# Patient Record
Sex: FEMALE | Race: WHITE | NOT HISPANIC OR LATINO | Employment: OTHER | URBAN - METROPOLITAN AREA
[De-identification: names, ages, dates, MRNs, and addresses within clinical notes are randomized per-mention and may not be internally consistent; named-entity substitution may affect disease eponyms.]

---

## 2017-01-09 DIAGNOSIS — R60.1 GENERALIZED EDEMA: ICD-10-CM

## 2017-01-09 RX ORDER — OMEGA-3-ACID ETHYL ESTERS 1 G/1
4 CAPSULE, LIQUID FILLED ORAL DAILY
Qty: 120 CAPSULE | Refills: 0 | Status: SHIPPED | OUTPATIENT
Start: 2017-01-09 | End: 2017-02-11 | Stop reason: SDUPTHER

## 2017-01-09 RX ORDER — FUROSEMIDE 20 MG/1
20 TABLET ORAL DAILY
Qty: 30 TABLET | Refills: 0 | Status: SHIPPED | OUTPATIENT
Start: 2017-01-09 | End: 2017-04-22 | Stop reason: SDUPTHER

## 2017-01-23 ENCOUNTER — OFFICE VISIT (OUTPATIENT)
Dept: INTERNAL MEDICINE | Facility: CLINIC | Age: 57
End: 2017-01-23

## 2017-01-23 VITALS
BODY MASS INDEX: 42.43 KG/M2 | SYSTOLIC BLOOD PRESSURE: 132 MMHG | HEIGHT: 66 IN | WEIGHT: 264 LBS | OXYGEN SATURATION: 94 % | DIASTOLIC BLOOD PRESSURE: 84 MMHG | HEART RATE: 89 BPM

## 2017-01-23 DIAGNOSIS — I10 ESSENTIAL HYPERTENSION: ICD-10-CM

## 2017-01-23 DIAGNOSIS — H72.91 RIGHT OTITIS MEDIA WITH SPONTANEOUS RUPTURE OF EARDRUM: ICD-10-CM

## 2017-01-23 DIAGNOSIS — E78.49 OTHER HYPERLIPIDEMIA: ICD-10-CM

## 2017-01-23 DIAGNOSIS — R05.9 COUGH: ICD-10-CM

## 2017-01-23 DIAGNOSIS — H66.91 RIGHT OTITIS MEDIA WITH SPONTANEOUS RUPTURE OF EARDRUM: ICD-10-CM

## 2017-01-23 DIAGNOSIS — H66.41 RECURRENT SUPPURATIVE OTITIS MEDIA, RIGHT: ICD-10-CM

## 2017-01-23 DIAGNOSIS — H72.91 PERFORATED RIGHT TYMPANIC MEMBRANE ON EXAMINATION: ICD-10-CM

## 2017-01-23 DIAGNOSIS — E11.8 TYPE 2 DIABETES MELLITUS WITH COMPLICATION, WITHOUT LONG-TERM CURRENT USE OF INSULIN (HCC): Primary | ICD-10-CM

## 2017-01-23 DIAGNOSIS — B37.31 VAGINAL YEAST INFECTION: ICD-10-CM

## 2017-01-23 DIAGNOSIS — G43.019 INTRACTABLE MIGRAINE WITHOUT AURA AND WITHOUT STATUS MIGRAINOSUS: ICD-10-CM

## 2017-01-23 DIAGNOSIS — K59.1 FUNCTIONAL DIARRHEA: ICD-10-CM

## 2017-01-23 DIAGNOSIS — R10.2 PELVIC PAIN: ICD-10-CM

## 2017-01-23 DIAGNOSIS — R35.0 URINARY FREQUENCY: ICD-10-CM

## 2017-01-23 DIAGNOSIS — M17.11 PRIMARY OSTEOARTHRITIS OF RIGHT KNEE: ICD-10-CM

## 2017-01-23 LAB
BILIRUB BLD-MCNC: NEGATIVE MG/DL
CLARITY, POC: CLEAR
COLOR UR: YELLOW
GLUCOSE BLDC GLUCOMTR-MCNC: 140 MG/DL (ref 70–130)
GLUCOSE UR STRIP-MCNC: NEGATIVE MG/DL
HBA1C MFR BLD: 6.6 %
KETONES UR QL: NEGATIVE
LEUKOCYTE EST, POC: NEGATIVE
NITRITE UR-MCNC: NEGATIVE MG/ML
PH UR: 7 [PH] (ref 5–8)
PROT UR STRIP-MCNC: NEGATIVE MG/DL
RBC # UR STRIP: NEGATIVE /UL
SP GR UR: 1.01 (ref 1–1.03)
UROBILINOGEN UR QL: NORMAL

## 2017-01-23 PROCEDURE — 81003 URINALYSIS AUTO W/O SCOPE: CPT | Performed by: HOSPITALIST

## 2017-01-23 PROCEDURE — 83036 HEMOGLOBIN GLYCOSYLATED A1C: CPT | Performed by: HOSPITALIST

## 2017-01-23 PROCEDURE — 82947 ASSAY GLUCOSE BLOOD QUANT: CPT | Performed by: HOSPITALIST

## 2017-01-23 PROCEDURE — 96372 THER/PROPH/DIAG INJ SC/IM: CPT | Performed by: HOSPITALIST

## 2017-01-23 PROCEDURE — 99214 OFFICE O/P EST MOD 30 MIN: CPT | Performed by: HOSPITALIST

## 2017-01-23 RX ORDER — KETOROLAC TROMETHAMINE 30 MG/ML
60 INJECTION, SOLUTION INTRAMUSCULAR; INTRAVENOUS ONCE
Status: COMPLETED | OUTPATIENT
Start: 2017-01-23 | End: 2017-01-23

## 2017-01-23 RX ORDER — FLUCONAZOLE 150 MG/1
150 TABLET ORAL ONCE
Qty: 2 TABLET | Refills: 0 | Status: SHIPPED | OUTPATIENT
Start: 2017-01-23 | End: 2017-01-23

## 2017-01-23 RX ORDER — CEFDINIR 300 MG/1
300 CAPSULE ORAL 2 TIMES DAILY
Qty: 20 CAPSULE | Refills: 0 | Status: SHIPPED | OUTPATIENT
Start: 2017-01-23 | End: 2017-06-12

## 2017-01-23 RX ADMIN — KETOROLAC TROMETHAMINE 60 MG: 30 INJECTION, SOLUTION INTRAMUSCULAR; INTRAVENOUS at 14:02
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
## 2017-01-23 NOTE — MR AVS SNAPSHOT
Saadia Caceres   1/23/2017 12:30 PM   Office Visit    Dept Phone:  [phone]   Encounter #:  [id number]    Provider:  Carmen Schmitz MD   Department:  [facility] INTERNAL MEDICINE AND ENDOCRINOLOGY [place]                Your Full Care Plan              Today's Medication Changes          These changes are accurate as of: 1/23/17  1:57 PM.  If you have any questions, ask your nurse or doctor.               New Medication(s)Ordered:     cefdinir 300 MG capsule   Commonly known as:  OMNICEF   Take 1 capsule by mouth 2 (Two) Times a Day.   Started by:  Carmen Schmitz MD         Medication(s)that have changed:     fluconazole 150 MG tablet   Commonly known as:  DIFLUCAN   Take 1 tablet by mouth 1 (One) Time for 1 dose. Repeat in 1 week if needed   What changed:  See the new instructions.   Changed by:  Carmen Schmitz MD         Stop taking medication(s)listed here:     amoxicillin 875 MG tablet   Commonly known as:  AMOXIL   Stopped by:  Carmen Schmitz MD           azithromycin 250 MG tablet   Commonly known as:  ZITHROMAX Z-MAGALY   Stopped by:  Carmen Schmitz MD                Where to Get Your Medications      These medications were sent to [facility] [address] [place] [address] [address] AT [facility] [address] - [phone] [facility] [phone]   [address] [facility] [facility] [address]     Phone:  [phone]     cefdinir 300 MG capsule    fluconazole 150 MG tablet                  Your Updated Medication List          This list is accurate as of: 1/23/17  1:57 PM.  Always use your most recent med list.                * albuterol (2.5 MG/3ML) 0.083% nebulizer solution   Commonly known as:  PROVENTIL       * albuterol 108 (90 BASE) MCG/ACT inhaler   Commonly known as:  PROAIR HFA   Inhale 2 puffs every 6 (six) hours as needed for wheezing.       ALPRAZolam 2 MG tablet   Commonly known as:  XANAX       B-D ULTRAFINE III SHORT PEN  31G X 8 MM misc   Generic drug:  Insulin Pen Needle   USE AS DIRECTED TWICE DAILY       * CLEM CONTOUR NEXT TEST test strip   Generic drug:  glucose blood   TEST FOUR TIMES DAILY AS DIRECTED       * CLEM CONTOUR NEXT TEST test strip   Generic drug:  glucose blood   TEST FOUR TIMES DAILY AS DIRECTED       bethanechol 25 MG tablet   Commonly known as:  URECHOLINE       budesonide-formoterol 160-4.5 MCG/ACT inhaler   Commonly known as:  SYMBICORT   Inhale 2 puffs 2 (two) times a day.       butalbital-acetaminophen-caffeine -40 MG per tablet   Commonly known as:  FIORICET, ESGIC   Take 1 tablet by mouth every 6 (six) hours as needed for headaches.       cefdinir 300 MG capsule   Commonly known as:  OMNICEF   Take 1 capsule by mouth 2 (Two) Times a Day.       cyclobenzaprine 10 MG tablet   Commonly known as:  FLEXERIL       dicyclomine 10 MG capsule   Commonly known as:  BENTYL   Take 1 capsule by mouth 4 (Four) Times a Day Before Meals & at Bedtime.       divalproex 500 MG 24 hr tablet   Commonly known as:  DEPAKOTE       estazolam 2 MG tablet   Commonly known as:  PROSOM       estradiol 2 MG tablet   Commonly known as:  ESTRACE   Take 1 tablet by mouth Daily.       fluconazole 150 MG tablet   Commonly known as:  DIFLUCAN   Take 1 tablet by mouth 1 (One) Time for 1 dose. Repeat in 1 week if needed       furosemide 20 MG tablet   Commonly known as:  LASIX   Take 1 tablet by mouth Daily.       levothyroxine 88 MCG tablet   Commonly known as:  SYNTHROID, LEVOTHROID       metFORMIN  MG 24 hr tablet   Commonly known as:  GLUCOPHAGE-XR       * MICROLET LANCETS misc   TEST FOUR TIMES DAILY AS DIRECTED       * MICROLET LANCETS misc   TEST FOUR TIMES DAILY AS DIRECTED       NOVOLOG MIX 70/30 FLEXPEN (70-30) 100 UNIT/ML suspension pen-injector injection   Generic drug:  insulin aspart prot & aspart   INJECT 21 UNITS SUBCUTANOULY EVERY MORNING AND 7 UNITS EVERY EVENING AS DIRECTED       nystatin 002510 UNIT/GM cream    Commonly known as:  MYCOSTATIN   APPLY THREE TIMES DAILY UNDER AFFECTED SKIN FOLDS       omega-3 acid ethyl esters 1 G capsule   Commonly known as:  LOVAZA   Take 4 capsules by mouth Daily.       omeprazole 20 MG capsule   Commonly known as:  priLOSEC   TAKE 1 CAPSULE BY MOUTH EVERY DAY 30 MINUTES BEFORE A MEAL ON AN EMPTY STOMACH       oxyCODONE-acetaminophen  MG per tablet   Commonly known as:  PERCOCET       promethazine 25 MG tablet   Commonly known as:  PHENERGAN       saccharomyces boulardii 250 MG capsule   Commonly known as:  FLORASTOR   Take 1 capsule by mouth 2 (two) times a day.       SUMAtriptan 50 MG tablet   Commonly known as:  IMITREX   Take one tablet at onset of headache. May repeat dose one time in 2 hours if headache not relieved.       traZODone 100 MG tablet   Commonly known as:  DESYREL       * Notice:  This list has 6 medication(s) that are the same as other medications prescribed for you. Read the directions carefully, and ask your doctor or other care provider to review them with you.            We Performed the Following     POC Glucose Fingerstick     POC Glycosylated Hemoglobin (Hb A1C)     POC Urinalysis Dipstick, Automated       You Were Diagnosed With        Codes Comments    Type 2 diabetes mellitus with complication, without long-term current use of insulin    -  Primary ICD-10-CM: E11.8  ICD-9-CM: 250.90     Urinary frequency     ICD-10-CM: R35.0  ICD-9-CM: 788.41     Intractable migraine without aura and without status migrainosus     ICD-10-CM: G43.019  ICD-9-CM: 346.11     Essential hypertension     ICD-10-CM: I10  ICD-9-CM: 401.9     Other hyperlipidemia     ICD-10-CM: E78.4  ICD-9-CM: 272.4     Cough     ICD-10-CM: R05  ICD-9-CM: 786.2     Functional diarrhea     ICD-10-CM: K59.1  ICD-9-CM: 564.5     Primary osteoarthritis of right knee     ICD-10-CM: M17.11  ICD-9-CM: 715.16     Pelvic pain     ICD-10-CM: R10.2  ICD-9-CM: GIZ7660     Right otitis media with spontaneous  "rupture of eardrum     ICD-10-CM: H66.91, H72.91  ICD-9-CM: 382.9     Recurrent suppurative otitis media, right     ICD-10-CM: H66.41  ICD-9-CM: 382.4     Perforated right tympanic membrane on examination     ICD-10-CM: H72.91  ICD-9-CM: 384.20     Vaginal yeast infection     ICD-10-CM: B37.3  ICD-9-CM: 112.1       Instructions     None    Patient Instructions History      Upcoming Appointments     Visit Type Date Time Department    FOLLOW UP 1/23/2017 12:30 PM [facility]      [facility] Signup     Our records indicate that you have declined [facility] signup. If you would like to sign up for [employer], please email [email or web address] or call [phone] to obtain an activation code.             Other Info from Your Visit           Allergies     Hydrocodone  Nausea Only    Ibuprofen  Nausea Only    Lortab [Hydrocodone-acetaminophen]      Ondansetron  Itching    Rosuvastatin        Reason for Visit     Type 2 diabetes mellitus with complication, without long-ter     Chronic obstructive pulmonary disease with acute exacerbatio     requesting a handicap parking permit     Urinary Incontinence     Neck Pain           Vital Signs     Blood Pressure Pulse Height Weight Oxygen Saturation Body Mass Index    132/84 89 66\" (167.6 cm) 264 lb (120 kg) 94% 42.61 kg/m2    Smoking Status                   Current Every Day Smoker           Problems and Diagnoses Noted     Pelvic pain    Cough    Diabetes    Diarrhea    High cholesterol or triglycerides    High blood pressure    Migraines    Degenerative arthritis of knee    Perforated right tympanic membrane on examination    Middle ear infection    Urinary frequency    Right otitis media with spontaneous rupture of eardrum        Vaginal yeast infection          No Longer an Issue     Migraines      Results     POC Glycosylated Hemoglobin (Hb A1C)      Component Value Standard Range & Units    Hemoglobin A1C 6.6 %                POC Glucose Fingerstick      " Component Value Standard Range & Units    Glucose 140 70 - 130 mg/dL                POC Urinalysis Dipstick, Automated      Component Value Standard Range & Units    Color Yellow Yellow, Straw, Dark Yellow, Emerald    Clarity, UA Clear Clear    Glucose, UA Negative Negative, 1000 mg/dL (3+) mg/dL    Bilirubin Negative Negative    Ketones, UA Negative Negative    Specific Gravity  1.015 1.005 - 1.030    Blood, UA Negative Negative    pH, Urine 7.0 5.0 - 8.0    Protein, POC Negative Negative mg/dL    Urobilinogen, UA Normal Normal    Leukocytes Negative Negative    Nitrite, UA Negative Negative

## 2017-02-03 ENCOUNTER — TELEPHONE (OUTPATIENT)
Dept: INTERNAL MEDICINE | Facility: CLINIC | Age: 57
End: 2017-02-03

## 2017-02-03 NOTE — TELEPHONE ENCOUNTER
Please Advise     ----- Message from Gabi Marinelli sent at 2/1/2017  9:49 AM EST -----  Contact: PATIENT   RE: RECENT FALL    PATIENT STATES SHE FELL RECENTLY AND WOULD LIKE TO KNOW IF SHE NEEDS TO BE SEEN BY DR CONTRERAS OR IF SHE NEEDS TO GO SEE HER PAIN MANAGEMENT DOCTOR. PLEASE ADVISE PATIENT.    CALL BACK #[phone]

## 2017-02-06 VITALS
HEART RATE: 82 BPM | TEMPERATURE: 97.7 F | BODY MASS INDEX: 41.78 KG/M2 | WEIGHT: 260 LBS | HEIGHT: 66 IN | DIASTOLIC BLOOD PRESSURE: 67 MMHG | RESPIRATION RATE: 22 BRPM | OXYGEN SATURATION: 93 % | SYSTOLIC BLOOD PRESSURE: 142 MMHG

## 2017-02-06 PROCEDURE — 99282 EMERGENCY DEPT VISIT SF MDM: CPT

## 2017-02-07 ENCOUNTER — APPOINTMENT (OUTPATIENT)
Dept: GENERAL RADIOLOGY | Facility: HOSPITAL | Age: 57
End: 2017-02-07

## 2017-02-07 ENCOUNTER — TELEPHONE (OUTPATIENT)
Dept: INTERNAL MEDICINE | Facility: CLINIC | Age: 57
End: 2017-02-07

## 2017-02-07 ENCOUNTER — HOSPITAL ENCOUNTER (EMERGENCY)
Facility: HOSPITAL | Age: 57
Discharge: HOME | End: 2017-02-07
Attending: EMERGENCY MEDICINE | Admitting: EMERGENCY MEDICINE

## 2017-02-07 DIAGNOSIS — S70.02XA CONTUSION OF LEFT HIP, INITIAL ENCOUNTER: ICD-10-CM

## 2017-02-07 DIAGNOSIS — S40.011A CONTUSION OF RIGHT SHOULDER, INITIAL ENCOUNTER: ICD-10-CM

## 2017-02-07 DIAGNOSIS — S40.012A CONTUSION OF LEFT SHOULDER, INITIAL ENCOUNTER: ICD-10-CM

## 2017-02-07 DIAGNOSIS — S80.01XA CONTUSION OF RIGHT KNEE, INITIAL ENCOUNTER: ICD-10-CM
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
## 2017-02-07 PROCEDURE — 72170 X-RAY EXAM OF PELVIS: CPT

## 2017-02-07 PROCEDURE — 73030 X-RAY EXAM OF SHOULDER: CPT

## 2017-02-07 PROCEDURE — 73560 X-RAY EXAM OF KNEE 1 OR 2: CPT

## 2017-02-07 NOTE — TELEPHONE ENCOUNTER
----- Message from Jena Ortiz sent at 2/6/2017  9:23 AM EST -----  THE PATIENT IS WANTED TO KNOW WHEN HER NEXT APPOINTMENT IS WITH HER COLONOSCOPY . THE PATIENT'S CALL BACK NUMBER [phone]
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
## 2017-02-07 NOTE — TELEPHONE ENCOUNTER
----- Message from Gabi Marinelli sent at 2/7/2017  9:43 AM EST -----  Contact: PATIENT   RE: RECENT FALL    PATIENT STATES SHE FELL SEVERAL DAYS AGO, SHE WENT TO ER TODAY. SHE WAS TOLD THAT SHE WAS BADLY BRUISED AND WOULD LIKE TO KNOW WHAT DR CONTRERAS WOULD LIKE FOR HER TO DO.      CALL BACK #[phone]

## 2017-02-07 NOTE — DISCHARGE INSTRUCTIONS
Please review the medications you are supposed to be taking according to prior physician recommendations. I have not changed your home medications during this visit. If your discharge instructions indicate that I have changed your home medications, this is not the case, and you should disregard. If you have any questions about the medication you should be taking at home, please call your physician.

## 2017-02-13 RX ORDER — OMEGA-3-ACID ETHYL ESTERS 1 G/1
CAPSULE, LIQUID FILLED ORAL
Qty: 120 CAPSULE | Refills: 0 | Status: SHIPPED | OUTPATIENT
Start: 2017-02-13 | End: 2017-04-11 | Stop reason: SDUPTHER

## 2017-02-13 RX ORDER — ESTRADIOL 2 MG/1
TABLET ORAL
Qty: 30 TABLET | Refills: 0 | Status: SHIPPED | OUTPATIENT
Start: 2017-02-13 | End: 2017-03-24 | Stop reason: SDUPTHER

## 2017-02-14 ENCOUNTER — TELEPHONE (OUTPATIENT)
Dept: INTERNAL MEDICINE | Facility: CLINIC | Age: 57
End: 2017-02-14

## 2017-02-14 NOTE — TELEPHONE ENCOUNTER
----- Message from Charlotte Barraza sent at 2/13/2017 11:00 AM EST -----  Contact: [phone]  The patient would like you to call her back at [phone]. She is having a lot of problems after her fall, she can't get around very well or clean her house or do anything. She was wanting to see if she could get a home health nurse out to her house to help her.

## 2017-02-15 DIAGNOSIS — Z91.81 HISTORY OF RECENT FALL: Primary | ICD-10-CM

## 2017-02-22 ENCOUNTER — TELEPHONE (OUTPATIENT)
Dept: ENDOCRINOLOGY | Facility: CLINIC | Age: 57
End: 2017-02-22

## 2017-02-22 ENCOUNTER — TELEPHONE (OUTPATIENT)
Dept: INTERNAL MEDICINE | Facility: CLINIC | Age: 57
End: 2017-02-22

## 2017-02-22 NOTE — TELEPHONE ENCOUNTER
----- Message from Carmen Schmitz MD sent at 2/21/2017  8:38 PM EST -----  Can give verbal order for PT/OT  ----- Message -----     From: Estefany DELGADO MA     Sent: 2/21/2017   4:24 PM       To: MD Dr. Ainsley Mendez pls advise on verbal orders for PT and OT.     ----- Message -----     From: Jena Ortiz     Sent: 2/21/2017  10:52 AM       To: SHANON Jenkins FROM [facility] WOULD LIKE TO GET A  ORDER FOR THE PATIENT TO RECEIVE PT AND OT FROM THEM. SHE STATES THAT THE PATIENT HAD AN ORDER BEFORE BUT IT HAD RAN OUT. DMITRI'S CALL BACK NUMBER [phone]

## 2017-02-22 NOTE — TELEPHONE ENCOUNTER
----- Message from Carmen Schmitz MD sent at 2/21/2017  8:38 PM EST -----  Can give verbal order for PT/OT  ----- Message -----     From: Estefany DELGADO MA     Sent: 2/21/2017   4:24 PM       To: MD Dr. Ainsley Mendez pls advise on verbal orders for PT and OT.     ----- Message -----     From: Jena Ortiz     Sent: 2/21/2017  10:52 AM       To: SHANON Jenkins FROM [facility] WOULD LIKE TO GET A  ORDER FOR THE PATIENT TO RECEIVE PT AND OT FROM THEM. SHE STATES THAT THE PATIENT HAD AN ORDER BEFORE BUT IT HAD RAN OUT. DMITRI'S CALL BACK NUMBER [phone]

## 2017-02-24 ENCOUNTER — TELEPHONE (OUTPATIENT)
Dept: INTERNAL MEDICINE | Facility: CLINIC | Age: 57
End: 2017-02-24

## 2017-02-24 DIAGNOSIS — Z79.899 MEDICATION MANAGEMENT: Primary | ICD-10-CM

## 2017-02-24 NOTE — TELEPHONE ENCOUNTER
2/24/17    Called Tarsha back and tried to give a verbal order but she said that she would not be able to take it since she is a PT.    Dr. Schmitz, can you put an order in and fax it over for a nurse to come out to the pt home please?

## 2017-02-24 NOTE — TELEPHONE ENCOUNTER
----- Message from Charlotte Barraza sent at 2/24/2017  3:32 PM EST -----  Tarsha from [facility] is calling to let dr alcocer know that she set her up for home health for the patient and she is going to see her for 6 visits to address her balance issues. She wanted to see if Dr. Alcocer could order nursing for the patient because she has her medications in disarray. Tarsha can be reached at [phone].

## 2017-03-15 ENCOUNTER — TELEPHONE (OUTPATIENT)
Dept: INTERNAL MEDICINE | Facility: CLINIC | Age: 57
End: 2017-03-15

## 2017-03-15 NOTE — TELEPHONE ENCOUNTER
3/15/17    Called and left a v/m stating the pt's provider has been out of the office and when she returns, we will get this paperwork signed and faxed back over.

## 2017-03-15 NOTE — TELEPHONE ENCOUNTER
----- Message from Daniela oFng sent at 3/13/2017 12:51 PM EDT -----  Contact: ERINN WITH [facility]  SHE IS CALLING STATING THEY FAXED US A MAP 10 FORM ON 03/07/2017 FOR DR. CONTRERAS TO SIGN AND TO HAVE DIAGNOSIS WITH THE ICD 10 CODES ON IT. SHE IS CALLING TO CHECK THE STATUS OF THIS FORM. YOU CAN REACH [phone]

## 2017-03-17 RX ORDER — DICYCLOMINE HYDROCHLORIDE 10 MG/1
CAPSULE ORAL
Qty: 120 CAPSULE | Refills: 0 | Status: SHIPPED | OUTPATIENT
Start: 2017-03-17 | End: 2017-06-28 | Stop reason: SDUPTHER

## 2017-03-21 ENCOUNTER — TELEPHONE (OUTPATIENT)
Dept: INTERNAL MEDICINE | Facility: CLINIC | Age: 57
End: 2017-03-21

## 2017-03-21 NOTE — TELEPHONE ENCOUNTER
3/21/17    Is it okay to do a verbal order for home health to go back out to the pt's home and help her with getting her meds back in order? If so, then for how long?    Please advise.

## 2017-03-24 RX ORDER — ESTRADIOL 2 MG/1
2 TABLET ORAL DAILY
Qty: 30 TABLET | Refills: 5 | Status: SHIPPED | OUTPATIENT
Start: 2017-03-24 | End: 2017-08-31 | Stop reason: SDUPTHER

## 2017-03-24 NOTE — TELEPHONE ENCOUNTER
Spoke with Nazia the Home Health Nurse and she states that she will send a paper over for Dr alcocer to sign but they will be unable to do indefinitely because of Patients medicare.

## 2017-03-28 ENCOUNTER — TELEPHONE (OUTPATIENT)
Dept: INTERNAL MEDICINE | Facility: CLINIC | Age: 57
End: 2017-03-28

## 2017-03-28 NOTE — TELEPHONE ENCOUNTER
----- Message from Jena Ortiz sent at 3/27/2017  9:39 AM EDT -----  HOSEA THE PATIENT'S NURSE CALLED IN REGARDS BECAUSE THE PATIENT HAS STOPPED TAKING HER BETHANECHOL MEDICATION . AND ALSO HOSEA HAS SOME QUESTIONS ABOUT THE PATIENT'S NOVOLG MEDICATION. HOSEA'S  CALL BACK NUMBER [phone]

## 2017-04-05 ENCOUNTER — TELEPHONE (OUTPATIENT)
Dept: INTERNAL MEDICINE | Facility: CLINIC | Age: 57
End: 2017-04-05

## 2017-04-05 NOTE — TELEPHONE ENCOUNTER
Tried to call patient back with no answer     ----- Message from Gabi Marinelli sent at 4/4/2017 11:42 AM EDT -----  Contact: PATIENT   MS GOMEZ CALLED TODAY, NOT SURE WHAT SHE WANTED, I DID MAKE HER AN APPT TO SEE DR CONTRERAS ON FRI 4/7. SHE STATES SHE IS FEELING DIZZY. HER SPEECH WAS SLURRED DURING OUR CONVERSATION. SHE STATES SHE WOULD LIKE A RETURN CALL.      CALL BACK #[phone]

## 2017-04-11 RX ORDER — OMEGA-3-ACID ETHYL ESTERS 1 G/1
CAPSULE, LIQUID FILLED ORAL
Qty: 120 CAPSULE | Refills: 0 | Status: SHIPPED | OUTPATIENT
Start: 2017-04-11 | End: 2017-05-30 | Stop reason: SDUPTHER

## 2017-04-22 DIAGNOSIS — R60.1 GENERALIZED EDEMA: ICD-10-CM

## 2017-04-24 ENCOUNTER — OFFICE VISIT (OUTPATIENT)
Dept: INTERNAL MEDICINE | Facility: CLINIC | Age: 57
End: 2017-04-24

## 2017-04-24 VITALS
OXYGEN SATURATION: 96 % | HEART RATE: 98 BPM | BODY MASS INDEX: 41.45 KG/M2 | WEIGHT: 256.8 LBS | SYSTOLIC BLOOD PRESSURE: 148 MMHG | DIASTOLIC BLOOD PRESSURE: 68 MMHG

## 2017-04-24 DIAGNOSIS — F32.9 REACTIVE DEPRESSION: ICD-10-CM

## 2017-04-24 DIAGNOSIS — R35.0 FREQUENCY OF URINATION: ICD-10-CM

## 2017-04-24 DIAGNOSIS — R53.83 OTHER FATIGUE: ICD-10-CM

## 2017-04-24 DIAGNOSIS — Z51.81 MEDICATION MONITORING ENCOUNTER: ICD-10-CM

## 2017-04-24 DIAGNOSIS — E03.8 OTHER SPECIFIED HYPOTHYROIDISM: ICD-10-CM

## 2017-04-24 DIAGNOSIS — E11.8 TYPE 2 DIABETES MELLITUS WITH COMPLICATION, WITHOUT LONG-TERM CURRENT USE OF INSULIN (HCC): ICD-10-CM

## 2017-04-24 DIAGNOSIS — E78.49 OTHER HYPERLIPIDEMIA: Primary | ICD-10-CM

## 2017-04-24 LAB
BASOPHILS # BLD AUTO: 0.03 10*3/MM3 (ref 0–0.2)
BASOPHILS NFR BLD AUTO: 0.3 % (ref 0–1)
DEPRECATED RDW RBC AUTO: 53.8 FL (ref 37–54)
EOSINOPHIL # BLD AUTO: 0.16 10*3/MM3 (ref 0.1–0.3)
EOSINOPHIL NFR BLD AUTO: 1.7 % (ref 0–3)
ERYTHROCYTE [DISTWIDTH] IN BLOOD BY AUTOMATED COUNT: 15.1 % (ref 11.3–14.5)
GLUCOSE BLDC GLUCOMTR-MCNC: 121 MG/DL (ref 70–130)
HBA1C MFR BLD: 6.6 %
HCT VFR BLD AUTO: 42.7 % (ref 34.5–44)
HGB BLD-MCNC: 13.7 G/DL (ref 11.5–15.5)
IMM GRANULOCYTES # BLD: 0.04 10*3/MM3 (ref 0–0.03)
IMM GRANULOCYTES NFR BLD: 0.4 % (ref 0–0.6)
LYMPHOCYTES # BLD AUTO: 3.94 10*3/MM3 (ref 0.6–4.8)
LYMPHOCYTES NFR BLD AUTO: 42.8 % (ref 24–44)
MCH RBC QN AUTO: 31.5 PG (ref 27–31)
MCHC RBC AUTO-ENTMCNC: 32.1 G/DL (ref 32–36)
MCV RBC AUTO: 98.2 FL (ref 80–99)
MONOCYTES # BLD AUTO: 0.59 10*3/MM3 (ref 0–1)
MONOCYTES NFR BLD AUTO: 6.4 % (ref 0–12)
NEUTROPHILS # BLD AUTO: 4.45 10*3/MM3 (ref 1.5–8.3)
NEUTROPHILS NFR BLD AUTO: 48.4 % (ref 41–71)
PLATELET # BLD AUTO: 230 10*3/MM3 (ref 150–450)
PMV BLD AUTO: 9.1 FL (ref 6–12)
RBC # BLD AUTO: 4.35 10*6/MM3 (ref 3.89–5.14)
TSH SERPL DL<=0.05 MIU/L-ACNC: 1.8 MIU/ML (ref 0.35–5.35)
VALPROATE SERPL-MCNC: 58 MCG/ML (ref 50–150)
VIT B12 BLD-MCNC: 488 PG/ML (ref 211–911)
WBC NRBC COR # BLD: 9.21 10*3/MM3 (ref 3.5–10.8)

## 2017-04-24 PROCEDURE — 99214 OFFICE O/P EST MOD 30 MIN: CPT | Performed by: HOSPITALIST

## 2017-04-24 PROCEDURE — 83036 HEMOGLOBIN GLYCOSYLATED A1C: CPT | Performed by: HOSPITALIST

## 2017-04-24 PROCEDURE — 82607 VITAMIN B-12: CPT | Performed by: HOSPITALIST

## 2017-04-24 PROCEDURE — 84443 ASSAY THYROID STIM HORMONE: CPT | Performed by: HOSPITALIST

## 2017-04-24 PROCEDURE — 82947 ASSAY GLUCOSE BLOOD QUANT: CPT | Performed by: HOSPITALIST

## 2017-04-24 PROCEDURE — 80164 ASSAY DIPROPYLACETIC ACD TOT: CPT | Performed by: HOSPITALIST

## 2017-04-24 PROCEDURE — 85025 COMPLETE CBC W/AUTO DIFF WBC: CPT | Performed by: HOSPITALIST

## 2017-04-24 RX ORDER — BETHANECHOL CHLORIDE 25 MG/1
25 TABLET ORAL 3 TIMES DAILY
Qty: 90 TABLET | Refills: 1 | Status: SHIPPED | OUTPATIENT
Start: 2017-04-24 | End: 2017-08-01 | Stop reason: SINTOL

## 2017-04-24 RX ORDER — FUROSEMIDE 20 MG/1
TABLET ORAL
Qty: 90 TABLET | Refills: 0 | Status: SHIPPED | OUTPATIENT
Start: 2017-04-24 | End: 2017-10-06 | Stop reason: SDUPTHER

## 2017-04-24 RX ORDER — BUPROPION HYDROCHLORIDE 150 MG/1
150 TABLET ORAL DAILY
Qty: 30 TABLET | Refills: 3 | Status: SHIPPED | OUTPATIENT
Start: 2017-04-24 | End: 2017-07-06 | Stop reason: ALTCHOICE

## 2017-04-24 NOTE — PROGRESS NOTES
Subjective   Saadia Caceres is a 56 y.o. female. Hyperlipidemia; Hypertension; Diabetes; Hypothyroidism; and Cough         HPI Comments: She is tired and not sleeping well. She has recently lost her brother 10 days ago. She is still crying and grieving . She is Dr. Schaffer's patient. She has been in contact with him. She is also worried about her granddaughter who is very ill. The patient is having frequency and dysuria. She is also fatigued and feels like sleeping all the time.    Hyperlipidemia   This is a chronic problem. The current episode started more than 1 year ago. Exacerbating diseases include hypothyroidism. Pertinent negatives include no chest pain.   Hypertension   Pertinent negatives include no chest pain.   Diabetes   Pertinent negatives for hypoglycemia include no confusion or seizures. Pertinent negatives for diabetes include no chest pain.   Hypothyroidism   Associated symptoms include coughing. Pertinent negatives include no abdominal pain, chest pain, chills, diaphoresis or numbness.   Cough   Pertinent negatives include no chest pain, chills or eye redness.       The following portions of the patient's history were reviewed and updated as appropriate: allergies, current medications, past family history, past medical history, past social history, past surgical history and problem list.    Review of Systems   Constitutional: Negative for chills and diaphoresis.   Eyes: Negative for pain and redness.   Respiratory: Positive for cough.    Cardiovascular: Negative for chest pain and leg swelling.   Gastrointestinal: Positive for diarrhea. Negative for abdominal distention, abdominal pain, anal bleeding and blood in stool.   Neurological: Negative for seizures and numbness.   Psychiatric/Behavioral: Negative for behavioral problems and confusion.       Objective   Vitals:    04/24/17 1411   BP: 148/68   Pulse: 98   SpO2: 96%       Physical Exam   Constitutional: She is oriented to person, place,  and time. She appears well-developed and well-nourished.   HENT:   Head: Normocephalic and atraumatic.   Eyes: Conjunctivae and EOM are normal. Pupils are equal, round, and reactive to light.   Neck: Normal range of motion. Neck supple.   Cardiovascular: Normal rate, regular rhythm, normal heart sounds and intact distal pulses.    Pulmonary/Chest: Effort normal and breath sounds normal.   Abdominal: Soft. Bowel sounds are normal.   Neurological: She is alert and oriented to person, place, and time.   Skin: Skin is warm and dry.   Psychiatric: Her behavior is normal. Judgment and thought content normal.       Assessment/Plan   [place] was seen today for hyperlipidemia, hypertension, diabetes, hypothyroidism and cough.    Diagnoses and all orders for this visit:    Other hyperlipidemia  -     Lipid Panel; Future    Frequency of urination  -     POCT urinalysis dipstick, automated  -     bethanechol (URECHOLINE) 25 MG tablet; Take 1 tablet by mouth 3 (Three) Times a Day.    Type 2 diabetes mellitus with complication, without long-term current use of insulin    Other specified hypothyroidism  -     TSH    Other fatigue  -     CBC & Differential  -     Vitamin B12    Reactive depression  -     buPROPion XL (WELLBUTRIN XL) 150 MG 24 hr tablet; Take 1 tablet by mouth Daily.    Medication monitoring encounter  -     Valproic Acid Level, Total

## 2017-04-28 ENCOUNTER — TELEPHONE (OUTPATIENT)
Dept: INTERNAL MEDICINE | Facility: CLINIC | Age: 57
End: 2017-04-28

## 2017-04-28 DIAGNOSIS — R21 RASH: Primary | ICD-10-CM

## 2017-04-28 NOTE — TELEPHONE ENCOUNTER
----- Message from Daniela Fong sent at 4/28/2017 12:06 PM EDT -----  Contact: PATIENT  PATIENT IS CALLING STATING SHE HAS A REAL BAD RASH IN THE GROWN AREA AND AROUND BELLY. SHE SAYS ITS VERY PAINFUL. SHE IS WANTING TO SEE IF WE CAN CALL SOMETHING IN TO HELP WITH THIS RASH AND PAIN. PATIENT CAN BE REACHED [phone].

## 2017-04-28 NOTE — TELEPHONE ENCOUNTER
4/28/17    Can you send in a rx for this pt or do you want her to go to [facility]?

## 2017-05-01 RX ORDER — NYSTATIN 100000 U/G
CREAM TOPICAL
Qty: 60 G | Refills: 0 | Status: SHIPPED | OUTPATIENT
Start: 2017-05-01 | End: 2017-08-31

## 2017-05-01 RX ORDER — IODOQUINOL, HYDROCORTISONE ACETATE AND ALOE VERA LEAF 10; 20; 10 MG/G; MG/G; MG/G
0.5 GEL TOPICAL 3 TIMES DAILY
Qty: 30 G | Refills: 1 | Status: SHIPPED | OUTPATIENT
Start: 2017-05-01 | End: 2017-05-05

## 2017-05-05 ENCOUNTER — TELEPHONE (OUTPATIENT)
Dept: INTERNAL MEDICINE | Facility: CLINIC | Age: 57
End: 2017-05-05

## 2017-05-08 RX ORDER — OMEPRAZOLE 20 MG/1
CAPSULE, DELAYED RELEASE ORAL
Qty: 30 CAPSULE | Refills: 0 | Status: SHIPPED | OUTPATIENT
Start: 2017-05-08 | End: 2017-06-05 | Stop reason: SDUPTHER

## 2017-05-30 RX ORDER — OMEGA-3-ACID ETHYL ESTERS 1 G/1
CAPSULE, LIQUID FILLED ORAL
Qty: 120 CAPSULE | Refills: 0 | Status: SHIPPED | OUTPATIENT
Start: 2017-05-30 | End: 2017-07-02 | Stop reason: SDUPTHER

## 2017-06-05 RX ORDER — OMEPRAZOLE 20 MG/1
CAPSULE, DELAYED RELEASE ORAL
Qty: 30 CAPSULE | Refills: 0 | Status: SHIPPED | OUTPATIENT
Start: 2017-06-05 | End: 2017-06-28 | Stop reason: SDUPTHER

## 2017-06-12 ENCOUNTER — OFFICE VISIT (OUTPATIENT)
Dept: INTERNAL MEDICINE | Facility: CLINIC | Age: 57
End: 2017-06-12

## 2017-06-12 VITALS
HEART RATE: 76 BPM | SYSTOLIC BLOOD PRESSURE: 122 MMHG | DIASTOLIC BLOOD PRESSURE: 68 MMHG | BODY MASS INDEX: 40.87 KG/M2 | WEIGHT: 253.2 LBS

## 2017-06-12 DIAGNOSIS — G43.011 INTRACTABLE MIGRAINE WITHOUT AURA AND WITH STATUS MIGRAINOSUS: ICD-10-CM

## 2017-06-12 DIAGNOSIS — R30.9 PAINFUL URINATION: Primary | ICD-10-CM

## 2017-06-12 DIAGNOSIS — B37.2 CUTANEOUS CANDIDIASIS: ICD-10-CM

## 2017-06-12 LAB
BILIRUB BLD-MCNC: NEGATIVE MG/DL
CLARITY, POC: ABNORMAL
COLOR UR: YELLOW
GLUCOSE UR STRIP-MCNC: NEGATIVE MG/DL
KETONES UR QL: NEGATIVE
LEUKOCYTE EST, POC: ABNORMAL
NITRITE UR-MCNC: NEGATIVE MG/ML
PH UR: 6 [PH] (ref 5–8)
PROT UR STRIP-MCNC: NEGATIVE MG/DL
RBC # UR STRIP: NEGATIVE /UL
SP GR UR: 1.02 (ref 1–1.03)
UROBILINOGEN UR QL: ABNORMAL

## 2017-06-12 PROCEDURE — 87186 SC STD MICRODIL/AGAR DIL: CPT | Performed by: PHYSICIAN ASSISTANT

## 2017-06-12 PROCEDURE — 99213 OFFICE O/P EST LOW 20 MIN: CPT | Performed by: PHYSICIAN ASSISTANT

## 2017-06-12 PROCEDURE — 87077 CULTURE AEROBIC IDENTIFY: CPT | Performed by: PHYSICIAN ASSISTANT

## 2017-06-12 PROCEDURE — 96372 THER/PROPH/DIAG INJ SC/IM: CPT | Performed by: PHYSICIAN ASSISTANT

## 2017-06-12 PROCEDURE — 87086 URINE CULTURE/COLONY COUNT: CPT | Performed by: PHYSICIAN ASSISTANT

## 2017-06-12 PROCEDURE — 81003 URINALYSIS AUTO W/O SCOPE: CPT | Performed by: PHYSICIAN ASSISTANT

## 2017-06-12 RX ORDER — PROMETHAZINE HYDROCHLORIDE 25 MG/ML
12.5 INJECTION, SOLUTION INTRAMUSCULAR; INTRAVENOUS ONCE
Status: DISCONTINUED | OUTPATIENT
Start: 2017-06-12 | End: 2017-06-12

## 2017-06-12 RX ORDER — PROMETHAZINE HYDROCHLORIDE 25 MG/ML
25 INJECTION, SOLUTION INTRAMUSCULAR; INTRAVENOUS ONCE
Status: COMPLETED | OUTPATIENT
Start: 2017-06-12 | End: 2017-06-12

## 2017-06-12 RX ORDER — KETOROLAC TROMETHAMINE 30 MG/ML
60 INJECTION, SOLUTION INTRAMUSCULAR; INTRAVENOUS ONCE
Status: COMPLETED | OUTPATIENT
Start: 2017-06-12 | End: 2017-06-12

## 2017-06-12 RX ADMIN — KETOROLAC TROMETHAMINE 60 MG: 30 INJECTION, SOLUTION INTRAMUSCULAR; INTRAVENOUS at 13:07

## 2017-06-12 RX ADMIN — PROMETHAZINE HYDROCHLORIDE 25 MG: 25 INJECTION, SOLUTION INTRAMUSCULAR; INTRAVENOUS at 13:08

## 2017-06-12 NOTE — PROGRESS NOTES
Chief Complaint   Patient presents with   • Rash     breast and groin   • Migraine   • Difficulty Urinating       Subjective   Saadia Caceres is a 56 y.o. female.       History of Present Illness     Pt has had migraine for several days. Has long history of migraines that come and go. Has taken imitrex in the past but was told that she needed a refill. Took a xanax but has not taken anything else for her migraine.    She is having some cutaneous flare. She was not able to get the dermazone cream covered. Has used a medicated cream that is orange and starts with an S, not sure of the name.    Has history of recurrent UTI, having some dysuria.    Current Outpatient Prescriptions:   •  albuterol (PROAIR HFA) 108 (90 BASE) MCG/ACT inhaler, Inhale 2 puffs every 6 (six) hours as needed for wheezing., Disp: 1 inhaler, Rfl: 5  •  albuterol (PROVENTIL) (2.5 MG/3ML) 0.083% nebulizer solution, Albuterol Sulfate (2.5 MG/3ML) 0.083% Inhalation Nebulization Solution; Patient Sig: Albuterol Sulfate (2.5 MG/3ML) 0.083% Inhalation Nebulization Solution USE 1 VIAL PER NEBULIZER EVERY 4 TO 6 HOURS AS NEEDED; 540; 1; 24-Jan-2013; Active, Disp: , Rfl:   •  ALPRAZolam (XANAX) 2 MG tablet, TK 1 T PO QID AND 1 PRN MAX OF 5 PER DAY, Disp: , Rfl: 5  •  B-D ULTRAFINE III SHORT PEN 31G X 8 MM misc, USE AS DIRECTED TWICE DAILY, Disp: 100 each, Rfl: 6  •  CLEM CONTOUR NEXT TEST test strip, TEST FOUR TIMES DAILY AS DIRECTED, Disp: 100 each, Rfl: 1  •  bethanechol (URECHOLINE) 25 MG tablet, Take 1 tablet by mouth 3 (Three) Times a Day., Disp: 90 tablet, Rfl: 1  •  budesonide-formoterol (SYMBICORT) 160-4.5 MCG/ACT inhaler, Inhale 2 puffs 2 (two) times a day., Disp: 10.2 g, Rfl: 5  •  buPROPion XL (WELLBUTRIN XL) 150 MG 24 hr tablet, Take 1 tablet by mouth Daily., Disp: 30 tablet, Rfl: 3  •  butalbital-acetaminophen-caffeine (FIORICET, ESGIC) -40 MG per tablet, Take 1 tablet by mouth every 6 (six) hours as needed for headaches., Disp: 20  tablet, Rfl: 0  •  cyclobenzaprine (FLEXERIL) 10 MG tablet, TK 1 T PO  BID PRF MUSCLE SPASM, Disp: , Rfl: 5  •  dicyclomine (BENTYL) 10 MG capsule, TAKE 1 CAPSULE BY MOUTH FOUR TIMES DAILY BEFORE MEALS AND AT BEDTIME, Disp: 120 capsule, Rfl: 0  •  divalproex (DEPAKOTE) 500 MG 24 hr tablet, TK 4 TS PO QHS, Disp: , Rfl: 5  •  estazolam (PROSOM) 2 MG tablet, , Disp: , Rfl:   •  estradiol (ESTRACE) 2 MG tablet, Take 1 tablet by mouth Daily., Disp: 30 tablet, Rfl: 5  •  furosemide (LASIX) 20 MG tablet, TAKE 1 TABLET BY MOUTH DAILY, Disp: 90 tablet, Rfl: 0  •  Iodoquinol-HC (DERMAZENE) 1 % cream, Apply sparingly to affected areas three times a day., Disp: 28.4 g, Rfl: 1  •  levothyroxine (SYNTHROID, LEVOTHROID) 88 MCG tablet, TK 1 T PO QD, Disp: , Rfl: 3  •  metFORMIN XR (GLUCOPHAGE-XR) 500 MG 24 hr tablet, TK 2 TS PO QD, Disp: , Rfl: 3  •  MICROLET LANCETS misc, TEST FOUR TIMES DAILY AS DIRECTED, Disp: 100 each, Rfl: 0  •  NOVOLOG MIX 70/30 FLEXPEN (70-30) 100 UNIT/ML suspension pen-injector injection, INJECT 21 UNITS SUBCUTANOULY EVERY MORNING AND 7 UNITS EVERY EVENING AS DIRECTED, Disp: 15 mL, Rfl: 2  •  nystatin (MYCOSTATIN) 590836 UNIT/GM cream, APPLY THREE TIMES DAILY UNDER AFFECTED SKIN FOLDS, Disp: 60 g, Rfl: 0  •  omega-3 acid ethyl esters (LOVAZA) 1 G capsule, TAKE 4 CAPSULES BY MOUTH DAILY, Disp: 120 capsule, Rfl: 0  •  omeprazole (priLOSEC) 20 MG capsule, TAKE 1 CAPSULE BY MOUTH EVERY DAY 30 MINUTES BEFORE A MEAL ON AN EMPTY STOMACH, Disp: 30 capsule, Rfl: 0  •  oxyCODONE-acetaminophen (PERCOCET)  MG per tablet, TK 1 T PO TID PRN, Disp: , Rfl: 0  •  promethazine (PHENERGAN) 25 MG tablet, TK 1 T PO  TID PRN, Disp: , Rfl: 3  •  saccharomyces boulardii (FLORASTOR) 250 MG capsule, Take 1 capsule by mouth 2 (two) times a day., Disp: 30 capsule, Rfl: 5  •  SUMAtriptan (IMITREX) 50 MG tablet, Take one tablet at onset of headache. May repeat dose one time in 2 hours if headache not relieved., Disp: 9 tablet,  Rfl: 11  •  traZODone (DESYREL) 100 MG tablet, TK 4 TS PO QHS, Disp: , Rfl: 2  No current facility-administered medications for this visit.      PMFSH  The following portions of the patient's history were reviewed and updated as appropriate: allergies, current medications, past family history, past medical history, past social history, past surgical history and problem list.    Review of Systems   Constitutional: Negative for activity change, fatigue and unexpected weight change.   HENT: Negative for dental problem, ear pain, nosebleeds and sore throat.    Eyes: Negative for pain and discharge.   Respiratory: Negative for chest tightness, shortness of breath and wheezing.    Gastrointestinal: Negative for abdominal pain and blood in stool.   Endocrine: Negative.    Genitourinary: Positive for dysuria. Negative for difficulty urinating and hematuria.   Musculoskeletal: Negative for joint swelling.   Skin: Positive for rash. Negative for color change, pallor and wound.   Allergic/Immunologic: Negative.    Neurological: Positive for headaches. Negative for tremors, seizures, syncope, facial asymmetry, speech difficulty and numbness.   Hematological: Negative for adenopathy.   Psychiatric/Behavioral: Negative for agitation, confusion, sleep disturbance and suicidal ideas.       Objective   /68  Pulse 76  Wt 253 lb 3.2 oz (115 kg)  BMI 40.87 kg/m2    Physical Exam   Constitutional: She is oriented to person, place, and time. She appears well-developed and well-nourished.  Non-toxic appearance. No distress.   HENT:   Head: Normocephalic and atraumatic. Head is without right periorbital erythema and without left periorbital erythema.   Nose: Nose normal.   Mouth/Throat: Oropharynx is clear and moist.   Eyes: Conjunctivae and EOM are normal. Pupils are equal, round, and reactive to light. Right eye exhibits no discharge. Left eye exhibits no discharge. No scleral icterus.   Neck: Normal range of motion. Neck  supple.   Cardiovascular: Normal rate, regular rhythm and normal heart sounds.    No murmur heard.  Pulmonary/Chest: Effort normal.   Abdominal: Soft. There is no tenderness.   Musculoskeletal: Normal range of motion. She exhibits no tenderness or deformity.   Neurological: She is alert and oriented to person, place, and time. She has normal reflexes. She displays no atrophy, no tremor and normal reflexes. No cranial nerve deficit. She exhibits normal muscle tone. Coordination normal.   Reflex Scores:       Tricep reflexes are 2+ on the right side and 2+ on the left side.       Bicep reflexes are 2+ on the right side and 2+ on the left side.       Brachioradialis reflexes are 2+ on the right side and 2+ on the left side.       Patellar reflexes are 2+ on the right side and 2+ on the left side.       Achilles reflexes are 2+ on the right side and 2+ on the left side.  Skin: Skin is warm and dry. No rash noted. She is not diaphoretic. No erythema.   Slight erythema under bilateral breasts and bilateral groin- no visible rash   Psychiatric: She has a normal mood and affect. Her behavior is normal. Judgment and thought content normal.   Nursing note and vitals reviewed.      Results for orders placed or performed in visit on 06/12/17   POCT urinalysis dipstick, automated   Result Value Ref Range    Color Yellow Yellow, Straw, Dark Yellow, Emerald    Clarity, UA Cloudy (A) Clear    Glucose, UA Negative Negative, 1000 mg/dL (3+) mg/dL    Bilirubin Negative Negative    Ketones, UA Negative Negative    Specific Gravity  1.025 1.005 - 1.030    Blood, UA Negative Negative    pH, Urine 6.0 5.0 - 8.0    Protein, POC Negative Negative mg/dL    Urobilinogen, UA 1 E.U./dL  (A) Normal    Leukocytes 25 Drea/ul (A) Negative    Nitrite, UA Negative Negative        ASSESSMENT/PLAN    Problem List Items Addressed This Visit        Cardiovascular and Mediastinum    Intractable migraine without aura and with status migrainosus     Toradol 60  mg IM and phenergan 25 mg IM in office.            Relevant Medications    ketorolac (TORADOL) injection 60 mg (Completed)    promethazine (PHENERGAN) injection 25 mg (Completed)       Musculoskeletal and Integument    Cutaneous candidiasis     Will contact pharmacy to determine what cream pt has used in the past.           Other Visit Diagnoses     Painful urination    -  Primary    Relevant Orders    POCT urinalysis dipstick, automated (Completed)    Urine Culture               Return if symptoms worsen or fail to improve.

## 2017-06-14 LAB — BACTERIA SPEC AEROBE CULT: ABNORMAL

## 2017-06-14 RX ORDER — NITROFURANTOIN 25; 75 MG/1; MG/1
100 CAPSULE ORAL 2 TIMES DAILY
Qty: 14 CAPSULE | Refills: 0 | Status: SHIPPED | OUTPATIENT
Start: 2017-06-14 | End: 2017-07-06

## 2017-06-15 ENCOUNTER — TELEPHONE (OUTPATIENT)
Dept: INTERNAL MEDICINE | Facility: CLINIC | Age: 57
End: 2017-06-15

## 2017-06-15 NOTE — TELEPHONE ENCOUNTER
THE PATIENT STATES THAT SHE DIDN'T GET TO MENTION THAT SHE IS HAVING REALLY BAD HEADACHES AGAIN. SHE STATES THAT SHE IS GOING NEED SOMETHING TO TAKE FOR THEM SHE STATES THAT THERE AWFUL .THE PATIENT WOULD LIKE TO GET A CALL BACK -[phone]

## 2017-06-16 DIAGNOSIS — G43.019 INTRACTABLE MIGRAINE WITHOUT AURA AND WITHOUT STATUS MIGRAINOSUS: ICD-10-CM

## 2017-06-16 RX ORDER — SUMATRIPTAN 50 MG/1
50 TABLET, FILM COATED ORAL
Qty: 9 TABLET | Refills: 5 | Status: SHIPPED | OUTPATIENT
Start: 2017-06-16 | End: 2017-10-31 | Stop reason: SDUPTHER

## 2017-06-16 RX ORDER — METFORMIN HYDROCHLORIDE 500 MG/1
TABLET, EXTENDED RELEASE ORAL
Qty: 180 TABLET | Refills: 0 | Status: SHIPPED | OUTPATIENT
Start: 2017-06-16 | End: 2017-07-06 | Stop reason: SINTOL

## 2017-06-16 NOTE — TELEPHONE ENCOUNTER
6/16/17    Sent rx over to pharmacy since they claimed they did not have refills remaining on this.

## 2017-06-28 RX ORDER — DICYCLOMINE HYDROCHLORIDE 10 MG/1
CAPSULE ORAL
Qty: 120 CAPSULE | Refills: 0 | Status: SHIPPED | OUTPATIENT
Start: 2017-06-28 | End: 2017-07-07 | Stop reason: SDUPTHER

## 2017-06-28 RX ORDER — OMEPRAZOLE 20 MG/1
CAPSULE, DELAYED RELEASE ORAL
Qty: 30 CAPSULE | Refills: 0 | Status: SHIPPED | OUTPATIENT
Start: 2017-06-28 | End: 2017-07-29 | Stop reason: SDUPTHER

## 2017-07-03 RX ORDER — OMEGA-3-ACID ETHYL ESTERS 1 G/1
CAPSULE, LIQUID FILLED ORAL
Qty: 120 CAPSULE | Refills: 0 | Status: SHIPPED | OUTPATIENT
Start: 2017-07-03 | End: 2017-09-15 | Stop reason: SDUPTHER

## 2017-07-06 ENCOUNTER — OFFICE VISIT (OUTPATIENT)
Dept: INTERNAL MEDICINE | Facility: CLINIC | Age: 57
End: 2017-07-06

## 2017-07-06 VITALS
SYSTOLIC BLOOD PRESSURE: 128 MMHG | BODY MASS INDEX: 40.82 KG/M2 | WEIGHT: 254 LBS | DIASTOLIC BLOOD PRESSURE: 70 MMHG | HEART RATE: 70 BPM | OXYGEN SATURATION: 96 % | HEIGHT: 66 IN

## 2017-07-06 DIAGNOSIS — R41.3 MEMORY CHANGE: ICD-10-CM

## 2017-07-06 DIAGNOSIS — K59.1 FUNCTIONAL DIARRHEA: Primary | ICD-10-CM

## 2017-07-06 DIAGNOSIS — Z91.89 AT RISK FOR POLYPHARMACY: ICD-10-CM

## 2017-07-06 DIAGNOSIS — F41.9 ANXIETY: ICD-10-CM

## 2017-07-06 DIAGNOSIS — G43.011 INTRACTABLE MIGRAINE WITHOUT AURA AND WITH STATUS MIGRAINOSUS: ICD-10-CM

## 2017-07-06 DIAGNOSIS — E11.8 TYPE 2 DIABETES MELLITUS WITH COMPLICATION, WITHOUT LONG-TERM CURRENT USE OF INSULIN (HCC): ICD-10-CM

## 2017-07-06 PROCEDURE — 99214 OFFICE O/P EST MOD 30 MIN: CPT | Performed by: NURSE PRACTITIONER

## 2017-07-06 RX ORDER — LANCETS
1 EACH MISCELLANEOUS DAILY
Qty: 100 EACH | Refills: 0 | Status: SHIPPED | OUTPATIENT
Start: 2017-07-06 | End: 2017-08-01 | Stop reason: SDUPTHER
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
## 2017-07-07 RX ORDER — DICYCLOMINE HYDROCHLORIDE 10 MG/1
10 CAPSULE ORAL
Qty: 120 CAPSULE | Refills: 0 | Status: SHIPPED | OUTPATIENT
Start: 2017-07-07 | End: 2017-09-26 | Stop reason: SDUPTHER

## 2017-07-20 DIAGNOSIS — R60.1 GENERALIZED EDEMA: ICD-10-CM

## 2017-07-24 RX ORDER — FUROSEMIDE 20 MG/1
TABLET ORAL
Qty: 90 TABLET | Refills: 0 | OUTPATIENT
Start: 2017-07-24

## 2017-07-24 RX ORDER — PEN NEEDLE, DIABETIC 31 GX5/16"
NEEDLE, DISPOSABLE MISCELLANEOUS
Refills: 0 | OUTPATIENT
Start: 2017-07-24

## 2017-07-28 RX ORDER — OMEPRAZOLE 20 MG/1
CAPSULE, DELAYED RELEASE ORAL
Qty: 30 CAPSULE | Refills: 0 | OUTPATIENT
Start: 2017-07-28

## 2017-07-29 RX ORDER — OMEPRAZOLE 20 MG/1
20 CAPSULE, DELAYED RELEASE ORAL DAILY
Qty: 30 CAPSULE | Refills: 5 | Status: SHIPPED | OUTPATIENT
Start: 2017-07-29 | End: 2018-02-05 | Stop reason: SDUPTHER

## 2017-07-30 RX ORDER — OXYCODONE AND ACETAMINOPHEN 7.5; 325 MG/1; MG/1
TABLET ORAL
Refills: 0 | COMMUNITY
Start: 2017-07-02 | End: 2018-06-25

## 2017-07-30 RX ORDER — BUPROPION HYDROCHLORIDE 300 MG/1
TABLET ORAL
Refills: 3 | COMMUNITY
Start: 2017-07-12 | End: 2018-06-25

## 2017-07-30 RX ORDER — BUPROPION HYDROCHLORIDE 150 MG/1
TABLET ORAL
Refills: 3 | COMMUNITY
Start: 2017-07-12 | End: 2018-06-25

## 2017-08-01 ENCOUNTER — OFFICE VISIT (OUTPATIENT)
Dept: INTERNAL MEDICINE | Facility: CLINIC | Age: 57
End: 2017-08-01

## 2017-08-01 VITALS
HEIGHT: 66 IN | HEART RATE: 84 BPM | DIASTOLIC BLOOD PRESSURE: 72 MMHG | SYSTOLIC BLOOD PRESSURE: 130 MMHG | OXYGEN SATURATION: 95 %

## 2017-08-01 DIAGNOSIS — H72.91 PERFORATED RIGHT TYMPANIC MEMBRANE ON EXAMINATION: ICD-10-CM

## 2017-08-01 DIAGNOSIS — E11.8 TYPE 2 DIABETES MELLITUS WITH COMPLICATION, WITHOUT LONG-TERM CURRENT USE OF INSULIN (HCC): Primary | ICD-10-CM

## 2017-08-01 LAB
GLUCOSE BLDC GLUCOMTR-MCNC: 161 MG/DL (ref 70–130)
HBA1C MFR BLD: 6.6 %

## 2017-08-01 PROCEDURE — 99214 OFFICE O/P EST MOD 30 MIN: CPT | Performed by: NURSE PRACTITIONER

## 2017-08-01 PROCEDURE — 82947 ASSAY GLUCOSE BLOOD QUANT: CPT | Performed by: NURSE PRACTITIONER

## 2017-08-01 PROCEDURE — 83036 HEMOGLOBIN GLYCOSYLATED A1C: CPT | Performed by: NURSE PRACTITIONER

## 2017-08-01 RX ORDER — LANCETS
EACH MISCELLANEOUS
Qty: 100 EACH | Refills: 0 | Status: SHIPPED | OUTPATIENT
Start: 2017-08-01 | End: 2017-09-22 | Stop reason: SDUPTHER

## 2017-08-01 RX ORDER — ECHINACEA PURPUREA EXTRACT 125 MG
2 TABLET ORAL AS NEEDED
Qty: 44 ML | Refills: 12 | Status: SHIPPED | OUTPATIENT
Start: 2017-08-01 | End: 2019-07-25
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
## 2017-08-10 RX ORDER — OMEGA-3-ACID ETHYL ESTERS 1 G/1
CAPSULE, LIQUID FILLED ORAL
Qty: 120 CAPSULE | Refills: 0 | OUTPATIENT
Start: 2017-08-10

## 2017-08-14 RX ORDER — ESTRADIOL 2 MG/1
TABLET ORAL
Qty: 30 TABLET | Refills: 0 | Status: SHIPPED | OUTPATIENT
Start: 2017-08-14 | End: 2017-08-31

## 2017-08-16 ENCOUNTER — APPOINTMENT (OUTPATIENT)
Dept: GENERAL RADIOLOGY | Facility: HOSPITAL | Age: 57
End: 2017-08-16

## 2017-08-16 ENCOUNTER — TELEPHONE (OUTPATIENT)
Dept: INTERNAL MEDICINE | Facility: CLINIC | Age: 57
End: 2017-08-16

## 2017-08-16 ENCOUNTER — HOSPITAL ENCOUNTER (EMERGENCY)
Facility: HOSPITAL | Age: 57
Discharge: HOME | End: 2017-08-17
Attending: EMERGENCY MEDICINE | Admitting: EMERGENCY MEDICINE

## 2017-08-16 DIAGNOSIS — G43.809 OTHER MIGRAINE WITHOUT STATUS MIGRAINOSUS, NOT INTRACTABLE: Primary | ICD-10-CM

## 2017-08-16 PROCEDURE — 96372 THER/PROPH/DIAG INJ SC/IM: CPT

## 2017-08-16 PROCEDURE — 99284 EMERGENCY DEPT VISIT MOD MDM: CPT

## 2017-08-16 PROCEDURE — 25010000002 KETOROLAC TROMETHAMINE PER 15 MG: Performed by: EMERGENCY MEDICINE

## 2017-08-16 PROCEDURE — 72170 X-RAY EXAM OF PELVIS: CPT

## 2017-08-16 PROCEDURE — 73552 X-RAY EXAM OF FEMUR 2/>: CPT

## 2017-08-16 PROCEDURE — 72100 X-RAY EXAM L-S SPINE 2/3 VWS: CPT

## 2017-08-16 RX ORDER — KETOROLAC TROMETHAMINE 30 MG/ML
30 INJECTION, SOLUTION INTRAMUSCULAR; INTRAVENOUS ONCE
Status: COMPLETED | OUTPATIENT
Start: 2017-08-16 | End: 2017-08-16

## 2017-08-16 RX ORDER — OXYCODONE HYDROCHLORIDE AND ACETAMINOPHEN 5; 325 MG/1; MG/1
1 TABLET ORAL ONCE
Status: COMPLETED | OUTPATIENT
Start: 2017-08-16 | End: 2017-08-16

## 2017-08-16 RX ADMIN — OXYCODONE AND ACETAMINOPHEN 1 TABLET: 5; 325 TABLET ORAL at 23:02

## 2017-08-16 RX ADMIN — KETOROLAC TROMETHAMINE 30 MG: 30 INJECTION, SOLUTION INTRAMUSCULAR at 23:01

## 2017-08-16 NOTE — TELEPHONE ENCOUNTER
PT'S GLUCOSE METER, CLEM CONTOUR NEXT, HAS  AND [facility] NEEDS A NEW [facility]

## 2017-08-17 VITALS
TEMPERATURE: 97.3 F | OXYGEN SATURATION: 94 % | HEIGHT: 65 IN | SYSTOLIC BLOOD PRESSURE: 126 MMHG | RESPIRATION RATE: 18 BRPM | DIASTOLIC BLOOD PRESSURE: 59 MMHG | HEART RATE: 64 BPM | BODY MASS INDEX: 40.98 KG/M2 | WEIGHT: 246 LBS
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
## 2017-08-21 ENCOUNTER — APPOINTMENT (OUTPATIENT)
Dept: GENERAL RADIOLOGY | Facility: HOSPITAL | Age: 57
End: 2017-08-21

## 2017-08-21 ENCOUNTER — APPOINTMENT (OUTPATIENT)
Dept: CT IMAGING | Facility: HOSPITAL | Age: 57
End: 2017-08-21

## 2017-08-21 ENCOUNTER — HOSPITAL ENCOUNTER (EMERGENCY)
Facility: HOSPITAL | Age: 57
Discharge: HOME | End: 2017-08-21
Attending: EMERGENCY MEDICINE | Admitting: EMERGENCY MEDICINE

## 2017-08-21 VITALS
RESPIRATION RATE: 16 BRPM | HEART RATE: 85 BPM | BODY MASS INDEX: 41.78 KG/M2 | DIASTOLIC BLOOD PRESSURE: 70 MMHG | OXYGEN SATURATION: 90 % | SYSTOLIC BLOOD PRESSURE: 135 MMHG | TEMPERATURE: 97.8 F | WEIGHT: 260 LBS | HEIGHT: 66 IN

## 2017-08-21 DIAGNOSIS — G89.4 PAIN SYNDROME, CHRONIC: ICD-10-CM

## 2017-08-21 DIAGNOSIS — S50.02XA LEFT ELBOW CONTUSION: Primary | ICD-10-CM

## 2017-08-21 DIAGNOSIS — R29.6 MULTIPLE FALLS: ICD-10-CM

## 2017-08-21 LAB
ALBUMIN SERPL-MCNC: 3.9 G/DL (ref 3.2–4.8)
ALBUMIN/GLOB SERPL: 1.3 G/DL (ref 1.5–2.5)
ALP SERPL-CCNC: 69 U/L (ref 25–100)
ALT SERPL W P-5'-P-CCNC: 10 U/L (ref 7–40)
ANION GAP SERPL CALCULATED.3IONS-SCNC: 6 MMOL/L (ref 3–11)
AST SERPL-CCNC: 10 U/L (ref 0–33)
BASOPHILS # BLD AUTO: 0.04 10*3/MM3 (ref 0–0.2)
BASOPHILS NFR BLD AUTO: 0.4 % (ref 0–1)
BILIRUB SERPL-MCNC: 0.2 MG/DL (ref 0.3–1.2)
BILIRUB UR QL STRIP: NEGATIVE
BUN BLD-MCNC: 11 MG/DL (ref 9–23)
BUN/CREAT SERPL: 15.7 (ref 7–25)
CALCIUM SPEC-SCNC: 9.1 MG/DL (ref 8.7–10.4)
CHLORIDE SERPL-SCNC: 101 MMOL/L (ref 99–109)
CK SERPL-CCNC: 29 U/L (ref 26–174)
CLARITY UR: CLEAR
CO2 SERPL-SCNC: 31 MMOL/L (ref 20–31)
COLOR UR: YELLOW
CREAT BLD-MCNC: 0.7 MG/DL (ref 0.6–1.3)
D-LACTATE SERPL-SCNC: 1.5 MMOL/L (ref 0.5–2)
DEPRECATED RDW RBC AUTO: 55.7 FL (ref 37–54)
EOSINOPHIL # BLD AUTO: 0.13 10*3/MM3 (ref 0–0.3)
EOSINOPHIL NFR BLD AUTO: 1.3 % (ref 0–3)
ERYTHROCYTE [DISTWIDTH] IN BLOOD BY AUTOMATED COUNT: 15.4 % (ref 11.3–14.5)
GFR SERPL CREATININE-BSD FRML MDRD: 87 ML/MIN/1.73
GLOBULIN UR ELPH-MCNC: 3 GM/DL
GLUCOSE BLD-MCNC: 107 MG/DL (ref 70–100)
GLUCOSE UR STRIP-MCNC: NEGATIVE MG/DL
HCT VFR BLD AUTO: 42.2 % (ref 34.5–44)
HGB BLD-MCNC: 13.8 G/DL (ref 11.5–15.5)
HGB UR QL STRIP.AUTO: NEGATIVE
IMM GRANULOCYTES # BLD: 0.08 10*3/MM3 (ref 0–0.03)
IMM GRANULOCYTES NFR BLD: 0.8 % (ref 0–0.6)
KETONES UR QL STRIP: NEGATIVE
LEUKOCYTE ESTERASE UR QL STRIP.AUTO: NEGATIVE
LYMPHOCYTES # BLD AUTO: 4.37 10*3/MM3 (ref 0.6–4.8)
LYMPHOCYTES NFR BLD AUTO: 44.1 % (ref 24–44)
MCH RBC QN AUTO: 31.8 PG (ref 27–31)
MCHC RBC AUTO-ENTMCNC: 32.7 G/DL (ref 32–36)
MCV RBC AUTO: 97.2 FL (ref 80–99)
MONOCYTES # BLD AUTO: 0.59 10*3/MM3 (ref 0–1)
MONOCYTES NFR BLD AUTO: 6 % (ref 0–12)
NEUTROPHILS # BLD AUTO: 4.7 10*3/MM3 (ref 1.5–8.3)
NEUTROPHILS NFR BLD AUTO: 47.4 % (ref 41–71)
NITRITE UR QL STRIP: NEGATIVE
PH UR STRIP.AUTO: 6.5 [PH] (ref 5–8)
PLATELET # BLD AUTO: 218 10*3/MM3 (ref 150–450)
PMV BLD AUTO: 8.7 FL (ref 6–12)
POTASSIUM BLD-SCNC: 4 MMOL/L (ref 3.5–5.5)
PROT SERPL-MCNC: 6.9 G/DL (ref 5.7–8.2)
PROT UR QL STRIP: NEGATIVE
RBC # BLD AUTO: 4.34 10*6/MM3 (ref 3.89–5.14)
SODIUM BLD-SCNC: 138 MMOL/L (ref 132–146)
SP GR UR STRIP: 1.02 (ref 1–1.03)
TROPONIN I SERPL-MCNC: <0.006 NG/ML
UROBILINOGEN UR QL STRIP: NORMAL
WBC NRBC COR # BLD: 9.91 10*3/MM3 (ref 3.5–10.8)

## 2017-08-21 PROCEDURE — 72125 CT NECK SPINE W/O DYE: CPT

## 2017-08-21 PROCEDURE — 80053 COMPREHEN METABOLIC PANEL: CPT | Performed by: NURSE PRACTITIONER

## 2017-08-21 PROCEDURE — 73090 X-RAY EXAM OF FOREARM: CPT

## 2017-08-21 PROCEDURE — 99283 EMERGENCY DEPT VISIT LOW MDM: CPT

## 2017-08-21 PROCEDURE — 84484 ASSAY OF TROPONIN QUANT: CPT | Performed by: NURSE PRACTITIONER

## 2017-08-21 PROCEDURE — 82550 ASSAY OF CK (CPK): CPT | Performed by: NURSE PRACTITIONER

## 2017-08-21 PROCEDURE — 93005 ELECTROCARDIOGRAM TRACING: CPT | Performed by: EMERGENCY MEDICINE

## 2017-08-21 PROCEDURE — 83605 ASSAY OF LACTIC ACID: CPT | Performed by: NURSE PRACTITIONER

## 2017-08-21 PROCEDURE — 70450 CT HEAD/BRAIN W/O DYE: CPT

## 2017-08-21 PROCEDURE — 85025 COMPLETE CBC W/AUTO DIFF WBC: CPT | Performed by: NURSE PRACTITIONER

## 2017-08-21 PROCEDURE — 87040 BLOOD CULTURE FOR BACTERIA: CPT | Performed by: NURSE PRACTITIONER

## 2017-08-21 PROCEDURE — 81003 URINALYSIS AUTO W/O SCOPE: CPT | Performed by: NURSE PRACTITIONER

## 2017-08-21 RX ORDER — OXYCODONE AND ACETAMINOPHEN 7.5; 325 MG/1; MG/1
1 TABLET ORAL ONCE
Status: COMPLETED | OUTPATIENT
Start: 2017-08-21 | End: 2017-08-21

## 2017-08-21 RX ADMIN — OXYCODONE HYDROCHLORIDE AND ACETAMINOPHEN 1 TABLET: 7.5; 325 TABLET ORAL at 22:55
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
## 2017-08-22 NOTE — DISCHARGE INSTRUCTIONS
Call your pain management clinic tomorrow for follow-up appointment.      Take necessary precautions to avoid multiple falls in your home.    .

## 2017-08-25 DIAGNOSIS — Z71.89 ENCOUNTER FOR HOME SAFETY REVIEW FOR INJURY PREVENTION: Primary | ICD-10-CM

## 2017-08-26 LAB
BACTERIA SPEC AEROBE CULT: NORMAL
BACTERIA SPEC AEROBE CULT: NORMAL

## 2017-08-28 ENCOUNTER — TELEPHONE (OUTPATIENT)
Dept: INTERNAL MEDICINE | Facility: CLINIC | Age: 57
End: 2017-08-28

## 2017-08-28 DIAGNOSIS — Z91.81 AT HIGH RISK FOR FALLS: Primary | ICD-10-CM

## 2017-08-31 ENCOUNTER — OFFICE VISIT (OUTPATIENT)
Dept: INTERNAL MEDICINE | Facility: CLINIC | Age: 57
End: 2017-08-31

## 2017-08-31 ENCOUNTER — TELEPHONE (OUTPATIENT)
Dept: INTERNAL MEDICINE | Facility: CLINIC | Age: 57
End: 2017-08-31

## 2017-08-31 VITALS — HEART RATE: 66 BPM | OXYGEN SATURATION: 94 % | DIASTOLIC BLOOD PRESSURE: 76 MMHG | SYSTOLIC BLOOD PRESSURE: 128 MMHG

## 2017-08-31 DIAGNOSIS — G47.30 SLEEP APNEA, UNSPECIFIED TYPE: ICD-10-CM

## 2017-08-31 DIAGNOSIS — N95.1 MENOPAUSAL HOT FLUSHES: ICD-10-CM

## 2017-08-31 DIAGNOSIS — Z51.81 MEDICATION MONITORING ENCOUNTER: Primary | ICD-10-CM

## 2017-08-31 DIAGNOSIS — J41.0 SIMPLE CHRONIC BRONCHITIS (HCC): ICD-10-CM

## 2017-08-31 DIAGNOSIS — E03.9 HYPOTHYROIDISM, UNSPECIFIED TYPE: ICD-10-CM

## 2017-08-31 DIAGNOSIS — E11.8 TYPE 2 DIABETES MELLITUS WITH COMPLICATION, WITHOUT LONG-TERM CURRENT USE OF INSULIN (HCC): ICD-10-CM

## 2017-08-31 DIAGNOSIS — E78.49 OTHER HYPERLIPIDEMIA: ICD-10-CM

## 2017-08-31 DIAGNOSIS — B37.2 CUTANEOUS CANDIDIASIS: ICD-10-CM

## 2017-08-31 LAB
ARTICHOKE IGE QN: 195 MG/DL (ref 0–130)
CHOLEST SERPL-MCNC: 267 MG/DL (ref 0–200)
HDLC SERPL-MCNC: 43 MG/DL (ref 40–60)
TRIGL SERPL-MCNC: 299 MG/DL (ref 0–150)

## 2017-08-31 PROCEDURE — 80061 LIPID PANEL: CPT | Performed by: HOSPITALIST

## 2017-08-31 PROCEDURE — 99215 OFFICE O/P EST HI 40 MIN: CPT | Performed by: NURSE PRACTITIONER

## 2017-08-31 RX ORDER — BETHANECHOL CHLORIDE 25 MG/1
25 TABLET ORAL 3 TIMES DAILY
COMMUNITY
End: 2020-06-24 | Stop reason: SDUPTHER

## 2017-08-31 RX ORDER — LEVOTHYROXINE SODIUM 88 UG/1
88 TABLET ORAL DAILY
Qty: 90 TABLET | Refills: 3 | Status: SHIPPED | OUTPATIENT
Start: 2017-08-31 | End: 2019-01-17 | Stop reason: SDUPTHER

## 2017-08-31 RX ORDER — ESTRADIOL 2 MG/1
1 TABLET ORAL DAILY
Qty: 30 TABLET | Refills: 1 | Status: SHIPPED | OUTPATIENT
Start: 2017-08-31 | End: 2017-10-06 | Stop reason: SDUPTHER

## 2017-08-31 RX ORDER — UNDERPADS 23" X 36"
1 EACH MISCELLANEOUS 2 TIMES DAILY
Qty: 60 EACH | Refills: 11 | Status: SHIPPED | OUTPATIENT
Start: 2017-08-31

## 2017-09-01 ENCOUNTER — TELEPHONE (OUTPATIENT)
Dept: INTERNAL MEDICINE | Facility: CLINIC | Age: 57
End: 2017-09-01

## 2017-09-01 NOTE — TELEPHONE ENCOUNTER
We received a PA for Hydrocortisone cream on the 28th for her. Do you want to change it or do you want me to do the PA?

## 2017-09-11 ENCOUNTER — TELEPHONE (OUTPATIENT)
Dept: INTERNAL MEDICINE | Facility: CLINIC | Age: 57
End: 2017-09-11

## 2017-09-12 ENCOUNTER — TELEPHONE (OUTPATIENT)
Dept: INTERNAL MEDICINE | Facility: CLINIC | Age: 57
End: 2017-09-12

## 2017-09-15 RX ORDER — OMEGA-3-ACID ETHYL ESTERS 1 G/1
CAPSULE, LIQUID FILLED ORAL
Qty: 120 CAPSULE | Refills: 0 | Status: SHIPPED | OUTPATIENT
Start: 2017-09-15 | End: 2017-10-21 | Stop reason: SDUPTHER

## 2017-09-18 ENCOUNTER — OUTSIDE FACILITY SERVICE (OUTPATIENT)
Dept: INTERNAL MEDICINE | Facility: CLINIC | Age: 57
End: 2017-09-18

## 2017-09-18 PROCEDURE — G0179 MD RECERTIFICATION HHA PT: HCPCS | Performed by: INTERNAL MEDICINE

## 2017-09-22 DIAGNOSIS — E11.8 TYPE 2 DIABETES MELLITUS WITH COMPLICATION, WITHOUT LONG-TERM CURRENT USE OF INSULIN (HCC): ICD-10-CM

## 2017-09-23 RX ORDER — LANCETS
EACH MISCELLANEOUS
Qty: 100 EACH | Refills: 0 | Status: SHIPPED | OUTPATIENT
Start: 2017-09-23 | End: 2018-06-25 | Stop reason: SDUPTHER

## 2017-09-23 NOTE — TELEPHONE ENCOUNTER
Called pharmacy and requested PA request be faxed over to our office for Tradjenta in order to start PA.

## 2017-09-26 RX ORDER — DICYCLOMINE HYDROCHLORIDE 10 MG/1
10 CAPSULE ORAL
Qty: 120 CAPSULE | Refills: 2 | Status: SHIPPED | OUTPATIENT
Start: 2017-09-26 | End: 2018-01-12 | Stop reason: SDUPTHER

## 2017-09-26 NOTE — TELEPHONE ENCOUNTER
PATIENT CALLED FOR REFILL, ALSO NEEDS TRADJENTA SAMPLES. PLEASE RETURN CALL IF SAMPLES ARE AVAILABLE. SHE ALSO NEEDS ANOTHER MEDICATION BUT DOES NOTE KNOW THE NAME OF IT.

## 2017-09-27 ENCOUNTER — TELEPHONE (OUTPATIENT)
Dept: INTERNAL MEDICINE | Facility: CLINIC | Age: 57
End: 2017-09-27

## 2017-10-02 ENCOUNTER — OUTSIDE FACILITY SERVICE (OUTPATIENT)
Dept: INTERNAL MEDICINE | Facility: CLINIC | Age: 57
End: 2017-10-02

## 2017-10-02 PROCEDURE — G0179 MD RECERTIFICATION HHA PT: HCPCS | Performed by: INTERNAL MEDICINE

## 2017-10-06 ENCOUNTER — OFFICE VISIT (OUTPATIENT)
Dept: INTERNAL MEDICINE | Facility: CLINIC | Age: 57
End: 2017-10-06

## 2017-10-06 VITALS
BODY MASS INDEX: 42.45 KG/M2 | DIASTOLIC BLOOD PRESSURE: 82 MMHG | OXYGEN SATURATION: 92 % | HEART RATE: 68 BPM | WEIGHT: 263 LBS | SYSTOLIC BLOOD PRESSURE: 128 MMHG

## 2017-10-06 DIAGNOSIS — M17.11 PRIMARY OSTEOARTHRITIS OF RIGHT KNEE: Primary | ICD-10-CM

## 2017-10-06 DIAGNOSIS — Z51.81 MEDICATION MONITORING ENCOUNTER: ICD-10-CM

## 2017-10-06 DIAGNOSIS — E11.8 TYPE 2 DIABETES MELLITUS WITH COMPLICATION, WITHOUT LONG-TERM CURRENT USE OF INSULIN (HCC): ICD-10-CM

## 2017-10-06 DIAGNOSIS — N95.1 MENOPAUSAL HOT FLUSHES: ICD-10-CM

## 2017-10-06 DIAGNOSIS — G89.29 CHRONIC BACK PAIN, UNSPECIFIED BACK LOCATION, UNSPECIFIED BACK PAIN LATERALITY: ICD-10-CM

## 2017-10-06 DIAGNOSIS — E78.5 HYPERLIPIDEMIA, UNSPECIFIED HYPERLIPIDEMIA TYPE: ICD-10-CM

## 2017-10-06 DIAGNOSIS — R60.1 GENERALIZED EDEMA: ICD-10-CM

## 2017-10-06 DIAGNOSIS — G47.30 SLEEP APNEA, UNSPECIFIED TYPE: ICD-10-CM

## 2017-10-06 DIAGNOSIS — I10 ESSENTIAL HYPERTENSION: ICD-10-CM

## 2017-10-06 DIAGNOSIS — M54.9 CHRONIC BACK PAIN, UNSPECIFIED BACK LOCATION, UNSPECIFIED BACK PAIN LATERALITY: ICD-10-CM

## 2017-10-06 DIAGNOSIS — R30.0 DYSURIA: ICD-10-CM

## 2017-10-06 DIAGNOSIS — J43.9 PULMONARY EMPHYSEMA, UNSPECIFIED EMPHYSEMA TYPE (HCC): ICD-10-CM

## 2017-10-06 DIAGNOSIS — E03.9 HYPOTHYROIDISM, UNSPECIFIED TYPE: ICD-10-CM

## 2017-10-06 LAB
ALBUMIN SERPL-MCNC: 4 G/DL (ref 3.2–4.8)
ALBUMIN/GLOB SERPL: 1.4 G/DL (ref 1.5–2.5)
ALP SERPL-CCNC: 68 U/L (ref 25–100)
ALT SERPL W P-5'-P-CCNC: 10 U/L (ref 7–40)
ANION GAP SERPL CALCULATED.3IONS-SCNC: 6 MMOL/L (ref 3–11)
ARTICHOKE IGE QN: 193 MG/DL (ref 0–130)
AST SERPL-CCNC: 12 U/L (ref 0–33)
BACTERIA UR QL AUTO: ABNORMAL /HPF
BASOPHILS # BLD AUTO: 0.02 10*3/MM3 (ref 0–0.2)
BASOPHILS NFR BLD AUTO: 0.3 % (ref 0–1)
BILIRUB BLD-MCNC: NEGATIVE MG/DL
BILIRUB SERPL-MCNC: 0.2 MG/DL (ref 0.3–1.2)
BILIRUB UR QL STRIP: NEGATIVE
BUN BLD-MCNC: 13 MG/DL (ref 9–23)
BUN/CREAT SERPL: 18.6 (ref 7–25)
CALCIUM SPEC-SCNC: 8.9 MG/DL (ref 8.7–10.4)
CHLORIDE SERPL-SCNC: 100 MMOL/L (ref 99–109)
CHOLEST SERPL-MCNC: 264 MG/DL (ref 0–200)
CLARITY UR: ABNORMAL
CLARITY, POC: CLEAR
CO2 SERPL-SCNC: 31 MMOL/L (ref 20–31)
COLOR UR: ABNORMAL
COLOR UR: ABNORMAL
CREAT BLD-MCNC: 0.7 MG/DL (ref 0.6–1.3)
DEPRECATED RDW RBC AUTO: 52.1 FL (ref 37–54)
EOSINOPHIL # BLD AUTO: 0.06 10*3/MM3 (ref 0–0.3)
EOSINOPHIL NFR BLD AUTO: 0.8 % (ref 0–3)
ERYTHROCYTE [DISTWIDTH] IN BLOOD BY AUTOMATED COUNT: 14.6 % (ref 11.3–14.5)
GFR SERPL CREATININE-BSD FRML MDRD: 87 ML/MIN/1.73
GLOBULIN UR ELPH-MCNC: 2.9 GM/DL
GLUCOSE BLD-MCNC: 121 MG/DL (ref 70–100)
GLUCOSE UR STRIP-MCNC: NEGATIVE MG/DL
GLUCOSE UR STRIP-MCNC: NEGATIVE MG/DL
HBA1C MFR BLD: 6.5 % (ref 4.8–5.6)
HCT VFR BLD AUTO: 41.3 % (ref 34.5–44)
HDLC SERPL-MCNC: 49 MG/DL (ref 40–60)
HGB BLD-MCNC: 13.4 G/DL (ref 11.5–15.5)
HGB UR QL STRIP.AUTO: NEGATIVE
HYALINE CASTS UR QL AUTO: ABNORMAL /LPF
IMM GRANULOCYTES # BLD: 0.07 10*3/MM3 (ref 0–0.03)
IMM GRANULOCYTES NFR BLD: 0.9 % (ref 0–0.6)
KETONES UR QL STRIP: ABNORMAL
KETONES UR QL: ABNORMAL
LEUKOCYTE EST, POC: ABNORMAL
LEUKOCYTE ESTERASE UR QL STRIP.AUTO: NEGATIVE
LYMPHOCYTES # BLD AUTO: 2.79 10*3/MM3 (ref 0.6–4.8)
LYMPHOCYTES NFR BLD AUTO: 36.2 % (ref 24–44)
MCH RBC QN AUTO: 31.6 PG (ref 27–31)
MCHC RBC AUTO-ENTMCNC: 32.4 G/DL (ref 32–36)
MCV RBC AUTO: 97.4 FL (ref 80–99)
MONOCYTES # BLD AUTO: 0.5 10*3/MM3 (ref 0–1)
MONOCYTES NFR BLD AUTO: 6.5 % (ref 0–12)
NEUTROPHILS # BLD AUTO: 4.26 10*3/MM3 (ref 1.5–8.3)
NEUTROPHILS NFR BLD AUTO: 55.3 % (ref 41–71)
NITRITE UR QL STRIP: NEGATIVE
NITRITE UR-MCNC: NEGATIVE MG/ML
PH UR STRIP.AUTO: 7.5 [PH] (ref 5–8)
PH UR: 7 [PH] (ref 5–8)
PLATELET # BLD AUTO: 224 10*3/MM3 (ref 150–450)
PMV BLD AUTO: 9.5 FL (ref 6–12)
POTASSIUM BLD-SCNC: 4.8 MMOL/L (ref 3.5–5.5)
PROT SERPL-MCNC: 6.9 G/DL (ref 5.7–8.2)
PROT UR QL STRIP: ABNORMAL
PROT UR STRIP-MCNC: ABNORMAL MG/DL
RBC # BLD AUTO: 4.24 10*6/MM3 (ref 3.89–5.14)
RBC # UR STRIP: NEGATIVE /UL
RBC # UR: ABNORMAL /HPF
REF LAB TEST METHOD: ABNORMAL
SODIUM BLD-SCNC: 137 MMOL/L (ref 132–146)
SP GR UR STRIP: 1.03 (ref 1–1.03)
SP GR UR: 1 (ref 1–1.03)
SQUAMOUS #/AREA URNS HPF: ABNORMAL /HPF
TRIGL SERPL-MCNC: 207 MG/DL (ref 0–150)
UROBILINOGEN UR QL STRIP: ABNORMAL
UROBILINOGEN UR QL: ABNORMAL
VALPROATE SERPL-MCNC: 88 MCG/ML (ref 50–150)
WBC NRBC COR # BLD: 7.7 10*3/MM3 (ref 3.5–10.8)
WBC UR QL AUTO: ABNORMAL /HPF

## 2017-10-06 PROCEDURE — 81003 URINALYSIS AUTO W/O SCOPE: CPT | Performed by: NURSE PRACTITIONER

## 2017-10-06 PROCEDURE — 81001 URINALYSIS AUTO W/SCOPE: CPT | Performed by: NURSE PRACTITIONER

## 2017-10-06 PROCEDURE — 99214 OFFICE O/P EST MOD 30 MIN: CPT | Performed by: NURSE PRACTITIONER

## 2017-10-06 PROCEDURE — 80061 LIPID PANEL: CPT | Performed by: NURSE PRACTITIONER

## 2017-10-06 PROCEDURE — 85025 COMPLETE CBC W/AUTO DIFF WBC: CPT | Performed by: NURSE PRACTITIONER

## 2017-10-06 PROCEDURE — 80053 COMPREHEN METABOLIC PANEL: CPT | Performed by: NURSE PRACTITIONER

## 2017-10-06 PROCEDURE — 83036 HEMOGLOBIN GLYCOSYLATED A1C: CPT | Performed by: NURSE PRACTITIONER

## 2017-10-06 PROCEDURE — 87086 URINE CULTURE/COLONY COUNT: CPT | Performed by: NURSE PRACTITIONER

## 2017-10-06 PROCEDURE — 80164 ASSAY DIPROPYLACETIC ACD TOT: CPT | Performed by: NURSE PRACTITIONER

## 2017-10-06 RX ORDER — FUROSEMIDE 20 MG/1
20 TABLET ORAL DAILY
Qty: 90 TABLET | Refills: 0 | Status: SHIPPED | OUTPATIENT
Start: 2017-10-06 | End: 2018-01-01 | Stop reason: SDUPTHER

## 2017-10-06 RX ORDER — ESTRADIOL 2 MG/1
2 TABLET ORAL DAILY
Qty: 30 TABLET | Refills: 2 | Status: SHIPPED | OUTPATIENT
Start: 2017-10-06 | End: 2018-01-03 | Stop reason: SDUPTHER

## 2017-10-06 RX ORDER — ESTRADIOL 1 MG/1
TABLET ORAL
Refills: 1 | COMMUNITY
Start: 2017-09-23 | End: 2017-10-06
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
## 2017-10-08 DIAGNOSIS — E78.5 HYPERLIPIDEMIA, UNSPECIFIED HYPERLIPIDEMIA TYPE: Primary | ICD-10-CM

## 2017-10-08 LAB
BACTERIA SPEC AEROBE CULT: NORMAL
BACTERIA SPEC AEROBE CULT: NORMAL

## 2017-10-08 RX ORDER — SIMVASTATIN 10 MG
10 TABLET ORAL NIGHTLY
Qty: 30 TABLET | Refills: 2 | Status: SHIPPED | OUTPATIENT
Start: 2017-10-08 | End: 2018-01-03 | Stop reason: SDUPTHER

## 2017-10-08 NOTE — PROGRESS NOTES
Spoke with daughter, not really sure that her leg cramps were a result of statin or because she was 75 pounds heavier at the time. So will try simvastatin and Co Q 10

## 2017-10-11 DIAGNOSIS — E11.8 TYPE 2 DIABETES MELLITUS WITH COMPLICATION, WITHOUT LONG-TERM CURRENT USE OF INSULIN (HCC): ICD-10-CM

## 2017-10-21 RX ORDER — OMEGA-3-ACID ETHYL ESTERS 1 G/1
CAPSULE, LIQUID FILLED ORAL
Qty: 120 CAPSULE | Refills: 0 | Status: SHIPPED | OUTPATIENT
Start: 2017-10-21 | End: 2017-11-22 | Stop reason: SDUPTHER

## 2017-10-31 DIAGNOSIS — G43.019 INTRACTABLE MIGRAINE WITHOUT AURA AND WITHOUT STATUS MIGRAINOSUS: ICD-10-CM

## 2017-10-31 RX ORDER — SUMATRIPTAN 50 MG/1
50 TABLET, FILM COATED ORAL
Qty: 9 TABLET | Refills: 5 | Status: SHIPPED | OUTPATIENT
Start: 2017-10-31 | End: 2018-03-30 | Stop reason: SDUPTHER

## 2017-11-22 RX ORDER — OMEGA-3-ACID ETHYL ESTERS 1 G/1
CAPSULE, LIQUID FILLED ORAL
Qty: 120 CAPSULE | Refills: 0 | Status: SHIPPED | OUTPATIENT
Start: 2017-11-22 | End: 2017-12-26 | Stop reason: SDUPTHER

## 2017-12-22 DIAGNOSIS — E11.8 TYPE 2 DIABETES MELLITUS WITH COMPLICATION, WITHOUT LONG-TERM CURRENT USE OF INSULIN (HCC): ICD-10-CM

## 2017-12-22 RX ORDER — LINAGLIPTIN 5 MG/1
TABLET, FILM COATED ORAL
Qty: 30 TABLET | Refills: 0 | Status: SHIPPED | OUTPATIENT
Start: 2017-12-22 | End: 2018-03-29 | Stop reason: SDUPTHER

## 2017-12-26 RX ORDER — OMEGA-3-ACID ETHYL ESTERS 1 G/1
CAPSULE, LIQUID FILLED ORAL
Qty: 120 CAPSULE | Refills: 0 | Status: SHIPPED | OUTPATIENT
Start: 2017-12-26 | End: 2018-02-05 | Stop reason: SDUPTHER

## 2017-12-27 ENCOUNTER — TELEPHONE (OUTPATIENT)
Dept: INTERNAL MEDICINE | Facility: CLINIC | Age: 57
End: 2017-12-27

## 2017-12-27 NOTE — TELEPHONE ENCOUNTER
ROSSANA, MS GOMEZ STOPPED BY THE OFFICE AND ASKED TO HAVE YOU WRITE A LETTER REGARDING ALL HER PHYSICAL AND MENTAL PROBLEMS TO GET HELP THROUGH A COMPANY CALL[facility].    CALL BACK [phone]

## 2017-12-29 PROBLEM — Z74.09 DECREASED MOBILITY AND ENDURANCE: Status: ACTIVE | Noted: 2017-12-29

## 2017-12-29 PROBLEM — F43.10 PTSD (POST-TRAUMATIC STRESS DISORDER): Status: ACTIVE | Noted: 2017-12-29

## 2017-12-29 PROBLEM — Z91.89 AT RISK FOR POLYPHARMACY: Status: ACTIVE | Noted: 2017-12-29

## 2017-12-29 NOTE — TELEPHONE ENCOUNTER
"Spoke with pt and explained the papers have been faxed to [facility].   Pt verbalized understanding.   Pt stated that she wanted to reschedule her appt from 1/3/17 to after the holidays due to her having to drive in \"holiday traffic\" pt also stated she wants to see Amrita GUILLAUME instead.   Rescheduled pt for 1/12/17 at 2:15pm with Amrita Gr.   "

## 2018-01-01 DIAGNOSIS — R60.1 GENERALIZED EDEMA: ICD-10-CM

## 2018-01-01 RX ORDER — FUROSEMIDE 20 MG/1
TABLET ORAL
Qty: 90 TABLET | Refills: 0 | Status: SHIPPED | OUTPATIENT
Start: 2018-01-01 | End: 2018-04-12 | Stop reason: SDUPTHER

## 2018-01-03 DIAGNOSIS — N95.1 MENOPAUSAL HOT FLUSHES: ICD-10-CM

## 2018-01-03 RX ORDER — ESTRADIOL 2 MG/1
TABLET ORAL
Qty: 30 TABLET | Refills: 0 | Status: SHIPPED | OUTPATIENT
Start: 2018-01-03 | End: 2018-06-25 | Stop reason: SDUPTHER

## 2018-01-03 RX ORDER — SIMVASTATIN 10 MG
TABLET ORAL
Qty: 30 TABLET | Refills: 0 | Status: SHIPPED | OUTPATIENT
Start: 2018-01-03 | End: 2018-02-05 | Stop reason: SDUPTHER

## 2018-01-12 RX ORDER — DICYCLOMINE HYDROCHLORIDE 10 MG/1
10 CAPSULE ORAL
Qty: 120 CAPSULE | Refills: 2 | Status: SHIPPED | OUTPATIENT
Start: 2018-01-12 | End: 2018-06-25

## 2018-01-12 NOTE — TELEPHONE ENCOUNTER
PT IS OUT OF OTHER MEDICATIONS BUT DOESN'T KNOW WHAT THEY ARE. PT SAYS PHARM WAS SOPPOSED TO CALL YESTERDAY ABOUT THIS.

## 2018-02-05 RX ORDER — OMEPRAZOLE 20 MG/1
20 CAPSULE, DELAYED RELEASE ORAL DAILY
Qty: 30 CAPSULE | Refills: 0 | Status: SHIPPED | OUTPATIENT
Start: 2018-02-05 | End: 2018-03-29 | Stop reason: SDUPTHER

## 2018-02-05 RX ORDER — OMEGA-3-ACID ETHYL ESTERS 1 G/1
1 CAPSULE, LIQUID FILLED ORAL DAILY
Qty: 30 CAPSULE | Refills: 0 | Status: SHIPPED | OUTPATIENT
Start: 2018-02-05 | End: 2018-04-11 | Stop reason: SDUPTHER

## 2018-02-05 RX ORDER — SIMVASTATIN 10 MG
10 TABLET ORAL NIGHTLY
Qty: 30 TABLET | Refills: 0 | Status: SHIPPED | OUTPATIENT
Start: 2018-02-05 | End: 2018-06-25 | Stop reason: SDUPTHER

## 2018-02-05 NOTE — TELEPHONE ENCOUNTER
Communication rcvd that ok to provide pt with 30 day supply of omeprazole, simvastatin, and omega-3. Pt will need to schedule with MD in office in order to obtain further refills as provider no longer with this office. Pt has upcoming appt with Amrita Gr on 02/09/18. Per Annabella scott to refill for 30 day supply under her name.

## 2018-03-08 RX ORDER — SIMVASTATIN 10 MG
TABLET ORAL
Qty: 30 TABLET | Refills: 0 | OUTPATIENT
Start: 2018-03-08

## 2018-03-08 RX ORDER — OMEGA-3-ACID ETHYL ESTERS 1 G/1
CAPSULE, LIQUID FILLED ORAL
Qty: 30 CAPSULE | Refills: 0 | OUTPATIENT
Start: 2018-03-08

## 2018-03-08 RX ORDER — OMEPRAZOLE 20 MG/1
CAPSULE, DELAYED RELEASE ORAL
Qty: 30 CAPSULE | Refills: 0 | OUTPATIENT
Start: 2018-03-08

## 2018-03-08 NOTE — TELEPHONE ENCOUNTER
Patient needs to be seen by new provider for refills. Last refill pt was informed that she would need to schedule.

## 2018-03-29 ENCOUNTER — OFFICE VISIT (OUTPATIENT)
Dept: INTERNAL MEDICINE | Facility: CLINIC | Age: 58
End: 2018-03-29

## 2018-03-29 VITALS
OXYGEN SATURATION: 95 % | BODY MASS INDEX: 42.29 KG/M2 | DIASTOLIC BLOOD PRESSURE: 64 MMHG | WEIGHT: 262 LBS | SYSTOLIC BLOOD PRESSURE: 122 MMHG | HEART RATE: 105 BPM

## 2018-03-29 DIAGNOSIS — K21.9 GASTROESOPHAGEAL REFLUX DISEASE, ESOPHAGITIS PRESENCE NOT SPECIFIED: ICD-10-CM

## 2018-03-29 DIAGNOSIS — I10 ESSENTIAL HYPERTENSION: ICD-10-CM

## 2018-03-29 DIAGNOSIS — Z91.89 AT RISK FOR POLYPHARMACY: ICD-10-CM

## 2018-03-29 DIAGNOSIS — E11.8 TYPE 2 DIABETES MELLITUS WITH COMPLICATION, WITHOUT LONG-TERM CURRENT USE OF INSULIN (HCC): Primary | ICD-10-CM

## 2018-03-29 DIAGNOSIS — Z74.09 DECREASED MOBILITY AND ENDURANCE: ICD-10-CM

## 2018-03-29 LAB
GLUCOSE BLDC GLUCOMTR-MCNC: 225 MG/DL (ref 70–130)
HBA1C MFR BLD: 9.1 %

## 2018-03-29 PROCEDURE — 99214 OFFICE O/P EST MOD 30 MIN: CPT | Performed by: PHYSICIAN ASSISTANT

## 2018-03-29 PROCEDURE — 83036 HEMOGLOBIN GLYCOSYLATED A1C: CPT | Performed by: PHYSICIAN ASSISTANT

## 2018-03-29 PROCEDURE — 82962 GLUCOSE BLOOD TEST: CPT | Performed by: PHYSICIAN ASSISTANT

## 2018-03-29 RX ORDER — OMEPRAZOLE 20 MG/1
20 CAPSULE, DELAYED RELEASE ORAL DAILY
Qty: 30 CAPSULE | Refills: 5 | Status: SHIPPED | OUTPATIENT
Start: 2018-03-29 | End: 2019-08-20

## 2018-03-29 NOTE — PROGRESS NOTES
Chief Complaint   Patient presents with   • Follow-up     type 2 diabetes, hyperlipidemia, hypertension       Subjective   Saadia Caceres is a 57 y.o. female.       History of Present Illness     Pt has not been able to get into her appointments for a variety of reasons, struggles with transportation and has familial stressors and issues that interfere with getting to appointments.    She is out of her stomach medications- thinks it is omeprazole. May have increased diarrhea when she does not take it.    She has taken herself off of her wellbutrin for an unknown reason. She sees Dr Schaffer for her psychiatric care. He has been notified that she stopped it.    Was not able to afford the [facility] services that HEIDY Martinez ordered. [facility] [employer] [facility] has come to help her organize her medications in the past. Needs help with this again. Uses pill organizer but has bags of pill bottles.    She is not longer going to pain management because her son was taking her percocets, was seeing Dr Breonna Iyer. Pt says she was told that she could go back to the clinic as needed but she does not want to.    Taking tradjenta, no other diabetes meds. Stopped metformin in the past d/t diarrhea, could not administer victoza. Was previously on novolin 70/30 and it worked well to control her blood sugar.        Current Outpatient Prescriptions:   •  albuterol (PROAIR HFA) 108 (90 BASE) MCG/ACT inhaler, Inhale 2 puffs every 6 (six) hours as needed for wheezing., Disp: 1 inhaler, Rfl: 5  •  albuterol (PROVENTIL) (2.5 MG/3ML) 0.083% nebulizer solution, Albuterol Sulfate (2.5 MG/3ML) 0.083% Inhalation Nebulization Solution; Patient Sig: Albuterol Sulfate (2.5 MG/3ML) 0.083% Inhalation Nebulization Solution USE 1 VIAL PER NEBULIZER EVERY 4 TO 6 HOURS AS NEEDED; 540; 1; 24-Jan-2013; Active, Disp: , Rfl:   •  ALPRAZolam (XANAX) 2 MG tablet, TK 1 T PO QID AND 1 PRN MAX OF 5 PER DAY, Disp: , Rfl: 5  •  bethanechol (URECHOLINE) 25 MG  "tablet, Take 25 mg by mouth 3 (Three) Times a Day., Disp: , Rfl:   •  Blood Glucose Monitoring Suppl ([employer] CONTOUR NEXT MONITOR) w/Device kit, 1 each Daily., Disp: 1 kit, Rfl: 0  •  budesonide-formoterol (SYMBICORT) 160-4.5 MCG/ACT inhaler, Inhale 2 puffs 2 (two) times a day., Disp: 10.2 g, Rfl: 5  •  buPROPion XL (WELLBUTRIN XL) 150 MG 24 hr tablet, TK 1 T PO QAM, Disp: , Rfl: 3  •  buPROPion XL (WELLBUTRIN XL) 300 MG 24 hr tablet, TK 1 T PO QAM, Disp: , Rfl: 3  •  cyclobenzaprine (FLEXERIL) 10 MG tablet, TK 1 T PO  BID PRF MUSCLE SPASM, Disp: , Rfl: 5  •  dicyclomine (BENTYL) 10 MG capsule, Take 1 capsule by mouth 4 (Four) Times a Day Before Meals & at Bedtime., Disp: 120 capsule, Rfl: 2  •  Dimethicone 1.2 % gel, Apply 1 application topically 2 (Two) Times a Day As Needed (skin irritation)., Disp: 42 g, Rfl: 2  •  divalproex (DEPAKOTE) 500 MG 24 hr tablet, TK 4 TS PO QHS, Disp: , Rfl: 5  •  estazolam (PROSOM) 2 MG tablet, , Disp: , Rfl:   •  estradiol (ESTRACE) 2 MG tablet, TAKE 1 TABLET BY MOUTH DAILY, Disp: 30 tablet, Rfl: 0  •  furosemide (LASIX) 20 MG tablet, TAKE 1 TABLET BY MOUTH EVERY DAY, Disp: 90 tablet, Rfl: 0  •  glucose blood (CLEM CONTOUR NEXT TEST) test strip, 1 each by Other route Daily. Use as instructed, Disp: 100 each, Rfl: 1  •  Incontinence Supply Disposable (DISPOSABLE UNDERPADS 30\"X36\") misc, 1 each Every Night., Disp: 30 each, Rfl: 11  •  Incontinence Supply Disposable (INCONTINENCE BRIEF LARGE) misc, 1 each 2 (Two) Times a Day., Disp: 60 each, Rfl: 11  •  levothyroxine (SYNTHROID, LEVOTHROID) 88 MCG tablet, Take 1 tablet by mouth Daily., Disp: 90 tablet, Rfl: 3  •  linagliptin (TRADJENTA) 5 MG tablet tablet, Take 1 tablet by mouth Daily., Disp: 30 tablet, Rfl: 5  •  MICROLET LANCETS misc, Use once daily., Disp: 100 each, Rfl: 0  •  omega-3 acid ethyl esters (LOVAZA) 1 g capsule, Take 1 capsule by mouth Daily., Disp: 30 capsule, Rfl: 0  •  omeprazole (priLOSEC) 20 MG capsule, Take 1 " capsule by mouth Daily., Disp: 30 capsule, Rfl: 5  •  oxyCODONE-acetaminophen (PERCOCET) 7.5-325 MG per tablet, TK 1 T PO TID PRN, Disp: , Rfl: 0  •  promethazine (PHENERGAN) 25 MG tablet, TK 1 T PO  TID PRN, Disp: , Rfl: 3  •  simvastatin (ZOCOR) 10 MG tablet, Take 1 tablet by mouth Every Night., Disp: 30 tablet, Rfl: 0  •  sodium chloride (OCEAN) 0.65 % nasal spray, 2 sprays into each nostril As Needed for Congestion., Disp: 44 mL, Rfl: 12  •  traZODone (DESYREL) 100 MG tablet, TK 4 TS PO QHS, Disp: , Rfl: 2  •  insulin aspart protamine & aspart (NOVOLOG) 70/30 100 unit/mL, Inject 0.1 mL under the skin 2 (Two) Times a Day Before Meals., Disp: 2 pen, Rfl: 5  •  SUMAtriptan (IMITREX) 50 MG tablet, Take 1 tablet by mouth Every 2 (Two) Hours As Needed for Migraine., Disp: 9 tablet, Rfl: 5     PMFSH  The following portions of the patient's history were reviewed and updated as appropriate: allergies, current medications, past family history, past medical history, past social history, past surgical history and problem list.    Review of Systems   Constitutional: Negative for chills, fever and unexpected weight change.   HENT: Negative.    Eyes: Negative for pain and visual disturbance.   Respiratory: Negative for chest tightness and shortness of breath.    Cardiovascular: Negative for chest pain.   Gastrointestinal: Negative for abdominal pain and blood in stool.   Endocrine: Negative.    Genitourinary: Negative.    Musculoskeletal: Positive for back pain. Negative for joint swelling.   Skin: Negative for color change, rash and wound.   Allergic/Immunologic: Negative.    Neurological: Negative for dizziness, syncope and speech difficulty.   Hematological: Negative for adenopathy.   Psychiatric/Behavioral: Positive for decreased concentration. Negative for confusion, hallucinations and suicidal ideas. The patient is nervous/anxious.        Objective   /64   Pulse 105   Wt 119 kg (262 lb)   SpO2 95%   BMI 42.29  kg/m²     Physical Exam   Constitutional: She appears well-developed and well-nourished.   HENT:   Head: Normocephalic.   Right Ear: Hearing, tympanic membrane, external ear and ear canal normal.   Left Ear: Hearing, tympanic membrane, external ear and ear canal normal.   Nose: Nose normal.   Mouth/Throat: Oropharynx is clear and moist.   Eyes: Conjunctivae are normal. Pupils are equal, round, and reactive to light.   Neck: Normal range of motion.   Cardiovascular: Normal rate, regular rhythm and normal heart sounds.    Pulmonary/Chest: Effort normal and breath sounds normal. She has no decreased breath sounds. She has no wheezes. She has no rhonchi. She has no rales.   Musculoskeletal: Normal range of motion.   Neurological: She is alert.   Skin: Skin is warm and dry.   Psychiatric: She has a normal mood and affect. Her behavior is normal.   Nursing note and vitals reviewed.      Results for orders placed or performed in visit on 03/29/18   POCT Glucose   Result Value Ref Range    Glucose 225 (A) 70 - 130 mg/dL   POC Glycosylated Hemoglobin (Hb A1C)   Result Value Ref Range    Hemoglobin A1C 9.1 %        ASSESSMENT/PLAN    Problem List Items Addressed This Visit        Cardiovascular and Mediastinum    Hypertension     Hypertension is unchanged.  Continue current treatment regimen.  Blood pressure will be reassessed at the next regular appointment.            Digestive    Esophageal reflux     Restart omeprazole, sent in refill.         Relevant Medications    omeprazole (priLOSEC) 20 MG capsule       Endocrine    Diabetes mellitus - Primary     Diabetes is worsening.   Reminded to bring in blood sugar diary at next visit.  Dietary recommendations for ADA diet.  Medication changes per orders. Start novolog 70/30 10 units BID.  Diabetes will be reassessed in 3 months.         Relevant Medications    insulin aspart protamine & aspart (NOVOLOG) 70/30 100 unit/mL    linagliptin (TRADJENTA) 5 MG tablet tablet    Other  Relevant Orders    POCT Glucose (Completed)    POC Glycosylated Hemoglobin (Hb A1C) (Completed)      Other Visit Diagnoses    None.              Return in about 3 months (around 6/29/2018) for Recheck.

## 2018-03-30 DIAGNOSIS — G43.019 INTRACTABLE MIGRAINE WITHOUT AURA AND WITHOUT STATUS MIGRAINOSUS: ICD-10-CM

## 2018-03-30 RX ORDER — SUMATRIPTAN 50 MG/1
50 TABLET, FILM COATED ORAL
Qty: 9 TABLET | Refills: 5 | Status: SHIPPED | OUTPATIENT
Start: 2018-03-30 | End: 2019-04-25 | Stop reason: SDUPTHER

## 2018-04-02 ENCOUNTER — TELEPHONE (OUTPATIENT)
Dept: INTERNAL MEDICINE | Facility: CLINIC | Age: 58
End: 2018-04-02

## 2018-04-02 DIAGNOSIS — E11.8 TYPE 2 DIABETES MELLITUS WITH COMPLICATION, WITHOUT LONG-TERM CURRENT USE OF INSULIN (HCC): Primary | ICD-10-CM

## 2018-04-02 NOTE — TELEPHONE ENCOUNTER
Received refill request via fax from [facility] pharmacy stating that plan does not cover Novolog mix 70/30 the preferred alternatives include: Humulin or Humalog, pls advise.

## 2018-04-03 ENCOUNTER — TELEPHONE (OUTPATIENT)
Dept: INTERNAL MEDICINE | Facility: CLINIC | Age: 58
End: 2018-04-03

## 2018-04-03 NOTE — TELEPHONE ENCOUNTER
JULIO C FROM [facility] CALLED ON BEHALF OF PT STATING THE PT WAS PREVIOUSLY TAKING NOVALOG MIX 70/30 FLEX PEN. DUE TO INSURANCE, THE MEDICATION HAS NOW CHANGED TO HUMALOG MIX 70/30 FLEX PEN. PHARMACY NEEDING VERIFICATION IF IT IS OK TO SWITCH. BEST CALL BACK # [phone]

## 2018-04-05 ENCOUNTER — TELEPHONE (OUTPATIENT)
Dept: INTERNAL MEDICINE | Facility: CLINIC | Age: 58
End: 2018-04-05

## 2018-04-05 NOTE — TELEPHONE ENCOUNTER
DARLING WITH [facility] CALLED TO REPORT THAT SHE IS NOT ABLE TO GET IN CONTACT WITH THIS PATIENT FOR HOME HEALTH SERVICES.    DARLING [phone]

## 2018-04-12 DIAGNOSIS — R60.1 GENERALIZED EDEMA: ICD-10-CM

## 2018-04-12 RX ORDER — FUROSEMIDE 20 MG/1
20 TABLET ORAL DAILY
Qty: 30 TABLET | Refills: 0 | Status: SHIPPED | OUTPATIENT
Start: 2018-04-12 | End: 2018-05-15 | Stop reason: SDUPTHER

## 2018-04-12 RX ORDER — OMEGA-3-ACID ETHYL ESTERS 1 G/1
CAPSULE, LIQUID FILLED ORAL
Qty: 30 CAPSULE | Refills: 0 | Status: SHIPPED | OUTPATIENT
Start: 2018-04-12 | End: 2018-05-15 | Stop reason: SDUPTHER

## 2018-04-26 ENCOUNTER — TELEPHONE (OUTPATIENT)
Dept: INTERNAL MEDICINE | Facility: CLINIC | Age: 58
End: 2018-04-26

## 2018-04-26 NOTE — TELEPHONE ENCOUNTER
PATIENT IS NEEDING SOME ASSISTANCE WITH WITH MEDICATION MANAGEMENT AND ON GOING WEAKNESS. SHE HAS HAD A FEW FALLS RECENTLY. THEY NEED ORDERS FOR PHYSICAL THERAPY AND SKILLED NURSING. YOU CAN REACH RUBIN UGALDE BACK [phone]

## 2018-04-27 NOTE — TELEPHONE ENCOUNTER
Called Briana with HH and gave verbal orders per Amrita, faxed over pt's demographic and LOV note per Briana's request at [phone].

## 2018-05-09 ENCOUNTER — TELEPHONE (OUTPATIENT)
Dept: INTERNAL MEDICINE | Facility: CLINIC | Age: 58
End: 2018-05-09

## 2018-05-09 NOTE — TELEPHONE ENCOUNTER
JOSE ENRIQUE WITH [employer] HAS MADE MULTIPLE ATTEMPTS TO CONTACT THIS PATIENT REGARDING HOME HEALTH VISITS OVER ONE WEEK'S TIME. SHE HAS LEFT A FINAL VOICEMAIL TO LET THE PATIENT KNOW THAT [employer] WILL NOT MAKE ANY MORE ATTEMPTS AND IF THE PATIENT HAS ANY CONCERNS ABOUT THIS DECISION TO CONTACT THIS OFFICE.

## 2018-05-15 DIAGNOSIS — R60.1 GENERALIZED EDEMA: ICD-10-CM

## 2018-05-15 RX ORDER — FUROSEMIDE 20 MG/1
20 TABLET ORAL DAILY
Qty: 30 TABLET | Refills: 0 | OUTPATIENT
Start: 2018-05-15

## 2018-05-15 RX ORDER — FUROSEMIDE 20 MG/1
20 TABLET ORAL DAILY
Qty: 30 TABLET | Refills: 0 | Status: SHIPPED | OUTPATIENT
Start: 2018-05-15 | End: 2018-06-25 | Stop reason: SDUPTHER

## 2018-05-15 RX ORDER — OMEGA-3-ACID ETHYL ESTERS 1 G/1
CAPSULE, LIQUID FILLED ORAL
Qty: 30 CAPSULE | Refills: 0 | Status: SHIPPED | OUTPATIENT
Start: 2018-05-15 | End: 2018-06-25 | Stop reason: SDUPTHER

## 2018-05-15 RX ORDER — OMEGA-3-ACID ETHYL ESTERS 1 G/1
CAPSULE, LIQUID FILLED ORAL
Qty: 30 CAPSULE | Refills: 0 | OUTPATIENT
Start: 2018-05-15

## 2018-06-25 ENCOUNTER — OFFICE VISIT (OUTPATIENT)
Dept: FAMILY MEDICINE CLINIC | Facility: CLINIC | Age: 58
End: 2018-06-25

## 2018-06-25 ENCOUNTER — TELEPHONE (OUTPATIENT)
Dept: FAMILY MEDICINE CLINIC | Facility: CLINIC | Age: 58
End: 2018-06-25

## 2018-06-25 VITALS
TEMPERATURE: 97.6 F | DIASTOLIC BLOOD PRESSURE: 80 MMHG | WEIGHT: 250 LBS | OXYGEN SATURATION: 93 % | RESPIRATION RATE: 16 BRPM | SYSTOLIC BLOOD PRESSURE: 110 MMHG | BODY MASS INDEX: 40.35 KG/M2 | HEART RATE: 96 BPM

## 2018-06-25 DIAGNOSIS — B37.2 SKIN YEAST INFECTION: ICD-10-CM

## 2018-06-25 DIAGNOSIS — R60.1 GENERALIZED EDEMA: ICD-10-CM

## 2018-06-25 DIAGNOSIS — G43.011 INTRACTABLE MIGRAINE WITHOUT AURA AND WITH STATUS MIGRAINOSUS: ICD-10-CM

## 2018-06-25 DIAGNOSIS — M54.5 CHRONIC BILATERAL LOW BACK PAIN, WITH SCIATICA PRESENCE UNSPECIFIED: ICD-10-CM

## 2018-06-25 DIAGNOSIS — IMO0002 UNCONTROLLED TYPE 2 DIABETES MELLITUS WITH COMPLICATION, WITH LONG-TERM CURRENT USE OF INSULIN: ICD-10-CM

## 2018-06-25 DIAGNOSIS — J44.1 CHRONIC OBSTRUCTIVE PULMONARY DISEASE WITH ACUTE EXACERBATION (HCC): ICD-10-CM

## 2018-06-25 DIAGNOSIS — J42 CHRONIC BRONCHITIS, UNSPECIFIED CHRONIC BRONCHITIS TYPE (HCC): ICD-10-CM

## 2018-06-25 DIAGNOSIS — E78.5 HYPERLIPIDEMIA, UNSPECIFIED HYPERLIPIDEMIA TYPE: ICD-10-CM

## 2018-06-25 DIAGNOSIS — I10 ESSENTIAL HYPERTENSION: Primary | ICD-10-CM

## 2018-06-25 DIAGNOSIS — Z00.00 HEALTHCARE MAINTENANCE: ICD-10-CM

## 2018-06-25 DIAGNOSIS — E03.9 ACQUIRED HYPOTHYROIDISM: ICD-10-CM

## 2018-06-25 DIAGNOSIS — N95.1 HOT FLASHES DUE TO MENOPAUSE: ICD-10-CM

## 2018-06-25 DIAGNOSIS — R60.9 EDEMA, UNSPECIFIED TYPE: ICD-10-CM

## 2018-06-25 DIAGNOSIS — N95.1 MENOPAUSAL HOT FLUSHES: ICD-10-CM

## 2018-06-25 DIAGNOSIS — G89.29 CHRONIC BILATERAL LOW BACK PAIN, WITH SCIATICA PRESENCE UNSPECIFIED: ICD-10-CM

## 2018-06-25 PROCEDURE — 99214 OFFICE O/P EST MOD 30 MIN: CPT | Performed by: NURSE PRACTITIONER

## 2018-06-25 RX ORDER — LANCETS
EACH MISCELLANEOUS
Qty: 100 EACH | Refills: 0 | Status: SHIPPED | OUTPATIENT
Start: 2018-06-25 | End: 2018-06-25 | Stop reason: SDUPTHER

## 2018-06-25 RX ORDER — OMEGA-3-ACID ETHYL ESTERS 1 G/1
1 CAPSULE, LIQUID FILLED ORAL DAILY
Qty: 30 CAPSULE | Refills: 5 | Status: SHIPPED | OUTPATIENT
Start: 2018-06-25 | End: 2018-11-02 | Stop reason: SDUPTHER

## 2018-06-25 RX ORDER — BUDESONIDE AND FORMOTEROL FUMARATE DIHYDRATE 160; 4.5 UG/1; UG/1
2 AEROSOL RESPIRATORY (INHALATION)
Qty: 10.2 G | Refills: 5 | Status: SHIPPED | OUTPATIENT
Start: 2018-06-25 | End: 2019-07-25 | Stop reason: ALTCHOICE

## 2018-06-25 RX ORDER — CYCLOBENZAPRINE HCL 10 MG
10 TABLET ORAL 3 TIMES DAILY PRN
Qty: 60 TABLET | Refills: 5 | Status: SHIPPED | OUTPATIENT
Start: 2018-06-25 | End: 2018-11-02 | Stop reason: SDUPTHER

## 2018-06-25 RX ORDER — BLOOD-GLUCOSE METER
1 KIT MISCELLANEOUS 2 TIMES DAILY
Qty: 1 EACH | Refills: 0 | Status: SHIPPED | OUTPATIENT
Start: 2018-06-25 | End: 2018-07-17 | Stop reason: SDUPTHER

## 2018-06-25 RX ORDER — ALBUTEROL SULFATE 90 UG/1
2 AEROSOL, METERED RESPIRATORY (INHALATION) EVERY 6 HOURS PRN
Qty: 1 INHALER | Refills: 5 | Status: SHIPPED | OUTPATIENT
Start: 2018-06-25 | End: 2018-10-23 | Stop reason: SDUPTHER

## 2018-06-25 RX ORDER — SIMVASTATIN 10 MG
10 TABLET ORAL NIGHTLY
Qty: 30 TABLET | Refills: 5 | Status: SHIPPED | OUTPATIENT
Start: 2018-06-25 | End: 2019-03-19 | Stop reason: SDUPTHER

## 2018-06-25 RX ORDER — LANCETS
EACH MISCELLANEOUS
Qty: 100 EACH | Refills: 0 | Status: SHIPPED | OUTPATIENT
Start: 2018-06-25 | End: 2018-08-16 | Stop reason: SDUPTHER

## 2018-06-25 RX ORDER — ESTRADIOL 2 MG/1
2 TABLET ORAL DAILY
Qty: 30 TABLET | Refills: 5 | Status: SHIPPED | OUTPATIENT
Start: 2018-06-25 | End: 2019-04-10 | Stop reason: SDUPTHER

## 2018-06-25 RX ORDER — NYSTATIN 100000 U/G
OINTMENT TOPICAL 2 TIMES DAILY
Qty: 30 G | Refills: 1 | Status: SHIPPED | OUTPATIENT
Start: 2018-06-25 | End: 2018-10-16 | Stop reason: SDUPTHER

## 2018-06-25 RX ORDER — FUROSEMIDE 20 MG/1
20 TABLET ORAL DAILY
Qty: 30 TABLET | Refills: 5 | Status: SHIPPED | OUTPATIENT
Start: 2018-06-25 | End: 2018-12-17 | Stop reason: SDUPTHER

## 2018-06-25 NOTE — TELEPHONE ENCOUNTER
Pharmacy called and said that they were missing prescriptions for patient's One Touch Ultra Lancets & Humulin 70/30 quick pens. Please send to [facility]ier Muniz.
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
## 2018-06-26 ENCOUNTER — TELEPHONE (OUTPATIENT)
Dept: FAMILY MEDICINE CLINIC | Facility: CLINIC | Age: 58
End: 2018-06-26

## 2018-06-26 NOTE — TELEPHONE ENCOUNTER
Called pharmacy, pt will start at 15 units bid, but will increase dose by 2 units every 4 days, max of 40 units per day for 30 days at this time.

## 2018-06-26 NOTE — TELEPHONE ENCOUNTER
Pharmacy called back and they are needing a daily max dose on patient's insulin. Essentially wanting to know if it is okay to give patient a 30 day supply.

## 2018-06-26 NOTE — TELEPHONE ENCOUNTER
Patient's daughter called, she is working toward getting patient back on meds and insulin, glucose 300's to 500's, she gave 10 units NPH bid yesterday, fasting this morning was 300; , I advised to increase to 15 units bid and increase by 2 units ea dose every 4 days until fasting 150. Give 5 extra units if glucose over 400. If patient has nausea, vomiting, or diarrhea, go to ER to be evaluated  Daughter verbalized understanding

## 2018-06-27 ENCOUNTER — PRIOR AUTHORIZATION (OUTPATIENT)
Dept: FAMILY MEDICINE CLINIC | Facility: CLINIC | Age: 58
End: 2018-06-27

## 2018-06-27 DIAGNOSIS — IMO0002 UNCONTROLLED TYPE 2 DIABETES MELLITUS WITH COMPLICATION, WITH LONG-TERM CURRENT USE OF INSULIN: Primary | ICD-10-CM

## 2018-06-28 ENCOUNTER — TELEPHONE (OUTPATIENT)
Dept: FAMILY MEDICINE CLINIC | Facility: CLINIC | Age: 58
End: 2018-06-28

## 2018-06-28 NOTE — TELEPHONE ENCOUNTER
Bella, this is the patient that insurance would not cover Humulin 70/30 so I dc'd that and ordered novolog 70/30.  Please call pharmacy  manoj

## 2018-06-28 NOTE — TELEPHONE ENCOUNTER
Spoke to Yanet at [facility].     Explained that we received a coverage denial from [facility] requesting a PA or change of Humulin. Novolog is preferred.     She ran a successful claim for Novolog Flexpens. Humulin Kwikpens cancelled to (hopefully) avoid further confusion.

## 2018-06-28 NOTE — TELEPHONE ENCOUNTER
HUMULIN 70/30 WAS CALLED IN AS WELL AS THE NOVOLOG 70/30. PHARM SAID THEY ARE BASICALLY THE SAME MED. NEEDS CONFIRMATION OF THE MED WE WANT FOR THE PATIENT.

## 2018-07-09 ENCOUNTER — LAB (OUTPATIENT)
Dept: LAB | Facility: HOSPITAL | Age: 58
End: 2018-07-09

## 2018-07-09 ENCOUNTER — TRANSCRIBE ORDERS (OUTPATIENT)
Dept: LAB | Facility: HOSPITAL | Age: 58
End: 2018-07-09

## 2018-07-09 DIAGNOSIS — F31.9 BIPOLAR 1 DISORDER (HCC): Primary | ICD-10-CM

## 2018-07-09 DIAGNOSIS — I10 ESSENTIAL HYPERTENSION: ICD-10-CM

## 2018-07-09 DIAGNOSIS — F32.A DEPRESSION, UNSPECIFIED DEPRESSION TYPE: ICD-10-CM

## 2018-07-09 DIAGNOSIS — E03.9 ACQUIRED HYPOTHYROIDISM: ICD-10-CM

## 2018-07-09 DIAGNOSIS — IMO0002 UNCONTROLLED TYPE 2 DIABETES MELLITUS WITH COMPLICATION, WITH LONG-TERM CURRENT USE OF INSULIN: ICD-10-CM

## 2018-07-09 DIAGNOSIS — F31.9 BIPOLAR 1 DISORDER (HCC): ICD-10-CM

## 2018-07-09 DIAGNOSIS — Z00.00 HEALTHCARE MAINTENANCE: ICD-10-CM

## 2018-07-09 DIAGNOSIS — E78.5 HYPERLIPIDEMIA, UNSPECIFIED HYPERLIPIDEMIA TYPE: ICD-10-CM

## 2018-07-09 LAB
ALBUMIN SERPL-MCNC: 4.16 G/DL (ref 3.2–4.8)
ALBUMIN/GLOB SERPL: 1.6 G/DL (ref 1.5–2.5)
ALP SERPL-CCNC: 77 U/L (ref 25–100)
ALT SERPL W P-5'-P-CCNC: 20 U/L (ref 7–40)
ANION GAP SERPL CALCULATED.3IONS-SCNC: 9 MMOL/L (ref 3–11)
ARTICHOKE IGE QN: 169 MG/DL (ref 0–130)
AST SERPL-CCNC: 19 U/L (ref 0–33)
BILIRUB SERPL-MCNC: 0.3 MG/DL (ref 0.3–1.2)
BUN BLD-MCNC: 6 MG/DL (ref 9–23)
BUN/CREAT SERPL: 7.7 (ref 7–25)
CALCIUM SPEC-SCNC: 9.2 MG/DL (ref 8.7–10.4)
CHLORIDE SERPL-SCNC: 98 MMOL/L (ref 99–109)
CHOLEST SERPL-MCNC: 238 MG/DL (ref 0–200)
CO2 SERPL-SCNC: 31 MMOL/L (ref 20–31)
CREAT BLD-MCNC: 0.78 MG/DL (ref 0.6–1.3)
DEPRECATED RDW RBC AUTO: 51 FL (ref 37–54)
ERYTHROCYTE [DISTWIDTH] IN BLOOD BY AUTOMATED COUNT: 14.3 % (ref 11.3–14.5)
GFR SERPL CREATININE-BSD FRML MDRD: 77 ML/MIN/1.73
GLOBULIN UR ELPH-MCNC: 2.6 GM/DL
GLUCOSE BLD-MCNC: 280 MG/DL (ref 70–100)
HBA1C MFR BLD: 11.7 % (ref 4.8–5.6)
HCT VFR BLD AUTO: 45.8 % (ref 34.5–44)
HCV AB SER DONR QL: NORMAL
HDLC SERPL-MCNC: 37 MG/DL (ref 40–60)
HGB BLD-MCNC: 15.4 G/DL (ref 11.5–15.5)
MCH RBC QN AUTO: 32.8 PG (ref 27–31)
MCHC RBC AUTO-ENTMCNC: 33.6 G/DL (ref 32–36)
MCV RBC AUTO: 97.4 FL (ref 80–99)
PLATELET # BLD AUTO: 219 10*3/MM3 (ref 150–450)
PMV BLD AUTO: 9.9 FL (ref 6–12)
POTASSIUM BLD-SCNC: 4.3 MMOL/L (ref 3.5–5.5)
PROT SERPL-MCNC: 6.8 G/DL (ref 5.7–8.2)
RBC # BLD AUTO: 4.7 10*6/MM3 (ref 3.89–5.14)
SODIUM BLD-SCNC: 138 MMOL/L (ref 132–146)
TRIGL SERPL-MCNC: 394 MG/DL (ref 0–150)
TSH SERPL DL<=0.05 MIU/L-ACNC: 1.58 MIU/ML (ref 0.35–5.35)
VALPROATE SERPL-MCNC: 73 MCG/ML (ref 50–150)
WBC NRBC COR # BLD: 8.84 10*3/MM3 (ref 3.5–10.8)

## 2018-07-09 PROCEDURE — 36415 COLL VENOUS BLD VENIPUNCTURE: CPT

## 2018-07-09 PROCEDURE — 86803 HEPATITIS C AB TEST: CPT

## 2018-07-09 PROCEDURE — 84443 ASSAY THYROID STIM HORMONE: CPT

## 2018-07-09 PROCEDURE — 80061 LIPID PANEL: CPT

## 2018-07-09 PROCEDURE — 85027 COMPLETE CBC AUTOMATED: CPT

## 2018-07-09 PROCEDURE — 80164 ASSAY DIPROPYLACETIC ACD TOT: CPT

## 2018-07-09 PROCEDURE — 82570 ASSAY OF URINE CREATININE: CPT

## 2018-07-09 PROCEDURE — 82043 UR ALBUMIN QUANTITATIVE: CPT

## 2018-07-09 PROCEDURE — 80053 COMPREHEN METABOLIC PANEL: CPT

## 2018-07-09 PROCEDURE — 83036 HEMOGLOBIN GLYCOSYLATED A1C: CPT

## 2018-07-10 LAB
CREAT 24H UR-MCNC: 58.5 MG/DL
MICROALBUMIN UR-MCNC: 39.2 UG/ML
MICROALBUMIN/CREAT UR: 67 MG/G CREAT (ref 0–30)

## 2018-07-12 ENCOUNTER — OFFICE VISIT (OUTPATIENT)
Dept: FAMILY MEDICINE CLINIC | Facility: CLINIC | Age: 58
End: 2018-07-12

## 2018-07-12 VITALS
RESPIRATION RATE: 16 BRPM | BODY MASS INDEX: 40.67 KG/M2 | OXYGEN SATURATION: 95 % | SYSTOLIC BLOOD PRESSURE: 106 MMHG | DIASTOLIC BLOOD PRESSURE: 60 MMHG | TEMPERATURE: 97.2 F | HEART RATE: 76 BPM | WEIGHT: 252 LBS

## 2018-07-12 DIAGNOSIS — K59.00 CONSTIPATION, UNSPECIFIED CONSTIPATION TYPE: ICD-10-CM

## 2018-07-12 DIAGNOSIS — I10 ESSENTIAL HYPERTENSION: Primary | ICD-10-CM

## 2018-07-12 DIAGNOSIS — R41.3 MEMORY CHANGES: ICD-10-CM

## 2018-07-12 DIAGNOSIS — IMO0002 UNCONTROLLED TYPE 2 DIABETES MELLITUS WITH COMPLICATION, WITH LONG-TERM CURRENT USE OF INSULIN: ICD-10-CM

## 2018-07-12 DIAGNOSIS — F43.10 PTSD (POST-TRAUMATIC STRESS DISORDER): ICD-10-CM

## 2018-07-12 DIAGNOSIS — F30.9 BIPOLAR I DISORDER, SINGLE MANIC EPISODE (HCC): ICD-10-CM

## 2018-07-12 PROCEDURE — 99214 OFFICE O/P EST MOD 30 MIN: CPT | Performed by: NURSE PRACTITIONER

## 2018-07-12 RX ORDER — PEN NEEDLE, DIABETIC 31 GX5/16"
1 NEEDLE, DISPOSABLE MISCELLANEOUS
COMMUNITY
Start: 2018-06-25 | End: 2020-06-24 | Stop reason: SDUPTHER

## 2018-07-12 RX ORDER — DICYCLOMINE HYDROCHLORIDE 10 MG/1
10 CAPSULE ORAL
COMMUNITY
End: 2018-12-17 | Stop reason: SDUPTHER

## 2018-07-12 NOTE — PROGRESS NOTES
Subjective   Saadia Caceres is a 57 y.o. female.     History of Present Illness   Patient is here for follow up, has been noncompliant with medications, her daughter has taken over med management. Labs were drawn at initial visit, wiill discuss results today. Patient has been taking medications as prescribed, denies side effects: diet includes ramen noodles, and processed foods, cooks frozen dinners, exercise includes walking, has been drinking a lot of water.   She did have an episode of constipation which caused hemorrhoid flare up  Her daughter is here with her, has been checking glucose levels and preparing medications, glucose was in 400's at first, but after getting patient back on medications,highest has been in 200's, lowest 106.  Daughter has noticed a decline in memory and wants patient referred to neurology.  The following portions of the patient's history were reviewed and updated as appropriate: allergies, current medications, past family history, past medical history, past social history, past surgical history and problem list.    Review of Systems   Constitutional: Negative for appetite change, chills, diaphoresis, fatigue, fever and unexpected weight change.   Eyes: Negative for visual disturbance.   Respiratory: Positive for shortness of breath. Negative for cough and chest tightness.    Cardiovascular: Positive for leg swelling. Negative for chest pain and palpitations.   Gastrointestinal: Positive for blood in stool (hemorrhoids) and constipation. Negative for diarrhea, nausea and vomiting.   Endocrine: Positive for polydipsia and polyuria. Negative for polyphagia.   Skin: Negative for color change and rash.   Neurological: Positive for dizziness, light-headedness and headaches. Negative for syncope, weakness and numbness.   Psychiatric/Behavioral: Positive for decreased concentration. The patient is nervous/anxious.        Objective   Physical Exam   Constitutional: She is oriented to  person, place, and time. She appears well-developed and well-nourished. No distress.   HENT:   Head: Normocephalic and atraumatic.   Right Ear: Tympanic membrane is perforated (chronic). Tympanic membrane is not erythematous and not bulging.   Left Ear: Tympanic membrane is not perforated, not erythematous and not bulging.   Eyes: Conjunctivae and EOM are normal. Pupils are equal, round, and reactive to light. No scleral icterus.   Neck: Normal range of motion. Neck supple. No JVD present. No thyromegaly present.   Cardiovascular: Normal rate, regular rhythm, normal heart sounds and intact distal pulses.    No murmur heard.  Pulmonary/Chest: Effort normal and breath sounds normal. No respiratory distress. She exhibits no tenderness.   Abdominal: Soft. She exhibits no distension. There is no tenderness.   Musculoskeletal: She exhibits no edema.   Neurological: She is alert and oriented to person, place, and time.   Skin: Skin is warm and dry. She is not diaphoretic. No erythema. No pallor.   Psychiatric: She has a normal mood and affect.   Difficulty staying focused   Nursing note and vitals reviewed.      Assessment/Plan   [place] was seen today for diabetes.    Diagnoses and all orders for this visit:    Essential hypertension    Uncontrolled type 2 diabetes mellitus with complication, with long-term current use of insulin (CMS/[facility])  -     insulin aspart protamine & aspart (NOVOLOG) 70/30 100 unit/mL; Inject 0.22 mL under the skin 2 (Two) Times a Day Before Meals. Increase by 2 units every 4 days until fasting at 150 mg/dl    Bipolar I disorder, single manic episode (CMS/[facility])    PTSD (post-traumatic stress disorder)    Memory changes  -     Ambulatory Referral to Neurology    Constipation, unspecified constipation type      Hypertension is controlled, continue current medications.  Diabetes is not controlled, but siince patient was just started back on meds, will continue meds as prescribed, repeat A1c in 3  months. Continue to monitor glucose at home.  Patient sees a psychiatrist for Bipolar and PTSD which accounts for her noncompliance and difficulty concentrating.  Referred to neurology per daughter's request for memory evaluation.  Advised daily fiber supplement to manage constipation.  Patient was encouraged to keep me informed of any acute changes, lack of improvement, or any new concerning symptoms.Patient voiced understanding of all instructions and denied further questions.

## 2018-07-17 DIAGNOSIS — IMO0002 UNCONTROLLED TYPE 2 DIABETES MELLITUS WITH COMPLICATION, WITH LONG-TERM CURRENT USE OF INSULIN: ICD-10-CM

## 2018-07-17 RX ORDER — BLOOD-GLUCOSE METER
EACH MISCELLANEOUS
Qty: 1 EACH | Refills: 0 | Status: SHIPPED | OUTPATIENT
Start: 2018-07-17 | End: 2020-04-07 | Stop reason: SDUPTHER

## 2018-07-25 ENCOUNTER — TELEPHONE (OUTPATIENT)
Dept: FAMILY MEDICINE CLINIC | Facility: CLINIC | Age: 58
End: 2018-07-25

## 2018-07-25 NOTE — TELEPHONE ENCOUNTER
ESTAVOLAM 2 MG, TAKES 1 AT BEDTIME    PATIENT HAD DR VILLA CALL THIS IN FOR HER BUT SHE NO LONGER SEES HIM. WOULD LIKE US TO SEND IN FOR HER.    NEEDS PRIOR AUTH AS WELL.

## 2018-07-25 NOTE — TELEPHONE ENCOUNTER
I think this is supposed to be Alprazolam 2mg. I called both pharmacies listed in pt chart to verify last fill date and the provider who filled it. Does pt need appt to discuss this?     Last fill: 6/30/18 #150 max of 5 tablets daily by Dr. Schaffer  Last office visit: 7/12/18  Next office visit: 10/15/18  Last Jcarlos: ran today   Controlled substance contract on file? No.   Last UDS: Needs updated.

## 2018-07-26 ENCOUNTER — TELEPHONE (OUTPATIENT)
Dept: FAMILY MEDICINE CLINIC | Facility: CLINIC | Age: 58
End: 2018-07-26

## 2018-07-26 NOTE — TELEPHONE ENCOUNTER
Called and spoke with pt daughter. I informed her of Roberta's recommendations and the daughter stated the pt only receives that medication from Dr. Schaffer and if the pt calls requesting a medication or a change it is to be ignored. She stated she is the one responsible for her mothers medications and she will be the one to contact us if changes need to be made. She requested we make note of this in the pt chart.

## 2018-07-26 NOTE — TELEPHONE ENCOUNTER
Patient is supposed to still be seeing Dr. Schaffer, her psychiatrist, he will need to prescribe these. Call patient's daughter if necessary to clarify this, our office can not take over her psychiatric medications.

## 2018-07-26 NOTE — TELEPHONE ENCOUNTER
NAVI FROM [facility] CALLED AND WANTS CHART NOTES FOR PT. SHE SAID ANY ORDERS THAT HAVE BEEN SIGNED, AND ANYTHING THAT SHOWS PT HAS COPD AND IS ON A VENTILATOR.     PLEASE FAX [phone]    PHONE NUMBER [phone]

## 2018-07-27 NOTE — TELEPHONE ENCOUNTER
I faxed the note from 6/25/18, patient was new to this office , the note does state she has COPD, if they need other information, may have to get from previous PCP

## 2018-07-31 ENCOUNTER — OFFICE VISIT (OUTPATIENT)
Dept: FAMILY MEDICINE CLINIC | Facility: CLINIC | Age: 58
End: 2018-07-31

## 2018-07-31 ENCOUNTER — HOSPITAL ENCOUNTER (OUTPATIENT)
Dept: CT IMAGING | Facility: HOSPITAL | Age: 58
Discharge: HOME | End: 2018-07-31
Admitting: NURSE PRACTITIONER

## 2018-07-31 VITALS
OXYGEN SATURATION: 97 % | DIASTOLIC BLOOD PRESSURE: 64 MMHG | WEIGHT: 254.2 LBS | BODY MASS INDEX: 41.03 KG/M2 | TEMPERATURE: 97.9 F | SYSTOLIC BLOOD PRESSURE: 112 MMHG | HEART RATE: 86 BPM

## 2018-07-31 DIAGNOSIS — R10.84 GENERALIZED ABDOMINAL PAIN: ICD-10-CM

## 2018-07-31 DIAGNOSIS — R10.11 ABDOMINAL PAIN, RUQ: ICD-10-CM

## 2018-07-31 PROCEDURE — 25010000002 IOPAMIDOL 61 % SOLUTION: Performed by: NURSE PRACTITIONER

## 2018-07-31 PROCEDURE — A9270 NON-COVERED ITEM OR SERVICE: HCPCS | Performed by: NURSE PRACTITIONER

## 2018-07-31 PROCEDURE — 99214 OFFICE O/P EST MOD 30 MIN: CPT | Performed by: NURSE PRACTITIONER

## 2018-07-31 PROCEDURE — 63710000001 BARIUM 2 % SUSPENSION: Performed by: NURSE PRACTITIONER

## 2018-07-31 PROCEDURE — 74177 CT ABD & PELVIS W/CONTRAST: CPT

## 2018-07-31 RX ADMIN — BARIUM SULFATE 450 ML: 21 SUSPENSION ORAL at 14:25

## 2018-07-31 RX ADMIN — IOPAMIDOL 80 ML: 612 INJECTION, SOLUTION INTRAVENOUS at 17:28

## 2018-07-31 NOTE — PROGRESS NOTES
Subjective   Saadia Caceres is a 57 y.o. female.   Chief Complaint   Patient presents with   • Abdominal Pain     abdomen has been sore to touch for the past 5 days worried about gallbladder       History of Present Illness   Patient is here with complaint of right upper and midgastric abdominal pain x 5 days, states she had 3 good sized bowel movements yesterday, but thought she needed to give herself 2 enemas of water today. Rates pain as a 10, she does have mental health issues and is difficult to tell if  Information she provides is accurate.  The following portions of the patient's history were reviewed and updated as appropriate: allergies, current medications, past family history, past medical history, past social history, past surgical history and problem list.    Review of Systems   Constitutional: Positive for appetite change. Negative for chills, fever and unexpected weight change.   Respiratory: Negative for cough, chest tightness and shortness of breath.    Cardiovascular: Negative for chest pain.   Gastrointestinal: Positive for abdominal distention, abdominal pain (x 5 days), nausea and vomiting. Negative for blood in stool, constipation and diarrhea.   Genitourinary: Negative for difficulty urinating and dysuria.   Musculoskeletal: Negative for back pain.   Skin: Negative for color change and rash.   Allergic/Immunologic: Negative for food allergies.       Objective   Physical Exam   Constitutional: She is oriented to person, place, and time. She appears well-developed and well-nourished. No distress.   HENT:   Head: Normocephalic and atraumatic.   Right Ear: External ear normal.   Left Ear: External ear normal.   Eyes: Conjunctivae are normal. No scleral icterus.   Cardiovascular: Normal rate, regular rhythm and normal heart sounds.    No murmur heard.  Pulmonary/Chest: Effort normal and breath sounds normal. No respiratory distress. She has no wheezes.   Abdominal: Soft. Bowel sounds are  normal. She exhibits no distension and no mass. There is tenderness. There is guarding. There is no rebound. No hernia.   Neurological: She is alert and oriented to person, place, and time.   Skin: Skin is warm and dry. She is not diaphoretic.   Psychiatric: She has a normal mood and affect. Her behavior is normal. Judgment and thought content normal.   Vitals reviewed.      Assessment/Plan   [place] was seen today for abdominal pain.    Diagnoses and all orders for this visit:    Generalized abdominal pain  -     Cancel: CT Abdomen Pelvis Without Contrast; Future  -     CT Abdomen Pelvis With Contrast; Future    Abdominal pain, RUQ  -     Cancel: CT Abdomen Pelvis Without Contrast; Future  -     CT Abdomen Pelvis With Contrast; Future      Stat CT ordered to evaluate abdominal pain, patient is afebrile. Advised her to stop giving herself enemas, take the daily fiber supplement and notify daughter if she has constipation in the future. I asked the daughter to remove the enema bottles from the home.   Patient was encouraged to keep me informed of any acute changes, lack of improvement, or any new concerning symptoms.Patient voiced understanding of all instructions and denied further questions.  I will contact patient regarding test results and provide instructions regarding any necessary changes in plan of care.Patient was encouraged to keep me informed of any acute changes, lack of improvement, or any new concerning symptoms.Patient voiced understanding of all instructions and denied further questions.

## 2018-08-01 ENCOUNTER — TELEPHONE (OUTPATIENT)
Dept: FAMILY MEDICINE CLINIC | Facility: CLINIC | Age: 58
End: 2018-08-01

## 2018-08-01 ENCOUNTER — APPOINTMENT (OUTPATIENT)
Dept: ULTRASOUND IMAGING | Facility: HOSPITAL | Age: 58
End: 2018-08-01

## 2018-08-01 ENCOUNTER — HOSPITAL ENCOUNTER (OUTPATIENT)
Facility: HOSPITAL | Age: 58
Setting detail: OBSERVATION
Discharge: HOME HEALTH | End: 2018-08-05
Attending: FAMILY MEDICINE | Admitting: HOSPITALIST

## 2018-08-01 ENCOUNTER — APPOINTMENT (OUTPATIENT)
Dept: GENERAL RADIOLOGY | Facility: HOSPITAL | Age: 58
End: 2018-08-01

## 2018-08-01 ENCOUNTER — LAB (OUTPATIENT)
Dept: LAB | Facility: HOSPITAL | Age: 58
End: 2018-08-01

## 2018-08-01 DIAGNOSIS — R93.5 ABNORMAL CT OF THE ABDOMEN: ICD-10-CM

## 2018-08-01 DIAGNOSIS — Z78.9 IMPAIRED MOBILITY AND ADLS: Primary | ICD-10-CM

## 2018-08-01 DIAGNOSIS — K85.90 ACUTE PANCREATITIS WITHOUT INFECTION OR NECROSIS, UNSPECIFIED PANCREATITIS TYPE: ICD-10-CM

## 2018-08-01 DIAGNOSIS — Z74.09 IMPAIRED MOBILITY AND ADLS: Primary | ICD-10-CM

## 2018-08-01 DIAGNOSIS — R10.84 GENERALIZED ABDOMINAL PAIN: Primary | ICD-10-CM

## 2018-08-01 DIAGNOSIS — R10.84 GENERALIZED ABDOMINAL PAIN: ICD-10-CM

## 2018-08-01 PROBLEM — E66.01 MORBIDLY OBESE (HCC): Status: ACTIVE | Noted: 2018-08-01

## 2018-08-01 PROBLEM — F31.9 BIPOLAR DISORDER: Chronic | Status: ACTIVE | Noted: 2018-08-01

## 2018-08-01 PROBLEM — G43.909 MIGRAINES: Chronic | Status: ACTIVE | Noted: 2018-08-01

## 2018-08-01 PROBLEM — G31.84 MILD COGNITIVE IMPAIRMENT: Chronic | Status: ACTIVE | Noted: 2018-08-01

## 2018-08-01 PROBLEM — Z72.0 TOBACCO ABUSE: Chronic | Status: ACTIVE | Noted: 2018-08-01

## 2018-08-01 LAB
ALBUMIN SERPL-MCNC: 4.03 G/DL (ref 3.2–4.8)
ALBUMIN SERPL-MCNC: 4.24 G/DL (ref 3.2–4.8)
ALBUMIN/GLOB SERPL: 1.4 G/DL (ref 1.5–2.5)
ALBUMIN/GLOB SERPL: 1.5 G/DL (ref 1.5–2.5)
ALP SERPL-CCNC: 64 U/L (ref 25–100)
ALP SERPL-CCNC: 81 U/L (ref 25–100)
ALT SERPL W P-5'-P-CCNC: 11 U/L (ref 7–40)
ALT SERPL W P-5'-P-CCNC: 12 U/L (ref 7–40)
AMYLASE SERPL-CCNC: 253 U/L (ref 30–118)
AMYLASE SERPL-CCNC: 373 U/L (ref 30–118)
ANION GAP SERPL CALCULATED.3IONS-SCNC: 2 MMOL/L (ref 3–11)
ANION GAP SERPL CALCULATED.3IONS-SCNC: 8 MMOL/L (ref 3–11)
APTT PPP: 34.3 SECONDS (ref 24–31)
AST SERPL-CCNC: 10 U/L (ref 0–33)
AST SERPL-CCNC: 13 U/L (ref 0–33)
BASOPHILS # BLD AUTO: 0.02 10*3/MM3 (ref 0–0.2)
BASOPHILS # BLD AUTO: 0.03 10*3/MM3 (ref 0–0.2)
BASOPHILS NFR BLD AUTO: 0.2 % (ref 0–1)
BASOPHILS NFR BLD AUTO: 0.3 % (ref 0–1)
BILIRUB SERPL-MCNC: 0.2 MG/DL (ref 0.3–1.2)
BILIRUB SERPL-MCNC: 0.2 MG/DL (ref 0.3–1.2)
BNP SERPL-MCNC: 11 PG/ML (ref 0–100)
BUN BLD-MCNC: 10 MG/DL (ref 9–23)
BUN BLD-MCNC: 8 MG/DL (ref 9–23)
BUN/CREAT SERPL: 12.1 (ref 7–25)
BUN/CREAT SERPL: 13.7 (ref 7–25)
CALCIUM SPEC-SCNC: 9.4 MG/DL (ref 8.7–10.4)
CALCIUM SPEC-SCNC: 9.6 MG/DL (ref 8.7–10.4)
CHLORIDE SERPL-SCNC: 100 MMOL/L (ref 99–109)
CHLORIDE SERPL-SCNC: 104 MMOL/L (ref 99–109)
CO2 SERPL-SCNC: 30 MMOL/L (ref 20–31)
CO2 SERPL-SCNC: 33 MMOL/L (ref 20–31)
CREAT BLD-MCNC: 0.66 MG/DL (ref 0.6–1.3)
CREAT BLD-MCNC: 0.73 MG/DL (ref 0.6–1.3)
D-LACTATE SERPL-SCNC: 0.9 MMOL/L (ref 0.5–2)
DEPRECATED RDW RBC AUTO: 50.9 FL (ref 37–54)
DEPRECATED RDW RBC AUTO: 52.4 FL (ref 37–54)
EOSINOPHIL # BLD AUTO: 0.13 10*3/MM3 (ref 0–0.3)
EOSINOPHIL # BLD AUTO: 0.14 10*3/MM3 (ref 0–0.3)
EOSINOPHIL NFR BLD AUTO: 1.2 % (ref 0–3)
EOSINOPHIL NFR BLD AUTO: 1.4 % (ref 0–3)
ERYTHROCYTE [DISTWIDTH] IN BLOOD BY AUTOMATED COUNT: 14.3 % (ref 11.3–14.5)
ERYTHROCYTE [DISTWIDTH] IN BLOOD BY AUTOMATED COUNT: 14.5 % (ref 11.3–14.5)
GFR SERPL CREATININE-BSD FRML MDRD: 83 ML/MIN/1.73
GFR SERPL CREATININE-BSD FRML MDRD: 93 ML/MIN/1.73
GLOBULIN UR ELPH-MCNC: 2.8 GM/DL
GLOBULIN UR ELPH-MCNC: 2.9 GM/DL
GLUCOSE BLD-MCNC: 100 MG/DL (ref 70–100)
GLUCOSE BLD-MCNC: 167 MG/DL (ref 70–100)
GLUCOSE BLDC GLUCOMTR-MCNC: 114 MG/DL (ref 70–130)
HAV IGM SERPL QL IA: NORMAL
HBV CORE IGM SERPL QL IA: NORMAL
HBV SURFACE AG SERPL QL IA: NORMAL
HCT VFR BLD AUTO: 41.2 % (ref 34.5–44)
HCT VFR BLD AUTO: 43.9 % (ref 34.5–44)
HCV AB SER DONR QL: NORMAL
HGB BLD-MCNC: 13.3 G/DL (ref 11.5–15.5)
HGB BLD-MCNC: 14.1 G/DL (ref 11.5–15.5)
IMM GRANULOCYTES # BLD: 0.07 10*3/MM3 (ref 0–0.03)
IMM GRANULOCYTES # BLD: 0.12 10*3/MM3 (ref 0–0.03)
IMM GRANULOCYTES NFR BLD: 0.7 % (ref 0–0.6)
IMM GRANULOCYTES NFR BLD: 1 % (ref 0–0.6)
INR PPP: 0.99 (ref 0.91–1.09)
LIPASE SERPL-CCNC: 319 U/L (ref 6–51)
LIPASE SERPL-CCNC: 942 U/L (ref 6–51)
LYMPHOCYTES # BLD AUTO: 3.88 10*3/MM3 (ref 0.6–4.8)
LYMPHOCYTES # BLD AUTO: 4.72 10*3/MM3 (ref 0.6–4.8)
LYMPHOCYTES NFR BLD AUTO: 32.2 % (ref 24–44)
LYMPHOCYTES NFR BLD AUTO: 49.4 % (ref 24–44)
MAGNESIUM SERPL-MCNC: 2 MG/DL (ref 1.3–2.7)
MCH RBC QN AUTO: 31.6 PG (ref 27–31)
MCH RBC QN AUTO: 31.8 PG (ref 27–31)
MCHC RBC AUTO-ENTMCNC: 32.1 G/DL (ref 32–36)
MCHC RBC AUTO-ENTMCNC: 32.3 G/DL (ref 32–36)
MCV RBC AUTO: 97.9 FL (ref 80–99)
MCV RBC AUTO: 98.9 FL (ref 80–99)
MONOCYTES # BLD AUTO: 0.6 10*3/MM3 (ref 0–1)
MONOCYTES # BLD AUTO: 0.96 10*3/MM3 (ref 0–1)
MONOCYTES NFR BLD AUTO: 6.3 % (ref 0–12)
MONOCYTES NFR BLD AUTO: 8 % (ref 0–12)
NEUTROPHILS # BLD AUTO: 4.07 10*3/MM3 (ref 1.5–8.3)
NEUTROPHILS # BLD AUTO: 7.04 10*3/MM3 (ref 1.5–8.3)
NEUTROPHILS NFR BLD AUTO: 42.6 % (ref 41–71)
NEUTROPHILS NFR BLD AUTO: 58.4 % (ref 41–71)
PLATELET # BLD AUTO: 265 10*3/MM3 (ref 150–450)
PLATELET # BLD AUTO: 306 10*3/MM3 (ref 150–450)
PMV BLD AUTO: 10.2 FL (ref 6–12)
PMV BLD AUTO: 9.4 FL (ref 6–12)
POTASSIUM BLD-SCNC: 4 MMOL/L (ref 3.5–5.5)
POTASSIUM BLD-SCNC: 4.7 MMOL/L (ref 3.5–5.5)
PROT SERPL-MCNC: 6.9 G/DL (ref 5.7–8.2)
PROT SERPL-MCNC: 7 G/DL (ref 5.7–8.2)
PROTHROMBIN TIME: 10.4 SECONDS (ref 9.6–11.5)
RBC # BLD AUTO: 4.21 10*6/MM3 (ref 3.89–5.14)
RBC # BLD AUTO: 4.44 10*6/MM3 (ref 3.89–5.14)
SODIUM BLD-SCNC: 138 MMOL/L (ref 132–146)
SODIUM BLD-SCNC: 139 MMOL/L (ref 132–146)
TROPONIN I SERPL-MCNC: <0.006 NG/ML
VALPROATE SERPL-MCNC: 54 MCG/ML (ref 50–150)
WBC NRBC COR # BLD: 12.04 10*3/MM3 (ref 3.5–10.8)
WBC NRBC COR # BLD: 9.55 10*3/MM3 (ref 3.5–10.8)

## 2018-08-01 PROCEDURE — 63710000001 INSULIN LISPRO (HUMAN) PER 5 UNITS: Performed by: NURSE PRACTITIONER

## 2018-08-01 PROCEDURE — G0378 HOSPITAL OBSERVATION PER HR: HCPCS

## 2018-08-01 PROCEDURE — 83690 ASSAY OF LIPASE: CPT | Performed by: NURSE PRACTITIONER

## 2018-08-01 PROCEDURE — 96374 THER/PROPH/DIAG INJ IV PUSH: CPT

## 2018-08-01 PROCEDURE — 83690 ASSAY OF LIPASE: CPT

## 2018-08-01 PROCEDURE — 85025 COMPLETE CBC W/AUTO DIFF WBC: CPT | Performed by: NURSE PRACTITIONER

## 2018-08-01 PROCEDURE — 85730 THROMBOPLASTIN TIME PARTIAL: CPT | Performed by: NURSE PRACTITIONER

## 2018-08-01 PROCEDURE — 36415 COLL VENOUS BLD VENIPUNCTURE: CPT

## 2018-08-01 PROCEDURE — 80074 ACUTE HEPATITIS PANEL: CPT | Performed by: NURSE PRACTITIONER

## 2018-08-01 PROCEDURE — 96375 TX/PRO/DX INJ NEW DRUG ADDON: CPT

## 2018-08-01 PROCEDURE — 84484 ASSAY OF TROPONIN QUANT: CPT | Performed by: NURSE PRACTITIONER

## 2018-08-01 PROCEDURE — 71045 X-RAY EXAM CHEST 1 VIEW: CPT

## 2018-08-01 PROCEDURE — 85610 PROTHROMBIN TIME: CPT | Performed by: NURSE PRACTITIONER

## 2018-08-01 PROCEDURE — 93010 ELECTROCARDIOGRAM REPORT: CPT | Performed by: INTERNAL MEDICINE

## 2018-08-01 PROCEDURE — 94799 UNLISTED PULMONARY SVC/PX: CPT

## 2018-08-01 PROCEDURE — 83880 ASSAY OF NATRIURETIC PEPTIDE: CPT | Performed by: NURSE PRACTITIONER

## 2018-08-01 PROCEDURE — 82962 GLUCOSE BLOOD TEST: CPT

## 2018-08-01 PROCEDURE — 99223 1ST HOSP IP/OBS HIGH 75: CPT | Performed by: FAMILY MEDICINE

## 2018-08-01 PROCEDURE — 80164 ASSAY DIPROPYLACETIC ACD TOT: CPT | Performed by: NURSE PRACTITIONER

## 2018-08-01 PROCEDURE — 76705 ECHO EXAM OF ABDOMEN: CPT

## 2018-08-01 PROCEDURE — 83735 ASSAY OF MAGNESIUM: CPT | Performed by: NURSE PRACTITIONER

## 2018-08-01 PROCEDURE — 25010000002 HYDROMORPHONE PER 4 MG: Performed by: NURSE PRACTITIONER

## 2018-08-01 PROCEDURE — 85025 COMPLETE CBC W/AUTO DIFF WBC: CPT

## 2018-08-01 PROCEDURE — 94660 CPAP INITIATION&MGMT: CPT

## 2018-08-01 PROCEDURE — 80053 COMPREHEN METABOLIC PANEL: CPT | Performed by: NURSE PRACTITIONER

## 2018-08-01 PROCEDURE — 25010000002 PROMETHAZINE PER 50 MG: Performed by: NURSE PRACTITIONER

## 2018-08-01 PROCEDURE — 83605 ASSAY OF LACTIC ACID: CPT | Performed by: NURSE PRACTITIONER

## 2018-08-01 PROCEDURE — 82150 ASSAY OF AMYLASE: CPT | Performed by: NURSE PRACTITIONER

## 2018-08-01 PROCEDURE — 25010000002 KETOROLAC TROMETHAMINE PER 15 MG: Performed by: NURSE PRACTITIONER

## 2018-08-01 PROCEDURE — 93005 ELECTROCARDIOGRAM TRACING: CPT | Performed by: NURSE PRACTITIONER

## 2018-08-01 PROCEDURE — 80053 COMPREHEN METABOLIC PANEL: CPT

## 2018-08-01 PROCEDURE — 74018 RADEX ABDOMEN 1 VIEW: CPT

## 2018-08-01 PROCEDURE — 82150 ASSAY OF AMYLASE: CPT

## 2018-08-01 RX ORDER — HYDROMORPHONE HYDROCHLORIDE 1 MG/ML
0.5 INJECTION, SOLUTION INTRAMUSCULAR; INTRAVENOUS; SUBCUTANEOUS ONCE
Status: COMPLETED | OUTPATIENT
Start: 2018-08-01 | End: 2018-08-01

## 2018-08-01 RX ORDER — OXYCODONE AND ACETAMINOPHEN 10; 325 MG/1; MG/1
1 TABLET ORAL 4 TIMES DAILY PRN
Status: DISCONTINUED | OUTPATIENT
Start: 2018-08-01 | End: 2018-08-05

## 2018-08-01 RX ORDER — ALPRAZOLAM 1 MG/1
2 TABLET ORAL ONCE AS NEEDED
Status: COMPLETED | OUTPATIENT
Start: 2018-08-01 | End: 2018-08-01

## 2018-08-01 RX ORDER — DIVALPROEX SODIUM 500 MG/1
2000 TABLET, EXTENDED RELEASE ORAL NIGHTLY
Status: DISCONTINUED | OUTPATIENT
Start: 2018-08-01 | End: 2018-08-05 | Stop reason: HOSPADM

## 2018-08-01 RX ORDER — HYDROMORPHONE HYDROCHLORIDE 1 MG/ML
0.5 INJECTION, SOLUTION INTRAMUSCULAR; INTRAVENOUS; SUBCUTANEOUS 4 TIMES DAILY PRN
Status: DISCONTINUED | OUTPATIENT
Start: 2018-08-01 | End: 2018-08-04

## 2018-08-01 RX ORDER — PROMETHAZINE HYDROCHLORIDE 25 MG/ML
12.5 INJECTION, SOLUTION INTRAMUSCULAR; INTRAVENOUS ONCE
Status: COMPLETED | OUTPATIENT
Start: 2018-08-01 | End: 2018-08-01

## 2018-08-01 RX ORDER — IPRATROPIUM BROMIDE AND ALBUTEROL SULFATE 2.5; .5 MG/3ML; MG/3ML
3 SOLUTION RESPIRATORY (INHALATION) EVERY 4 HOURS PRN
Status: DISCONTINUED | OUTPATIENT
Start: 2018-08-01 | End: 2018-08-05 | Stop reason: HOSPADM

## 2018-08-01 RX ORDER — ALPRAZOLAM 1 MG/1
2 TABLET ORAL DAILY PRN
Status: DISCONTINUED | OUTPATIENT
Start: 2018-08-01 | End: 2018-08-02

## 2018-08-01 RX ORDER — SODIUM CHLORIDE 0.9 % (FLUSH) 0.9 %
1-10 SYRINGE (ML) INJECTION AS NEEDED
Status: DISCONTINUED | OUTPATIENT
Start: 2018-08-01 | End: 2018-08-05 | Stop reason: HOSPADM

## 2018-08-01 RX ORDER — ALPRAZOLAM 1 MG/1
2 TABLET ORAL 4 TIMES DAILY
Status: DISCONTINUED | OUTPATIENT
Start: 2018-08-02 | End: 2018-08-05 | Stop reason: HOSPADM

## 2018-08-01 RX ORDER — PANTOPRAZOLE SODIUM 40 MG/1
40 TABLET, DELAYED RELEASE ORAL
Status: DISCONTINUED | OUTPATIENT
Start: 2018-08-02 | End: 2018-08-05 | Stop reason: HOSPADM

## 2018-08-01 RX ORDER — DEXTROSE MONOHYDRATE 25 G/50ML
25 INJECTION, SOLUTION INTRAVENOUS
Status: DISCONTINUED | OUTPATIENT
Start: 2018-08-01 | End: 2018-08-05 | Stop reason: HOSPADM

## 2018-08-01 RX ORDER — LEVOTHYROXINE SODIUM 88 UG/1
88 TABLET ORAL
Status: DISCONTINUED | OUTPATIENT
Start: 2018-08-02 | End: 2018-08-05 | Stop reason: HOSPADM

## 2018-08-01 RX ORDER — CYCLOBENZAPRINE HCL 10 MG
10 TABLET ORAL 3 TIMES DAILY PRN
Status: DISCONTINUED | OUTPATIENT
Start: 2018-08-01 | End: 2018-08-05

## 2018-08-01 RX ORDER — KETOROLAC TROMETHAMINE 30 MG/ML
15 INJECTION, SOLUTION INTRAMUSCULAR; INTRAVENOUS EVERY 6 HOURS PRN
Status: COMPLETED | OUTPATIENT
Start: 2018-08-01 | End: 2018-08-03

## 2018-08-01 RX ORDER — BETHANECHOL CHLORIDE 25 MG/1
25 TABLET ORAL 3 TIMES DAILY
Status: DISCONTINUED | OUTPATIENT
Start: 2018-08-01 | End: 2018-08-05 | Stop reason: HOSPADM

## 2018-08-01 RX ORDER — BUDESONIDE AND FORMOTEROL FUMARATE DIHYDRATE 160; 4.5 UG/1; UG/1
2 AEROSOL RESPIRATORY (INHALATION)
Status: DISCONTINUED | OUTPATIENT
Start: 2018-08-01 | End: 2018-08-05 | Stop reason: HOSPADM

## 2018-08-01 RX ORDER — ATORVASTATIN CALCIUM 10 MG/1
10 TABLET, FILM COATED ORAL NIGHTLY
Status: DISCONTINUED | OUTPATIENT
Start: 2018-08-01 | End: 2018-08-01

## 2018-08-01 RX ORDER — NYSTATIN 100000 U/G
OINTMENT TOPICAL 2 TIMES DAILY
Status: DISCONTINUED | OUTPATIENT
Start: 2018-08-01 | End: 2018-08-05 | Stop reason: HOSPADM

## 2018-08-01 RX ORDER — ACETAMINOPHEN 325 MG/1
650 TABLET ORAL EVERY 4 HOURS PRN
Status: DISCONTINUED | OUTPATIENT
Start: 2018-08-01 | End: 2018-08-05 | Stop reason: HOSPADM

## 2018-08-01 RX ORDER — ECHINACEA PURPUREA EXTRACT 125 MG
2 TABLET ORAL AS NEEDED
Status: DISCONTINUED | OUTPATIENT
Start: 2018-08-01 | End: 2018-08-05 | Stop reason: HOSPADM

## 2018-08-01 RX ORDER — NICOTINE POLACRILEX 4 MG
15 LOZENGE BUCCAL
Status: DISCONTINUED | OUTPATIENT
Start: 2018-08-01 | End: 2018-08-05 | Stop reason: HOSPADM

## 2018-08-01 RX ORDER — TRAZODONE HYDROCHLORIDE 100 MG/1
300 TABLET ORAL NIGHTLY
Status: DISCONTINUED | OUTPATIENT
Start: 2018-08-01 | End: 2018-08-05 | Stop reason: HOSPADM

## 2018-08-01 RX ADMIN — HYDROMORPHONE HYDROCHLORIDE 0.5 MG: 1 INJECTION, SOLUTION INTRAMUSCULAR; INTRAVENOUS; SUBCUTANEOUS at 20:21

## 2018-08-01 RX ADMIN — ALPRAZOLAM 2 MG: 1 TABLET ORAL at 20:55

## 2018-08-01 RX ADMIN — OXYCODONE HYDROCHLORIDE AND ACETAMINOPHEN 1 TABLET: 10; 325 TABLET ORAL at 20:56

## 2018-08-01 RX ADMIN — BETHANECHOL CHLORIDE 25 MG: 25 TABLET ORAL at 22:12

## 2018-08-01 RX ADMIN — KETOROLAC TROMETHAMINE 15 MG: 30 INJECTION, SOLUTION INTRAMUSCULAR at 22:12

## 2018-08-01 RX ADMIN — TRAZODONE HYDROCHLORIDE 300 MG: 100 TABLET ORAL at 22:40

## 2018-08-01 RX ADMIN — NYSTATIN: 100000 OINTMENT TOPICAL at 20:55

## 2018-08-01 RX ADMIN — PROMETHAZINE HYDROCHLORIDE 12.5 MG: 25 INJECTION INTRAMUSCULAR; INTRAVENOUS at 20:21

## 2018-08-01 RX ADMIN — DIVALPROEX SODIUM 2000 MG: 500 TABLET, FILM COATED, EXTENDED RELEASE ORAL at 20:21

## 2018-08-01 NOTE — TELEPHONE ENCOUNTER
Daughter (Heidi) called for CT results from yesterday, pt is in a lot of pain, and unsure what to do for her.  Please call back at [phone].  CT prelim in chart.  Please advise

## 2018-08-01 NOTE — H&P
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 equal to 2 midnights.      Electronically signed by HEIDY Franco, 08/01/18, 6:49 PM.      Brief Attending Admission Attestation     I have seen and examined the patient, performing an independent face-to-face diagnostic evaluation with plan of care reviewed and developed with the advanced practice clinician (APC).      Brief Summary Statement/HPI:   Saadia Caceres is a 57 y.o. female with PMHx uncontrolled DM II, HTN, HLD, debilitating bipolar and PTSD, and memory impairment.  Seen by PCP yesterday 7/31/2018 with complaints of epigastric and right upper quadrant pain, rated 10/10, not relieved by home bowel regimen, at time of evaluation in the office vital signs were stable and patient appeared nontoxic.  Patient is a difficult medical historian, therefore PCP thought best to workup and sent for labs and CT of abdomen and pelvis.  Today findings on labs showed elevated lipase and CT confirmed mild pancreatitis, patient was contacted at home and still having ongoing pain with poor by mouth intake.  Patient was directly admitted at PCPs request for further evaluation and management of pancreatitis.  Further review of outpatient CT of the pelvis notes contracted gallbladder on imaging, currently right upper quadrant ultrasound is still pending to further evaluate if pancreatitis is related to  gallstones.      Attending Physical Exam:  Constitutional: No acute distress, asleep but awakens brielfy, nontoxic, morbidly obese body habitus  Respiratory: Decreased bases, poor effort, nonlabored respirations, CPAP mask laying in floor next to patient  Cardiovascular: RRR, no murmur  Gastrointestinal: Positive bowel sounds, soft, nontender, nondistended  Musculoskeletal: Trace peripheral edema, normal muscle tone for age  Psychiatric: Distant and aversive affect, minimally cooperative bc awoken from sleep  Skin: No rashes, no jaundice, no petechiae, no mottling      Brief Assessment/Plan :  See above for further detailed assessment and plan developed with APC which I have reviewed and/or edited.      Electronically signed by Stephany Dotson MD, 08/01/18, 9:22 PM.

## 2018-08-01 NOTE — TELEPHONE ENCOUNTER
I have called patient's daughter and am doing labs to check for pancreatitis. Disregard previous message

## 2018-08-01 NOTE — TELEPHONE ENCOUNTER
I called patient's daughter with preliminary CT results, will do stat labs and admit if indicated, patient still having pain

## 2018-08-01 NOTE — TELEPHONE ENCOUNTER
I spoke to Mojgan, admission coordinator and to Dr. Stephany Dotson, patient to be admitted for observation and lab monitoring of acute pancreatitis. Patient's daughter, Heidi notified to take patient to [facility] registration desk for admission.  Patient has history of non-compliance, has hypertension, diabetes, hypothyroidism, COPD and mental health issues, daughter has been monitoring medications for past month. Patient is on Depakote, prescribed by her psychiatrist, Dr. Schaffer for Bipolar disorder and this can contribute to progression of pancreatitis. I am concerned that patient will not follow diet restrictions for pancreatitis if she remains home and is at risk of becoming critically ill.

## 2018-08-02 ENCOUNTER — EPISODE CHANGES (OUTPATIENT)
Dept: CASE MANAGEMENT | Facility: OTHER | Age: 58
End: 2018-08-02

## 2018-08-02 LAB
GLUCOSE BLDC GLUCOMTR-MCNC: 116 MG/DL (ref 70–130)
GLUCOSE BLDC GLUCOMTR-MCNC: 122 MG/DL (ref 70–130)
GLUCOSE BLDC GLUCOMTR-MCNC: 159 MG/DL (ref 70–130)

## 2018-08-02 PROCEDURE — 96376 TX/PRO/DX INJ SAME DRUG ADON: CPT

## 2018-08-02 PROCEDURE — 25010000002 HYDROMORPHONE PER 4 MG: Performed by: NURSE PRACTITIONER

## 2018-08-02 PROCEDURE — G0378 HOSPITAL OBSERVATION PER HR: HCPCS

## 2018-08-02 PROCEDURE — 94640 AIRWAY INHALATION TREATMENT: CPT

## 2018-08-02 PROCEDURE — 25010000002 PROMETHAZINE PER 50 MG: Performed by: INTERNAL MEDICINE

## 2018-08-02 PROCEDURE — 25010000002 ENOXAPARIN PER 10 MG: Performed by: FAMILY MEDICINE

## 2018-08-02 PROCEDURE — 97162 PT EVAL MOD COMPLEX 30 MIN: CPT

## 2018-08-02 PROCEDURE — 97165 OT EVAL LOW COMPLEX 30 MIN: CPT

## 2018-08-02 PROCEDURE — 94799 UNLISTED PULMONARY SVC/PX: CPT

## 2018-08-02 PROCEDURE — 96372 THER/PROPH/DIAG INJ SC/IM: CPT

## 2018-08-02 PROCEDURE — G8987 SELF CARE CURRENT STATUS: HCPCS

## 2018-08-02 PROCEDURE — 25010000002 KETOROLAC TROMETHAMINE PER 15 MG: Performed by: NURSE PRACTITIONER

## 2018-08-02 PROCEDURE — G8978 MOBILITY CURRENT STATUS: HCPCS

## 2018-08-02 PROCEDURE — 94660 CPAP INITIATION&MGMT: CPT

## 2018-08-02 PROCEDURE — G8979 MOBILITY GOAL STATUS: HCPCS

## 2018-08-02 PROCEDURE — G8988 SELF CARE GOAL STATUS: HCPCS

## 2018-08-02 PROCEDURE — 82962 GLUCOSE BLOOD TEST: CPT

## 2018-08-02 PROCEDURE — 99233 SBSQ HOSP IP/OBS HIGH 50: CPT | Performed by: INTERNAL MEDICINE

## 2018-08-02 RX ORDER — NICOTINE 21 MG/24HR
1 PATCH, TRANSDERMAL 24 HOURS TRANSDERMAL
Status: DISCONTINUED | OUTPATIENT
Start: 2018-08-02 | End: 2018-08-05 | Stop reason: HOSPADM

## 2018-08-02 RX ORDER — ALPRAZOLAM 1 MG/1
2 TABLET ORAL DAILY PRN
Status: DISCONTINUED | OUTPATIENT
Start: 2018-08-02 | End: 2018-08-05

## 2018-08-02 RX ORDER — ALPRAZOLAM 1 MG/1
2 TABLET ORAL 4 TIMES DAILY PRN
Status: DISCONTINUED | OUTPATIENT
Start: 2018-08-02 | End: 2018-08-02

## 2018-08-02 RX ORDER — PROMETHAZINE HYDROCHLORIDE 25 MG/ML
12.5 INJECTION, SOLUTION INTRAMUSCULAR; INTRAVENOUS EVERY 6 HOURS PRN
Status: DISCONTINUED | OUTPATIENT
Start: 2018-08-02 | End: 2018-08-05

## 2018-08-02 RX ORDER — SODIUM CHLORIDE 9 MG/ML
75 INJECTION, SOLUTION INTRAVENOUS CONTINUOUS
Status: DISCONTINUED | OUTPATIENT
Start: 2018-08-02 | End: 2018-08-04

## 2018-08-02 RX ADMIN — PROMETHAZINE HYDROCHLORIDE 12.5 MG: 25 INJECTION INTRAMUSCULAR; INTRAVENOUS at 17:06

## 2018-08-02 RX ADMIN — HYDROMORPHONE HYDROCHLORIDE 0.5 MG: 1 INJECTION, SOLUTION INTRAMUSCULAR; INTRAVENOUS; SUBCUTANEOUS at 16:13

## 2018-08-02 RX ADMIN — SODIUM CHLORIDE 75 ML/HR: 9 INJECTION, SOLUTION INTRAVENOUS at 08:51

## 2018-08-02 RX ADMIN — BETHANECHOL CHLORIDE 25 MG: 25 TABLET ORAL at 08:50

## 2018-08-02 RX ADMIN — KETOROLAC TROMETHAMINE 15 MG: 30 INJECTION, SOLUTION INTRAMUSCULAR at 09:58

## 2018-08-02 RX ADMIN — TRAZODONE HYDROCHLORIDE 300 MG: 100 TABLET ORAL at 20:42

## 2018-08-02 RX ADMIN — ALPRAZOLAM 2 MG: 1 TABLET ORAL at 12:14

## 2018-08-02 RX ADMIN — HYDROMORPHONE HYDROCHLORIDE 0.5 MG: 1 INJECTION, SOLUTION INTRAMUSCULAR; INTRAVENOUS; SUBCUTANEOUS at 00:02

## 2018-08-02 RX ADMIN — NYSTATIN: 100000 OINTMENT TOPICAL at 20:43

## 2018-08-02 RX ADMIN — NYSTATIN: 100000 OINTMENT TOPICAL at 08:52

## 2018-08-02 RX ADMIN — NICOTINE 1 PATCH: 14 PATCH TRANSDERMAL at 08:50

## 2018-08-02 RX ADMIN — KETOROLAC TROMETHAMINE 15 MG: 30 INJECTION, SOLUTION INTRAMUSCULAR at 03:44

## 2018-08-02 RX ADMIN — ALPRAZOLAM 2 MG: 1 TABLET ORAL at 00:14

## 2018-08-02 RX ADMIN — HYDROMORPHONE HYDROCHLORIDE 0.5 MG: 1 INJECTION, SOLUTION INTRAMUSCULAR; INTRAVENOUS; SUBCUTANEOUS at 20:41

## 2018-08-02 RX ADMIN — HYDROMORPHONE HYDROCHLORIDE 0.5 MG: 1 INJECTION, SOLUTION INTRAMUSCULAR; INTRAVENOUS; SUBCUTANEOUS at 04:49

## 2018-08-02 RX ADMIN — BUDESONIDE AND FORMOTEROL FUMARATE DIHYDRATE 2 PUFF: 160; 4.5 AEROSOL RESPIRATORY (INHALATION) at 09:28

## 2018-08-02 RX ADMIN — ALPRAZOLAM 2 MG: 1 TABLET ORAL at 20:42

## 2018-08-02 RX ADMIN — PANTOPRAZOLE SODIUM 40 MG: 40 TABLET, DELAYED RELEASE ORAL at 05:36

## 2018-08-02 RX ADMIN — ENOXAPARIN SODIUM 40 MG: 40 INJECTION SUBCUTANEOUS at 08:50

## 2018-08-02 RX ADMIN — LEVOTHYROXINE SODIUM 88 MCG: 88 TABLET ORAL at 05:36

## 2018-08-02 RX ADMIN — CYCLOBENZAPRINE HYDROCHLORIDE 10 MG: 10 TABLET, FILM COATED ORAL at 08:00

## 2018-08-02 RX ADMIN — BUDESONIDE AND FORMOTEROL FUMARATE DIHYDRATE 2 PUFF: 160; 4.5 AEROSOL RESPIRATORY (INHALATION) at 21:57

## 2018-08-02 RX ADMIN — OXYCODONE HYDROCHLORIDE AND ACETAMINOPHEN 1 TABLET: 10; 325 TABLET ORAL at 03:43

## 2018-08-02 RX ADMIN — KETOROLAC TROMETHAMINE 15 MG: 30 INJECTION, SOLUTION INTRAMUSCULAR at 18:28

## 2018-08-02 RX ADMIN — BETHANECHOL CHLORIDE 25 MG: 25 TABLET ORAL at 16:13

## 2018-08-02 RX ADMIN — OXYCODONE HYDROCHLORIDE AND ACETAMINOPHEN 1 TABLET: 10; 325 TABLET ORAL at 20:42

## 2018-08-02 RX ADMIN — ALPRAZOLAM 2 MG: 1 TABLET ORAL at 17:10

## 2018-08-02 RX ADMIN — DIVALPROEX SODIUM 2000 MG: 500 TABLET, FILM COATED, EXTENDED RELEASE ORAL at 20:42

## 2018-08-02 RX ADMIN — BETHANECHOL CHLORIDE 25 MG: 25 TABLET ORAL at 20:42

## 2018-08-02 RX ADMIN — ALPRAZOLAM 2 MG: 1 TABLET ORAL at 08:00
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
## 2018-08-02 NOTE — CONSULTS
Attempted to see patient for Diabetes Education x2 today. Sleeping during both visits. Thank  You for this referral.

## 2018-08-02 NOTE — PAYOR COMM NOTE
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 (Chronic)    Mild cognitive impairment (Chronic)    Migraines (Chronic)    Tobacco abuse (Chronic)    Morbidly obese (CMS/[facility])            Assessment & Plan:    1. Pancreatitis / abdominal pain:  - Will defer repeat CT, reviewed, contracted gallbladder, check RUQ u/s to eval further for GS or cholecytitis. Will check KUB also r/t lack of bowel sounds, abusing enemas at home per daughter.   - NPO, IV fluids, IV anti-emetics, IV/PO opioids. Abx not indicated yet, will add Zosyn if gallbladder issue.  - Hold lasix for now, no known CHF, dependent edema, normal BNP.    2. Bipolar disorder / chronic cognitive impairment and memory loss:  - Chronic, severe.  - Daughter as durable POA.  - Follows with psych and neuro.  - Depakote 2 g nightly - daughter questions if this med caused her acute issue. On trazodone 400 mg at home, will use 300 mg. Xanax 2 mg 4-5x daily chronically. UDS. Request FRANKLYN.    3. Diabetes mellitus II:  - Uncontrolled. Labs by PCP this month. A1C 11.7, elevated lipids/triglycerides.  - Will slightly reduce long acting, hold orals, add SSI, daughter notes non-compliance at home.    4. Hypertension / hyperlipidemia:  -  Not on anti-HTN meds.  - Med intolerances of side effects, otherwise no actual allergies.    5. Chronic obstructive pulmonary disease / obstructive sleep apnea:  - Symbicort, nebs PRN, still smokign 0.5 PPD and not interested in cessation.    6. Migraines:  - Chronic, not present on arrival, PRN triptan at home.    DVT prophylaxis: Scuds, lovenox.    CODE STATUS:  Full code / full support, no restrictions, daughter Heidi Garcia as durable POA - pt does not have capacity chronically and need to go through her for any hx or consents.      Code Status and Medical Interventions:   Ordered at: 08/01/18 2005     Level Of Support Discussed With:    Patient    Health Care Surrogate     Code Status:    CPR     Medical Interventions (Level of Support Prior to Arrest):    Full     Comments:    verified with daughter/POA Heidi.       Admission Status:  I believe this patient meets OBSERVATION status, however if further evaluation or treatment plans warrant, status may change.  Based upon current information, I predict patient's care encounter to be less than or equal to 2 midnights.      Electronically signed by HEIDY Franco, 08/01/18, 6:49 PM.      Brief Attending Admission Attestation     I have seen and examined the patient, performing an independent face-to-face diagnostic evaluation with plan of care reviewed and developed with the advanced practice clinician (APC).      Brief Summary Statement/HPI:   Saadia Caceres is a 57 y.o. female with PMHx uncontrolled DM II, HTN, HLD, debilitating bipolar and PTSD, and memory impairment.  Seen by PCP yesterday 7/31/2018 with complaints of epigastric and right upper quadrant pain, rated 10/10, not relieved by home bowel regimen, at time of evaluation in the office vital signs were stable and patient appeared nontoxic.  Patient is a difficult medical historian, therefore PCP thought best to workup and sent for labs and CT of abdomen and pelvis.  Today  findings on labs showed elevated lipase and CT confirmed mild pancreatitis, patient was contacted at home and still having ongoing pain with poor by mouth intake.  Patient was directly admitted at PCPs request for further evaluation and management of pancreatitis.  Further review of outpatient CT of the pelvis notes contracted gallbladder on imaging, currently right upper quadrant ultrasound is still pending to further evaluate if pancreatitis is related to gallstones.      Attending Physical Exam:  Constitutional: No acute distress, asleep but awakens brielfy, nontoxic, morbidly obese body habitus  Respiratory: Decreased bases, poor effort, nonlabored respirations, CPAP mask laying in floor next to patient  Cardiovascular: RRR, no murmur  Gastrointestinal: Positive bowel sounds, soft, nontender, nondistended  Musculoskeletal: Trace peripheral edema, normal muscle tone for age  Psychiatric: Distant and aversive affect, minimally cooperative bc awoken from sleep  Skin: No rashes, no jaundice, no petechiae, no mottling      Brief Assessment/Plan :  See above for further detailed assessment and plan developed with APC which I have reviewed and/or edited.      Electronically signed by Stephany Dotson MD, 08/01/18, 9:22 PM.           Electronically signed by Stephany Dotson MD at 8/1/2018 11:46 PM       Emergency Department Notes     No notes of this type exist for this encounter.                ICU Vital Signs     Row Name 08/02/18 0928 08/02/18 0758 08/02/18 0600 08/02/18 0403 08/02/18 0335       Vitals    Temp  -- 97.3 °F (36.3 °C)  --  -- 97.8 °F (36.6 °C)    Temp src  -- Oral  --  -- Oral    Pulse 64 66  --  -- 77    Heart Rate Source  -- Monitor  --  -- Monitor    Resp  -- 18  --  -- 18    Resp Rate Source  -- Visual  --  -- Visual    Resp Rate (Observed) Vent  --  --  -- 19  --    BP  -- 92/78  --  -- 111/53    Noninvasive MAP (mmHg)  -- 85  --  -- 70    BP Location  -- Right arm  --  -- Right arm    BP Method  --  "Automatic  --  -- Automatic    Patient Position  -- Lying  --  -- Lying       Oxygen Therapy    SpO2 91 % 91 %  --  -- 90 %    Pulse Oximetry Type Continuous  --  --  --  --    Device (Oxygen Therapy) room air  -- nasal cannula  --  --    Flow (L/min)  --  -- 3  --  --    Row Name 08/02/18 0200 08/02/18 0005 08/01/18 2315 08/01/18 2200 08/01/18 2000       Vitals    Resp Rate (Observed) Vent  --  -- 15  --  --       Oxygen Therapy    Device (Oxygen Therapy) nasal cannula nasal cannula  -- nasal cannula room air    Flow (L/min) 3 3  -- 2  --    Row Name 08/01/18 1947 08/01/18 1849 08/01/18 1700             Height and Weight    Height 166.4 cm (65.5\")  -- 165.1 cm (65\")      Height Method Stated  -- Stated      Weight 118 kg (261 lb 3.9 oz)  -- 116 kg (255 lb 8.2 oz)      Weight Method Bed scale  --  --      Ideal Body Weight (IBW) (kg) 58.43  -- 57.29      BSA (Calculated - sq m) 2.23 sq meters  -- 2.2 sq meters      BMI (Calculated) 42.8  -- 42.5      Weight in (lb) to have BMI = 25 152.2  -- 149.9         Vitals    Temp 98.4 °F (36.9 °C)  -- 97.5 °F (36.4 °C)      Temp src Oral  -- Oral      Pulse 63  -- 72      Heart Rate Source Monitor  -- Monitor      Resp 18  -- 18      Resp Rate Source Visual  -- Visual      /63  -- 116/74      Noninvasive MAP (mmHg) 77  -- 86      BP Location Right arm  --  --      BP Method Automatic  --  --      Patient Position Lying  --  --         Oxygen Therapy    SpO2 94 %  -- 94 %      Pulse Oximetry Type  --  -- Continuous      Device (Oxygen Therapy)  -- room air --          Intake & Output (last day)     None        Lines, Drains & Airways    Active LDAs     Name:   Placement date:   Placement time:   Site:   Days:    Peripheral IV 08/02/18 0439 Left Hand  08/02/18 0439    Hand    less than 1                Hospital Medications (all)       Dose Frequency Start End    acetaminophen (TYLENOL) tablet 650 mg 650 mg Every 4 Hours PRN 8/1/2018     Sig - Route: Take 2 tablets by " mouth Every 4 (Four) Hours As Needed for Mild Pain . - Oral    ALPRAZolam (XANAX) tablet 2 mg 2 mg Once As Needed 8/1/2018 8/1/2018    Sig - Route: Take 2 tablets by mouth 1 (One) Time As Needed for Anxiety. - Oral    ALPRAZolam (XANAX) tablet 2 mg 2 mg 4 Times Daily 8/2/2018 8/11/2018    Sig - Route: Take 2 tablets by mouth 4 (Four) Times a Day. - Oral    ALPRAZolam (XANAX) tablet 2 mg 2 mg Daily PRN 8/2/2018     Sig - Route: Take 2 tablets by mouth Daily As Needed for Anxiety. - Oral    bethanechol (URECHOLINE) tablet 25 mg 25 mg 3 Times Daily 8/1/2018     Sig - Route: Take 1 tablet by mouth 3 (Three) Times a Day. - Oral    budesonide-formoterol (SYMBICORT) 160-4.5 MCG/ACT inhaler 2 puff 2 puff 2 Times Daily - RT 8/1/2018     Sig - Route: Inhale 2 puffs 2 (Two) Times a Day. - Inhalation    cyclobenzaprine (FLEXERIL) tablet 10 mg 10 mg 3 Times Daily PRN 8/1/2018     Sig - Route: Take 1 tablet by mouth 3 (Three) Times a Day As Needed for Muscle Spasms. - Oral    dextrose (D50W) solution 25 g 25 g Every 15 Minutes PRN 8/1/2018     Sig - Route: Infuse 50 mL into a venous catheter Every 15 (Fifteen) Minutes As Needed for Low Blood Sugar (Blood Sugar Less Than 70). - Intravenous    dextrose (GLUTOSE) oral gel 15 g 15 g Every 15 Minutes PRN 8/1/2018     Sig - Route: Take 15 g by mouth Every 15 (Fifteen) Minutes As Needed for Low Blood Sugar (Blood sugar less than 70). - Oral    divalproex (DEPAKOTE) 24 hr tablet 2,000 mg 2,000 mg Nightly 8/1/2018     Sig - Route: Take 4 tablets by mouth Every Night. - Oral    enoxaparin (LOVENOX) syringe 40 mg 40 mg Every 24 Hours 8/2/2018     Sig - Route: Inject 0.4 mL under the skin into the appropriate area as directed Daily. - Subcutaneous    glucagon (human recombinant) (GLUCAGEN DIAGNOSTIC) injection 1 mg 1 mg As Needed 8/1/2018     Sig - Route: Inject 1 mg under the skin into the appropriate area as directed As Needed (Blood Glucose Less Than 70). - Subcutaneous    HYDROmorphone  (DILAUDID) injection 0.5 mg 0.5 mg Once 8/1/2018 8/1/2018    Sig - Route: Infuse 0.5 mL into a venous catheter 1 (One) Time. - Intravenous    HYDROmorphone (DILAUDID) injection 0.5 mg 0.5 mg 4 Times Daily PRN 8/1/2018 8/11/2018    Sig - Route: Infuse 0.5 mL into a venous catheter 4 (Four) Times a Day As Needed for Severe Pain  (use oral first as able, IV for severe pain). - Intravenous    insulin detemir (LEVEMIR) injection 10 Units 10 Units Every 12 Hours Scheduled 8/1/2018     Sig - Route: Inject 10 Units under the skin into the appropriate area as directed Every 12 (Twelve) Hours. - Subcutaneous    insulin lispro (humaLOG) injection 0-7 Units 0-7 Units 4 Times Daily With Meals & Nightly 8/1/2018     Sig - Route: Inject 0-7 Units under the skin into the appropriate area as directed 4 (Four) Times a Day With Meals & at Bedtime. - Subcutaneous    ipratropium-albuterol (DUO-NEB) nebulizer solution 3 mL 3 mL Every 4 Hours PRN 8/1/2018     Sig - Route: Take 3 mL by nebulization Every 4 (Four) Hours As Needed for Wheezing or Shortness of Air. - Nebulization    ketorolac (TORADOL) injection 15 mg 15 mg Every 6 Hours PRN 8/1/2018 8/6/2018    Sig - Route: Infuse 15 mg into a venous catheter Every 6 (Six) Hours As Needed for Moderate Pain . - Intravenous    levothyroxine (SYNTHROID, LEVOTHROID) tablet 88 mcg 88 mcg Every Early Morning 8/2/2018     Sig - Route: Take 1 tablet by mouth Every Morning. - Oral    nicotine (NICODERM CQ) 14 MG/24HR patch 1 patch 1 patch Every 24 Hours Scheduled 8/2/2018     Sig - Route: Place 1 patch on the skin as directed by provider Daily. - Transdermal    nystatin (MYCOSTATIN) ointment  2 Times Daily 8/1/2018     Sig - Route: Apply  topically to the appropriate area as directed 2 (Two) Times a Day. - Topical    oxyCODONE-acetaminophen (PERCOCET)  MG per tablet 1 tablet 1 tablet 4 Times Daily PRN 8/1/2018 8/11/2018    Sig - Route: Take 1 tablet by mouth 4 (Four) Times a Day As Needed for  Moderate Pain  or Severe Pain . - Oral    pantoprazole (PROTONIX) EC tablet 40 mg 40 mg Every Early Morning 2018     Sig - Route: Take 1 tablet by mouth Every Morning. - Oral    Pharmacy to Dose enoxaparin (LOVENOX)  Continuous PRN 2018     Sig - Route: Continuous As Needed for Consult. - Does not apply    promethazine (PHENERGAN) injection 12.5 mg 12.5 mg Once 2018    Sig - Route: Infuse 0.5 mL into a venous catheter 1 (One) Time. - Intravenous    sodium chloride (OCEAN) nasal spray 2 spray 2 spray As Needed 2018     Sig - Route: 2 sprays into the nostril(s) as directed by provider As Needed for Congestion. - Nasal    sodium chloride 0.9 % flush 1-10 mL 1-10 mL As Needed 2018     Sig - Route: Infuse 1-10 mL into a venous catheter As Needed for Line Care. - Intravenous    sodium chloride 0.9 % infusion 75 mL/hr Continuous 2018     Sig - Route: Infuse 75 mL/hr into a venous catheter Continuous. - Intravenous    traZODone (DESYREL) tablet 300 mg 300 mg Nightly 2018     Sig - Route: Take 3 tablets by mouth Every Night. - Oral    ALPRAZolam (XANAX) tablet 2 mg (Discontinued) 2 mg Daily PRN 2018    Sig - Route: Take 2 tablets by mouth Daily As Needed for Anxiety. - Oral    ALPRAZolam (XANAX) tablet 2 mg (Discontinued) 2 mg 4 Times Daily PRN 2018    Sig - Route: Take 2 tablets by mouth 4 (Four) Times a Day As Needed for Anxiety. - Oral    atorvastatin (LIPITOR) tablet 10 mg (Discontinued) 10 mg Nightly 2018    Sig - Route: Take 1 tablet by mouth Every Night. - Oral          Blood Administration Record     None        Operative/Procedure Notes (all)     No notes of this type exist for this encounter.           Physician Progress Notes (all)      Albert Schumacher MD at 2018  7:47 AM              [facility] Medicine Services  PROGRESS NOTE    Patient Name: Saadia Caceres  : 1960  MRN: [id number]    Date of  Admission: 8/1/2018  Length of Stay: 0  Primary Care Physician: Roberta Quintero APRN    Subjective   Subjective     CC:  F/u pancreatitis    HPI:  No family present.  She says she had a rough night.  Continues to have some abd pain.  No N/V/D.  She is a poor historian, but tells me no recent new medication or dose changes.    Review of Systems  Gen- No fevers, chills  CV- No chest pain, palpitations  Resp- No cough, dyspnea  GI- No N/V/D, + abd pain      Otherwise ROS is negative except as mentioned in the HPI.    Objective   Objective     Vital Signs:   Temp:  [97.5 °F (36.4 °C)-98.4 °F (36.9 °C)] 97.8 °F (36.6 °C)  Heart Rate:  [63-77] 77  Resp:  [18] 18  BP: (105-116)/(53-74) 111/53  FiO2 (%):  [40 %] 40 %        Physical Exam:  Constitutional: No acute distress, sleeping with bipap, easily awakens  HENT: NCAT, mucous membranes moist  Respiratory: Clear to auscultation bilaterally, respiratory effort normal   Cardiovascular: RRR, no murmurs, rubs, or gallops  Gastrointestinal: Positive bowel sounds, soft, nontender, nondistended  Musculoskeletal: No bilateral ankle edema  Psychiatric: odd affect but cooperative and pleasant  Neurologic: Oriented x 3, no focal deficits  Skin: No rashes      Results Reviewed:  I have personally reviewed current lab, radiology, and data and agree.      Results from last 7 days  Lab Units 08/01/18 2045 08/01/18  1011   WBC 10*3/mm3 9.55 12.04*   HEMOGLOBIN g/dL 13.3 14.1   HEMATOCRIT % 41.2 43.9   PLATELETS 10*3/mm3 265 306   INR  0.99  --        Results from last 7 days  Lab Units 08/01/18 2043 08/01/18  1011   SODIUM mmol/L 138 139   POTASSIUM mmol/L 4.0 4.7   CHLORIDE mmol/L 100 104   CO2 mmol/L 30.0 33.0*   BUN mg/dL 8* 10   CREATININE mg/dL 0.66 0.73   GLUCOSE mg/dL 100 167*   CALCIUM mg/dL 9.6 9.4   ALT (SGPT) U/L 11 12   AST (SGOT) U/L 13 10   TROPONIN I ng/mL <0.006  --      Estimated Creatinine Clearance: 125.4 mL/min (by C-G formula based on SCr of 0.66 mg/dL).  BNP    Date Value Ref Range Status   08/01/2018 11.0 0.0 - 100.0 pg/mL Final     Comment:     Results may be falsely decreased if patient taking Biotin.       Microbiology Results Abnormal     None          Imaging Results (last 24 hours)     Procedure Component Value Units Date/Time    US Gallbladder [[id number]] Collected:  08/01/18 2003     Updated:  08/01/18 2223    Narrative:       EXAM:    US Abdomen Limited, Right Upper Quadrant    CLINICAL HISTORY:    57 years, female; Pain; Abdominal pain; Epigastric; Additional info: Abd   pain, contracted gallbladder, pancreatitis    TECHNIQUE:    Real-time ultrasound of the right upper quadrant with image documentation.    COMPARISON:    US - OU-GALLBLADDER 2015-04-01 10:25    FINDINGS:    Liver:  The liver is slightly increased in echogenicity, suggesting fatty   infiltration. No obvious liver mass. No intrahepatic biliary ductal dilatation.    Gallbladder: Tiny amount of sludge in the gallbladder. No stones. No   gallbladder wall thickening or pericholecystic fluid. The ultrasound   technologist reported a negative Caceres's sign when the gallbladder was   palpated with the ultrasound transducer.    Common bile duct:  No obvious choledocholithiasis. The common bile duct   measures 4 mm.    Pancreas:  Suboptimal visualization of the pancreas secondary to bowel gas.   Visualized portions appear unremarkable. No obvious pancreatic mass. Pancreatic   duct measures 3 mm.    Right kidney:  Unremarkable.  No obvious stones.  No solid or cystic mass.    No hydronephrosis.      Impression:         No acute findings.    THIS DOCUMENT HAS BEEN ELECTRONICALLY SIGNED BY ANU AGUSTIN MD    XR Chest 1 View [[id number]] Collected:  08/01/18 2003     Updated:  08/01/18 0958    Narrative:       EXAM:    XR Chest, 1 View    CLINICAL HISTORY:    57 years, female; Pain; Other: Abdomen; Additional info: Abd pain, copd    TECHNIQUE:    Frontal view of the chest.    COMPARISON:    No relevant prior  studies available.    FINDINGS:    Lungs:  Unremarkable.  No consolidation.    Pleural space:  Unremarkable.  No pneumothorax.    Heart:  Unremarkable.  No cardiomegaly.    Mediastinum:  Unremarkable.    Bones/joints:  No acute osseous findings.    Vasculature:  Atherosclerotic calcifications are visualized within the aorta.      Impression:         No acute findings visualized in the chest.    THIS DOCUMENT HAS BEEN ELECTRONICALLY SIGNED BY ANU AGUSTIN MD    XR Abdomen KUB [[id number]] Collected:  08/01/18 2003     Updated:  08/01/18 2145    Narrative:       EXAM:    XR Abdomen, 1 View    CLINICAL HISTORY:    57 years, female; Pain; Abdominal pain; Additional info: Abd pain, nausea,   unable to appreciate bowel sounds    TECHNIQUE:    Frontal supine view of the abdomen/pelvis.    COMPARISON:    CT ABDOMEN PELVIS W CONTRAST 2018-07-31 17:18    FINDINGS:    Gastrointestinal tract:  No significant bowel dilatation visualized.    Organs:  Unremarkable as visualized.    Bones/joints:  No acute osseous findings.      Impression:         No acute findings visualized in the abdomen.    THIS DOCUMENT HAS BEEN ELECTRONICALLY SIGNED BY ANU AGUSTIN MD             I have reviewed the medications.    Assessment/Plan   Assessment / Plan     Hospital Problem List     * (Principal)Pancreatitis    COPD (chronic obstructive pulmonary disease) (CMS/[facility]) (Chronic)    Diabetes mellitus type II, uncontrolled (CMS/[facility]) (Chronic)    HLD (hyperlipidemia) (Chronic)    Hypertension (Chronic)    Obstructive sleep apnea syndrome (Chronic)    Overview Signed 8/1/2017  3:35 PM by More Reyes APRN     Trilogy Machine           Abdominal pain    Overview Signed 9/20/2016  3:49 PM by Iman Velazco         Bipolar disorder (CMS/[facility]) (Chronic)    Mild cognitive impairment (Chronic)    Migraines (Chronic)    Tobacco abuse (Chronic)    Morbidly obese (CMS/HCC)             Brief Hospital Course to date:  Saadia Caceres is a 57 y.o. female  with a history of bipolar disorder and psychiatric issues, type 2 diabetes, among other things who presented to primary care office with complaints of abdominal pain.  Imaging and blood work were consistent with pancreatitis and she was admitted for further management.      Assessment & Plan:  - Etiology of pancreatitis is unclear.  No alcohol use.  LFTs normal and gallbladder ultrasound unremarkable.  She is on a large dose of Depakote which could be contributing but this is a long-standing medication for her.  Due to her fragile psychiatric state I would continue the Depakote for now but reconsider if she has additional episodes in the future.  - Lipase trending down.  For now will continue supportive care with IV fluids.  Advance to clear liquids.  - continue psych meds.  FRANKLYN reviewed and large dose of xanax confirmed, prescribed by her psychiatrist Dr. Schaffer.  - PT/OT consults today.  - patient lives by herself, but sounds like daughter is heavily involved.  Anticipate home at discharge with existing home health.    DVT Prophylaxis:  lovenox    CODE STATUS:   Code Status and Medical Interventions:   Ordered at: 08/01/18 2005     Level Of Support Discussed With:    Patient    Health Care Surrogate     Code Status:    CPR     Medical Interventions (Level of Support Prior to Arrest):    Full     Comments:    verified with daughter/JOSIE Gordon.       Disposition: I expect the patient to be discharged home in 2 days.      Electronically signed by Albert Schumacher MD, 08/02/18, 7:47 AM.        Electronically signed by Albert Schumacher MD at 8/2/2018  7:54 AM       Consult Notes (all)     No notes of this type exist for this encounter.

## 2018-08-02 NOTE — PLAN OF CARE
Problem: Patient Care Overview  Goal: Plan of Care Review  Outcome: Ongoing (interventions implemented as appropriate)   08/02/18 7821   OTHER   Outcome Summary OT initial eval completed. Pt demonstrates an increased need for assist 2/2 weakness and fatigue. Recommend pt to receive skilled IPOT to address ADLs, functional mobility, safety and education to return to PLOF. Recommend pt to go home with HH at time of DC   Coping/Psychosocial   Plan of Care Reviewed With patient

## 2018-08-02 NOTE — PROGRESS NOTES
Discharge Planning Assessment  [facility]     Patient Name: Saadia Caceres  MRN: [id number]  Today's Date: 8/2/2018    Admit Date: 8/1/2018          Discharge Needs Assessment     Row Name 08/02/18 1237       Living Environment    Lives With alone    Current Living Arrangements home/apartment/condo    Primary Care Provided by self;homecare agency    Provides Primary Care For no one, unable/limited ability to care for self    Family Caregiver if Needed child(phill), adult    Quality of Family Relationships helpful;involved;supportive    Able to Return to Prior Arrangements yes    Living Arrangement Comments Patient's daughterHeidi, lives 50 yards from patient and checks on her daily       Resource/Environmental Concerns    Resource/Environmental Concerns none       Transition Planning    Patient/Family Anticipates Transition to home with help/services    Patient/Family Anticipated Services at Transition ;home health care    Transportation Anticipated family or friend will provide       Discharge Needs Assessment    Readmission Within the Last 30 Days no previous admission in last 30 days    Concerns to be Addressed discharge planning    Equipment Currently Used at Home bipap/cpap;oxygen;rollator;glucometer   Patient wears BiPAP qhs with oxygen; oxygen supplied by [facility]    Anticipated Changes Related to Illness none    Equipment Needed After Discharge none    Outpatient/Agency/Support Group Needs homecare agency    Discharge Facility/Level of Care Needs home with home health    Patient's Choice of Community Agency(s) Patient is current with [facility] for skilled nursing and [facility] for homecare aide 12 hours/week    Current Discharge Risk psychiatric illness            Discharge Plan     Row Name 08/02/18 1242       Plan    Plan [facility] with [facility]    Patient/Family in Agreement with Plan yes    Plan Comments Met with patient in the room to initiate discharge planning. She  was sleepy and requested that I speak with her daughter/POA, Heidi. Per Heidi, patient lives alone in an apartment in [place] [facility] [place] [place]. Heidi lives 50 yards away. Patient has a homecare aide who assists her 12 hrs/week with ADLs. Patient uses a rollator with mobililty. She wears a BiPAP qhs with oxygen. Patient is followed by [facility] for skilled nursing (confirmed with [facility]). [facility] also delivers patient's medications each week in a blister-pack. Patient has [facility] as well. PT/OT have recommended home health therapy services, but patient's daughter states patient has tried therapy at home in the past and they do not want it for now. She states her mom walks the breezeway at her apartment building everyday. An order to resume HH has been placed in Epic for MD to cosign, and CM will notify [facility] at discharge. No other discharge needs identified/voiced at this time. Patient's daughter will provide her ride at [place]. CM will continue to follow.     Final Discharge Disposition Code 06 - home with home health care        Destination     No service coordination in this encounter.      Durable Medical Equipment     No service coordination in this encounter.      Dialysis/Infusion     No service coordination in this encounter.      Home Medical Care     Service Request Status Selected Specialties Address Phone Number Fax Number    [facility] [address] [facility] [address] [facility] [id number] [phone]        Laviniabriana Yang 8/2/2018 1247    Patient will resume home health for skilled nursing at discharge.                  Social Care     No service coordination in this encounter.        Expected Discharge Date and Time     Expected Discharge Date Expected Discharge Time    Aug 4, 2018               Demographic Summary     Row Name 08/02/18 1236       General Information    Admission Type inpatient    Reason for Consult discharge  planning    General Information Comments PCP is Roberta Quintero       Contact Information    Permission Granted to Share Info With ;family/designee   daughter/POA, Crystal Sandifer            Functional Status     Row Name 08/02/18 1236       Functional Status    Usual Activity Tolerance moderate    Current Activity Tolerance moderate       Functional Status, IADL    Medications assistive person    Meal Preparation assistive person    Housekeeping assistive person    Laundry assistive person    Shopping assistive person       Employment/    Employment/ Comments Confirmed with patient's daughter/POA that patient has [employer] Medicare and [place] Medicaid.             Psychosocial    No documentation.           Abuse/Neglect    No documentation.           Legal    No documentation.           Substance Abuse    No documentation.           Patient Forms    No documentation.         Lavinia Yang

## 2018-08-02 NOTE — PLAN OF CARE
Problem: Patient Care Overview  Goal: Plan of Care Review  Outcome: Ongoing (interventions implemented as appropriate)   08/01/18 2000 08/02/18 0300   Plan of Care Review   Progress --  no change   OTHER   Outcome Summary --  pt vss no complaints of pain at this time    Coping/Psychosocial   Plan of Care Reviewed With daughter;patient --      Goal: Individualization and Mutuality  Outcome: Ongoing (interventions implemented as appropriate)    Goal: Discharge Needs Assessment  Outcome: Ongoing (interventions implemented as appropriate)   08/01/18 1845 08/01/18 1848   Disability   Equipment Currently Used at Home bipap/cpap;glucometer;walker, standard --    Discharge Needs Assessment   Patient/Family Anticipates Transition to --  home;home with help/services   Patient/Family Anticipated Services at Transition --     Transportation Anticipated --  car, drives self;family or friend will provide     Goal: Interprofessional Rounds/Family Conf  Outcome: Ongoing (interventions implemented as appropriate)   08/02/18 0300   Interdisciplinary Rounds/Family Conf   Participants nursing;pharmacy;patient;family       Problem: Fall Risk (Adult)  Goal: Identify Related Risk Factors and Signs and Symptoms  Outcome: Ongoing (interventions implemented as appropriate)   08/02/18 0300   Fall Risk (Adult)   Related Risk Factors (Fall Risk) impaired vision;bladder function altered;age-related changes;sensory deficits   Signs and Symptoms (Fall Risk) presence of risk factors     Goal: Absence of Fall  Outcome: Ongoing (interventions implemented as appropriate)   08/02/18 0300   Fall Risk (Adult)   Absence of Fall making progress toward outcome       Problem: Skin Injury Risk (Adult)  Goal: Identify Related Risk Factors and Signs and Symptoms  Outcome: Ongoing (interventions implemented as appropriate)   08/02/18 0300   Skin Injury Risk (Adult)   Related Risk Factors (Skin Injury Risk) advanced age;mobility impaired;body weight  extremes;cognitive impairment     Goal: Skin Health and Integrity  Outcome: Ongoing (interventions implemented as appropriate)   08/02/18 0300   Skin Injury Risk (Adult)   Skin Health and Integrity making progress toward outcome

## 2018-08-02 NOTE — PROGRESS NOTES
[facility] Medicine Services  PROGRESS NOTE    Patient Name: Saadia Caceres  : 1960  MRN: [id number]    Date of Admission: 2018  Length of Stay: 0  Primary Care Physician: Roberta Quintero APRN    Subjective   Subjective     CC:  F/u pancreatitis    HPI:  No family present.  She says she had a rough night.  Continues to have some abd pain.  No N/V/D.  She is a poor historian, but tells me no recent new medication or dose changes.    Review of Systems  Gen- No fevers, chills  CV- No chest pain, palpitations  Resp- No cough, dyspnea  GI- No N/V/D, + abd pain      Otherwise ROS is negative except as mentioned in the HPI.    Objective   Objective     Vital Signs:   Temp:  [97.5 °F (36.4 °C)-98.4 °F (36.9 °C)] 97.8 °F (36.6 °C)  Heart Rate:  [63-77] 77  Resp:  [18] 18  BP: (105-116)/(53-74) 111/53  FiO2 (%):  [40 %] 40 %        Physical Exam:  Constitutional: No acute distress, sleeping with bipap, easily awakens  HENT: NCAT, mucous membranes moist  Respiratory: Clear to auscultation bilaterally, respiratory effort normal   Cardiovascular: RRR, no murmurs, rubs, or gallops  Gastrointestinal: Positive bowel sounds, soft, nontender, nondistended  Musculoskeletal: No bilateral ankle edema  Psychiatric: odd affect but cooperative and pleasant  Neurologic: Oriented x 3, no focal deficits  Skin: No rashes      Results Reviewed:  I have personally reviewed current lab, radiology, and data and agree.      Results from last 7 days  Lab Units 18  1011   WBC 10*3/mm3 9.55 12.04*   HEMOGLOBIN g/dL 13.3 14.1   HEMATOCRIT % 41.2 43.9   PLATELETS 10*3/mm3 265 306   INR  0.99  --        Results from last 7 days  Lab Units 18  1011   SODIUM mmol/L 138 139   POTASSIUM mmol/L 4.0 4.7   CHLORIDE mmol/L 100 104   CO2 mmol/L 30.0 33.0*   BUN mg/dL 8* 10   CREATININE mg/dL 0.66 0.73   GLUCOSE mg/dL 100 167*   CALCIUM mg/dL 9.6 9.4   ALT (SGPT) U/L 11 12    AST (SGOT) U/L 13 10   TROPONIN I ng/mL <0.006  --      Estimated Creatinine Clearance: 125.4 mL/min (by C-G formula based on SCr of 0.66 mg/dL).  BNP   Date Value Ref Range Status   08/01/2018 11.0 0.0 - 100.0 pg/mL Final     Comment:     Results may be falsely decreased if patient taking Biotin.       Microbiology Results Abnormal     None          Imaging Results (last 24 hours)     Procedure Component Value Units Date/Time    US Gallbladder [[id number]] Collected:  08/01/18 2003     Updated:  08/01/18 2223    Narrative:       EXAM:    US Abdomen Limited, Right Upper Quadrant    CLINICAL HISTORY:    57 years, female; Pain; Abdominal pain; Epigastric; Additional info: Abd   pain, contracted gallbladder, pancreatitis    TECHNIQUE:    Real-time ultrasound of the right upper quadrant with image documentation.    COMPARISON:    US - OU-GALLBLADDER 2015-04-01 10:25    FINDINGS:    Liver:  The liver is slightly increased in echogenicity, suggesting fatty   infiltration. No obvious liver mass. No intrahepatic biliary ductal dilatation.    Gallbladder: Tiny amount of sludge in the gallbladder. No stones. No   gallbladder wall thickening or pericholecystic fluid. The ultrasound   technologist reported a negative Caceres's sign when the gallbladder was   palpated with the ultrasound transducer.    Common bile duct:  No obvious choledocholithiasis. The common bile duct   measures 4 mm.    Pancreas:  Suboptimal visualization of the pancreas secondary to bowel gas.   Visualized portions appear unremarkable. No obvious pancreatic mass. Pancreatic   duct measures 3 mm.    Right kidney:  Unremarkable.  No obvious stones.  No solid or cystic mass.    No hydronephrosis.      Impression:         No acute findings.    THIS DOCUMENT HAS BEEN ELECTRONICALLY SIGNED BY ANU AGUSTIN MD    XR Chest 1 View [[id number]] Collected:  08/01/18 2003     Updated:  08/01/18 7571    Narrative:       EXAM:    XR Chest, 1 View    CLINICAL HISTORY:    57  years, female; Pain; Other: Abdomen; Additional info: Abd pain, copd    TECHNIQUE:    Frontal view of the chest.    COMPARISON:    No relevant prior studies available.    FINDINGS:    Lungs:  Unremarkable.  No consolidation.    Pleural space:  Unremarkable.  No pneumothorax.    Heart:  Unremarkable.  No cardiomegaly.    Mediastinum:  Unremarkable.    Bones/joints:  No acute osseous findings.    Vasculature:  Atherosclerotic calcifications are visualized within the aorta.      Impression:         No acute findings visualized in the chest.    THIS DOCUMENT HAS BEEN ELECTRONICALLY SIGNED BY ANU AGUSTIN MD    XR Abdomen KUB [[id number]] Collected:  08/01/18 2003     Updated:  08/01/18 2145    Narrative:       EXAM:    XR Abdomen, 1 View    CLINICAL HISTORY:    57 years, female; Pain; Abdominal pain; Additional info: Abd pain, nausea,   unable to appreciate bowel sounds    TECHNIQUE:    Frontal supine view of the abdomen/pelvis.    COMPARISON:    CT ABDOMEN PELVIS W CONTRAST 2018-07-31 17:18    FINDINGS:    Gastrointestinal tract:  No significant bowel dilatation visualized.    Organs:  Unremarkable as visualized.    Bones/joints:  No acute osseous findings.      Impression:         No acute findings visualized in the abdomen.    THIS DOCUMENT HAS BEEN ELECTRONICALLY SIGNED BY ANU AGUSTIN MD             I have reviewed the medications.    Assessment/Plan   Assessment / Plan     Hospital Problem List     * (Principal)Pancreatitis    COPD (chronic obstructive pulmonary disease) (CMS/[facility]) (Chronic)    Diabetes mellitus type II, uncontrolled (CMS/[facility]) (Chronic)    HLD (hyperlipidemia) (Chronic)    Hypertension (Chronic)    Obstructive sleep apnea syndrome (Chronic)    Overview Signed 8/1/2017  3:35 PM by More Reyes APRN     Trilogy Machine           Abdominal pain    Overview Signed 9/20/2016  3:49 PM by Iman Velazco         Bipolar disorder (CMS/HCC) (Chronic)    Mild cognitive impairment (Chronic)    Migraines  (Chronic)    Tobacco abuse (Chronic)    Morbidly obese (CMS/HCC)             Brief Hospital Course to date:  Saadia Caceres is a 57 y.o. female with a history of bipolar disorder and psychiatric issues, type 2 diabetes, among other things who presented to primary care office with complaints of abdominal pain.  Imaging and blood work were consistent with pancreatitis and she was admitted for further management.      Assessment & Plan:  - Etiology of pancreatitis is unclear.  No alcohol use.  LFTs normal and gallbladder ultrasound unremarkable.  She is on a large dose of Depakote which could be contributing but this is a long-standing medication for her.  Due to her fragile psychiatric state I would continue the Depakote for now but reconsider if she has additional episodes in the future.  - Lipase trending down.  For now will continue supportive care with IV fluids.  Advance to clear liquids.  - continue psych meds.  FRANKLYN reviewed and large dose of xanax confirmed, prescribed by her psychiatrist Dr. Schaffer.  - PT/OT consults today.  - patient lives by herself, but sounds like daughter is heavily involved.  Anticipate home at discharge with existing home health.    DVT Prophylaxis:  lovenox    CODE STATUS:   Code Status and Medical Interventions:   Ordered at: 08/01/18 2005     Level Of Support Discussed With:    Patient    Health Care Surrogate     Code Status:    CPR     Medical Interventions (Level of Support Prior to Arrest):    Full     Comments:    verified with daughter/JOSIE Gordon.       Disposition: I expect the patient to be discharged home in 2 days.      Electronically signed by Albert Schumacher MD, 08/02/18, 7:47 AM.

## 2018-08-02 NOTE — PLAN OF CARE
Problem: Patient Care Overview  Goal: Plan of Care Review   08/02/18 1151   OTHER   Outcome Summary Pt completed bed mobility and sit to/from standing with SBA-CGA. Very lethargic this session, required constant cues for attention to task. Upon standing pt appeared to be unsteady and c/o dizziness. Deferred gait d/t lethargy, inability to follow commands, and dizziness. Recommend home with assist and HHPT services upon d/c. Will continue to progress pt as able per POC.   Coping/Psychosocial   Plan of Care Reviewed With patient

## 2018-08-03 LAB
AMPHET+METHAMPHET UR QL: NEGATIVE
AMPHETAMINES UR QL: NEGATIVE
B-HCG UR QL: NEGATIVE
BARBITURATES UR QL SCN: NEGATIVE
BENZODIAZ UR QL SCN: POSITIVE
BILIRUB UR QL STRIP: NEGATIVE
BUPRENORPHINE SERPL-MCNC: NEGATIVE NG/ML
CANNABINOIDS SERPL QL: NEGATIVE
CLARITY UR: CLEAR
COCAINE UR QL: NEGATIVE
COLOR UR: YELLOW
GLUCOSE BLDC GLUCOMTR-MCNC: 152 MG/DL (ref 70–130)
GLUCOSE BLDC GLUCOMTR-MCNC: 155 MG/DL (ref 70–130)
GLUCOSE BLDC GLUCOMTR-MCNC: 161 MG/DL (ref 70–130)
GLUCOSE BLDC GLUCOMTR-MCNC: 99 MG/DL (ref 70–130)
GLUCOSE UR STRIP-MCNC: NEGATIVE MG/DL
HGB UR QL STRIP.AUTO: NEGATIVE
KETONES UR QL STRIP: NEGATIVE
LEUKOCYTE ESTERASE UR QL STRIP.AUTO: NEGATIVE
METHADONE UR QL SCN: NEGATIVE
NITRITE UR QL STRIP: NEGATIVE
OPIATES UR QL: POSITIVE
OXYCODONE UR QL SCN: POSITIVE
PCP UR QL SCN: NEGATIVE
PH UR STRIP.AUTO: 6.5 [PH] (ref 5–8)
PROPOXYPH UR QL: NEGATIVE
PROT UR QL STRIP: NEGATIVE
SP GR UR STRIP: 1.01 (ref 1–1.03)
TRICYCLICS UR QL SCN: NEGATIVE
UROBILINOGEN UR QL STRIP: NORMAL

## 2018-08-03 PROCEDURE — 96376 TX/PRO/DX INJ SAME DRUG ADON: CPT

## 2018-08-03 PROCEDURE — 97535 SELF CARE MNGMENT TRAINING: CPT

## 2018-08-03 PROCEDURE — 81025 URINE PREGNANCY TEST: CPT | Performed by: NURSE PRACTITIONER

## 2018-08-03 PROCEDURE — 94799 UNLISTED PULMONARY SVC/PX: CPT

## 2018-08-03 PROCEDURE — 94760 N-INVAS EAR/PLS OXIMETRY 1: CPT

## 2018-08-03 PROCEDURE — G0108 DIAB MANAGE TRN  PER INDIV: HCPCS

## 2018-08-03 PROCEDURE — 94640 AIRWAY INHALATION TREATMENT: CPT

## 2018-08-03 PROCEDURE — 81003 URINALYSIS AUTO W/O SCOPE: CPT | Performed by: NURSE PRACTITIONER

## 2018-08-03 PROCEDURE — 97530 THERAPEUTIC ACTIVITIES: CPT

## 2018-08-03 PROCEDURE — G0378 HOSPITAL OBSERVATION PER HR: HCPCS

## 2018-08-03 PROCEDURE — 82962 GLUCOSE BLOOD TEST: CPT

## 2018-08-03 PROCEDURE — 25010000002 KETOROLAC TROMETHAMINE PER 15 MG: Performed by: NURSE PRACTITIONER

## 2018-08-03 PROCEDURE — 96372 THER/PROPH/DIAG INJ SC/IM: CPT

## 2018-08-03 PROCEDURE — 99233 SBSQ HOSP IP/OBS HIGH 50: CPT | Performed by: NURSE PRACTITIONER

## 2018-08-03 PROCEDURE — 25010000002 PROMETHAZINE PER 50 MG: Performed by: INTERNAL MEDICINE

## 2018-08-03 PROCEDURE — 25010000002 HYDROMORPHONE PER 4 MG: Performed by: NURSE PRACTITIONER

## 2018-08-03 PROCEDURE — 80306 DRUG TEST PRSMV INSTRMNT: CPT | Performed by: NURSE PRACTITIONER

## 2018-08-03 PROCEDURE — 25010000002 ENOXAPARIN PER 10 MG: Performed by: FAMILY MEDICINE

## 2018-08-03 RX ADMIN — ENOXAPARIN SODIUM 40 MG: 40 INJECTION SUBCUTANEOUS at 08:39

## 2018-08-03 RX ADMIN — PANTOPRAZOLE SODIUM 40 MG: 40 TABLET, DELAYED RELEASE ORAL at 05:15

## 2018-08-03 RX ADMIN — ALPRAZOLAM 2 MG: 1 TABLET ORAL at 08:39

## 2018-08-03 RX ADMIN — OXYCODONE HYDROCHLORIDE AND ACETAMINOPHEN 1 TABLET: 10; 325 TABLET ORAL at 18:50

## 2018-08-03 RX ADMIN — ALPRAZOLAM 2 MG: 1 TABLET ORAL at 00:17

## 2018-08-03 RX ADMIN — DIVALPROEX SODIUM 2000 MG: 500 TABLET, FILM COATED, EXTENDED RELEASE ORAL at 20:37

## 2018-08-03 RX ADMIN — HYDROMORPHONE HYDROCHLORIDE 0.5 MG: 1 INJECTION, SOLUTION INTRAMUSCULAR; INTRAVENOUS; SUBCUTANEOUS at 14:46

## 2018-08-03 RX ADMIN — CYCLOBENZAPRINE HYDROCHLORIDE 10 MG: 10 TABLET, FILM COATED ORAL at 16:00

## 2018-08-03 RX ADMIN — OXYCODONE HYDROCHLORIDE AND ACETAMINOPHEN 1 TABLET: 10; 325 TABLET ORAL at 11:31

## 2018-08-03 RX ADMIN — BUDESONIDE AND FORMOTEROL FUMARATE DIHYDRATE 2 PUFF: 160; 4.5 AEROSOL RESPIRATORY (INHALATION) at 19:25

## 2018-08-03 RX ADMIN — NICOTINE 1 PATCH: 14 PATCH TRANSDERMAL at 08:42

## 2018-08-03 RX ADMIN — TRAZODONE HYDROCHLORIDE 300 MG: 100 TABLET ORAL at 20:37

## 2018-08-03 RX ADMIN — ALPRAZOLAM 2 MG: 1 TABLET ORAL at 17:39

## 2018-08-03 RX ADMIN — HYDROMORPHONE HYDROCHLORIDE 0.5 MG: 1 INJECTION, SOLUTION INTRAMUSCULAR; INTRAVENOUS; SUBCUTANEOUS at 01:13

## 2018-08-03 RX ADMIN — PROMETHAZINE HYDROCHLORIDE 12.5 MG: 25 INJECTION INTRAMUSCULAR; INTRAVENOUS at 16:04

## 2018-08-03 RX ADMIN — BETHANECHOL CHLORIDE 25 MG: 25 TABLET ORAL at 16:00

## 2018-08-03 RX ADMIN — BETHANECHOL CHLORIDE 25 MG: 25 TABLET ORAL at 08:39

## 2018-08-03 RX ADMIN — SODIUM CHLORIDE 75 ML/HR: 9 INJECTION, SOLUTION INTRAVENOUS at 14:46

## 2018-08-03 RX ADMIN — PROMETHAZINE HYDROCHLORIDE 12.5 MG: 25 INJECTION INTRAMUSCULAR; INTRAVENOUS at 03:16

## 2018-08-03 RX ADMIN — HYDROMORPHONE HYDROCHLORIDE 0.5 MG: 1 INJECTION, SOLUTION INTRAMUSCULAR; INTRAVENOUS; SUBCUTANEOUS at 08:38

## 2018-08-03 RX ADMIN — KETOROLAC TROMETHAMINE 15 MG: 30 INJECTION, SOLUTION INTRAMUSCULAR at 02:26

## 2018-08-03 RX ADMIN — ALPRAZOLAM 2 MG: 1 TABLET ORAL at 20:37

## 2018-08-03 RX ADMIN — NYSTATIN: 100000 OINTMENT TOPICAL at 20:39

## 2018-08-03 RX ADMIN — ALPRAZOLAM 2 MG: 1 TABLET ORAL at 11:31

## 2018-08-03 RX ADMIN — PROMETHAZINE HYDROCHLORIDE 12.5 MG: 25 INJECTION INTRAMUSCULAR; INTRAVENOUS at 09:46

## 2018-08-03 RX ADMIN — NYSTATIN: 100000 OINTMENT TOPICAL at 08:39

## 2018-08-03 RX ADMIN — LEVOTHYROXINE SODIUM 88 MCG: 88 TABLET ORAL at 05:15

## 2018-08-03 RX ADMIN — BUDESONIDE AND FORMOTEROL FUMARATE DIHYDRATE 2 PUFF: 160; 4.5 AEROSOL RESPIRATORY (INHALATION) at 09:49

## 2018-08-03 RX ADMIN — CYCLOBENZAPRINE HYDROCHLORIDE 10 MG: 10 TABLET, FILM COATED ORAL at 08:39

## 2018-08-03 RX ADMIN — OXYCODONE HYDROCHLORIDE AND ACETAMINOPHEN 1 TABLET: 10; 325 TABLET ORAL at 05:15

## 2018-08-03 NOTE — PLAN OF CARE
Problem: Patient Care Overview  Goal: Plan of Care Review  Outcome: Ongoing (interventions implemented as appropriate)   08/03/18 0320   Plan of Care Review   Progress no change   Coping/Psychosocial   Plan of Care Reviewed With patient     Goal: Individualization and Mutuality  Outcome: Ongoing (interventions implemented as appropriate)    Goal: Discharge Needs Assessment   08/02/18 1237   Discharge Needs Assessment   Readmission Within the Last 30 Days no previous admission in last 30 days   Concerns to be Addressed discharge planning   Patient/Family Anticipates Transition to home with help/services   Patient/Family Anticipated Services at Transition ;home health care   Transportation Anticipated family or friend will provide   Anticipated Changes Related to Illness none   Equipment Needed After Discharge none   Outpatient/Agency/Support Group Needs homecare agency   Discharge Facility/Level of Care Needs home with home health   Patient's Choice of Community Agency(s) Patient is current with [facility] for skilled nursing and [facility] for homecare aide 12 hours/week   Current Discharge Risk psychiatric illness   Disability   Equipment Currently Used at Home bipap/cpap;oxygen;rollator;glucometer  (Patient wears BiPAP qhs with oxygen; oxygen supplied by [facility])     Goal: Interprofessional Rounds/Family Conf  Outcome: Ongoing (interventions implemented as appropriate)   08/02/18 0300   Interdisciplinary Rounds/Family Conf   Participants nursing;pharmacy;patient;family       Problem: Fall Risk (Adult)  Goal: Identify Related Risk Factors and Signs and Symptoms  Outcome: Ongoing (interventions implemented as appropriate)   08/02/18 0300   Fall Risk (Adult)   Related Risk Factors (Fall Risk) impaired vision;bladder function altered;age-related changes;sensory deficits   Signs and Symptoms (Fall Risk) presence of risk factors     Goal: Absence of Fall  Outcome: Ongoing (interventions implemented as  appropriate)   08/02/18 0300   Fall Risk (Adult)   Absence of Fall making progress toward outcome       Problem: Skin Injury Risk (Adult)  Goal: Identify Related Risk Factors and Signs and Symptoms  Outcome: Ongoing (interventions implemented as appropriate)   08/02/18 0300   Skin Injury Risk (Adult)   Related Risk Factors (Skin Injury Risk) advanced age;mobility impaired;body weight extremes;cognitive impairment     Goal: Skin Health and Integrity  Outcome: Ongoing (interventions implemented as appropriate)   08/02/18 0300   Skin Injury Risk (Adult)   Skin Health and Integrity making progress toward outcome

## 2018-08-03 NOTE — PROGRESS NOTES
[facility] Medicine Services  PROGRESS NOTE    Patient Name: Saadia Caceres  : 1960  MRN: [id number]    Date of Admission: 2018  Length of Stay: 2  Primary Care Physician: Roberta Quintero APRN    Subjective   Subjective     CC:  F/u pancreatitis    HPI:  Still with intermittent epigastric to RUQ pain with nausea. No vomiting. Is nervous and anxious about eating    Review of Systems  Gen- No fevers, chills  CV- No chest pain, palpitations  Resp- No cough, dyspnea    Otherwise ROS is negative except as mentioned in the HPI.    Objective   Objective     Vital Signs:   Temp:  [97.4 °F (36.3 °C)-98.5 °F (36.9 °C)] 97.5 °F (36.4 °C)  Heart Rate:  [68-75] 68  Resp:  [14-18] 18  BP: ()/(49-73) 113/73  FiO2 (%):  [40 %] 40 %        Physical Exam:  Constitutional: No acute distress, sitting up in bed  Respiratory: Clear to auscultation bilaterally, respiratory effort normal   Cardiovascular: RRR, no murmurs, rubs, or gallops  Gastrointestinal: Positive bowel sounds, soft, mild tenderness diffuse, nondistended  Musculoskeletal: No bilateral ankle edema, PPP  Psychiatric: odd affect, anxious but cooperative   Neurologic: Oriented x 3, no focal deficits    Results Reviewed:  I have personally reviewed current lab, radiology, and data and agree.      Results from last 7 days  Lab Units 18  1011   WBC 10*3/mm3 9.55 12.04*   HEMOGLOBIN g/dL 13.3 14.1   HEMATOCRIT % 41.2 43.9   PLATELETS 10*3/mm3 265 306   INR  0.99  --        Results from last 7 days  Lab Units 18  1011   SODIUM mmol/L 138 139   POTASSIUM mmol/L 4.0 4.7   CHLORIDE mmol/L 100 104   CO2 mmol/L 30.0 33.0*   BUN mg/dL 8* 10   CREATININE mg/dL 0.66 0.73   GLUCOSE mg/dL 100 167*   CALCIUM mg/dL 9.6 9.4   ALT (SGPT) U/L 11 12   AST (SGOT) U/L 13 10   TROPONIN I ng/mL <0.006  --      Estimated Creatinine Clearance: 125.4 mL/min (by C-G formula based on SCr of 0.66 mg/dL).  BNP    Date Value Ref Range Status   08/01/2018 11.0 0.0 - 100.0 pg/mL Final     Comment:     Results may be falsely decreased if patient taking Biotin.          I have reviewed the medications.    Assessment/Plan   Assessment / Plan     Hospital Problem List     * (Principal)Pancreatitis    COPD (chronic obstructive pulmonary disease) (CMS/[facility]) (Chronic)    Diabetes mellitus type II, uncontrolled (CMS/[facility]) (Chronic)    HLD (hyperlipidemia) (Chronic)    Hypertension (Chronic)    Obstructive sleep apnea syndrome (Chronic)    Overview Signed 8/1/2017  3:35 PM by More Reyes APRN     Trilogy Machine           Abdominal pain    Overview Signed 9/20/2016  3:49 PM by Iman Velazco         Bipolar disorder (CMS/[facility]) (Chronic)    Mild cognitive impairment (Chronic)    Migraines (Chronic)    Tobacco abuse (Chronic)    Morbidly obese (CMS/[facility])             Brief Hospital Course to date:  Saadia Caceres is a 57 y.o. female with a history of bipolar disorder and psychiatric issues, type 2 diabetes, among other things who presented to primary care office with complaints of abdominal pain.  Imaging and blood work were consistent with pancreatitis and she was admitted for further management.    Assessment & Plan:  - Etiology of pancreatitis is unclear.  No alcohol use.  LFTs normal and gallbladder ultrasound unremarkable.  She is on a large dose of Depakote which could be contributing but this is a long-standing medication for her.  Due to her fragile psychiatric state I would continue the Depakote for now but reconsider if she has additional episodes in the future.  - Lipase trending down.  For now will continue supportive care with IV fluids.  Advance to full liquids for dinner.  Recheck AM labs  - continue psych meds.  FRANKLYN reviewed and large dose of xanax confirmed, prescribed by her psychiatrist Dr. Schaffer.  - PT/OT consults today.  - patient lives by herself, but sounds like daughter is heavily involved.   Anticipate home at discharge with existing home health.  - Hgb A1c 11.7, glucose stable on current regimen, diabetes educator is seeing    DVT Prophylaxis:  lovenox    CODE STATUS:   Code Status and Medical Interventions:   Ordered at: 08/01/18 2005     Level Of Support Discussed With:    Patient    Health Care Surrogate     Code Status:    CPR     Medical Interventions (Level of Support Prior to Arrest):    Full     Comments:    verified with daughter/POA Heidi.       Disposition: I expect the patient to be discharged home in 2 days.      Electronically signed by HEIDY Posada, 08/03/18, 11:22 AM.

## 2018-08-03 NOTE — CONSULTS
"Diabetes Education  Assessment/Teaching    Patient Name:  Saadia Caceres  YOB: 1960  MRN: [id number]  Admit Date:  8/1/2018      Assessment Date:  8/3/2018    Most Recent Value   General Information    Referral From:  A1c   Height  166.4 cm (65.5\")   Height Method  Stated   Weight  118 kg (261 lb 3.9 oz)   Weight Method  Bed scale   Are you currently involved in an activity/exercise program?   No   Do you have high blood pressure?  yes   Are you currently being treated for high blood pressure?  yes   Is patient pregnant?  no   Pregnancy Assessment   Diabetes History   What type of diabetes do you have?  Type 2   Length of Diabetes Diagnosis  10 + years   Current DM knowledge  poor   Have you had diabetes education/teaching in the past?  no   Do you test your blood sugar at home?  yes   Frequency of checks  2-3 days daily    Meter type  Contour Next   Who performs the test?  self   Typical readings  \"high\"   Have you had low blood sugar? (<70mg/dl)  yes   How often do you have low blood sugar?  rare   Have you had high blood sugar? (>140mg/dl)  yes   How often do you have high blood sugar?  frequently   Do you have any diabetes complications?  circulation problems, heart disease, neuropathy   Education Preferences   What areas of diabetes would you like to learn about?  avoiding high blood sugar, diabetes complications, avoiding low blood sugar, medications for diabetes, resources on diabetes, stress/coping skills   Nutrition Information   Are you currently following a special meal plan?  never [meals provided per meals on wheels]   Do you eat mostly at home or out of the house?  at home   Assessment Topics   Healthy Eating - Assessment  Needs education   Being Active - Assessment  Needs education   Taking Medication - Assessment  Needs education   Problem Solving - Assessment  Needs education   Reducing Risk - Assessment  Needs education   Healthy Coping - Assessment  Needs education " "  Monitoring - Assessment  Needs education   DM Goals            Most Recent Value   DM Education Needs   Meter  Has own   Meter Type  Contour   Frequency of Testing  2 times a day   Medication  Oral, Insulin   Problem Solving  Hypoglycemia, Hyperglycemia, Sick days, Signs, Symptoms, Treatment   Reducing Risks  A1C testing, Eye exam, Dental exam   Physical Activity  None   Discharge Plan  Home Care   Motivation  Moderate   Teaching Method  Explanation, Discussion, Handouts   Patient Response  Needs reinforcement             Patient granted permission to be seen by diabetes education. Discussed and taught patient about type 2 diabetes self-management, risk factors, and importance of blood glucose control to reduce complications. Target blood glucose readings and A1c goals per ADA were reviewed. Reviewed with patient current A1c (11.7%) and discussed its significance. Discussed with patient any changes in healthcare status, medications, or lifestyle/stressful events that could attribute to increase in A1c. States the loss of a brother and her son in longterm has made her \"just not care\". Discussed stress/coping strategies. Pt states currently lives alone, but daughter is close by and seems she is very involved with patient care. Pt states she skips meds occasionally because she gets meds \"mixed up\" because pharmacy \"changes pills colors\". She admitted to occasionally stopping meds to \"prove to myself that I don't need it\". Stressed the importance of taking medications at prescribed times and to not skip medications. Discussed with case management, and patient is followed by [facility] for home care. Signs, symptoms and treatment of hyperglycemia and hypoglycemia were discussed. Lifestyle changes such as physical activity with HCP approval and healthy eating were encouraged. States Dr. Roberta Quintero currently manages DM.  Pt was offered a free op follow up appointment however declined at this time due to lack of " "transportation. Provided and reviewed with pt \"Why High Blood Glucose is a Problem\", [facility]'s \"What is Diabetes\", What's my A1c\", as well as a blood sugar goal sheet.   Thank you for the referral.         Electronically signed by:  Brooke Richardson RN  08/03/18 1:00 PM  "

## 2018-08-03 NOTE — PROGRESS NOTES
Continued Stay Note   [facility]     Patient Name: Saadia Caceres  MRN: [id number]  Today's Date: 8/3/2018    Admit Date: 8/1/2018          Discharge Plan     Row Name 08/03/18 1247       Plan    Plan Home with [facility]    Patient/Family in Agreement with Plan yes    Plan Comments Discussed patient in rounds. Patient could be medically ready for discharge this weekend. Patient's and daughter's plan is for patient to return home and resume home health with [facility]. CM will notify [facility] and discharge and fax the DC summary and resumption of care order when appropriate. Family will transport. CM will continue to follow.     Final Discharge Disposition Code 06 - home with home health care              Discharge Codes    No documentation.       Expected Discharge Date and Time     Expected Discharge Date Expected Discharge Time    Aug 4, 2018             Lavinia Yang

## 2018-08-03 NOTE — PLAN OF CARE
Problem: Patient Care Overview  Goal: Plan of Care Review  Outcome: Ongoing (interventions implemented as appropriate)   08/03/18 1506   Plan of Care Review   Progress no change   OTHER   Outcome Summary Pt completed bed mobility with CGA and VC's for sequencing and safety awareness, completed grooming tasks [facility] with supervision and set up provided. Pt CGA at  for bed to chair transfer. Cont IPOT per POC   Coping/Psychosocial   Plan of Care Reviewed With patient

## 2018-08-03 NOTE — PLAN OF CARE
Problem: Skin Injury Risk (Adult)  Goal: Skin Health and Integrity  Outcome: Ongoing (interventions implemented as appropriate)   08/02/18 0300   Skin Injury Risk (Adult)   Skin Health and Integrity making progress toward outcome
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
## 2018-08-04 LAB
ANION GAP SERPL CALCULATED.3IONS-SCNC: 2 MMOL/L (ref 3–11)
BUN BLD-MCNC: 7 MG/DL (ref 9–23)
BUN/CREAT SERPL: 11.7 (ref 7–25)
CALCIUM SPEC-SCNC: 8.6 MG/DL (ref 8.7–10.4)
CHLORIDE SERPL-SCNC: 102 MMOL/L (ref 99–109)
CO2 SERPL-SCNC: 30 MMOL/L (ref 20–31)
CREAT BLD-MCNC: 0.6 MG/DL (ref 0.6–1.3)
DEPRECATED RDW RBC AUTO: 50.4 FL (ref 37–54)
ERYTHROCYTE [DISTWIDTH] IN BLOOD BY AUTOMATED COUNT: 14 % (ref 11.3–14.5)
GFR SERPL CREATININE-BSD FRML MDRD: 104 ML/MIN/1.73
GLUCOSE BLD-MCNC: 108 MG/DL (ref 70–100)
GLUCOSE BLDC GLUCOMTR-MCNC: 122 MG/DL (ref 70–130)
GLUCOSE BLDC GLUCOMTR-MCNC: 139 MG/DL (ref 70–130)
GLUCOSE BLDC GLUCOMTR-MCNC: 158 MG/DL (ref 70–130)
GLUCOSE BLDC GLUCOMTR-MCNC: 99 MG/DL (ref 70–130)
HCT VFR BLD AUTO: 39.7 % (ref 34.5–44)
HGB BLD-MCNC: 12.6 G/DL (ref 11.5–15.5)
LIPASE SERPL-CCNC: 80 U/L (ref 6–51)
MCH RBC QN AUTO: 30.7 PG (ref 27–31)
MCHC RBC AUTO-ENTMCNC: 31.7 G/DL (ref 32–36)
MCV RBC AUTO: 96.8 FL (ref 80–99)
PLATELET # BLD AUTO: 253 10*3/MM3 (ref 150–450)
PMV BLD AUTO: 9.7 FL (ref 6–12)
POTASSIUM BLD-SCNC: 4.6 MMOL/L (ref 3.5–5.5)
RBC # BLD AUTO: 4.1 10*6/MM3 (ref 3.89–5.14)
SODIUM BLD-SCNC: 134 MMOL/L (ref 132–146)
WBC NRBC COR # BLD: 7.66 10*3/MM3 (ref 3.5–10.8)

## 2018-08-04 PROCEDURE — G0378 HOSPITAL OBSERVATION PER HR: HCPCS

## 2018-08-04 PROCEDURE — 94799 UNLISTED PULMONARY SVC/PX: CPT

## 2018-08-04 PROCEDURE — 85027 COMPLETE CBC AUTOMATED: CPT | Performed by: NURSE PRACTITIONER

## 2018-08-04 PROCEDURE — 25010000002 ENOXAPARIN PER 10 MG: Performed by: FAMILY MEDICINE

## 2018-08-04 PROCEDURE — 94640 AIRWAY INHALATION TREATMENT: CPT

## 2018-08-04 PROCEDURE — 83690 ASSAY OF LIPASE: CPT | Performed by: NURSE PRACTITIONER

## 2018-08-04 PROCEDURE — 96376 TX/PRO/DX INJ SAME DRUG ADON: CPT

## 2018-08-04 PROCEDURE — 25010000002 PROMETHAZINE PER 50 MG: Performed by: INTERNAL MEDICINE

## 2018-08-04 PROCEDURE — 63710000001 INSULIN DETEMIR PER 5 UNITS: Performed by: NURSE PRACTITIONER

## 2018-08-04 PROCEDURE — 96372 THER/PROPH/DIAG INJ SC/IM: CPT

## 2018-08-04 PROCEDURE — 25010000002 HYDROMORPHONE PER 4 MG: Performed by: NURSE PRACTITIONER

## 2018-08-04 PROCEDURE — 99233 SBSQ HOSP IP/OBS HIGH 50: CPT | Performed by: NURSE PRACTITIONER

## 2018-08-04 PROCEDURE — 80048 BASIC METABOLIC PNL TOTAL CA: CPT | Performed by: NURSE PRACTITIONER

## 2018-08-04 PROCEDURE — 82962 GLUCOSE BLOOD TEST: CPT

## 2018-08-04 RX ORDER — HYDROMORPHONE HYDROCHLORIDE 1 MG/ML
0.5 INJECTION, SOLUTION INTRAMUSCULAR; INTRAVENOUS; SUBCUTANEOUS 3 TIMES DAILY PRN
Status: DISCONTINUED | OUTPATIENT
Start: 2018-08-04 | End: 2018-08-05

## 2018-08-04 RX ADMIN — DIVALPROEX SODIUM 2000 MG: 500 TABLET, FILM COATED, EXTENDED RELEASE ORAL at 20:16

## 2018-08-04 RX ADMIN — BETHANECHOL CHLORIDE 25 MG: 25 TABLET ORAL at 20:16

## 2018-08-04 RX ADMIN — LEVOTHYROXINE SODIUM 88 MCG: 88 TABLET ORAL at 06:14

## 2018-08-04 RX ADMIN — OXYCODONE HYDROCHLORIDE AND ACETAMINOPHEN 1 TABLET: 10; 325 TABLET ORAL at 19:10

## 2018-08-04 RX ADMIN — INSULIN DETEMIR 10 UNITS: 100 INJECTION, SOLUTION SUBCUTANEOUS at 20:17

## 2018-08-04 RX ADMIN — CYCLOBENZAPRINE HYDROCHLORIDE 10 MG: 10 TABLET, FILM COATED ORAL at 20:16

## 2018-08-04 RX ADMIN — BUDESONIDE AND FORMOTEROL FUMARATE DIHYDRATE 2 PUFF: 160; 4.5 AEROSOL RESPIRATORY (INHALATION) at 21:13

## 2018-08-04 RX ADMIN — ALPRAZOLAM 2 MG: 1 TABLET ORAL at 20:16

## 2018-08-04 RX ADMIN — PANTOPRAZOLE SODIUM 40 MG: 40 TABLET, DELAYED RELEASE ORAL at 06:14

## 2018-08-04 RX ADMIN — PROMETHAZINE HYDROCHLORIDE 12.5 MG: 25 INJECTION INTRAMUSCULAR; INTRAVENOUS at 20:15

## 2018-08-04 RX ADMIN — HYDROMORPHONE HYDROCHLORIDE 0.5 MG: 1 INJECTION, SOLUTION INTRAMUSCULAR; INTRAVENOUS; SUBCUTANEOUS at 10:12

## 2018-08-04 RX ADMIN — INSULIN DETEMIR 10 UNITS: 100 INJECTION, SOLUTION SUBCUTANEOUS at 09:26

## 2018-08-04 RX ADMIN — NYSTATIN: 100000 OINTMENT TOPICAL at 09:24

## 2018-08-04 RX ADMIN — BETHANECHOL CHLORIDE 25 MG: 25 TABLET ORAL at 09:21

## 2018-08-04 RX ADMIN — ALPRAZOLAM 2 MG: 1 TABLET ORAL at 02:04

## 2018-08-04 RX ADMIN — OXYCODONE HYDROCHLORIDE AND ACETAMINOPHEN 1 TABLET: 10; 325 TABLET ORAL at 12:18

## 2018-08-04 RX ADMIN — NICOTINE 1 PATCH: 14 PATCH TRANSDERMAL at 09:23

## 2018-08-04 RX ADMIN — NYSTATIN: 100000 OINTMENT TOPICAL at 20:17

## 2018-08-04 RX ADMIN — ACETAMINOPHEN 650 MG: 325 TABLET, FILM COATED ORAL at 02:04

## 2018-08-04 RX ADMIN — BETHANECHOL CHLORIDE 25 MG: 25 TABLET ORAL at 17:27

## 2018-08-04 RX ADMIN — BUDESONIDE AND FORMOTEROL FUMARATE DIHYDRATE 2 PUFF: 160; 4.5 AEROSOL RESPIRATORY (INHALATION) at 08:55

## 2018-08-04 RX ADMIN — ENOXAPARIN SODIUM 40 MG: 40 INJECTION SUBCUTANEOUS at 09:21

## 2018-08-04 RX ADMIN — ALPRAZOLAM 2 MG: 1 TABLET ORAL at 09:22

## 2018-08-04 RX ADMIN — HYDROMORPHONE HYDROCHLORIDE 0.5 MG: 1 INJECTION, SOLUTION INTRAMUSCULAR; INTRAVENOUS; SUBCUTANEOUS at 02:05

## 2018-08-04 RX ADMIN — TRAZODONE HYDROCHLORIDE 300 MG: 100 TABLET ORAL at 20:16

## 2018-08-04 RX ADMIN — ALPRAZOLAM 2 MG: 1 TABLET ORAL at 12:44

## 2018-08-04 RX ADMIN — ALPRAZOLAM 2 MG: 1 TABLET ORAL at 17:27
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
## 2018-08-04 NOTE — PLAN OF CARE
Problem: Patient Care Overview  Goal: Plan of Care Review  Outcome: Ongoing (interventions implemented as appropriate)   08/03/18 1506 08/04/18 0800 08/04/18 1639   Plan of Care Review   Progress no change --  --    OTHER   Outcome Summary --  --  Up to bedside commode indepenently. Daughter visited briefly, assisted with ambulation. Attempting to deescalate pain medication regimen. Pt still seeking out staff VERY frequently despite instruction on call bell use. SBP slightly elevated, vital signs otherwise stable.    Coping/Psychosocial   Plan of Care Reviewed With --  patient --      Goal: Individualization and Mutuality  Outcome: Ongoing (interventions implemented as appropriate)    Goal: Discharge Needs Assessment  Outcome: Ongoing (interventions implemented as appropriate)    Goal: Interprofessional Rounds/Family Conf  Outcome: Ongoing (interventions implemented as appropriate)      Problem: Fall Risk (Adult)  Goal: Identify Related Risk Factors and Signs and Symptoms  Outcome: Ongoing (interventions implemented as appropriate)    Goal: Absence of Fall  Outcome: Ongoing (interventions implemented as appropriate)      Problem: Skin Injury Risk (Adult)  Goal: Identify Related Risk Factors and Signs and Symptoms  Outcome: Ongoing (interventions implemented as appropriate)    Goal: Skin Health and Integrity  Outcome: Ongoing (interventions implemented as appropriate)      Problem: Diabetes, Type 2 (Adult)  Goal: Signs and Symptoms of Listed Potential Problems Will be Absent, Minimized or Managed (Diabetes, Type 2)  Outcome: Ongoing (interventions implemented as appropriate)

## 2018-08-05 VITALS
SYSTOLIC BLOOD PRESSURE: 111 MMHG | OXYGEN SATURATION: 92 % | WEIGHT: 261.25 LBS | HEIGHT: 66 IN | TEMPERATURE: 98.3 F | HEART RATE: 62 BPM | BODY MASS INDEX: 41.99 KG/M2 | DIASTOLIC BLOOD PRESSURE: 67 MMHG | RESPIRATION RATE: 18 BRPM

## 2018-08-05 PROBLEM — K85.90 PANCREATITIS: Status: RESOLVED | Noted: 2018-08-01 | Resolved: 2018-08-05

## 2018-08-05 LAB
GLUCOSE BLDC GLUCOMTR-MCNC: 130 MG/DL (ref 70–130)
GLUCOSE BLDC GLUCOMTR-MCNC: 136 MG/DL (ref 70–130)
GLUCOSE BLDC GLUCOMTR-MCNC: 177 MG/DL (ref 70–130)

## 2018-08-05 PROCEDURE — 25010000002 ENOXAPARIN PER 10 MG: Performed by: FAMILY MEDICINE

## 2018-08-05 PROCEDURE — 25010000002 PROMETHAZINE PER 50 MG: Performed by: INTERNAL MEDICINE

## 2018-08-05 PROCEDURE — 96372 THER/PROPH/DIAG INJ SC/IM: CPT

## 2018-08-05 PROCEDURE — 82962 GLUCOSE BLOOD TEST: CPT

## 2018-08-05 PROCEDURE — 25010000002 HYDROMORPHONE PER 4 MG: Performed by: NURSE PRACTITIONER

## 2018-08-05 PROCEDURE — 99239 HOSP IP/OBS DSCHRG MGMT >30: CPT | Performed by: NURSE PRACTITIONER

## 2018-08-05 PROCEDURE — 63710000001 INSULIN DETEMIR PER 5 UNITS: Performed by: NURSE PRACTITIONER

## 2018-08-05 PROCEDURE — 96376 TX/PRO/DX INJ SAME DRUG ADON: CPT

## 2018-08-05 PROCEDURE — G0378 HOSPITAL OBSERVATION PER HR: HCPCS

## 2018-08-05 PROCEDURE — 94640 AIRWAY INHALATION TREATMENT: CPT

## 2018-08-05 RX ORDER — BISACODYL 10 MG
10 SUPPOSITORY, RECTAL RECTAL DAILY
Status: DISCONTINUED | OUTPATIENT
Start: 2018-08-05 | End: 2018-08-05 | Stop reason: HOSPADM

## 2018-08-05 RX ORDER — SENNA AND DOCUSATE SODIUM 50; 8.6 MG/1; MG/1
2 TABLET, FILM COATED ORAL 2 TIMES DAILY
Status: DISCONTINUED | OUTPATIENT
Start: 2018-08-05 | End: 2018-08-05 | Stop reason: HOSPADM

## 2018-08-05 RX ADMIN — NYSTATIN: 100000 OINTMENT TOPICAL at 08:07

## 2018-08-05 RX ADMIN — HYDROMORPHONE HYDROCHLORIDE 0.5 MG: 1 INJECTION, SOLUTION INTRAMUSCULAR; INTRAVENOUS; SUBCUTANEOUS at 02:59

## 2018-08-05 RX ADMIN — BISACODYL 10 MG: 10 SUPPOSITORY RECTAL at 14:41

## 2018-08-05 RX ADMIN — INSULIN LISPRO 2 UNITS: 100 INJECTION, SOLUTION INTRAVENOUS; SUBCUTANEOUS at 11:53

## 2018-08-05 RX ADMIN — LEVOTHYROXINE SODIUM 88 MCG: 88 TABLET ORAL at 06:47

## 2018-08-05 RX ADMIN — BUDESONIDE AND FORMOTEROL FUMARATE DIHYDRATE 2 PUFF: 160; 4.5 AEROSOL RESPIRATORY (INHALATION) at 08:24

## 2018-08-05 RX ADMIN — OXYCODONE HYDROCHLORIDE AND ACETAMINOPHEN 1 TABLET: 10; 325 TABLET ORAL at 09:18

## 2018-08-05 RX ADMIN — POLYETHYLENE GLYCOL 3350 17 G: 17 POWDER, FOR SOLUTION ORAL at 14:41

## 2018-08-05 RX ADMIN — BETHANECHOL CHLORIDE 25 MG: 25 TABLET ORAL at 08:07

## 2018-08-05 RX ADMIN — NICOTINE 1 PATCH: 14 PATCH TRANSDERMAL at 08:06

## 2018-08-05 RX ADMIN — INSULIN DETEMIR 10 UNITS: 100 INJECTION, SOLUTION SUBCUTANEOUS at 08:06

## 2018-08-05 RX ADMIN — ALPRAZOLAM 2 MG: 1 TABLET ORAL at 11:53

## 2018-08-05 RX ADMIN — PROMETHAZINE HYDROCHLORIDE 12.5 MG: 25 INJECTION INTRAMUSCULAR; INTRAVENOUS at 11:52

## 2018-08-05 RX ADMIN — PANTOPRAZOLE SODIUM 40 MG: 40 TABLET, DELAYED RELEASE ORAL at 06:47

## 2018-08-05 RX ADMIN — ALPRAZOLAM 2 MG: 1 TABLET ORAL at 08:05

## 2018-08-05 RX ADMIN — ENOXAPARIN SODIUM 40 MG: 40 INJECTION SUBCUTANEOUS at 08:06

## 2018-08-05 NOTE — DISCHARGE SUMMARY
[facility] Medicine Services  DISCHARGE SUMMARY    Patient Name: Saadia Caceres  : 1960  MRN: [id number]    Date of Admission: 2018  Date of Discharge:  2018  Primary Care Physician: Roberta Quintero APRN    Consults     No orders found from 7/3/2018 to 2018.        Hospital Course     Presenting Problem:   Pancreatitis [K85.90]  Pancreatitis [K85.90]    Active Hospital Problems    Diagnosis Date Noted   • Bipolar disorder (CMS/[facility]) [F31.9] 2018   • Mild cognitive impairment [G31.84] 2018   • Migraines [G43.909] 2018   • Tobacco abuse [Z72.0] 2018   • Morbidly obese (CMS/[facility]) [E66.01] 2018   • Abdominal pain [R10.9] 2016   • Diabetes mellitus type II, uncontrolled (CMS/[facility]) [E11.65] 2016   • Obstructive sleep apnea syndrome [G47.33] 2016   • HLD (hyperlipidemia) [E78.5] 2016   • Hypertension [I10] 2016   • COPD (chronic obstructive pulmonary disease) (CMS/[facility]) [J44.9] 2016      Resolved Hospital Problems    Diagnosis Date Noted Date Resolved   • **Pancreatitis [K85.90] 2018          Hospital Course:  Saadia Caceres is a 57 y.o. female with a history of bipolar disorder and psychiatric issues, type 2 diabetes, among other things who presented to primary care office with complaints of abdominal pain.  Imaging and blood work were consistent with pancreatitis and she was admitted for further management.  The etiology of her pancreatitis is unclear at this time.  No alcohol use.  LFTs normal and gallbladder ultrasound unremarkable.  She is on a large dose of Depakote which could be contributing but this is long-standing medication for her.  Due to her fragile psychiatric state we continued the Depakote for now.      She was given bowel rest and IV fluids.  Her lipase is trending down.  Diet was slowly advanced.  She is now tolerating regular diet.  She is very anxious when had a  "long discussion that her mood disorder is affecting her abdominal pain is well.  Her pancreatitis seems to be resolving.  She now seems to have constipation and bowel regimen has been ordered.  She is met medical criteria for discharge will be discharged today.      Long discussion with her daughter about course of illness today.  Daughter is heavily involved in her mother's care.  Home health has previously been arranged and can resume.  Her hemoglobin A1c is 11.7.  She's been seen by diabetes educator.  Suspect component of noncompliance due to psychiatric issues.    Needs close PCP follow-up.    Discharge Follow Up Recommendations for labs/diagnostics:  PCP in a few days        Day of Discharge     HPI:   Still has intermittent lower quadrant pain, no BM yet, ate breakfast and lunch, reports being very anxious and scared about the pain returning, being left alone, etc    Nursing reports that night shift RN found a \"xanax bar\" in her bed.     Review of Systems  Gen- No fevers, chills  CV- No chest pain, palpitations  Resp- No cough, dyspnea    Otherwise ROS is negative except as mentioned in the HPI.    Vital Signs:   Temp:  [97.4 °F (36.3 °C)-98.5 °F (36.9 °C)] 97.5 °F (36.4 °C)  Heart Rate:  [53-74] 74  Resp:  [18] 18  BP: (104-133)/(53-63) 123/56     Physical Exam:  Gen-no acute distress, resting in bed, appears oversedated   CV-RRR, S1 S2 normal, no m/r/g  Resp-CTAB, normal WOB, on RA  Abd-soft, mild diffuse tenderness but no guarding, ND, +BS  Ext-no edema, PPP  Neuro-A&Ox3, no focal deficits  Psych-appropriate mood      Pertinent  and/or Most Recent Results       Results from last 7 days  Lab Units 08/04/18  0353 08/01/18  2045 08/01/18  2043 08/01/18  1011   WBC 10*3/mm3 7.66 9.55  --  12.04*   HEMOGLOBIN g/dL 12.6 13.3  --  14.1   HEMATOCRIT % 39.7 41.2  --  43.9   PLATELETS 10*3/mm3 253 265  --  306   SODIUM mmol/L 134  --  138 139   POTASSIUM mmol/L 4.6  --  4.0 4.7   CHLORIDE mmol/L 102  --  100 104 "   CO2 mmol/L 30.0  --  30.0 33.0*   BUN mg/dL 7*  --  8* 10   CREATININE mg/dL 0.60  --  0.66 0.73   GLUCOSE mg/dL 108*  --  100 167*   CALCIUM mg/dL 8.6*  --  9.6 9.4       Results from last 7 days  Lab Units 08/01/18 2045 08/01/18 2043 08/01/18  1011   BILIRUBIN mg/dL  --  0.2* 0.2*   ALK PHOS U/L  --  64 81   ALT (SGPT) U/L  --  11 12   AST (SGOT) U/L  --  13 10   PROTIME Seconds 10.4  --   --    INR  0.99  --   --    APTT seconds 34.3*  --   --        Results from last 7 days  Lab Units 08/01/18 2043   BNP pg/mL 11.0   TROPONIN I ng/mL <0.006     Brief Urine Lab Results  (Last result in the past 365 days)      Color   Clarity   Blood   Leuk Est   Nitrite   Protein   CREAT   Urine HCG        08/03/18 1757               Negative     08/03/18 1757 Yellow Clear Negative Negative Negative Negative             Imaging Results (all)     Procedure Component Value Units Date/Time    US Gallbladder [[id number]] Collected:  08/01/18 2003     Updated:  08/01/18 2223    Narrative:       EXAM:    US Abdomen Limited, Right Upper Quadrant    CLINICAL HISTORY:    57 years, female; Pain; Abdominal pain; Epigastric; Additional info: Abd   pain, contracted gallbladder, pancreatitis    TECHNIQUE:    Real-time ultrasound of the right upper quadrant with image documentation.    COMPARISON:    US - OU-GALLBLADDER 2015-04-01 10:25    FINDINGS:    Liver:  The liver is slightly increased in echogenicity, suggesting fatty   infiltration. No obvious liver mass. No intrahepatic biliary ductal dilatation.    Gallbladder: Tiny amount of sludge in the gallbladder. No stones. No   gallbladder wall thickening or pericholecystic fluid. The ultrasound   technologist reported a negative Caceres's sign when the gallbladder was   palpated with the ultrasound transducer.    Common bile duct:  No obvious choledocholithiasis. The common bile duct   measures 4 mm.    Pancreas:  Suboptimal visualization of the pancreas secondary to bowel gas.   Visualized  portions appear unremarkable. No obvious pancreatic mass. Pancreatic   duct measures 3 mm.    Right kidney:  Unremarkable.  No obvious stones.  No solid or cystic mass.    No hydronephrosis.      Impression:         No acute findings.    THIS DOCUMENT HAS BEEN ELECTRONICALLY SIGNED BY ANU AGUSTIN MD    XR Chest 1 View [[id number]] Collected:  08/01/18 2003     Updated:  08/01/18 2145    Narrative:       EXAM:    XR Chest, 1 View    CLINICAL HISTORY:    57 years, female; Pain; Other: Abdomen; Additional info: Abd pain, copd    TECHNIQUE:    Frontal view of the chest.    COMPARISON:    No relevant prior studies available.    FINDINGS:    Lungs:  Unremarkable.  No consolidation.    Pleural space:  Unremarkable.  No pneumothorax.    Heart:  Unremarkable.  No cardiomegaly.    Mediastinum:  Unremarkable.    Bones/joints:  No acute osseous findings.    Vasculature:  Atherosclerotic calcifications are visualized within the aorta.      Impression:         No acute findings visualized in the chest.    THIS DOCUMENT HAS BEEN ELECTRONICALLY SIGNED BY ANU AGUSTIN MD    XR Abdomen KUB [[id number]] Collected:  08/01/18 2003     Updated:  08/01/18 2145    Narrative:       EXAM:    XR Abdomen, 1 View    CLINICAL HISTORY:    57 years, female; Pain; Abdominal pain; Additional info: Abd pain, nausea,   unable to appreciate bowel sounds    TECHNIQUE:    Frontal supine view of the abdomen/pelvis.    COMPARISON:    CT ABDOMEN PELVIS W CONTRAST 2018-07-31 17:18    FINDINGS:    Gastrointestinal tract:  No significant bowel dilatation visualized.    Organs:  Unremarkable as visualized.    Bones/joints:  No acute osseous findings.      Impression:         No acute findings visualized in the abdomen.    THIS DOCUMENT HAS BEEN ELECTRONICALLY SIGNED BY ANU AGUSTIN MD                         Discharge Details        Discharge Medications      New Medications      Instructions Start Date   polyethylene glycol pack packet  Commonly known as:  MIRALAX   17  "g, Oral, Daily         Continue These Medications      Instructions Start Date   albuterol (2.5 MG/3ML) 0.083% nebulizer solution  Commonly known as:  PROVENTIL   Albuterol Sulfate (2.5 MG/3ML) 0.083% Inhalation Nebulization Solution; Patient Sig: Albuterol Sulfate (2.5 MG/3ML) 0.083% Inhalation Nebulization Solution USE 1 VIAL PER NEBULIZER EVERY 4 TO 6 HOURS AS NEEDED; 540; 1; 24-Jan-2013; Active      albuterol 108 (90 Base) MCG/ACT inhaler  Commonly known as:  PROAIR HFA   2 puffs, Inhalation, Every 6 Hours PRN      ALPRAZolam 2 MG tablet  Commonly known as:  XANAX   TK 1 T PO QID AND 1 PRN MAX OF 5 PER DAY      CLEM CONTOUR NEXT MONITOR w/Device kit   1 each, Does not apply, Daily      ONE TOUCH ULTRA 2 w/Device kit   USE AS DIRECTED TO TEST BLOOD SUGAR TWICE DAILY      bethanechol 25 MG tablet  Commonly known as:  URECHOLINE   25 mg, Oral, 3 Times Daily      budesonide-formoterol 160-4.5 MCG/ACT inhaler  Commonly known as:  SYMBICORT   2 puffs, Inhalation, 2 Times Daily - RT      cyclobenzaprine 10 MG tablet  Commonly known as:  FLEXERIL   10 mg, Oral, 3 Times Daily PRN      dicyclomine 10 MG capsule  Commonly known as:  BENTYL   10 mg, Oral, 4 Times Daily Before Meals & Nightly      Dimethicone 1.2 % gel   1 application, Apply externally, 2 Times Daily PRN      divalproex 500 MG 24 hr tablet  Commonly known as:  DEPAKOTE   TK 4 TS PO QHS      estazolam 2 MG tablet  Commonly known as:  PROSOM   No dose, route, or frequency recorded.      estradiol 2 MG tablet  Commonly known as:  ESTRACE   2 mg, Oral, Daily, Try taking 1/2 pill      furosemide 20 MG tablet  Commonly known as:  LASIX   20 mg, Oral, Daily      glucose blood test strip   Use as instructed      Incontinence Brief Large misc   1 each, Does not apply, 2 Times Daily      Disposable Underpads 30\"x36\" misc   1 each, Does not apply, Nightly      insulin aspart prot & aspart (70-30) 100 UNIT/ML suspension pen-injector injection  Commonly known as:  " NOVOLOG   22 Units, Subcutaneous, 2 Times Daily Before Meals, Increase by 2 units every 4 days until fasting at 150 mg/dl      Insulin Pen Needle 32G X 4 MM misc  Commonly known as:  INSUPEN PEN NEEDLES   1 each, Does not apply, 2 Times Daily      B-D ULTRAFINE III SHORT PEN 31G X 8 MM misc  Generic drug:  Insulin Pen Needle   1 applicator, Subcutaneous, 2 Times Daily - RT73      levothyroxine 88 MCG tablet  Commonly known as:  SYNTHROID, LEVOTHROID   88 mcg, Oral, Daily      linagliptin 5 MG tablet tablet  Commonly known as:  TRADJENTA   5 mg, Oral, Daily      MICROLET LANCETS misc   Use once daily.      nystatin 967192 UNIT/GM ointment  Commonly known as:  MYCOSTATIN   Topical, 2 Times Daily      omega-3 acid ethyl esters 1 g capsule  Commonly known as:  LOVAZA   1 g, Oral, Daily      omeprazole 20 MG capsule  Commonly known as:  priLOSEC   20 mg, Oral, Daily      simvastatin 10 MG tablet  Commonly known as:  ZOCOR   10 mg, Oral, Nightly      sodium chloride 0.65 % nasal spray  Commonly known as:  OCEAN   2 sprays, Nasal, As Needed      SUMAtriptan 50 MG tablet  Commonly known as:  IMITREX   50 mg, Oral, Every 2 Hours PRN      traZODone 100 MG tablet  Commonly known as:  DESYREL   TK 4 TS PO QHS           Discharge Disposition:  Home or Self Care    Discharge Diet: diabetic as tolerated    Discharge Activity: as tolerated    Code Status/Level of Support:  Code Status and Medical Interventions:   Ordered at: 08/01/18 2005     Level Of Support Discussed With:    Patient    Health Care Surrogate     Code Status:    CPR     Medical Interventions (Level of Support Prior to Arrest):    Full     Comments:    verified with daughter/POA Heidi.       Future Appointments  Date Time Provider Department Center   8/14/2018 2:45 PM Roberta Quintero APRN [facility] None   10/15/2018 10:15 AM Roberta Quintero APRN [facility] None       Additional Instructions for the Follow-ups that You Need to Schedule     Ambulatory  Referral to Home Health    As directed      Face to Face Visit Date:  8/2/2018    Follow-up Provider for Plan of Care?:  I treated the patient in an acute care facility and will not continue treatment after discharge.    Follow-up Provider:  LUISANA COHEN [[id number]]    Reason/Clinical Findings:  Pancreatitis    Describe mobility limitations that make leaving home difficult:  Impaired functional mobility, balance, gait, and endurance    Nursing/Therapeutic Services Requested:  Skilled Nursing    Skilled nursing orders:  Other (Resume home health at discharge)    Frequency:  1 Week 1         Discharge Follow-up with PCP    As directed      Currently Documented PCP:  Luisana Cohen APRN  PCP Phone Number:  [phone]    Follow Up Details:  PCP in a few days               Time Spent on Discharge:  40 minutes    Electronically signed by HEIDY Posada, 08/05/18, 2:22 PM.

## 2018-08-05 NOTE — PLAN OF CARE
Problem: Skin Injury Risk (Adult)  Goal: Identify Related Risk Factors and Signs and Symptoms  Outcome: Ongoing (interventions implemented as appropriate)   08/02/18 0300   Skin Injury Risk (Adult)   Related Risk Factors (Skin Injury Risk) advanced age;mobility impaired;body weight extremes;cognitive impairment     Goal: Skin Health and Integrity  Outcome: Ongoing (interventions implemented as appropriate)   08/05/18 0531   Skin Injury Risk (Adult)   Skin Health and Integrity making progress toward outcome

## 2018-08-06 ENCOUNTER — TRANSITIONAL CARE MANAGEMENT TELEPHONE ENCOUNTER (OUTPATIENT)
Dept: FAMILY MEDICINE CLINIC | Facility: CLINIC | Age: 58
End: 2018-08-06

## 2018-08-06 ENCOUNTER — READMISSION MANAGEMENT (OUTPATIENT)
Dept: CALL CENTER | Facility: HOSPITAL | Age: 58
End: 2018-08-06

## 2018-08-06 NOTE — OUTREACH NOTE
KAM call completed.  Please refer to TCM call flowsheet for call documentation.  Ms. Sandifer reports that patient is improved.  She is still having pain in her RUQ and RLQ but it is tolerable without medication.  No complaints of nausea or vomiting, she has had episodes of diarrhea but this is not uncommon for patient.  Denies questions regarding meds or d/c instructions from hospital.  Appointment confirmed.

## 2018-08-06 NOTE — OUTREACH NOTE
Prep Survey      Responses   Facility patient discharged from?  [place]   Is patient eligible?  Yes   Discharge diagnosis  pancreatitis (non-alcoholic)   Does the patient have one of the following disease processes/diagnoses(primary or secondary)?  Other   Does the patient have Home health ordered?  Yes   What is the Home health agency?   [facility]   Is there a DME ordered?  No   General alerts for this patient  Hx of Bipolar disorder   Prep survey completed?  Yes          Kristina Myers RN

## 2018-08-07 ENCOUNTER — READMISSION MANAGEMENT (OUTPATIENT)
Dept: CALL CENTER | Facility: HOSPITAL | Age: 58
End: 2018-08-07

## 2018-08-07 NOTE — OUTREACH NOTE
Medical Week 1 Survey      Responses   Facility patient discharged from?  [place]   Does the patient have one of the following disease processes/diagnoses(primary or secondary)?  Other   Is there a successful TCM telephone encounter documented?  Yes          Mina Rivero RN

## 2018-08-14 ENCOUNTER — OFFICE VISIT (OUTPATIENT)
Dept: FAMILY MEDICINE CLINIC | Facility: CLINIC | Age: 58
End: 2018-08-14

## 2018-08-14 VITALS
BODY MASS INDEX: 41.79 KG/M2 | SYSTOLIC BLOOD PRESSURE: 126 MMHG | WEIGHT: 255 LBS | OXYGEN SATURATION: 99 % | TEMPERATURE: 98.4 F | RESPIRATION RATE: 20 BRPM | HEART RATE: 78 BPM | DIASTOLIC BLOOD PRESSURE: 74 MMHG

## 2018-08-14 DIAGNOSIS — E03.9 HYPOTHYROIDISM, UNSPECIFIED TYPE: ICD-10-CM

## 2018-08-14 DIAGNOSIS — Z12.11 COLON CANCER SCREENING: ICD-10-CM

## 2018-08-14 DIAGNOSIS — Z91.89 AT RISK FOR POLYPHARMACY: ICD-10-CM

## 2018-08-14 DIAGNOSIS — K85.90 ACUTE PANCREATITIS WITHOUT INFECTION OR NECROSIS, UNSPECIFIED PANCREATITIS TYPE: Primary | ICD-10-CM

## 2018-08-14 DIAGNOSIS — F31.9 BIPOLAR AFFECTIVE DISORDER, REMISSION STATUS UNSPECIFIED (HCC): Chronic | ICD-10-CM

## 2018-08-14 DIAGNOSIS — IMO0002 UNCONTROLLED TYPE 2 DIABETES MELLITUS WITH COMPLICATION, WITHOUT LONG-TERM CURRENT USE OF INSULIN: Chronic | ICD-10-CM

## 2018-08-14 PROCEDURE — 99496 TRANSJ CARE MGMT HIGH F2F 7D: CPT | Performed by: NURSE PRACTITIONER

## 2018-08-14 NOTE — PROGRESS NOTES
Transitional Care Follow Up Visit  Subjective     Saadia Caceres is a 57 y.o. female who presents for a transitional care management visit.    Within 48 business hours after discharge our office contacted her via telephone to coordinate her care and needs.      I reviewed and discussed the details of that call along with the discharge summary, hospital problems, inpatient lab results, inpatient diagnostic studies, and consultation reports with Virginia.     Current outpatient and discharge medications have been reconciled for the patient.    Date of TCM Phone Call 8/6/2018   [facility]   Date of Admission 8/1/2018   Date of Discharge 8/5/2018   Discharge Disposition Home or Self Care     Risk for Readmission (LACE) Score: 10 (8/5/2018  6:00 AM)    History of Present Illness   Course During Hospital Stay:  Patient was a direct admission 8/1/18 after labs and CT scan were consistent with acute pancreatitis. etiology of pancreatitis remains unclear. She is on large doses of Depakote which can cause pancreatitis, but she has been on this for some time. Mikal's daughter is here with her for follow up, it is difficult to obtain accurate information due to patient's mental health issues. She states she does still have intermittent abdominal pain,but daughter says it is manageable and has not noticed a change in activity or ability to function. Mother and daughter do argue back and forth in the office today, difficult to keep them focused on reason for today's visit. Overall it appears the pancreatitis has resolved. Insulin was changed to 70/30 before discharge due to insurance coverage. Patient states she has not had a problem with constipation since discharge, daughter thinks patient is taking more than reccomended dose of fiber and this may be contributing to frequent stools.  Home glucoses running 170's -220's. Still having some abdominal pain, is having regular bowel movements, states she  did overeat once since going home and did have vomiting then. She has seen her psychiatrist since discharge and he is aware of her hospitalization.     The following portions of the patient's history were reviewed and updated as appropriate: allergies, current medications, past family history, past medical history, past social history, past surgical history and problem list.    Review of Systems   Gastrointestinal: Positive for abdominal pain (better). Negative for blood in stool, constipation and diarrhea.       Objective   Physical Exam   Constitutional: She is oriented to person, place, and time. She appears well-developed and well-nourished. No distress.   HENT:   Head: Normocephalic and atraumatic.   Right Ear: External ear normal.   Left Ear: External ear normal.   Mouth/Throat: Oropharynx is clear and moist.   Eyes: Conjunctivae are normal. No scleral icterus.   Cardiovascular: Normal rate, regular rhythm and normal heart sounds.    No murmur heard.  Pulmonary/Chest: Effort normal and breath sounds normal. No respiratory distress. She has no wheezes.   Abdominal: Soft. Bowel sounds are normal. She exhibits no distension. There is no tenderness. There is no guarding.   Neurological: She is alert and oriented to person, place, and time.   Skin: Skin is warm and dry. She is not diaphoretic.   Psychiatric: She has a normal mood and affect.   Lack of focus, irritated with daughter    Vitals reviewed.      Assessment/Plan   Virginia was seen today for hospital follow up.    Diagnoses and all orders for this visit:    Acute pancreatitis without infection or necrosis, unspecified pancreatitis type    Colon cancer screening  -     Ambulatory Referral For Screening Colonoscopy    Uncontrolled type 2 diabetes mellitus with complication, without long-term current use of insulin (CMS/[facility])    Hypothyroidism, unspecified type    Bipolar affective disorder, remission status unspecified (CMS/[facility])    At risk for  polypharmacy         Acute Pancreatitis appears to be resolved.  Continue insulin for uncontrolled diabetes, increase by 2 units every 4 days for fasting >150. Will repeat A1c in October.  Hypothyroidism is controlled  Bipolar is stable,  Continue regular follow ups with Dr. Schaffer  She has history of medication noncompliance, her daughter has recently taken an active role in medication management, home health also helps with medications. Patient has potential for self medicating so important that her meds are monitored by daughter and home health.   Patient was encouraged to keep me informed of any acute changes, lack of improvement, or any new concerning symptoms.Patient voiced understanding of all instructions and denied further questions.

## 2018-08-15 ENCOUNTER — READMISSION MANAGEMENT (OUTPATIENT)
Dept: CALL CENTER | Facility: HOSPITAL | Age: 58
End: 2018-08-15

## 2018-08-15 NOTE — OUTREACH NOTE
Medical Week 2 Survey      Responses   Facility patient discharged from?  [place]   Does the patient have one of the following disease processes/diagnoses(primary or secondary)?  Other   Week 2 attempt successful?  Yes   Call start time  1657   General alerts for this patient  Hx of Bipolar disorder   Discharge diagnosis  pancreatitis (non-alcoholic)   Call end time  1700   Is patient permission given to speak with other caregiver?  Yes   Person spoke with today (if not patient) and relationship  Crystal Radha/ daughter   Meds reviewed with patient/caregiver?  Yes   Is the patient having any side effects they believe may be caused by any medication additions or changes?  No   Does the patient have all medications ordered at discharge?  Yes   Is the patient taking all medications as directed (includes completed medication regime)?  Yes   Does the patient have a primary care provider?   Yes   Comments regarding PCP  Seen by PCP today 08/15- APRN Roberta Warnerwood   Has the patient kept scheduled appointments due by today?  Yes   What is the Home health agency?   [facility]   Has home health visited the patient within 72 hours of discharge?  Yes   Psychosocial issues?  No   Did the patient receive a copy of their discharge instructions?  Yes   Nursing interventions  Reviewed instructions with patient   What is the patient's perception of their health status since discharge?  Improving   Is the patient/caregiver able to teach back signs and symptoms related to disease process for when to call PCP?  Yes   Is the patient/caregiver able to teach back signs and symptoms related to disease process for when to call 911?  Yes   Is the patient/caregiver able to teach back the hierarchy of who to call/visit for symptoms/problems? PCP, Specialist, Home health nurse, Urgent Care, ED, 911  Yes   Week 2 Call Completed?  Yes          iMna Rivero RN

## 2018-08-25 ENCOUNTER — READMISSION MANAGEMENT (OUTPATIENT)
Dept: CALL CENTER | Facility: HOSPITAL | Age: 58
End: 2018-08-25

## 2018-08-25 NOTE — OUTREACH NOTE
Medical Week 3 Survey      Responses   Facility patient discharged from?  [place]   Does the patient have one of the following disease processes/diagnoses(primary or secondary)?  Other   Week 3 attempt successful?  Yes   Call start time  1052   Call end time  1054   General alerts for this patient  Hx of Bipolar disorder   Discharge diagnosis  pancreatitis (non-alcoholic)   Is patient permission given to speak with other caregiver?  Yes   Person spoke with today (if not patient) and relationship  Crystal Radha/ daughter   Meds reviewed with patient/caregiver?  Yes   Is the patient having any side effects they believe may be caused by any medication additions or changes?  No   Does the patient have all medications ordered at discharge?  Yes   Is the patient taking all medications as directed (includes completed medication regime)?  Yes   Does the patient have a primary care provider?   Yes   Comments regarding PCP  Seen by PCP already   Does the patient have an appointment with their PCP within 7 days of discharge?  Yes   Has the patient kept scheduled appointments due by today?  Yes   What is the Home health agency?   [facility]   Has home health visited the patient within 72 hours of discharge?  Yes   Psychosocial issues?  No   Did the patient receive a copy of their discharge instructions?  Yes   Nursing interventions  Reviewed instructions with patient   What is the patient's perception of their health status since discharge?  Improving   Is the patient/caregiver able to teach back signs and symptoms related to disease process for when to call PCP?  Yes   Is the patient/caregiver able to teach back signs and symptoms related to disease process for when to call 911?  Yes   Is the patient/caregiver able to teach back the hierarchy of who to call/visit for symptoms/problems? PCP, Specialist, Home health nurse, Urgent Care, ED, 911  Yes   Week 3 Call Completed?  Yes          Mina Rivero RN

## 2018-08-28 ENCOUNTER — TELEPHONE (OUTPATIENT)
Dept: FAMILY MEDICINE CLINIC | Facility: CLINIC | Age: 58
End: 2018-08-28

## 2018-08-28 NOTE — TELEPHONE ENCOUNTER
Please find out what glucose reading was after taking insulin, she should be increasing insulin by 2 units every day until fasting glucose is consistently around 150. This was explained to patient and daughter at follow up and should be on prescription also. If spiked once, patient may have eaten sweets. She was only having minor pain at follow up for pancreatitis hospitalization. If pain is severe she will need to come in.

## 2018-08-28 NOTE — TELEPHONE ENCOUNTER
PER LO SMITH CALLED, STATED PT HAD A BLOOD SUGAR SPIKE IN THE MORNING 398. PT TOOK 24 UNITS OF INSULIN. PT ALSO HAS PAIN AFTER HOSPILIZATION PANCREATITIS. PLEASE CALL LO BACK [phone] BEFORE 2. PT DOES NOT HAVE WORKING PHONE NUMBER AVAILABLE AT THIS TIME.

## 2018-09-04 ENCOUNTER — READMISSION MANAGEMENT (OUTPATIENT)
Dept: CALL CENTER | Facility: HOSPITAL | Age: 58
End: 2018-09-04

## 2018-09-04 NOTE — OUTREACH NOTE
Medical Week 4 Survey      Responses   Facility patient discharged from?  [place]   Does the patient have one of the following disease processes/diagnoses(primary or secondary)?  Other   Week 4 attempt successful?  No          Kristina Myers RN

## 2018-09-06 ENCOUNTER — HOSPITAL ENCOUNTER (EMERGENCY)
Facility: HOSPITAL | Age: 58
Discharge: HOME | End: 2018-09-07
Attending: EMERGENCY MEDICINE | Admitting: EMERGENCY MEDICINE

## 2018-09-06 ENCOUNTER — APPOINTMENT (OUTPATIENT)
Dept: CT IMAGING | Facility: HOSPITAL | Age: 58
End: 2018-09-06

## 2018-09-06 VITALS
HEIGHT: 63 IN | RESPIRATION RATE: 20 BRPM | HEART RATE: 78 BPM | SYSTOLIC BLOOD PRESSURE: 138 MMHG | DIASTOLIC BLOOD PRESSURE: 70 MMHG | OXYGEN SATURATION: 93 % | WEIGHT: 260 LBS | TEMPERATURE: 97.9 F | BODY MASS INDEX: 46.07 KG/M2

## 2018-09-06 DIAGNOSIS — Z87.19 HX OF CHRONIC PANCREATITIS: ICD-10-CM

## 2018-09-06 DIAGNOSIS — R10.30 LOWER ABDOMINAL PAIN: Primary | ICD-10-CM

## 2018-09-06 DIAGNOSIS — F41.9 ANXIETY: ICD-10-CM

## 2018-09-06 DIAGNOSIS — K59.00 CONSTIPATION, UNSPECIFIED CONSTIPATION TYPE: ICD-10-CM

## 2018-09-06 LAB
ALBUMIN SERPL-MCNC: 3.84 G/DL (ref 3.2–4.8)
ALBUMIN/GLOB SERPL: 1.6 G/DL (ref 1.5–2.5)
ALP SERPL-CCNC: 56 U/L (ref 25–100)
ALT SERPL W P-5'-P-CCNC: 11 U/L (ref 7–40)
ANION GAP SERPL CALCULATED.3IONS-SCNC: 3 MMOL/L (ref 3–11)
AST SERPL-CCNC: 12 U/L (ref 0–33)
BASOPHILS # BLD AUTO: 0.03 10*3/MM3 (ref 0–0.2)
BASOPHILS NFR BLD AUTO: 0.3 % (ref 0–1)
BILIRUB SERPL-MCNC: 0.2 MG/DL (ref 0.3–1.2)
BILIRUB UR QL STRIP: NEGATIVE
BUN BLD-MCNC: 9 MG/DL (ref 9–23)
BUN/CREAT SERPL: 13 (ref 7–25)
CALCIUM SPEC-SCNC: 9.2 MG/DL (ref 8.7–10.4)
CHLORIDE SERPL-SCNC: 106 MMOL/L (ref 99–109)
CLARITY UR: CLEAR
CO2 SERPL-SCNC: 28 MMOL/L (ref 20–31)
COLOR UR: YELLOW
CREAT BLD-MCNC: 0.69 MG/DL (ref 0.6–1.3)
D-LACTATE SERPL-SCNC: 1.6 MMOL/L (ref 0.5–2)
DEPRECATED RDW RBC AUTO: 53.5 FL (ref 37–54)
EOSINOPHIL # BLD AUTO: 0.09 10*3/MM3 (ref 0–0.3)
EOSINOPHIL NFR BLD AUTO: 0.8 % (ref 0–3)
ERYTHROCYTE [DISTWIDTH] IN BLOOD BY AUTOMATED COUNT: 15.1 % (ref 11.3–14.5)
GFR SERPL CREATININE-BSD FRML MDRD: 88 ML/MIN/1.73
GLOBULIN UR ELPH-MCNC: 2.4 GM/DL
GLUCOSE BLD-MCNC: 152 MG/DL (ref 70–100)
GLUCOSE UR STRIP-MCNC: NEGATIVE MG/DL
HCT VFR BLD AUTO: 41.2 % (ref 34.5–44)
HGB BLD-MCNC: 13.5 G/DL (ref 11.5–15.5)
HGB UR QL STRIP.AUTO: NEGATIVE
HOLD SPECIMEN: NORMAL
HOLD SPECIMEN: NORMAL
IMM GRANULOCYTES # BLD: 0.08 10*3/MM3 (ref 0–0.03)
IMM GRANULOCYTES NFR BLD: 0.7 % (ref 0–0.6)
KETONES UR QL STRIP: ABNORMAL
LEUKOCYTE ESTERASE UR QL STRIP.AUTO: NEGATIVE
LIPASE SERPL-CCNC: 38 U/L (ref 6–51)
LYMPHOCYTES # BLD AUTO: 4.5 10*3/MM3 (ref 0.6–4.8)
LYMPHOCYTES NFR BLD AUTO: 40.9 % (ref 24–44)
MCH RBC QN AUTO: 31.8 PG (ref 27–31)
MCHC RBC AUTO-ENTMCNC: 32.8 G/DL (ref 32–36)
MCV RBC AUTO: 97.2 FL (ref 80–99)
MONOCYTES # BLD AUTO: 0.9 10*3/MM3 (ref 0–1)
MONOCYTES NFR BLD AUTO: 8.2 % (ref 0–12)
NEUTROPHILS # BLD AUTO: 5.47 10*3/MM3 (ref 1.5–8.3)
NEUTROPHILS NFR BLD AUTO: 49.8 % (ref 41–71)
NITRITE UR QL STRIP: NEGATIVE
PH UR STRIP.AUTO: 8 [PH] (ref 5–8)
PLATELET # BLD AUTO: 209 10*3/MM3 (ref 150–450)
PMV BLD AUTO: 9.2 FL (ref 6–12)
POTASSIUM BLD-SCNC: 3.6 MMOL/L (ref 3.5–5.5)
PROT SERPL-MCNC: 6.2 G/DL (ref 5.7–8.2)
PROT UR QL STRIP: NEGATIVE
RBC # BLD AUTO: 4.24 10*6/MM3 (ref 3.89–5.14)
SODIUM BLD-SCNC: 137 MMOL/L (ref 132–146)
SP GR UR STRIP: 1.02 (ref 1–1.03)
UROBILINOGEN UR QL STRIP: ABNORMAL
WBC NRBC COR # BLD: 10.99 10*3/MM3 (ref 3.5–10.8)
WHOLE BLOOD HOLD SPECIMEN: NORMAL
WHOLE BLOOD HOLD SPECIMEN: NORMAL

## 2018-09-06 PROCEDURE — 81003 URINALYSIS AUTO W/O SCOPE: CPT | Performed by: EMERGENCY MEDICINE

## 2018-09-06 PROCEDURE — 96375 TX/PRO/DX INJ NEW DRUG ADDON: CPT

## 2018-09-06 PROCEDURE — 25010000002 HYDROMORPHONE PER 4 MG: Performed by: EMERGENCY MEDICINE

## 2018-09-06 PROCEDURE — 83605 ASSAY OF LACTIC ACID: CPT | Performed by: EMERGENCY MEDICINE

## 2018-09-06 PROCEDURE — 25010000002 PROMETHAZINE PER 50 MG: Performed by: PHYSICIAN ASSISTANT

## 2018-09-06 PROCEDURE — 99284 EMERGENCY DEPT VISIT MOD MDM: CPT

## 2018-09-06 PROCEDURE — 85025 COMPLETE CBC W/AUTO DIFF WBC: CPT | Performed by: EMERGENCY MEDICINE

## 2018-09-06 PROCEDURE — 96374 THER/PROPH/DIAG INJ IV PUSH: CPT

## 2018-09-06 PROCEDURE — 83690 ASSAY OF LIPASE: CPT | Performed by: EMERGENCY MEDICINE

## 2018-09-06 PROCEDURE — 96376 TX/PRO/DX INJ SAME DRUG ADON: CPT

## 2018-09-06 PROCEDURE — 80053 COMPREHEN METABOLIC PANEL: CPT | Performed by: EMERGENCY MEDICINE

## 2018-09-06 PROCEDURE — 74176 CT ABD & PELVIS W/O CONTRAST: CPT

## 2018-09-06 RX ORDER — LORAZEPAM 1 MG/1
1 TABLET ORAL EVERY 8 HOURS PRN
Qty: 10 TABLET | Refills: 0 | Status: SHIPPED | OUTPATIENT
Start: 2018-09-06 | End: 2018-10-16

## 2018-09-06 RX ORDER — PROMETHAZINE HYDROCHLORIDE 25 MG/ML
12.5 INJECTION, SOLUTION INTRAMUSCULAR; INTRAVENOUS ONCE
Status: COMPLETED | OUTPATIENT
Start: 2018-09-06 | End: 2018-09-06

## 2018-09-06 RX ORDER — PROMETHAZINE HYDROCHLORIDE 25 MG/1
25 TABLET ORAL EVERY 6 HOURS PRN
Qty: 12 TABLET | Refills: 0 | Status: SHIPPED | OUTPATIENT
Start: 2018-09-06 | End: 2018-10-16 | Stop reason: SDUPTHER

## 2018-09-06 RX ORDER — TRAMADOL HYDROCHLORIDE 50 MG/1
50 TABLET ORAL EVERY 6 HOURS PRN
Qty: 15 TABLET | Refills: 0 | Status: SHIPPED | OUTPATIENT
Start: 2018-09-06 | End: 2019-01-31

## 2018-09-06 RX ORDER — SODIUM CHLORIDE 0.9 % (FLUSH) 0.9 %
10 SYRINGE (ML) INJECTION AS NEEDED
Status: DISCONTINUED | OUTPATIENT
Start: 2018-09-06 | End: 2018-09-07 | Stop reason: HOSPADM

## 2018-09-06 RX ORDER — HYDROMORPHONE HYDROCHLORIDE 1 MG/ML
0.5 INJECTION, SOLUTION INTRAMUSCULAR; INTRAVENOUS; SUBCUTANEOUS ONCE
Status: COMPLETED | OUTPATIENT
Start: 2018-09-06 | End: 2018-09-06

## 2018-09-06 RX ADMIN — HYDROMORPHONE HYDROCHLORIDE 0.5 MG: 1 INJECTION, SOLUTION INTRAMUSCULAR; INTRAVENOUS; SUBCUTANEOUS at 17:04

## 2018-09-06 RX ADMIN — HYDROMORPHONE HYDROCHLORIDE 1 MG: 1 INJECTION, SOLUTION INTRAMUSCULAR; INTRAVENOUS; SUBCUTANEOUS at 18:20

## 2018-09-06 RX ADMIN — PROMETHAZINE HYDROCHLORIDE 12.5 MG: 25 INJECTION INTRAMUSCULAR; INTRAVENOUS at 17:02

## 2018-09-06 NOTE — DISCHARGE INSTRUCTIONS
[place] diet, increase yourr fluid intake.  Follow up with your PCP tomorrow for recheck of your symptoms.     Immediately return to the ED for worsening or new concerning symptoms.
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
## 2018-09-09 DIAGNOSIS — IMO0002 UNCONTROLLED TYPE 2 DIABETES MELLITUS WITH COMPLICATION, WITH LONG-TERM CURRENT USE OF INSULIN: ICD-10-CM

## 2018-09-10 RX ORDER — INSULIN ASPART 100 [IU]/ML
INJECTION, SUSPENSION SUBCUTANEOUS
Qty: 9 ML | Refills: 0 | Status: SHIPPED | OUTPATIENT
Start: 2018-09-10 | End: 2018-10-01 | Stop reason: SDUPTHER

## 2018-09-20 RX ORDER — PROMETHAZINE HYDROCHLORIDE 25 MG/1
TABLET ORAL
Qty: 90 TABLET | Refills: 0 | OUTPATIENT
Start: 2018-09-20

## 2018-09-20 NOTE — TELEPHONE ENCOUNTER
Promethazine was prescribed in ER, patient does not need to continue, if having continuous nausea, she should follow up with GI

## 2018-10-01 DIAGNOSIS — IMO0002 UNCONTROLLED TYPE 2 DIABETES MELLITUS WITH COMPLICATION, WITH LONG-TERM CURRENT USE OF INSULIN: ICD-10-CM

## 2018-10-01 RX ORDER — INSULIN ASPART 100 [IU]/ML
INJECTION, SUSPENSION SUBCUTANEOUS
Qty: 9 ML | Refills: 0 | Status: SHIPPED | OUTPATIENT
Start: 2018-10-01 | End: 2018-10-13 | Stop reason: SDUPTHER

## 2018-10-02 RX ORDER — PROMETHAZINE HYDROCHLORIDE 25 MG/1
TABLET ORAL
Qty: 12 TABLET | Refills: 0 | OUTPATIENT
Start: 2018-10-02

## 2018-10-13 DIAGNOSIS — IMO0002 UNCONTROLLED TYPE 2 DIABETES MELLITUS WITH COMPLICATION, WITH LONG-TERM CURRENT USE OF INSULIN: ICD-10-CM

## 2018-10-15 RX ORDER — INSULIN ASPART 100 [IU]/ML
INJECTION, SUSPENSION SUBCUTANEOUS
Qty: 9 ML | Refills: 0 | Status: SHIPPED | OUTPATIENT
Start: 2018-10-15 | End: 2018-10-27 | Stop reason: SDUPTHER

## 2018-10-16 ENCOUNTER — OFFICE VISIT (OUTPATIENT)
Dept: FAMILY MEDICINE CLINIC | Facility: CLINIC | Age: 58
End: 2018-10-16

## 2018-10-16 VITALS
WEIGHT: 246 LBS | BODY MASS INDEX: 43.59 KG/M2 | DIASTOLIC BLOOD PRESSURE: 88 MMHG | OXYGEN SATURATION: 94 % | HEART RATE: 87 BPM | TEMPERATURE: 97.8 F | SYSTOLIC BLOOD PRESSURE: 112 MMHG | HEIGHT: 63 IN

## 2018-10-16 DIAGNOSIS — F41.9 ANXIETY: ICD-10-CM

## 2018-10-16 DIAGNOSIS — F31.9 BIPOLAR AFFECTIVE DISORDER, REMISSION STATUS UNSPECIFIED (HCC): Chronic | ICD-10-CM

## 2018-10-16 DIAGNOSIS — Z72.0 TOBACCO ABUSE: Chronic | ICD-10-CM

## 2018-10-16 DIAGNOSIS — H72.91 PERFORATED RIGHT TYMPANIC MEMBRANE ON EXAMINATION: ICD-10-CM

## 2018-10-16 DIAGNOSIS — R11.0 CHRONIC NAUSEA: ICD-10-CM

## 2018-10-16 DIAGNOSIS — F30.9 BIPOLAR I DISORDER, SINGLE MANIC EPISODE (HCC): ICD-10-CM

## 2018-10-16 DIAGNOSIS — E11.65 UNCONTROLLED TYPE 2 DIABETES MELLITUS WITH HYPERGLYCEMIA (HCC): Chronic | ICD-10-CM

## 2018-10-16 DIAGNOSIS — Z00.00 MEDICARE ANNUAL WELLNESS VISIT, SUBSEQUENT: Primary | ICD-10-CM

## 2018-10-16 DIAGNOSIS — E66.01 MORBIDLY OBESE (HCC): ICD-10-CM

## 2018-10-16 DIAGNOSIS — B37.2 SKIN YEAST INFECTION: ICD-10-CM

## 2018-10-16 LAB — HBA1C MFR BLD: 7.4 %

## 2018-10-16 PROCEDURE — 99214 OFFICE O/P EST MOD 30 MIN: CPT | Performed by: NURSE PRACTITIONER

## 2018-10-16 PROCEDURE — 83036 HEMOGLOBIN GLYCOSYLATED A1C: CPT | Performed by: NURSE PRACTITIONER

## 2018-10-16 RX ORDER — HYDROXYZINE PAMOATE 50 MG/1
50 CAPSULE ORAL 4 TIMES DAILY PRN
COMMUNITY
End: 2018-10-16 | Stop reason: SDUPTHER

## 2018-10-16 RX ORDER — HYDROXYZINE PAMOATE 50 MG/1
50 CAPSULE ORAL 4 TIMES DAILY PRN
Qty: 120 CAPSULE | Refills: 3 | Status: SHIPPED | OUTPATIENT
Start: 2018-10-16 | End: 2019-01-31

## 2018-10-16 RX ORDER — PROMETHAZINE HYDROCHLORIDE 25 MG/1
25 TABLET ORAL EVERY 6 HOURS PRN
Qty: 30 TABLET | Refills: 1 | Status: SHIPPED | OUTPATIENT
Start: 2018-10-16 | End: 2018-10-21 | Stop reason: SDUPTHER

## 2018-10-16 RX ORDER — NYSTATIN 100000 U/G
OINTMENT TOPICAL 2 TIMES DAILY
Qty: 60 G | Refills: 2 | Status: SHIPPED | OUTPATIENT
Start: 2018-10-16 | End: 2018-12-28 | Stop reason: SDUPTHER
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
## 2018-10-16 NOTE — PATIENT INSTRUCTIONS
"Carbohydrate Counting for Diabetes Mellitus, Adult  Carbohydrate counting is a method for keeping track of how many carbohydrates you eat. Eating carbohydrates naturally increases the amount of sugar (glucose) in the blood. Counting how many carbohydrates you eat helps keep your blood glucose within normal limits, which helps you manage your diabetes (diabetes mellitus).  It is important to know how many carbohydrates you can safely have in each meal. This is different for every person. A diet and nutrition specialist (registered dietitian) can help you make a meal plan and calculate how many carbohydrates you should have at each meal and snack.  Carbohydrates are found in the following foods:  · Grains, such as breads and cereals.  · Dried beans and soy products.  · Starchy vegetables, such as potatoes, peas, and corn.  · Fruit and fruit juices.  · Milk and yogurt.  · Sweets and snack foods, such as cake, cookies, candy, chips, and soft drinks.    How do I count carbohydrates?  There are two ways to count carbohydrates in food. You can use either of the methods or a combination of both.  Reading \"Nutrition Facts\" on packaged food  The \"Nutrition Facts\" list is included on the labels of almost all packaged foods and beverages in the [place] It includes:  · The serving size.  · Information about nutrients in each serving, including the grams (g) of carbohydrate per serving.    To use the “Nutrition Facts\":  · Decide how many servings you will have.  · Multiply the number of servings by the number of carbohydrates per serving.  · The resulting number is the total amount of carbohydrates that you will be having.    Learning standard serving sizes of other foods  When you eat foods containing carbohydrates that are not packaged or do not include \"Nutrition Facts\" on the label, you need to measure the servings in order to count the amount of carbohydrates:  · Measure the foods that you will eat with a food scale or " measuring cup, if needed.  · Decide how many standard-size servings you will eat.  · Multiply the number of servings by 15. Most carbohydrate-rich foods have about 15 g of carbohydrates per serving.  ? For example, if you eat 8 oz (170 g) of strawberries, you will have eaten 2 servings and 30 g of carbohydrates (2 servings x 15 g = 30 g).  · For foods that have more than one food mixed, such as soups and casseroles, you must count the carbohydrates in each food that is included.    The following list contains standard serving sizes of common carbohydrate-rich foods. Each of these servings has about 15 g of carbohydrates:  · ½ hamburger bun or ½ English muffin.  · ½ oz (15 mL) syrup.  · ½ oz (14 g) jelly.  · 1 slice of bread.  · 1 six-inch tortilla.  · 3 oz (85 g) cooked rice or pasta.  · 4 oz (113 g) cooked dried beans.  · 4 oz (113 g) starchy vegetable, such as peas, corn, or potatoes.  · 4 oz (113 g) hot cereal.  · 4 oz (113 g) mashed potatoes or ¼ of a large baked potato.  · 4 oz (113 g) canned or frozen fruit.  · 4 oz (120 mL) fruit juice.  · 4-6 crackers.  · 6 chicken nuggets.  · 6 oz (170 g) unsweetened dry cereal.  · 6 oz (170 g) plain fat-free yogurt or yogurt sweetened with artificial sweeteners.  · 8 oz (240 mL) milk.  · 8 oz (170 g) fresh fruit or one small piece of fruit.  · 24 oz (680 g) popped popcorn.    Example of carbohydrate counting  Sample meal  · 3 oz (85 g) chicken breast.  · 6 oz (170 g) brown rice.  · 4 oz (113 g) corn.  · 8 oz (240 mL) milk.  · 8 oz (170 g) strawberries with sugar-free whipped topping.  Carbohydrate calculation  1. Identify the foods that contain carbohydrates:  ? Rice.  ? Corn.  ? Milk.  ? Strawberries.  2. Calculate how many servings you have of each food:  ? 2 servings rice.  ? 1 serving corn.  ? 1 serving milk.  ? 1 serving strawberries.  3. Multiply each number of servings by 15 g:  ? 2 servings rice x 15 g = 30 g.  ? 1 serving corn x 15 g = 15 g.  ? 1 serving milk x 15  g = 15 g.  ? 1 serving strawberries x 15 g = 15 g.  4. Add together all of the amounts to find the total grams of carbohydrates eaten:  ? 30 g + 15 g + 15 g + 15 g = 75 g of carbohydrates total.  This information is not intended to replace advice given to you by your health care provider. Make sure you discuss any questions you have with your health care provider.  Document Released: 12/18/2006 Document Revised: 07/07/2017 Document Reviewed: 05/31/2017  [employer] Interactive Patient Education © 2018 [employer]    Diabetes Mellitus and Skin Care  Diabetes (diabetes mellitus) can lead to health problems over time, including skin problems. People with diabetes have a higher risk for many types of skin complications. This is because having poorly controlled blood sugar (glucose) levels can:  · Damage nerves and blood vessels. This can result in decreased feeling in your legs and feet, which means you may not notice minor skin injuries that could lead to serious problems.  · Reduce blood flow (circulation), which makes wounds heal more slowly and increases your risk of infection.  · Cause areas of skin to become thick or discolored.    What are some common skin conditions that affect people with diabetes?  Diabetes often causes dry skin. It can also cause the skin on the feet to get thinner, break more easily, and heal more slowly. There are certain skin conditions that commonly affect people who have diabetes, such as:  · Bacterial skin infections, such as styes, boils, infected hair follicles, and infections of the skin around the nails.  · Fungal skin infections. These are most common in areas where skin rubs together, such as in the armpits or under the breasts.  · Open sores, especially on the feet.  · Tissue death (gangrene). This can happen on your feet if a serious infection does not heal properly. Gangrene can cause the need for a foot or leg to be surgically removed (amputated).    Diabetes can also cause the  skin to change. You may develop:  · Dark, velvety markings on the skin that usually appear on the face, neck, armpits, inner thighs, and groin (acanthosis nigricans). This typically affects people of -American and American-[place] descent.  · Red, raised, scar-like tissue that may itch, feel painful, or develop into a wound (necrobiosis lipoidica).  · Blisters on feet, toes, hands, or fingers.  · Thickened, wax-like areas of skin that usually occur on the hands, forehead, or toes (digital sclerosis).  · Brown or red ring-shaped or half-ring-shaped patches of skin on the ears or fingers (disseminated granuloma).  · Pea-shaped yellow bumps that may be itchy and surrounded by a red ring (eruptive xanthomatosis). This usually affects the arms, feet, buttocks, and the top of the hands.  · Round, discolored patches of tan skin that do not hurt or itch (diabetic dermopathy). These may look like age spots.    What do I need to know about itchy skin?  It is common for people with diabetes to have itchy skin caused by dryness. Frequent high blood glucose levels can cause itchiness, and poor circulation and certain skin infections can make dry, itchy skin worse. If you have itchy skin that is red or covered in a rash, this could be a sign of an allergic reaction to a medicine.  If you have a rash or if your skin is very itchy, contact your health care provider. You may need help to manage your diabetes better, or you may need treatment for an infection.  How can I prevent skin breakdown?  When you have diabetes and you get a badly infected ulcer or sore that does not heal, your skin can break down, especially if you have poor circulation or are on bed rest. To prevent skin breakdown:  · Keep your skin clean and dry. Wash your skin often. Do not use hot water.  · Do not use any products that contain nicotine or tobacco, such as cigarettes and e-cigarettes. Smoking affects the body’s ability to heal. If you need help  quitting, ask your health care provider.  · Check your skin every day for cuts, bruises, redness, blisters, or sores, especially on your feet. Tell your health care provider about any cuts, wounds, or sores you have, especially if they are healing slowly.  · If you are on bed rest, try to change positions often.    What else do I need to know about taking care of my skin?    · To relieve dry skin and itching:  ? Limit baths and showers to 5-10 minutes.  ? Bathe with lukewarm water instead of hot water.  ? Use mild soap and gentle skin cleansers. Do not use soap that is perfumed or harsh or dries your skin.  ? Put on lotion as soon as you finish bathing.  · Make sure that your health care provider performs a visual foot exam at every medical visit.  · Schedule a foot exam with your health care provider once every year. This exam includes an inspection of the structure and skin of your feet.  · If you get a skin injury, such as a cut, blister, or sore, check the area every day for signs of infection. Check for:  ? More redness, swelling, or pain.  ? More fluid or blood.  ? Warmth.  ? Pus or a bad smell.  Contact a health care provider if:  · You develop a cut or sore, especially on your feet.  · You develop signs of infection after a skin injury.  · Your blood glucose level is higher than 240 mg/dL (13.3 mmol/L) for 2 days in a row.  · You have itchy skin that develops redness or a rash.  · You have discolored areas of skin.  · You have areas where your skin is changing, such as thickening or appearing shiny.  This information is not intended to replace advice given to you by your health care provider. Make sure you discuss any questions you have with your health care provider.  Document Released: 05/30/2017 Document Revised: 07/07/2017 Document Reviewed: 05/30/2017  to be Interactive Patient Education © 2018 to [employer]    Diabetes Mellitus and Standards of Medical Care  Managing diabetes (diabetes mellitus) can  be complicated. Your diabetes treatment may be managed by a team of health care providers, including:  · A diet and nutrition specialist (registered dietitian).  · A nurse.  · A certified diabetes educator (CDE).  · A diabetes specialist (endocrinologist).  · An eye doctor.  · A primary care provider.  · A dentist.    Your health care providers follow a schedule in order to help you get the best quality of care. The following schedule is a general guideline for your diabetes management plan. Your health care providers may also give you more specific instructions.  HbA1c (  hemoglobin A1c) test  This test provides information about blood sugar (glucose) control over the previous 2-3 months. It is used to check whether your diabetes management plan needs to be adjusted.  · If you are meeting your treatment goals, this test is done at least 2 times a year.  · If you are not meeting treatment goals or if your treatment goals have changed, this test is done 4 times a year.    Blood pressure test  · This test is done at every routine medical visit. For most people, the goal is less than 130/80. Ask your health care provider what your goal blood pressure should be.  Dental and eye exams  · Visit your dentist two times a year.  · If you have type 1 diabetes, get an eye exam 3-5 years after you are diagnosed, and then once a year after your first exam.  ? If you were diagnosed with type 1 diabetes as a child, get an eye exam when you are age 10 or older and have had diabetes for 3-5 years. After the first exam, you should get an eye exam once a year.  · If you have type 2 diabetes, have an eye exam as soon as you are diagnosed, and then once a year after your first exam.  Foot care exam  · Visual foot exams are done at every routine medical visit. The exams check for cuts, bruises, redness, blisters, sores, or other problems with the feet.  · A complete foot exam is done by your health care provider once a year. This exam  includes an inspection of the structure and skin of your feet, and a check of the pulses and sensation in your feet.  ? Type 1 diabetes: Get your first exam 3-5 years after diagnosis.  ? Type 2 diabetes: Get your first exam as soon as you are diagnosed.  · Check your feet every day for cuts, bruises, redness, blisters, or sores. If you have any of these or other problems that are not healing, contact your health care provider.  Kidney function test (  urine microalbumin)  · This test is done once a year.  ? Type 1 diabetes: Get your first test 5 years after diagnosis.  ? Type 2 diabetes: Get your first test as soon as you are diagnosed.  · If you have chronic kidney disease (CKD), get a serum creatinine and estimated glomerular filtration rate (eGFR) test once a year.  Lipid profile (cholesterol, HDL, LDL, triglycerides)  · This test should be done when you are diagnosed with diabetes, and every 5 years after the first test. If you are on medicines to lower your cholesterol, you may need to get this test done every year.  ? The goal for LDL is less than 100 mg/dL (5.5 mmol/L). If you are at high risk, the goal is less than 70 mg/dL (3.9 mmol/L).  ? The goal for HDL is 40 mg/dL (2.2 mmol/L) for men and 50 mg/dL(2.8 mmol/L) for women. An HDL cholesterol of 60 mg/dL (3.3 mmol/L) or higher gives some protection against heart disease.  ? The goal for triglycerides is less than 150 mg/dL (8.3 mmol/L).  Immunizations  · The yearly flu (influenza) vaccine is recommended for everyone 6 months or older who has diabetes.  · The pneumonia (pneumococcal) vaccine is recommended for everyone 2 years or older who has diabetes. If you are 65 or older, you may get the pneumonia vaccine as a series of two separate shots.  · The hepatitis B vaccine is recommended for adults shortly after they have been diagnosed with diabetes.  · The Tdap (tetanus, diphtheria, and pertussis) vaccine should be given:  ? According to normal childhood  vaccination schedules, for children.  ? Every 10 years, for adults who have diabetes.  · The shingles vaccine is recommended for people who have had chicken pox and are 50 years or older.  Mental and emotional health  · Screening for symptoms of eating disorders, anxiety, and depression is recommended at the time of diagnosis and afterward as needed. If your screening shows that you have symptoms (you have a positive screening result), you may need further evaluation and be referred to a mental health care provider.  Diabetes self-management education  · Education about how to manage your diabetes is recommended at diagnosis and ongoing as needed.  Treatment plan  · Your treatment plan will be reviewed at every medical visit.  Summary  · Managing diabetes (diabetes mellitus) can be complicated. Your diabetes treatment may be managed by a team of health care providers.  · Your health care providers follow a schedule in order to help you get the best quality of care.  · Standards of care including having regular physical exams, blood tests, blood pressure monitoring, immunizations, screening tests, and education about how to manage your diabetes.  · Your health care providers may also give you more specific instructions based on your individual health.  This information is not intended to replace advice given to you by your health care provider. Make sure you discuss any questions you have with your health care provider.  Document Released: 10/15/2010 Document Revised: 09/15/2017 Document Reviewed: 09/15/2017  [employer] Interactive Patient Education © 2018 [employer]    For more information:    Quit Now [place]  1-800-QUIT-NOW  https://[facility].quitlogix.org/en-[place]/  Steps to Quit Smoking  Smoking tobacco can be harmful to your health and can affect almost every organ in your body. Smoking puts you, and those around you, at risk for developing many serious chronic diseases. Quitting smoking is difficult, but it is  one of the best things that you can do for your health. It is never too late to quit.  What are the benefits of quitting smoking?  When you quit smoking, you lower your risk of developing serious diseases and conditions, such as:  · Lung cancer or lung disease, such as COPD.  · Heart disease.  · Stroke.  · Heart attack.  · Infertility.  · Osteoporosis and bone fractures.  Additionally, symptoms such as coughing, wheezing, and shortness of breath may get better when you quit. You may also find that you get sick less often because your body is stronger at fighting off colds and infections. If you are pregnant, quitting smoking can help to reduce your chances of having a baby of low birth weight.  How do I get ready to quit?  When you decide to quit smoking, create a plan to make sure that you are successful. Before you quit:  · Pick a date to quit. Set a date within the next two weeks to give you time to prepare.  · Write down the reasons why you are quitting. Keep this list in places where you will see it often, such as on your bathroom mirror or in your car or wallet.  · Identify the people, places, things, and activities that make you want to smoke (triggers) and avoid them. Make sure to take these actions:  ¨ Throw away all cigarettes at home, at work, and in your car.  ¨ Throw away smoking accessories, such as ashtrays and lighters.  ¨ Clean your car and make sure to empty the ashtray.  ¨ Clean your home, including curtains and carpets.  · Tell your family, friends, and coworkers that you are quitting. Support from your loved ones can make quitting easier.  · Talk with your health care provider about your options for quitting smoking.  · Find out what treatment options are covered by your health insurance.  What strategies can I use to quit smoking?  Talk with your healthcare provider about different strategies to quit smoking. Some strategies include:  · Quitting smoking altogether instead of gradually lessening  how much you smoke over a period of time. Research shows that quitting “cold turkey” is more successful than gradually quitting.  · Attending in-person counseling to help you build problem-solving skills. You are more likely to have success in quitting if you attend several counseling sessions. Even short sessions of 10 minutes can be effective.  · Finding resources and support systems that can help you to quit smoking and remain smoke-free after you quit. These resources are most helpful when you use them often. They can include:  ¨ Online chats with a counselor.  ¨ Telephone quitlines.  ¨ Printed self-help materials.  ¨ Support groups or group counseling.  ¨ Text messaging programs.  ¨ Mobile phone applications.  · Taking medicines to help you quit smoking. (If you are pregnant or breastfeeding, talk with your health care provider first.) Some medicines contain nicotine and some do not. Both types of medicines help with cravings, but the medicines that include nicotine help to relieve withdrawal symptoms. Your health care provider may recommend:  ¨ Nicotine patches, gum, or lozenges.  ¨ Nicotine inhalers or sprays.  ¨ Non-nicotine medicine that is taken by mouth.  Talk with your health care provider about combining strategies, such as taking medicines while you are also receiving in-person counseling. Using these two strategies together makes you more likely to succeed in quitting than if you used either strategy on its own.  If you are pregnant or breastfeeding, talk with your health care provider about finding counseling or other support strategies to quit smoking. Do not take medicine to help you quit smoking unless told to do so by your health care provider.  What things can I do to make it easier to quit?  Quitting smoking might feel overwhelming at first, but there is a lot that you can do to make it easier. Take these important actions:  · Reach out to your family and friends and ask that they support and  encourage you during this time. Call telephone quitlines, reach out to support groups, or work with a counselor for support.  · Ask people who smoke to avoid smoking around you.  · Avoid places that trigger you to smoke, such as bars, parties, or smoke-break areas at work.  · Spend time around people who do not smoke.  · Lessen stress in your life, because stress can be a smoking trigger for some people. To lessen stress, try:  ¨ Exercising regularly.  ¨ Deep-breathing exercises.  ¨ Yoga.  ¨ Meditating.  ¨ Performing a body scan. This involves closing your eyes, scanning your body from head to toe, and noticing which parts of your body are particularly tense. Purposefully relax the muscles in those areas.  · Download or purchase mobile phone or tablet apps (applications) that can help you stick to your quit plan by providing reminders, tips, and encouragement. There are many free apps, such as QuitGuide from the CDC (Centers for Disease Control and Prevention). You can find other support for quitting smoking (smoking cessation) through smokefree.gov and other websites.  How will I feel when I quit smoking?  Within the first 24 hours of quitting smoking, you may start to feel some withdrawal symptoms. These symptoms are usually most noticeable 2-3 days after quitting, but they usually do not last beyond 2-3 weeks. Changes or symptoms that you might experience include:  · Mood swings.  · Restlessness, anxiety, or irritation.  · Difficulty concentrating.  · Dizziness.  · Strong cravings for sugary foods in addition to nicotine.  · Mild weight gain.  · Constipation.  · Nausea.  · Coughing or a sore throat.  · Changes in how your medicines work in your body.  · A depressed mood.  · Difficulty sleeping (insomnia).  After the first 2-3 weeks of quitting, you may start to notice more positive results, such as:  · Improved sense of smell and taste.  · Decreased coughing and sore throat.  · Slower heart rate.  · Lower blood  pressure.  · Clearer skin.  · The ability to breathe more easily.  · Fewer sick days.  Quitting smoking is very challenging for most people. Do not get discouraged if you are not successful the first time. Some people need to make many attempts to quit before they achieve long-term success. Do your best to stick to your quit plan, and talk with your health care provider if you have any questions or concerns.  This information is not intended to replace advice given to you by your health care provider. Make sure you discuss any questions you have with your health care provider.  Document Released: 12/12/2002 Document Revised: 08/15/2017 Document Reviewed: 05/03/2016  [employer] Interactive Patient Education © 2017 [employer]

## 2018-10-21 DIAGNOSIS — R11.0 CHRONIC NAUSEA: ICD-10-CM

## 2018-10-22 ENCOUNTER — EPISODE CHANGES (OUTPATIENT)
Dept: CASE MANAGEMENT | Facility: OTHER | Age: 58
End: 2018-10-22

## 2018-10-22 RX ORDER — PROMETHAZINE HYDROCHLORIDE 25 MG/1
TABLET ORAL
Qty: 12 TABLET | Refills: 0 | Status: SHIPPED | OUTPATIENT
Start: 2018-10-22 | End: 2019-01-31

## 2018-10-23 DIAGNOSIS — J44.1 CHRONIC OBSTRUCTIVE PULMONARY DISEASE WITH ACUTE EXACERBATION (HCC): ICD-10-CM

## 2018-10-23 RX ORDER — ALBUTEROL SULFATE 90 UG/1
AEROSOL, METERED RESPIRATORY (INHALATION)
Qty: 18 G | Refills: 0 | Status: SHIPPED | OUTPATIENT
Start: 2018-10-23 | End: 2018-11-15 | Stop reason: SDUPTHER

## 2018-10-27 DIAGNOSIS — IMO0002 UNCONTROLLED TYPE 2 DIABETES MELLITUS WITH COMPLICATION, WITH LONG-TERM CURRENT USE OF INSULIN: ICD-10-CM

## 2018-10-29 RX ORDER — INSULIN ASPART 100 [IU]/ML
INJECTION, SUSPENSION SUBCUTANEOUS
Qty: 9 ML | Refills: 0 | Status: SHIPPED | OUTPATIENT
Start: 2018-10-29 | End: 2018-11-14 | Stop reason: SDUPTHER

## 2018-11-02 DIAGNOSIS — M54.5 CHRONIC BILATERAL LOW BACK PAIN, WITH SCIATICA PRESENCE UNSPECIFIED: ICD-10-CM

## 2018-11-02 DIAGNOSIS — E78.5 HYPERLIPIDEMIA, UNSPECIFIED HYPERLIPIDEMIA TYPE: ICD-10-CM

## 2018-11-02 DIAGNOSIS — G89.29 CHRONIC BILATERAL LOW BACK PAIN, WITH SCIATICA PRESENCE UNSPECIFIED: ICD-10-CM

## 2018-11-02 RX ORDER — CYCLOBENZAPRINE HCL 10 MG
TABLET ORAL
Qty: 60 TABLET | Refills: 0 | Status: SHIPPED | OUTPATIENT
Start: 2018-11-02 | End: 2018-11-28 | Stop reason: SDUPTHER

## 2018-11-02 RX ORDER — OMEGA-3-ACID ETHYL ESTERS 1 G/1
1 CAPSULE, LIQUID FILLED ORAL DAILY
Qty: 30 CAPSULE | Refills: 0 | Status: SHIPPED | OUTPATIENT
Start: 2018-11-02 | End: 2018-12-28 | Stop reason: SDUPTHER

## 2018-11-14 DIAGNOSIS — IMO0002 UNCONTROLLED TYPE 2 DIABETES MELLITUS WITH COMPLICATION, WITH LONG-TERM CURRENT USE OF INSULIN: ICD-10-CM

## 2018-11-14 RX ORDER — INSULIN ASPART 100 [IU]/ML
INJECTION, SUSPENSION SUBCUTANEOUS
Qty: 9 ML | Refills: 0 | Status: SHIPPED | OUTPATIENT
Start: 2018-11-14 | End: 2018-12-06 | Stop reason: SDUPTHER

## 2018-11-15 DIAGNOSIS — J44.1 CHRONIC OBSTRUCTIVE PULMONARY DISEASE WITH ACUTE EXACERBATION (HCC): ICD-10-CM

## 2018-11-15 RX ORDER — ALBUTEROL SULFATE 90 UG/1
AEROSOL, METERED RESPIRATORY (INHALATION)
Qty: 18 G | Refills: 0 | Status: SHIPPED | OUTPATIENT
Start: 2018-11-15 | End: 2019-01-20 | Stop reason: SDUPTHER

## 2018-11-28 DIAGNOSIS — G89.29 CHRONIC BILATERAL LOW BACK PAIN, WITH SCIATICA PRESENCE UNSPECIFIED: ICD-10-CM

## 2018-11-28 DIAGNOSIS — M54.5 CHRONIC BILATERAL LOW BACK PAIN, WITH SCIATICA PRESENCE UNSPECIFIED: ICD-10-CM

## 2018-11-28 RX ORDER — CYCLOBENZAPRINE HCL 10 MG
TABLET ORAL
Qty: 60 TABLET | Refills: 0 | Status: SHIPPED | OUTPATIENT
Start: 2018-11-28 | End: 2018-12-26 | Stop reason: SDUPTHER

## 2018-12-02 DIAGNOSIS — E11.8 TYPE 2 DIABETES MELLITUS WITH COMPLICATION, WITHOUT LONG-TERM CURRENT USE OF INSULIN (HCC): ICD-10-CM

## 2018-12-02 RX ORDER — LINAGLIPTIN 5 MG/1
TABLET, FILM COATED ORAL
Qty: 30 TABLET | Refills: 0 | OUTPATIENT
Start: 2018-12-02

## 2018-12-06 DIAGNOSIS — IMO0002 UNCONTROLLED TYPE 2 DIABETES MELLITUS WITH COMPLICATION, WITH LONG-TERM CURRENT USE OF INSULIN: ICD-10-CM

## 2018-12-06 RX ORDER — INSULIN ASPART 100 [IU]/ML
INJECTION, SUSPENSION SUBCUTANEOUS
Qty: 9 ML | Refills: 0 | Status: SHIPPED | OUTPATIENT
Start: 2018-12-06 | End: 2018-12-19 | Stop reason: SDUPTHER

## 2018-12-17 DIAGNOSIS — E11.8 TYPE 2 DIABETES MELLITUS WITH COMPLICATION, WITHOUT LONG-TERM CURRENT USE OF INSULIN (HCC): ICD-10-CM

## 2018-12-17 DIAGNOSIS — R60.1 GENERALIZED EDEMA: ICD-10-CM

## 2018-12-17 DIAGNOSIS — R60.9 EDEMA, UNSPECIFIED TYPE: ICD-10-CM

## 2018-12-17 RX ORDER — DICYCLOMINE HYDROCHLORIDE 10 MG/1
10 CAPSULE ORAL
Qty: 120 CAPSULE | Refills: 2 | Status: SHIPPED | OUTPATIENT
Start: 2018-12-17 | End: 2019-01-31

## 2018-12-17 RX ORDER — FUROSEMIDE 20 MG/1
TABLET ORAL
Qty: 30 TABLET | Refills: 5 | Status: SHIPPED | OUTPATIENT
Start: 2018-12-17 | End: 2019-06-10 | Stop reason: SDUPTHER

## 2018-12-17 RX ORDER — LINAGLIPTIN 5 MG/1
TABLET, FILM COATED ORAL
Qty: 30 TABLET | Refills: 0 | OUTPATIENT
Start: 2018-12-17

## 2018-12-19 DIAGNOSIS — IMO0002 UNCONTROLLED TYPE 2 DIABETES MELLITUS WITH COMPLICATION, WITH LONG-TERM CURRENT USE OF INSULIN: ICD-10-CM

## 2018-12-19 RX ORDER — INSULIN ASPART 100 [IU]/ML
INJECTION, SUSPENSION SUBCUTANEOUS
Qty: 9 ML | Refills: 0 | Status: SHIPPED | OUTPATIENT
Start: 2018-12-19 | End: 2019-01-03 | Stop reason: SDUPTHER

## 2018-12-26 DIAGNOSIS — G89.29 CHRONIC BILATERAL LOW BACK PAIN, WITH SCIATICA PRESENCE UNSPECIFIED: ICD-10-CM

## 2018-12-26 DIAGNOSIS — M54.5 CHRONIC BILATERAL LOW BACK PAIN, WITH SCIATICA PRESENCE UNSPECIFIED: ICD-10-CM

## 2018-12-26 RX ORDER — CYCLOBENZAPRINE HCL 10 MG
TABLET ORAL
Qty: 60 TABLET | Refills: 0 | Status: SHIPPED | OUTPATIENT
Start: 2018-12-26 | End: 2019-01-31 | Stop reason: SDUPTHER

## 2018-12-28 DIAGNOSIS — B37.2 SKIN YEAST INFECTION: ICD-10-CM

## 2018-12-28 DIAGNOSIS — E78.5 HYPERLIPIDEMIA, UNSPECIFIED HYPERLIPIDEMIA TYPE: ICD-10-CM

## 2018-12-28 RX ORDER — OMEGA-3-ACID ETHYL ESTERS 1 G/1
1 CAPSULE, LIQUID FILLED ORAL DAILY
Qty: 30 CAPSULE | Refills: 0 | Status: SHIPPED | OUTPATIENT
Start: 2018-12-28 | End: 2019-01-25 | Stop reason: SDUPTHER

## 2018-12-28 RX ORDER — NYSTATIN 100000 U/G
OINTMENT TOPICAL
Qty: 60 G | Refills: 0 | Status: SHIPPED | OUTPATIENT
Start: 2018-12-28 | End: 2019-02-26 | Stop reason: SDUPTHER

## 2019-01-03 DIAGNOSIS — IMO0002 UNCONTROLLED TYPE 2 DIABETES MELLITUS WITH COMPLICATION, WITH LONG-TERM CURRENT USE OF INSULIN: ICD-10-CM

## 2019-01-03 RX ORDER — INSULIN ASPART 100 [IU]/ML
INJECTION, SUSPENSION SUBCUTANEOUS
Qty: 9 ML | Refills: 0 | Status: SHIPPED | OUTPATIENT
Start: 2019-01-03 | End: 2019-02-26 | Stop reason: SDUPTHER

## 2019-01-08 DIAGNOSIS — E03.9 HYPOTHYROIDISM, UNSPECIFIED TYPE: ICD-10-CM

## 2019-01-08 RX ORDER — LEVOTHYROXINE SODIUM 88 UG/1
TABLET ORAL
Qty: 90 TABLET | Refills: 0 | OUTPATIENT
Start: 2019-01-08

## 2019-01-09 DIAGNOSIS — E03.9 HYPOTHYROIDISM, UNSPECIFIED TYPE: ICD-10-CM

## 2019-01-09 DIAGNOSIS — K21.9 GASTROESOPHAGEAL REFLUX DISEASE, ESOPHAGITIS PRESENCE NOT SPECIFIED: ICD-10-CM

## 2019-01-09 RX ORDER — LEVOTHYROXINE SODIUM 88 UG/1
TABLET ORAL
Qty: 90 TABLET | Refills: 0 | OUTPATIENT
Start: 2019-01-09

## 2019-01-09 RX ORDER — OMEPRAZOLE 20 MG/1
20 CAPSULE, DELAYED RELEASE ORAL DAILY
Qty: 30 CAPSULE | Refills: 0 | OUTPATIENT
Start: 2019-01-09

## 2019-01-17 ENCOUNTER — TELEPHONE (OUTPATIENT)
Dept: FAMILY MEDICINE CLINIC | Facility: CLINIC | Age: 59
End: 2019-01-17

## 2019-01-17 DIAGNOSIS — E03.9 HYPOTHYROIDISM, UNSPECIFIED TYPE: ICD-10-CM

## 2019-01-17 RX ORDER — LEVOTHYROXINE SODIUM 88 UG/1
88 TABLET ORAL DAILY
Qty: 90 TABLET | Refills: 3 | Status: SHIPPED | OUTPATIENT
Start: 2019-01-17 | End: 2019-11-17 | Stop reason: SDUPTHER

## 2019-01-17 RX ORDER — LEVOTHYROXINE SODIUM 88 UG/1
TABLET ORAL
Qty: 90 TABLET | Refills: 0 | OUTPATIENT
Start: 2019-01-17

## 2019-01-17 NOTE — TELEPHONE ENCOUNTER
Pt daughter called about medication refill on Synthroid 88 MCG 90 day supply     Pt daughter states that she has been having issues with getting this medication filled for the last month

## 2019-01-20 DIAGNOSIS — J44.1 CHRONIC OBSTRUCTIVE PULMONARY DISEASE WITH ACUTE EXACERBATION (HCC): ICD-10-CM

## 2019-01-21 RX ORDER — ALBUTEROL SULFATE 90 UG/1
AEROSOL, METERED RESPIRATORY (INHALATION)
Qty: 18 G | Refills: 0 | Status: SHIPPED | OUTPATIENT
Start: 2019-01-21 | End: 2019-02-13 | Stop reason: SDUPTHER

## 2019-01-25 DIAGNOSIS — E78.5 HYPERLIPIDEMIA, UNSPECIFIED HYPERLIPIDEMIA TYPE: ICD-10-CM

## 2019-01-25 RX ORDER — OMEGA-3-ACID ETHYL ESTERS 1 G/1
1 CAPSULE, LIQUID FILLED ORAL DAILY
Qty: 30 CAPSULE | Refills: 0 | Status: SHIPPED | OUTPATIENT
Start: 2019-01-25 | End: 2019-02-13 | Stop reason: SDUPTHER

## 2019-01-31 ENCOUNTER — OFFICE VISIT (OUTPATIENT)
Dept: FAMILY MEDICINE CLINIC | Facility: CLINIC | Age: 59
End: 2019-01-31

## 2019-01-31 ENCOUNTER — APPOINTMENT (OUTPATIENT)
Dept: LAB | Facility: HOSPITAL | Age: 59
End: 2019-01-31

## 2019-01-31 VITALS
OXYGEN SATURATION: 96 % | SYSTOLIC BLOOD PRESSURE: 114 MMHG | BODY MASS INDEX: 44.07 KG/M2 | WEIGHT: 248.8 LBS | TEMPERATURE: 97.7 F | DIASTOLIC BLOOD PRESSURE: 70 MMHG | HEART RATE: 78 BPM

## 2019-01-31 DIAGNOSIS — E11.65 UNCONTROLLED TYPE 2 DIABETES MELLITUS WITH HYPERGLYCEMIA (HCC): Primary | Chronic | ICD-10-CM

## 2019-01-31 DIAGNOSIS — R10.84 GENERALIZED ABDOMINAL PAIN: ICD-10-CM

## 2019-01-31 DIAGNOSIS — Z79.4 TYPE 2 DIABETES MELLITUS WITH DIABETIC PERIPHERAL ANGIOPATHY WITHOUT GANGRENE, WITH LONG-TERM CURRENT USE OF INSULIN (HCC): ICD-10-CM

## 2019-01-31 DIAGNOSIS — E66.01 MORBIDLY OBESE (HCC): ICD-10-CM

## 2019-01-31 DIAGNOSIS — F17.210 CIGARETTE SMOKER: ICD-10-CM

## 2019-01-31 DIAGNOSIS — F30.9 BIPOLAR I DISORDER, SINGLE MANIC EPISODE (HCC): ICD-10-CM

## 2019-01-31 DIAGNOSIS — Z87.19 HX OF CHRONIC PANCREATITIS: ICD-10-CM

## 2019-01-31 DIAGNOSIS — Z91.89 AT RISK FOR POLYPHARMACY: ICD-10-CM

## 2019-01-31 DIAGNOSIS — M54.5 CHRONIC BILATERAL LOW BACK PAIN, WITH SCIATICA PRESENCE UNSPECIFIED: ICD-10-CM

## 2019-01-31 DIAGNOSIS — E11.51 TYPE 2 DIABETES MELLITUS WITH DIABETIC PERIPHERAL ANGIOPATHY WITHOUT GANGRENE, WITH LONG-TERM CURRENT USE OF INSULIN (HCC): ICD-10-CM

## 2019-01-31 DIAGNOSIS — J43.9 PULMONARY EMPHYSEMA, UNSPECIFIED EMPHYSEMA TYPE (HCC): ICD-10-CM

## 2019-01-31 DIAGNOSIS — G89.29 CHRONIC BILATERAL LOW BACK PAIN, WITH SCIATICA PRESENCE UNSPECIFIED: ICD-10-CM

## 2019-01-31 DIAGNOSIS — Z72.0 TOBACCO ABUSE: Chronic | ICD-10-CM

## 2019-01-31 LAB
ALBUMIN SERPL-MCNC: 4.36 G/DL (ref 3.2–4.8)
ALBUMIN/GLOB SERPL: 1.9 G/DL (ref 1.5–2.5)
ALP SERPL-CCNC: 91 U/L (ref 25–100)
ALT SERPL W P-5'-P-CCNC: 11 U/L (ref 7–40)
ANION GAP SERPL CALCULATED.3IONS-SCNC: 5 MMOL/L (ref 3–11)
AST SERPL-CCNC: 11 U/L (ref 0–33)
BILIRUB SERPL-MCNC: 0.2 MG/DL (ref 0.3–1.2)
BUN BLD-MCNC: 16 MG/DL (ref 9–23)
BUN/CREAT SERPL: 21.9 (ref 7–25)
CALCIUM SPEC-SCNC: 9.9 MG/DL (ref 8.7–10.4)
CHLORIDE SERPL-SCNC: 98 MMOL/L (ref 99–109)
CO2 SERPL-SCNC: 30 MMOL/L (ref 20–31)
CREAT BLD-MCNC: 0.73 MG/DL (ref 0.6–1.3)
GFR SERPL CREATININE-BSD FRML MDRD: 82 ML/MIN/1.73
GLOBULIN UR ELPH-MCNC: 2.3 GM/DL
GLUCOSE BLD-MCNC: 254 MG/DL (ref 70–100)
HBA1C MFR BLD: 8.4 %
LIPASE SERPL-CCNC: 63 U/L (ref 6–51)
POTASSIUM BLD-SCNC: 5.1 MMOL/L (ref 3.5–5.5)
PROT SERPL-MCNC: 6.7 G/DL (ref 5.7–8.2)
SODIUM BLD-SCNC: 133 MMOL/L (ref 132–146)

## 2019-01-31 PROCEDURE — 83690 ASSAY OF LIPASE: CPT | Performed by: NURSE PRACTITIONER

## 2019-01-31 PROCEDURE — 99214 OFFICE O/P EST MOD 30 MIN: CPT | Performed by: NURSE PRACTITIONER

## 2019-01-31 PROCEDURE — 80053 COMPREHEN METABOLIC PANEL: CPT | Performed by: NURSE PRACTITIONER

## 2019-01-31 PROCEDURE — 83036 HEMOGLOBIN GLYCOSYLATED A1C: CPT | Performed by: NURSE PRACTITIONER

## 2019-01-31 PROCEDURE — 36415 COLL VENOUS BLD VENIPUNCTURE: CPT | Performed by: NURSE PRACTITIONER

## 2019-01-31 RX ORDER — DICYCLOMINE HYDROCHLORIDE 10 MG/1
10 CAPSULE ORAL
COMMUNITY
End: 2019-11-30

## 2019-01-31 RX ORDER — CYCLOBENZAPRINE HCL 10 MG
10 TABLET ORAL 3 TIMES DAILY PRN
Qty: 60 TABLET | Refills: 0 | Status: SHIPPED | OUTPATIENT
Start: 2019-01-31 | End: 2019-02-13 | Stop reason: SDUPTHER

## 2019-01-31 RX ORDER — GABAPENTIN 800 MG/1
TABLET ORAL
Refills: 3 | COMMUNITY
Start: 2019-01-25 | End: 2020-01-10 | Stop reason: ALTCHOICE

## 2019-01-31 RX ORDER — PRAZOSIN HYDROCHLORIDE 2 MG/1
CAPSULE ORAL
Refills: 4 | COMMUNITY
Start: 2019-01-17 | End: 2019-08-20

## 2019-01-31 NOTE — PATIENT INSTRUCTIONS
Abdominal Pain, Adult  Many things can cause belly (abdominal) pain. Most times, belly pain is not dangerous. Many cases of belly pain can be watched and treated at home. Sometimes belly pain is serious, though. Your doctor will try to find the cause of your belly pain.  Follow these instructions at home:  · Take over-the-counter and prescription medicines only as told by your doctor. Do not take medicines that help you poop (laxatives) unless told to by your doctor.  · Drink enough fluid to keep your pee (urine) clear or pale yellow.  · Watch your belly pain for any changes.  · Keep all follow-up visits as told by your doctor. This is important.  Contact a doctor if:  · Your belly pain changes or gets worse.  · You are not hungry, or you lose weight without trying.  · You are having trouble pooping (constipated) or have watery poop (diarrhea) for more than 2-3 days.  · You have pain when you pee or poop.  · Your belly pain wakes you up at night.  · Your pain gets worse with meals, after eating, or with certain foods.  · You are throwing up and cannot keep anything down.  · You have a fever.  Get help right away if:  · Your pain does not go away as soon as your doctor says it should.  · You cannot stop throwing up.  · Your pain is only in areas of your belly, such as the right side or the left lower part of the belly.  · You have bloody or black poop, or poop that looks like tar.  · You have very bad pain, cramping, or bloating in your belly.  · You have signs of not having enough fluid or water in your body (dehydration), such as:  ? Dark pee, very little pee, or no pee.  ? Cracked lips.  ? Dry mouth.  ? Sunken eyes.  ? Sleepiness.  ? Weakness.  This information is not intended to replace advice given to you by your health care provider. Make sure you discuss any questions you have with your health care provider.  Document Released: 06/05/2009 Document Revised: 07/07/2017 Document Reviewed: 05/31/2017  [employer]  Interactive Patient Education © 2018 [employer]    Diabetes Mellitus and Standards of Medical Care  Managing diabetes (diabetes mellitus) can be complicated. Your diabetes treatment may be managed by a team of health care providers, including:  · A diet and nutrition specialist (registered dietitian).  · A nurse.  · A certified diabetes educator (CDE).  · A diabetes specialist (endocrinologist).  · An eye doctor.  · A primary care provider.  · A dentist.    Your health care providers follow a schedule in order to help you get the best quality of care. The following schedule is a general guideline for your diabetes management plan. Your health care providers may also give you more specific instructions.  HbA1c (  hemoglobin A1c) test  This test provides information about blood sugar (glucose) control over the previous 2-3 months. It is used to check whether your diabetes management plan needs to be adjusted.  · If you are meeting your treatment goals, this test is done at least 2 times a year.  · If you are not meeting treatment goals or if your treatment goals have changed, this test is done 4 times a year.    Blood pressure test  · This test is done at every routine medical visit. For most people, the goal is less than 130/80. Ask your health care provider what your goal blood pressure should be.  Dental and eye exams  · Visit your dentist two times a year.  · If you have type 1 diabetes, get an eye exam 3-5 years after you are diagnosed, and then once a year after your first exam.  ? If you were diagnosed with type 1 diabetes as a child, get an eye exam when you are age 10 or older and have had diabetes for 3-5 years. After the first exam, you should get an eye exam once a year.  · If you have type 2 diabetes, have an eye exam as soon as you are diagnosed, and then once a year after your first exam.  Foot care exam  · Visual foot exams are done at every routine medical visit. The exams check for cuts, bruises,  redness, blisters, sores, or other problems with the feet.  · A complete foot exam is done by your health care provider once a year. This exam includes an inspection of the structure and skin of your feet, and a check of the pulses and sensation in your feet.  ? Type 1 diabetes: Get your first exam 3-5 years after diagnosis.  ? Type 2 diabetes: Get your first exam as soon as you are diagnosed.  · Check your feet every day for cuts, bruises, redness, blisters, or sores. If you have any of these or other problems that are not healing, contact your health care provider.  Kidney function test (  urine microalbumin)  · This test is done once a year.  ? Type 1 diabetes: Get your first test 5 years after diagnosis.  ? Type 2 diabetes: Get your first test as soon as you are diagnosed.  · If you have chronic kidney disease (CKD), get a serum creatinine and estimated glomerular filtration rate (eGFR) test once a year.  Lipid profile (cholesterol, HDL, LDL, triglycerides)  · This test should be done when you are diagnosed with diabetes, and every 5 years after the first test. If you are on medicines to lower your cholesterol, you may need to get this test done every year.  ? The goal for LDL is less than 100 mg/dL (5.5 mmol/L). If you are at high risk, the goal is less than 70 mg/dL (3.9 mmol/L).  ? The goal for HDL is 40 mg/dL (2.2 mmol/L) for men and 50 mg/dL(2.8 mmol/L) for women. An HDL cholesterol of 60 mg/dL (3.3 mmol/L) or higher gives some protection against heart disease.  ? The goal for triglycerides is less than 150 mg/dL (8.3 mmol/L).  Immunizations  · The yearly flu (influenza) vaccine is recommended for everyone 6 months or older who has diabetes.  · The pneumonia (pneumococcal) vaccine is recommended for everyone 2 years or older who has diabetes. If you are 65 or older, you may get the pneumonia vaccine as a series of two separate shots.  · The hepatitis B vaccine is recommended for adults shortly after they  have been diagnosed with diabetes.  · The Tdap (tetanus, diphtheria, and pertussis) vaccine should be given:  ? According to normal childhood vaccination schedules, for children.  ? Every 10 years, for adults who have diabetes.  · The shingles vaccine is recommended for people who have had chicken pox and are 50 years or older.  Mental and emotional health  · Screening for symptoms of eating disorders, anxiety, and depression is recommended at the time of diagnosis and afterward as needed. If your screening shows that you have symptoms (you have a positive screening result), you may need further evaluation and be referred to a mental health care provider.  Diabetes self-management education  · Education about how to manage your diabetes is recommended at diagnosis and ongoing as needed.  Treatment plan  · Your treatment plan will be reviewed at every medical visit.  Summary  · Managing diabetes (diabetes mellitus) can be complicated. Your diabetes treatment may be managed by a team of health care providers.  · Your health care providers follow a schedule in order to help you get the best quality of care.  · Standards of care including having regular physical exams, blood tests, blood pressure monitoring, immunizations, screening tests, and education about how to manage your diabetes.  · Your health care providers may also give you more specific instructions based on your individual health.  This information is not intended to replace advice given to you by your health care provider. Make sure you discuss any questions you have with your health care provider.  Document Released: 10/15/2010 Document Revised: 09/15/2017 Document Reviewed: 09/15/2017  [employer] Interactive Patient Education © 2018 [employer]    Coping With Diabetes  Diabetes (type 1 diabetes mellitus or type 2 diabetes mellitus) is a condition in which the body does not have enough of a hormone called insulin, or the body does not respond properly to  "insulin. Normally, insulin allows sugars (glucose) to enter cells in the body. The cells use glucose for energy. With diabetes, extra glucose builds up in the blood instead of going into cells, which results in high blood glucose (hyperglycemia).  How to manage lifestyle changes  Managing diabetes includes medical treatments as well as lifestyle changes. If diabetes is not managed well, serious physical and emotional complications can occur. Taking good care of yourself means that you are responsible for:  · Monitoring glucose regularly.  · Eating a healthy diet.  · Exercising regularly.  · Meeting with health care providers.  · Taking medicines as directed.    Some people may feel a lot of stress about managing their diabetes. This is known as emotional distress, and it is very common. Living with diabetes can place you at risk for emotional distress, depression, or anxiety. These disorders can be confusing and can make diabetes management more difficult.  How to recognize stress  Emotional distress  Symptoms of emotional distress include:  · Anger about having a diagnosis of diabetes.  · Fear or frustration about your diagnosis and the changes you need to make to manage the condition.  · Being overly worried about the care that you need or the cost of the care you need.  · Feeling like you caused your condition by doing something wrong.  · Fear of unpredictable situations, like low or high blood glucose.  · Feeling judged by your health care providers.  · Feeling very alone with the disease.  · Getting too tired or \"burned out\" with the demands of daily care.    Depression  Having diabetes means that you are at a higher risk for depression. Having depression also means that you are at a higher risk for diabetes. Your health care provider may test (screen) you for symptoms of depression. It is important to recognize depression symptoms and to start treatment for it soon after it is diagnosed. The following are some " symptoms of depression:  · Loss of interest in things that you used to enjoy.  · Trouble sleeping, or often waking up early and not being able to get back to sleep.  · A change in appetite.  · Feeling tired most of the day.  · Feeling nervous and anxious.  · Feeling guilty and worrying that you are a burden to others.  · Feeling depressed more often than you do not feel that way.  · Thoughts of hurting yourself or feeling that you want to die.    If you have any of these symptoms for 2 weeks or longer, reach out to a health care provider.  Where to find support  · Ask your health care provider to recommend a therapist who understands both depression and diabetes.  · Search for information and support from the American Diabetes Association: www.diabetes.org  · Find a certified diabetes educator and make an appointment through American Association of Diabetes Educators: www.diabeteseducator.org  Follow these instructions at home:  Managing emotional distress  The following are some ways to manage emotional distress:  · Talk with your health care provider or certified diabetes educator. Consider working with a counselor or therapist.  · Learn as much as you can about diabetes and its treatment. Meet with a certified diabetes educator or take a class to learn how to manage your condition.  · Keep a journal of your thoughts and concerns.  · Accept that some things are out of your control.  · Talk with other people who have diabetes. It can help to talk with others about the emotional distress that you feel.  · Find ways to manage stress that work for you. These may include art or music therapy, exercise, meditation, and hobbies.  · Seek support from spiritual leaders, family, and friends.    General instructions  · Follow your diabetes management plan.  · Keep all follow-up visits as told by your health care provider. This is important.  Get help right away if:  · You have thoughts about hurting yourself or others.  If  you ever feel like you may hurt yourself or others, or have thoughts about taking your own life, get help right away. You can go to your nearest emergency department or call:  · Your local emergency services (911 in the [place]  · A suicide crisis helpline, such as the National Suicide Prevention Lifeline at 1-731.319.5190. This is open 24 hours a day.    Summary  · Diabetes (type 1 diabetes mellitus or type 2 diabetes mellitus) is a condition in which the body does not have enough of a hormone called insulin, or the body does not respond properly to insulin.  · Living with diabetes puts you at risk for medical issues, and it also puts you at risk for emotional issues such as emotional distress, depression, and anxiety.  · Recognizing the symptoms of emotional distress and depression may help you avoid problems with your diabetes control. It is important to start treatment for emotional distress and depression soon after they are diagnosed.  · Having diabetes means that you are at a higher risk for depression. Ask your health care provider to recommend a therapist who understands both depression and diabetes.  · If you experience symptoms of emotional distress or depression, it is important to discuss this with your health care provider, certified diabetes educator, or therapist.  This information is not intended to replace advice given to you by your health care provider. Make sure you discuss any questions you have with your health care provider.  Document Released: 05/03/2018 Document Revised: 05/03/2018 Document Reviewed: 05/03/2018  [employer] Interactive Patient Education © 2018 [employer]    For more information:    Quit Now [place]  1-800-QUIT-NOW  https://[facility].quitlogix.org/en-[place]/  Steps to Quit Smoking  Smoking tobacco can be harmful to your health and can affect almost every organ in your body. Smoking puts you, and those around you, at risk for developing many serious chronic diseases. Quitting  smoking is difficult, but it is one of the best things that you can do for your health. It is never too late to quit.  What are the benefits of quitting smoking?  When you quit smoking, you lower your risk of developing serious diseases and conditions, such as:  · Lung cancer or lung disease, such as COPD.  · Heart disease.  · Stroke.  · Heart attack.  · Infertility.  · Osteoporosis and bone fractures.  Additionally, symptoms such as coughing, wheezing, and shortness of breath may get better when you quit. You may also find that you get sick less often because your body is stronger at fighting off colds and infections. If you are pregnant, quitting smoking can help to reduce your chances of having a baby of low birth weight.  How do I get ready to quit?  When you decide to quit smoking, create a plan to make sure that you are successful. Before you quit:  · Pick a date to quit. Set a date within the next two weeks to give you time to prepare.  · Write down the reasons why you are quitting. Keep this list in places where you will see it often, such as on your bathroom mirror or in your car or wallet.  · Identify the people, places, things, and activities that make you want to smoke (triggers) and avoid them. Make sure to take these actions:  ¨ Throw away all cigarettes at home, at work, and in your car.  ¨ Throw away smoking accessories, such as ashtrays and lighters.  ¨ Clean your car and make sure to empty the ashtray.  ¨ Clean your home, including curtains and carpets.  · Tell your family, friends, and coworkers that you are quitting. Support from your loved ones can make quitting easier.  · Talk with your health care provider about your options for quitting smoking.  · Find out what treatment options are covered by your health insurance.  What strategies can I use to quit smoking?  Talk with your healthcare provider about different strategies to quit smoking. Some strategies include:  · Quitting smoking  altogether instead of gradually lessening how much you smoke over a period of time. Research shows that quitting “cold turkey” is more successful than gradually quitting.  · Attending in-person counseling to help you build problem-solving skills. You are more likely to have success in quitting if you attend several counseling sessions. Even short sessions of 10 minutes can be effective.  · Finding resources and support systems that can help you to quit smoking and remain smoke-free after you quit. These resources are most helpful when you use them often. They can include:  ¨ Online chats with a counselor.  ¨ Telephone quitlines.  ¨ Printed self-help materials.  ¨ Support groups or group counseling.  ¨ Text messaging programs.  ¨ Mobile phone applications.  · Taking medicines to help you quit smoking. (If you are pregnant or breastfeeding, talk with your health care provider first.) Some medicines contain nicotine and some do not. Both types of medicines help with cravings, but the medicines that include nicotine help to relieve withdrawal symptoms. Your health care provider may recommend:  ¨ Nicotine patches, gum, or lozenges.  ¨ Nicotine inhalers or sprays.  ¨ Non-nicotine medicine that is taken by mouth.  Talk with your health care provider about combining strategies, such as taking medicines while you are also receiving in-person counseling. Using these two strategies together makes you more likely to succeed in quitting than if you used either strategy on its own.  If you are pregnant or breastfeeding, talk with your health care provider about finding counseling or other support strategies to quit smoking. Do not take medicine to help you quit smoking unless told to do so by your health care provider.  What things can I do to make it easier to quit?  Quitting smoking might feel overwhelming at first, but there is a lot that you can do to make it easier. Take these important actions:  · Reach out to your family  and friends and ask that they support and encourage you during this time. Call telephone quitlines, reach out to support groups, or work with a counselor for support.  · Ask people who smoke to avoid smoking around you.  · Avoid places that trigger you to smoke, such as bars, parties, or smoke-break areas at work.  · Spend time around people who do not smoke.  · Lessen stress in your life, because stress can be a smoking trigger for some people. To lessen stress, try:  ¨ Exercising regularly.  ¨ Deep-breathing exercises.  ¨ Yoga.  ¨ Meditating.  ¨ Performing a body scan. This involves closing your eyes, scanning your body from head to toe, and noticing which parts of your body are particularly tense. Purposefully relax the muscles in those areas.  · Download or purchase mobile phone or tablet apps (applications) that can help you stick to your quit plan by providing reminders, tips, and encouragement. There are many free apps, such as QuitGuide from the CDC (Centers for Disease Control and Prevention). You can find other support for quitting smoking (smoking cessation) through smokefree.gov and other websites.  How will I feel when I quit smoking?  Within the first 24 hours of quitting smoking, you may start to feel some withdrawal symptoms. These symptoms are usually most noticeable 2-3 days after quitting, but they usually do not last beyond 2-3 weeks. Changes or symptoms that you might experience include:  · Mood swings.  · Restlessness, anxiety, or irritation.  · Difficulty concentrating.  · Dizziness.  · Strong cravings for sugary foods in addition to nicotine.  · Mild weight gain.  · Constipation.  · Nausea.  · Coughing or a sore throat.  · Changes in how your medicines work in your body.  · A depressed mood.  · Difficulty sleeping (insomnia).  After the first 2-3 weeks of quitting, you may start to notice more positive results, such as:  · Improved sense of smell and taste.  · Decreased coughing and sore  throat.  · Slower heart rate.  · Lower blood pressure.  · Clearer skin.  · The ability to breathe more easily.  · Fewer sick days.  Quitting smoking is very challenging for most people. Do not get discouraged if you are not successful the first time. Some people need to make many attempts to quit before they achieve long-term success. Do your best to stick to your quit plan, and talk with your health care provider if you have any questions or concerns.  This information is not intended to replace advice given to you by your health care provider. Make sure you discuss any questions you have with your health care provider.  Document Released: 12/12/2002 Document Revised: 08/15/2017 Document Reviewed: 05/03/2016  [employer] Interactive Patient Education © 2017 [employer]

## 2019-01-31 NOTE — PROGRESS NOTES
"Subjective   Saadia Caceres is a 58 y.o. female.   Chief Complaint   Patient presents with   • Diabetes     3 month A1C Check       History of Present Illness   Patient is here for 3 month diabetes follow up, has not been taking medications as prescribed, Does check glucose at home sometimes, not consistently.States highest glucose was over 300, usually around 180. Denies hypoglycemic episodes, polyuria, polydipsia, states she has not taken her insulin for the past week, \" just because\". Her daughter is a nurse and here with patient, has not had any success getting patient to be compliant.  States she has not been eating a healthy diet. Still having abdominal pain, daughter states this is \"exactly the same pain\" as she has had, thinks it is related to chronic pancreatitis and poor diet. States she has daily bowel movements, has stopped using enemas and taking laxatives, daughter wants new referral to GI, did not follow up for colonoscopy  .Patient  continues to smoke daily, daughter requests patient be referred to pulmonologist for COPD evaluation and management.   Patient continues to see Dr. Montes, psychiatrist for management of PTSD, Bipolar,and any other mental health issues; he has adjusted her medications.   Patient has chronic back pain, was seeing pain management in the past, is now just taking muscle relaxer only, daughter does not want her to be on pain medications, patient did not take as prescribed, felt risk outweighed benefits.  The following portions of the patient's history were reviewed and updated as appropriate: past family history, past medical history, past social history and past surgical history.    Review of Systems   Constitutional: Positive for appetite change (decreased). Negative for diaphoresis, fatigue and unexpected weight change.   Eyes: Negative for visual disturbance.   Respiratory: Negative for cough, chest tightness, shortness of breath and wheezing.    Cardiovascular: " Negative for chest pain, palpitations and leg swelling.   Gastrointestinal: Positive for abdominal pain. Negative for constipation, diarrhea, nausea and vomiting.   Endocrine: Negative for polydipsia, polyphagia and polyuria.   Genitourinary: Negative for difficulty urinating and dysuria.   Skin: Negative for color change.   Neurological: Positive for dizziness and headaches. Negative for weakness, light-headedness and numbness.   Psychiatric/Behavioral: Negative for sleep disturbance.       Objective   Physical Exam   Constitutional: She is oriented to person, place, and time. She appears well-developed and well-nourished. No distress.   HENT:   Head: Normocephalic and atraumatic.   Right Ear: External ear normal. Tympanic membrane is scarred and perforated. Tympanic membrane is not erythematous and not bulging.   Left Ear: External ear normal. Tympanic membrane is not erythematous and not bulging. A middle ear effusion is present.   Mouth/Throat: No oropharyngeal exudate.   Eyes: Conjunctivae are normal. No scleral icterus.   Cardiovascular: Normal rate and regular rhythm.   No murmur heard.  Pulmonary/Chest: Effort normal. No respiratory distress. She has wheezes.   Decreased in bases   Abdominal: Soft. Bowel sounds are normal. She exhibits no distension and no mass. There is tenderness (generalized, patient jumps almost before skin is touched). There is no guarding.   Neurological: She is alert and oriented to person, place, and time.   Skin: Skin is warm and dry. She is not diaphoretic.   Psychiatric: Her mood appears anxious. Her speech is rapid and/or pressured. She is hyperactive.   Vitals reviewed.      Assessment/Plan   [place] was seen today for diabetes.    Diagnoses and all orders for this visit:    Uncontrolled type 2 diabetes mellitus with hyperglycemia (CMS/[facility])  -     POC Glycosylated Hemoglobin (Hb A1C)  -     Ambulatory Referral to Gastroenterology    Type 2 diabetes mellitus with diabetic  peripheral angiopathy without gangrene, with long-term current use of insulin (CMS/HCC)    Morbidly obese (CMS/HCC)    Hx of chronic pancreatitis  -     Ambulatory Referral to Gastroenterology  -     Lipase; Future  -     Lipase    Generalized abdominal pain  -     Ambulatory Referral to Gastroenterology  -     Comprehensive Metabolic Panel; Future  -     Lipase; Future  -     Comprehensive Metabolic Panel  -     Lipase    Bipolar I disorder, single manic episode (CMS/HCC)    Pulmonary emphysema, unspecified emphysema type (CMS/HCC)  -     Ambulatory Referral to Pulmonology    Chronic bilateral low back pain, with sciatica presence unspecified  -     cyclobenzaprine (FLEXERIL) 10 MG tablet; Take 1 tablet by mouth 3 (Three) Times a Day As Needed (back pain). for muscle spams    Cigarette smoker  -     Ambulatory Referral to Pulmonology    Tobacco abuse    At risk for polypharmacy          Results for orders placed or performed in visit on 01/31/19   POC Glycosylated Hemoglobin (Hb A1C)   Result Value Ref Range    Hemoglobin A1C 8.4 %     Diabetes is not controlled, explained the importance of taking insulin every day as prescribed, monitor glucose levels at home. Will have patient follow up in one month to help with compliance.  Advised to avoid sweets, do not overeat, choose healthy snacks.  Labs ordered to evaluate abdominal pain, patient and daughter declined CT scan., referred to GI for further evaluation and to schedule colonoscopy  Bipolar and other mental health issues managed by psychiatry.  Referred to pulmonology for evaluation and management of COPD.  Flexeril refilled for back pain, her psychiatrist also added gabapentin (to help with mental health), may also help with back pain  Provided smoking cessation information today, advised patient to stop smoking, she is not ready to quit, will try to cut back.  Patient has history of non-compliance with medications, needs regular follow ups for  monitoring.  Patient and daughter argue during appointments, is difficult to keep them focused and to manage all issues they want addressed. I explained time needs to be limited to a few problems at a time.  Patient was encouraged to keep me informed of any acute changes, lack of improvement, or any new concerning symptoms.Patient voiced understanding of all instructions and denied further questions.

## 2019-02-13 DIAGNOSIS — J44.1 CHRONIC OBSTRUCTIVE PULMONARY DISEASE WITH ACUTE EXACERBATION (HCC): ICD-10-CM

## 2019-02-13 DIAGNOSIS — G89.29 CHRONIC BILATERAL LOW BACK PAIN, WITH SCIATICA PRESENCE UNSPECIFIED: ICD-10-CM

## 2019-02-13 DIAGNOSIS — M54.5 CHRONIC BILATERAL LOW BACK PAIN, WITH SCIATICA PRESENCE UNSPECIFIED: ICD-10-CM

## 2019-02-13 DIAGNOSIS — E78.5 HYPERLIPIDEMIA, UNSPECIFIED HYPERLIPIDEMIA TYPE: ICD-10-CM

## 2019-02-13 RX ORDER — ALBUTEROL SULFATE 90 UG/1
AEROSOL, METERED RESPIRATORY (INHALATION)
Qty: 18 G | Refills: 0 | Status: SHIPPED | OUTPATIENT
Start: 2019-02-13 | End: 2019-03-08 | Stop reason: SDUPTHER

## 2019-02-13 RX ORDER — OMEGA-3-ACID ETHYL ESTERS 1 G/1
1 CAPSULE, LIQUID FILLED ORAL DAILY
Qty: 30 CAPSULE | Refills: 0 | Status: SHIPPED | OUTPATIENT
Start: 2019-02-13 | End: 2019-02-19

## 2019-02-13 RX ORDER — CYCLOBENZAPRINE HCL 10 MG
TABLET ORAL
Qty: 60 TABLET | Refills: 0 | Status: SHIPPED | OUTPATIENT
Start: 2019-02-13 | End: 2019-03-07 | Stop reason: SDUPTHER

## 2019-02-13 NOTE — TELEPHONE ENCOUNTER
For now tell them max dose of 50 units twice daily and will forward to Roberta for further comment or correction.    [place]

## 2019-02-19 ENCOUNTER — PRIOR AUTHORIZATION (OUTPATIENT)
Dept: FAMILY MEDICINE CLINIC | Facility: CLINIC | Age: 59
End: 2019-02-19

## 2019-02-19 RX ORDER — ICOSAPENT ETHYL 1000 MG/1
1 CAPSULE ORAL DAILY
Qty: 90 CAPSULE | Refills: 1 | Status: SHIPPED | OUTPATIENT
Start: 2019-02-19 | End: 2019-08-13 | Stop reason: SDUPTHER

## 2019-02-19 NOTE — TELEPHONE ENCOUNTER
Pharmacy sent over a fax saying that pt's prescription omega-3 acid 1 gm capsules were not covered by insurance. They are asking to change to preferred alternative Vascepaca. Please advise if okay to change. I have it set up for you please just sign if okay.

## 2019-02-19 NOTE — TELEPHONE ENCOUNTER
PA submitted for Albuterol HFA inhaler  Key #[id number]  Diagnosis: J44.1-COPD  Awaiting response from cover my meds.

## 2019-02-26 ENCOUNTER — OFFICE VISIT (OUTPATIENT)
Dept: FAMILY MEDICINE CLINIC | Facility: CLINIC | Age: 59
End: 2019-02-26

## 2019-02-26 VITALS
DIASTOLIC BLOOD PRESSURE: 72 MMHG | OXYGEN SATURATION: 97 % | BODY MASS INDEX: 43.79 KG/M2 | TEMPERATURE: 98.1 F | HEART RATE: 104 BPM | WEIGHT: 247.2 LBS | SYSTOLIC BLOOD PRESSURE: 116 MMHG

## 2019-02-26 DIAGNOSIS — G89.29 CHRONIC RIGHT EAR PAIN: ICD-10-CM

## 2019-02-26 DIAGNOSIS — IMO0002 UNCONTROLLED TYPE 2 DIABETES MELLITUS WITH COMPLICATION, WITH LONG-TERM CURRENT USE OF INSULIN: ICD-10-CM

## 2019-02-26 DIAGNOSIS — E11.65 UNCONTROLLED TYPE 2 DIABETES MELLITUS WITH HYPERGLYCEMIA (HCC): Primary | ICD-10-CM

## 2019-02-26 DIAGNOSIS — H92.01 CHRONIC RIGHT EAR PAIN: ICD-10-CM

## 2019-02-26 DIAGNOSIS — L73.9 FOLLICULITIS: ICD-10-CM

## 2019-02-26 DIAGNOSIS — B37.2 SKIN YEAST INFECTION: ICD-10-CM

## 2019-02-26 PROCEDURE — 99214 OFFICE O/P EST MOD 30 MIN: CPT | Performed by: NURSE PRACTITIONER

## 2019-02-26 RX ORDER — GABAPENTIN 400 MG/1
400 CAPSULE ORAL NIGHTLY
Refills: 3 | COMMUNITY
Start: 2019-02-20 | End: 2023-03-02 | Stop reason: HOSPADM

## 2019-02-26 RX ORDER — INSULIN ASPART 100 [IU]/ML
INJECTION, SUSPENSION SUBCUTANEOUS
Qty: 9 ML | Refills: 0 | Status: SHIPPED | OUTPATIENT
Start: 2019-02-26 | End: 2019-03-12 | Stop reason: SDUPTHER

## 2019-02-26 RX ORDER — MUPIROCIN CALCIUM 20 MG/G
CREAM TOPICAL 3 TIMES DAILY
Qty: 30 G | Refills: 0 | Status: SHIPPED | OUTPATIENT
Start: 2019-02-26 | End: 2019-03-23 | Stop reason: SDUPTHER

## 2019-02-26 RX ORDER — NYSTATIN 100000 U/G
OINTMENT TOPICAL 2 TIMES DAILY
Qty: 60 G | Refills: 1 | Status: SHIPPED | OUTPATIENT
Start: 2019-02-26 | End: 2020-12-03 | Stop reason: SDUPTHER

## 2019-02-26 NOTE — PATIENT INSTRUCTIONS
Chronic Pancreatitis  Chronic pancreatitis is long-lasting inflammation and scarring of the pancreas. The pancreas is a gland that is located behind the stomach. It produces enzymes that help to digest food. The pancreas also releases the hormones glucagon and insulin, which help to regulate blood sugar. Damage to the pancreas may affect digestion, cause pain in the upper abdomen and back, and cause diabetes. Inflammation can also irritate other abdominal organs near the pancreas.  At the very beginning, pancreatitis may be sudden (acute). If acute pancreatitis is not caught in time or treated effectively, or if you have several or prolonged episodes of acute pancreatitis, then the condition can turn into chronic pancreatitis.  What are the causes?  The most common cause of this condition is alcohol abuse. Other causes include:  · High levels of triglycerides in the blood (hypertriglyceridemia).  · Gallstones or other conditions that can block the tube that drains the pancreas (pancreatic duct).  · Pancreatic cancer.  · Cystic fibrosis.  · Too much calcium in the blood (hypercalcemia), which may be caused by an overactive parathyroid gland (hyperparathyroidism).  · Certain medicines.  · Injury to the pancreas.  · Infection.  · Autoimmune pancreatitis. This is when the body's disease-fighting (immune) system attacks the pancreas.  · Genes that are passed along from parent to child (inherited).    In some cases, the cause may not be known.  What increases the risk?  This condition is more likely to develop in:  · Men.  · People who are 40-60 years old.    What are the signs or symptoms?  Symptoms of this condition may include:  · Abdominal pain. Pain may also be felt in the upper back and may get worse after eating.  · Nausea and vomiting.  · Fever.  · Weight loss.  · A change in the color and consistency of bowel movements, such as diarrhea.    How is this diagnosed?  This condition is diagnosed based on your  symptoms, your medical history, and a physical exam. You may have tests, such as:  · Blood tests.  · Stool samples.  · Biopsy of the pancreas. This is the removal of a small amount of pancreas tissue to be tested in a lab.  · Imaging studies, such as:  ? X-rays.  ? CT scan.  ? MRI.  ? Ultrasound.    How is this treated?  The goal of treatment is to help relieve symptoms and to prevent complications from occurring. Treatment focuses on:  · Resting the pancreas. You may need to stop eating and drinking for a few days while in the hospital to give your pancreas time to recover. During this time, you will be given IV fluids to keep you hydrated.  · Controlling pain. You may be given pain medicines by mouth (orally) or as injections.  · Improving digestion. You may be given:  ? Medicines to help balance your enzymes.  ? Vitamin supplements.  ? A specific diet to follow. If you are given a diet, you may work with a specialist (dietitian).  · Preventing diabetes. You may need insulin injections.    You may have surgery to:  · Clear the pancreatic ducts of any blockages, such as gallstones.  · Remove any fluid or damaged tissue from the pancreas.    Follow these instructions at home:  · Take over-the-counter and prescription medicines only as told by your health care provider. This includes any vitamin supplements.  · Do not drive or operate heavy machinery while taking prescription pain medicines.  · Drink enough fluid to keep your urine clear or pale yellow.  · Do not drink alcohol. If you need help quitting, ask your health care provider.  · Do not use any tobacco products, such as cigarettes, chewing tobacco, and e-cigarettes. If you need help quitting, ask your health care provider.  · Follow a diet as told by your health care provider or dietitian, if this applies. This may include:  ? Limiting how much fat you eat.  ? Eating smaller meals more often.  ? Avoiding caffeine.  · Keep all follow-up visits as told by your  health care provider. This is important.  Contact a health care provider if:  · You have pain that does not get better with medicine.  · You have a fever.  Get help right away if:  · Your pain suddenly gets worse.  · You have sudden abdominal swelling.  · You start to vomit often or you vomit blood.  · You have diarrhea that does not go away.  · You have blood in your stool.  This information is not intended to replace advice given to you by your health care provider. Make sure you discuss any questions you have with your health care provider.  Document Released: 01/13/2017 Document Revised: 05/25/2017 Document Reviewed: 11/25/2015  [employer] Interactive Patient Education © 2018 [employer]    Diabetes Mellitus and Standards of Medical Care  Managing diabetes (diabetes mellitus) can be complicated. Your diabetes treatment may be managed by a team of health care providers, including:  · A diet and nutrition specialist (registered dietitian).  · A nurse.  · A certified diabetes educator (CDE).  · A diabetes specialist (endocrinologist).  · An eye doctor.  · A primary care provider.  · A dentist.    Your health care providers follow a schedule in order to help you get the best quality of care. The following schedule is a general guideline for your diabetes management plan. Your health care providers may also give you more specific instructions.  HbA1c (  hemoglobin A1c) test  This test provides information about blood sugar (glucose) control over the previous 2-3 months. It is used to check whether your diabetes management plan needs to be adjusted.  · If you are meeting your treatment goals, this test is done at least 2 times a year.  · If you are not meeting treatment goals or if your treatment goals have changed, this test is done 4 times a year.    Blood pressure test  · This test is done at every routine medical visit. For most people, the goal is less than 130/80. Ask your health care provider what your goal  blood pressure should be.  Dental and eye exams  · Visit your dentist two times a year.  · If you have type 1 diabetes, get an eye exam 3-5 years after you are diagnosed, and then once a year after your first exam.  ? If you were diagnosed with type 1 diabetes as a child, get an eye exam when you are age 10 or older and have had diabetes for 3-5 years. After the first exam, you should get an eye exam once a year.  · If you have type 2 diabetes, have an eye exam as soon as you are diagnosed, and then once a year after your first exam.  Foot care exam  · Visual foot exams are done at every routine medical visit. The exams check for cuts, bruises, redness, blisters, sores, or other problems with the feet.  · A complete foot exam is done by your health care provider once a year. This exam includes an inspection of the structure and skin of your feet, and a check of the pulses and sensation in your feet.  ? Type 1 diabetes: Get your first exam 3-5 years after diagnosis.  ? Type 2 diabetes: Get your first exam as soon as you are diagnosed.  · Check your feet every day for cuts, bruises, redness, blisters, or sores. If you have any of these or other problems that are not healing, contact your health care provider.  Kidney function test (  urine microalbumin)  · This test is done once a year.  ? Type 1 diabetes: Get your first test 5 years after diagnosis.  ? Type 2 diabetes: Get your first test as soon as you are diagnosed.  · If you have chronic kidney disease (CKD), get a serum creatinine and estimated glomerular filtration rate (eGFR) test once a year.  Lipid profile (cholesterol, HDL, LDL, triglycerides)  · This test should be done when you are diagnosed with diabetes, and every 5 years after the first test. If you are on medicines to lower your cholesterol, you may need to get this test done every year.  ? The goal for LDL is less than 100 mg/dL (5.5 mmol/L). If you are at high risk, the goal is less than 70 mg/dL  (3.9 mmol/L).  ? The goal for HDL is 40 mg/dL (2.2 mmol/L) for men and 50 mg/dL(2.8 mmol/L) for women. An HDL cholesterol of 60 mg/dL (3.3 mmol/L) or higher gives some protection against heart disease.  ? The goal for triglycerides is less than 150 mg/dL (8.3 mmol/L).  Immunizations  · The yearly flu (influenza) vaccine is recommended for everyone 6 months or older who has diabetes.  · The pneumonia (pneumococcal) vaccine is recommended for everyone 2 years or older who has diabetes. If you are 65 or older, you may get the pneumonia vaccine as a series of two separate shots.  · The hepatitis B vaccine is recommended for adults shortly after they have been diagnosed with diabetes.  · The Tdap (tetanus, diphtheria, and pertussis) vaccine should be given:  ? According to normal childhood vaccination schedules, for children.  ? Every 10 years, for adults who have diabetes.  · The shingles vaccine is recommended for people who have had chicken pox and are 50 years or older.  Mental and emotional health  · Screening for symptoms of eating disorders, anxiety, and depression is recommended at the time of diagnosis and afterward as needed. If your screening shows that you have symptoms (you have a positive screening result), you may need further evaluation and be referred to a mental health care provider.  Diabetes self-management education  · Education about how to manage your diabetes is recommended at diagnosis and ongoing as needed.  Treatment plan  · Your treatment plan will be reviewed at every medical visit.  Summary  · Managing diabetes (diabetes mellitus) can be complicated. Your diabetes treatment may be managed by a team of health care providers.  · Your health care providers follow a schedule in order to help you get the best quality of care.  · Standards of care including having regular physical exams, blood tests, blood pressure monitoring, immunizations, screening tests, and education about how to manage your  diabetes.  · Your health care providers may also give you more specific instructions based on your individual health.  This information is not intended to replace advice given to you by your health care provider. Make sure you discuss any questions you have with your health care provider.  Document Released: 10/15/2010 Document Revised: 09/15/2017 Document Reviewed: 09/15/2017  [employer] Interactive Patient Education © 2018 [employer]    How to Avoid Diabetes Mellitus Problems  You can take action to prevent or slow down problems that are caused by diabetes (diabetes mellitus). Following your diabetes plan and taking care of yourself can reduce your risk of serious or life-threatening complications.  Manage your diabetes  · Follow instructions from your health care providers about managing your diabetes. Your diabetes may be managed by a team of health care providers who can teach you how to care for yourself and can answer questions that you have.  · Educate yourself about your condition so you can make healthy choices about eating and physical activity.  · Check your blood sugar (glucose) levels as often as directed. Your health care provider will help you decide how often to check your blood glucose level depending on your treatment goals and how well you are meeting them.  · Ask your health care provider if you should take low-dose aspirin daily and what dose is recommended for you. Taking low-dose aspirin daily is recommended to help prevent cardiovascular disease.  Do not use nicotine or tobacco  Do not use any products that contain nicotine or tobacco, such as cigarettes and e-cigarettes. If you need help quitting, ask your health care provider. Nicotine raises your risk for diabetes problems. If you quit using nicotine:  · You will lower your risk for heart attack, stroke, nerve disease, and kidney disease.  · Your cholesterol and blood pressure may improve.  · Your blood circulation will improve.    Keep  your blood pressure under control  To control your blood pressure:  · Follow instructions from your health care provider about meal planning, exercise, and medicines.  · Make sure your health care provider checks your blood pressure at every medical visit.    A blood pressure reading consists of two numbers. Generally, the goal is to keep your top number (systolic pressure) at or below 130, and your bottom number (diastolic pressure) at or below 80. Your health care provider may recommend a lower target blood pressure. Your individualized target blood pressure is determined based on:  · Your age.  · Your medicines.  · How long you have had diabetes.  · Any other medical conditions you have.    Keep your cholesterol under control  To control your cholesterol:  · Follow instructions from your health care provider about meal planning, exercise, and medicines.  · Have your cholesterol checked at least once a year.  · You may be prescribed medicine to lower cholesterol (statin). If you are not taking a statin, ask your health care provider if you should be.    Controlling your cholesterol may:  · Help prevent heart disease and stroke. These are the most common health problems for people with diabetes.  · Improve your blood flow.    Schedule and keep yearly physical exams and eye exams  Your health care provider will tell you how often you need medical visits depending on your diabetes management plan. Keep all follow-up visits as directed. This is important so possible problems can be identified early and complications can be avoided or treated.  · Every visit with your health care provider should include measuring your:  ? Weight.  ? Blood pressure.  ? Blood glucose control.  · Your A1c (hemoglobin A1c) level should be checked:  ? At least 2 times a year, if you are meeting your treatment goals.  ? 4 times a year, if you are not meeting treatment goals or if your treatment goals have changed.  · Your blood lipids (lipid  profile) should be checked yearly. You should also be checked yearly for protein in your urine (urine microalbumin).  · If you have type 1 diabetes, get an eye exam 3-5 years after you are diagnosed, and then once a year after your first exam.  · If you have type 2 diabetes, get an eye exam as soon as you are diagnosed, and then once a year after your first exam.    Keep your vaccines current  It is recommended that you receive:  · A flu (influenza) vaccine every year.  · A pneumonia (pneumococcal) vaccine and a hepatitis B vaccine. If you are age 65 or older, you may get the pneumonia vaccine as a series of two separate shots.    Ask your health care provider which other vaccines may be recommended.  Take care of your feet  Diabetes may cause you to have poor blood circulation to your legs and feet. Because of this, taking care of your feet is very important. Diabetes can cause:  · The skin on the feet to get thinner, break more easily, and heal more slowly.  · Nerve damage in your legs and feet, which results in decreased feeling. You may not notice minor injuries that could lead to serious problems.    To avoid foot problems:  · Check your skin and feet every day for cuts, bruises, redness, blisters, or sores.  · Schedule a foot exam with your health care provider once every year. This exam includes:  ? Inspecting of the structure and skin of your feet.  ? Checking the pulses and sensation in your feet.  · Make sure that your health care provider performs a visual foot exam at every medical visit.    Take care of your teeth  People with poorly controlled diabetes are more likely to have gum (periodontal) disease. Diabetes can make periodontal diseases harder to control. If not treated, periodontal diseases can lead to tooth loss. To prevent this:  · Brush your teeth twice a day.  · Floss at least once a day.  · Visit your dentist 2 times a year.    Drink responsibly  Limit alcohol intake to no more than 1 drink a  day for nonpregnant women and 2 drinks a day for men. One drink equals 12 oz of beer, 5 oz of wine, or 1½ oz of hard liquor. It is important to eat food when you drink alcohol to avoid low blood glucose (hypoglycemia). Avoid alcohol if you:  · Have a history of alcohol abuse or dependence.  · Are pregnant.  · Have liver disease, pancreatitis, advanced neuropathy, or severe hypertriglyceridemia.    Lessen stress  Living with diabetes can be stressful. When you are experiencing stress, your blood glucose may be affected in two ways:  · Stress hormones may cause your blood glucose to rise.  · You may be distracted from taking good care of yourself.    Be aware of your stress level and make changes to help you manage challenging situations. To lower your stress levels:  · Consider joining a support group.  · Do planned relaxation or meditation.  · Do a hobby that you enjoy.  · Maintain healthy relationships.  · Exercise regularly.  · Work with your health care provider or a mental health professional.    Summary  · You can take action to prevent or slow down problems that are caused by diabetes (diabetes mellitus). Following your diabetes plan and taking care of yourself can reduce your risk of serious or life-threatening complications.  · Follow instructions from your health care providers about managing your diabetes. Your diabetes may be managed by a team of health care providers who can teach you how to care for yourself and can answer questions that you have.  · Your health care provider will tell you how often you need medical visits depending on your diabetes management plan. Keep all follow-up visits as directed. This is important so possible problems can be identified early and complications can be avoided or treated.  This information is not intended to replace advice given to you by your health care provider. Make sure you discuss any questions you have with your health care provider.  Document Released:  09/04/2012 Document Revised: 09/16/2017 Document Reviewed: 09/16/2017  [employer] Interactive Patient Education © 2018 [employer]        Take 22 units of insulin twice a day as prescribed.   Apply mupirocin to skin lesion after using warm compresses to help open lesion.

## 2019-02-26 NOTE — PROGRESS NOTES
Subjective   Saadia Caceres is a 58 y.o. female.   Chief Complaint   Patient presents with   • Diabetes     4 week follow-up       History of Present Illness   Patient is here for one month follow up, her daughter is here with her, patient has been checking her glucose at home, has brought in her readings. States she is taking her insulin once a day instead of twice, fasting 140's to 160's, with highest 283. Continues to eat some sweets and does not eat a diabetic diet. She also continues to have intermittent abdominal pain that seems to be related to chronic pancreatitis. Patient's mental health is managed by her psychiatrist, Dr. Schaffer, but she has a problem staying focused on topic and does not seem to understand the importance of diabetes management with diet and exercise. She and her daughter have disagreements in the office at each visit.   Has had chronic right ear pain, perforated ear drum, has seen ENT in the past, was told she needs to stop smoking before it could be repaired.   Is having regular bowel movements.   Patient has new complaint of painful bump on left labia, started a few days ago and has not tried any treatment.Also has redness under breasts, nystatin helps.  The following portions of the patient's history were reviewed and updated as appropriate: allergies, current medications, past family history, past medical history, past social history, past surgical history and problem list.    Review of Systems   HENT: Positive for ear pain.    Respiratory: Positive for cough, shortness of breath and wheezing.    Gastrointestinal: Positive for abdominal distention and abdominal pain. Negative for blood in stool, constipation and diarrhea.   Genitourinary: Negative for difficulty urinating and dysuria.   Musculoskeletal: Positive for arthralgias.   Allergic/Immunologic: Positive for environmental allergies.   Neurological: Positive for numbness (bilateral feet).   Psychiatric/Behavioral: The  patient is nervous/anxious.        Objective   Physical Exam   Constitutional: She is oriented to person, place, and time. She appears well-developed and well-nourished. No distress.   HENT:   Head: Normocephalic and atraumatic.   Right Ear: External ear normal. Tympanic membrane is scarred and perforated.   Left Ear: External ear normal.   Eyes: Conjunctivae are normal. No scleral icterus.   Cardiovascular: Normal rate, regular rhythm and normal heart sounds.   No murmur heard.  Pulmonary/Chest: Effort normal. No respiratory distress. She has wheezes (right LL).   Abdominal: Soft.   Genitourinary:         Neurological: She is alert and oriented to person, place, and time.   Skin: Skin is warm and dry. She is not diaphoretic.   Erythema under both breasts   Vitals reviewed.      Assessment/Plan   [place] was seen today for diabetes.    Diagnoses and all orders for this visit:    Uncontrolled type 2 diabetes mellitus with hyperglycemia (CMS/[facility])    Skin yeast infection  -     nystatin (MYCOSTATIN) 389974 UNIT/GM ointment; Apply  topically to the appropriate area as directed 2 (Two) Times a Day.    Folliculitis  -     mupirocin (BACTROBAN) 2 % cream; Apply  topically to the appropriate area as directed 3 (Three) Times a Day.    Chronic right ear pain      Advised patietn to take 70/30 insulin as prescribed, continue to monitor glucose levels, avoid sweets and high carb foods and snacks. Follow up in 2 months for A1c.  Nystatin refilled for skin yeast infection, advised to keep areas as dry as possibly.  Mupirocin prescribed for folliculitis, advised warm compresses several times a day to encourage drainage.   Patient has pending referrals to pulmonology and GI, will wait for these to be taken care first, then refer to ENT for evaluation of chronic ear pain and perforation.   Patient was encouraged to keep me informed of any acute changes, lack of improvement, or any new concerning symptoms.Patient voiced  understanding of all instructions and denied further questions.

## 2019-03-07 DIAGNOSIS — G89.29 CHRONIC BILATERAL LOW BACK PAIN, WITH SCIATICA PRESENCE UNSPECIFIED: ICD-10-CM

## 2019-03-07 DIAGNOSIS — M54.5 CHRONIC BILATERAL LOW BACK PAIN, WITH SCIATICA PRESENCE UNSPECIFIED: ICD-10-CM

## 2019-03-08 DIAGNOSIS — J44.1 CHRONIC OBSTRUCTIVE PULMONARY DISEASE WITH ACUTE EXACERBATION (HCC): ICD-10-CM

## 2019-03-08 RX ORDER — ALBUTEROL SULFATE 90 UG/1
AEROSOL, METERED RESPIRATORY (INHALATION)
Qty: 18 G | Refills: 0 | Status: SHIPPED | OUTPATIENT
Start: 2019-03-08 | End: 2019-04-09 | Stop reason: SDUPTHER

## 2019-03-08 RX ORDER — CYCLOBENZAPRINE HCL 10 MG
TABLET ORAL
Qty: 60 TABLET | Refills: 0 | Status: SHIPPED | OUTPATIENT
Start: 2019-03-08 | End: 2019-04-29 | Stop reason: SDUPTHER

## 2019-03-12 DIAGNOSIS — IMO0002 UNCONTROLLED TYPE 2 DIABETES MELLITUS WITH COMPLICATION, WITH LONG-TERM CURRENT USE OF INSULIN: ICD-10-CM

## 2019-03-12 RX ORDER — INSULIN ASPART 100 [IU]/ML
INJECTION, SUSPENSION SUBCUTANEOUS
Qty: 9 ML | Refills: 0 | Status: SHIPPED | OUTPATIENT
Start: 2019-03-12 | End: 2019-03-31 | Stop reason: SDUPTHER

## 2019-03-19 DIAGNOSIS — E78.5 HYPERLIPIDEMIA, UNSPECIFIED HYPERLIPIDEMIA TYPE: ICD-10-CM

## 2019-03-19 RX ORDER — SIMVASTATIN 10 MG
TABLET ORAL
Qty: 30 TABLET | Refills: 0 | Status: SHIPPED | OUTPATIENT
Start: 2019-03-19 | End: 2019-04-10 | Stop reason: SDUPTHER

## 2019-03-23 DIAGNOSIS — L73.9 FOLLICULITIS: ICD-10-CM

## 2019-03-23 RX ORDER — MUPIROCIN CALCIUM 20 MG/G
CREAM TOPICAL
Qty: 30 G | Refills: 0 | Status: SHIPPED | OUTPATIENT
Start: 2019-03-23 | End: 2019-05-10 | Stop reason: SDUPTHER

## 2019-03-31 DIAGNOSIS — IMO0002 UNCONTROLLED TYPE 2 DIABETES MELLITUS WITH COMPLICATION, WITH LONG-TERM CURRENT USE OF INSULIN: ICD-10-CM

## 2019-04-01 RX ORDER — INSULIN ASPART 100 [IU]/ML
INJECTION, SUSPENSION SUBCUTANEOUS
Qty: 9 ML | Refills: 0 | Status: SHIPPED | OUTPATIENT
Start: 2019-04-01 | End: 2019-04-10 | Stop reason: SDUPTHER

## 2019-04-09 DIAGNOSIS — J44.1 CHRONIC OBSTRUCTIVE PULMONARY DISEASE WITH ACUTE EXACERBATION (HCC): ICD-10-CM

## 2019-04-09 RX ORDER — ALBUTEROL SULFATE 90 UG/1
AEROSOL, METERED RESPIRATORY (INHALATION)
Qty: 18 G | Refills: 0 | Status: SHIPPED | OUTPATIENT
Start: 2019-04-09 | End: 2019-05-09 | Stop reason: SDUPTHER

## 2019-04-10 DIAGNOSIS — IMO0002 UNCONTROLLED TYPE 2 DIABETES MELLITUS WITH COMPLICATION, WITH LONG-TERM CURRENT USE OF INSULIN: ICD-10-CM

## 2019-04-10 DIAGNOSIS — E78.5 HYPERLIPIDEMIA, UNSPECIFIED HYPERLIPIDEMIA TYPE: ICD-10-CM

## 2019-04-10 DIAGNOSIS — G43.019 INTRACTABLE MIGRAINE WITHOUT AURA AND WITHOUT STATUS MIGRAINOSUS: ICD-10-CM

## 2019-04-10 DIAGNOSIS — N95.1 MENOPAUSAL HOT FLUSHES: ICD-10-CM

## 2019-04-10 DIAGNOSIS — N95.1 HOT FLASHES DUE TO MENOPAUSE: ICD-10-CM

## 2019-04-10 RX ORDER — INSULIN ASPART 100 [IU]/ML
INJECTION, SUSPENSION SUBCUTANEOUS
Qty: 9 ML | Refills: 0 | Status: SHIPPED | OUTPATIENT
Start: 2019-04-10 | End: 2019-06-08 | Stop reason: SDUPTHER

## 2019-04-10 RX ORDER — SUMATRIPTAN 50 MG/1
TABLET, FILM COATED ORAL
Qty: 9 TABLET | Refills: 0 | OUTPATIENT
Start: 2019-04-10

## 2019-04-11 RX ORDER — SIMVASTATIN 10 MG
TABLET ORAL
Qty: 30 TABLET | Refills: 0 | Status: SHIPPED | OUTPATIENT
Start: 2019-04-11 | End: 2019-05-08 | Stop reason: SDUPTHER

## 2019-04-11 RX ORDER — DICYCLOMINE HYDROCHLORIDE 10 MG/1
CAPSULE ORAL
Qty: 120 CAPSULE | Refills: 0 | Status: SHIPPED | OUTPATIENT
Start: 2019-04-11 | End: 2019-07-14 | Stop reason: SDUPTHER

## 2019-04-11 RX ORDER — ESTRADIOL 2 MG/1
TABLET ORAL
Qty: 30 TABLET | Refills: 0 | Status: SHIPPED | OUTPATIENT
Start: 2019-04-11 | End: 2019-05-14 | Stop reason: SDUPTHER

## 2019-04-17 DIAGNOSIS — G43.019 INTRACTABLE MIGRAINE WITHOUT AURA AND WITHOUT STATUS MIGRAINOSUS: ICD-10-CM

## 2019-04-17 RX ORDER — SUMATRIPTAN 50 MG/1
TABLET, FILM COATED ORAL
Qty: 9 TABLET | Refills: 0 | OUTPATIENT
Start: 2019-04-17

## 2019-04-19 DIAGNOSIS — G43.019 INTRACTABLE MIGRAINE WITHOUT AURA AND WITHOUT STATUS MIGRAINOSUS: ICD-10-CM

## 2019-04-19 RX ORDER — SUMATRIPTAN 50 MG/1
TABLET, FILM COATED ORAL
Qty: 9 TABLET | Refills: 0 | OUTPATIENT
Start: 2019-04-19

## 2019-04-25 ENCOUNTER — LAB (OUTPATIENT)
Dept: LAB | Facility: HOSPITAL | Age: 59
End: 2019-04-25

## 2019-04-25 ENCOUNTER — OFFICE VISIT (OUTPATIENT)
Dept: FAMILY MEDICINE CLINIC | Facility: CLINIC | Age: 59
End: 2019-04-25

## 2019-04-25 ENCOUNTER — TRANSCRIBE ORDERS (OUTPATIENT)
Dept: LAB | Facility: HOSPITAL | Age: 59
End: 2019-04-25

## 2019-04-25 VITALS
SYSTOLIC BLOOD PRESSURE: 104 MMHG | WEIGHT: 254.4 LBS | BODY MASS INDEX: 45.07 KG/M2 | DIASTOLIC BLOOD PRESSURE: 72 MMHG | OXYGEN SATURATION: 95 % | HEART RATE: 93 BPM | HEIGHT: 63 IN

## 2019-04-25 DIAGNOSIS — G43.019 INTRACTABLE MIGRAINE WITHOUT AURA AND WITHOUT STATUS MIGRAINOSUS: ICD-10-CM

## 2019-04-25 DIAGNOSIS — F31.9 BIPOLAR 1 DISORDER (HCC): ICD-10-CM

## 2019-04-25 DIAGNOSIS — K86.1 CHRONIC PANCREATITIS, UNSPECIFIED PANCREATITIS TYPE (HCC): ICD-10-CM

## 2019-04-25 DIAGNOSIS — F31.9 BIPOLAR 1 DISORDER (HCC): Primary | ICD-10-CM

## 2019-04-25 DIAGNOSIS — IMO0002 UNCONTROLLED TYPE 2 DIABETES MELLITUS WITH COMPLICATION, WITHOUT LONG-TERM CURRENT USE OF INSULIN: Primary | Chronic | ICD-10-CM

## 2019-04-25 LAB
ALBUMIN SERPL-MCNC: 3.9 G/DL (ref 3.5–5.2)
ALBUMIN/GLOB SERPL: 1.2 G/DL
ALP SERPL-CCNC: 76 U/L (ref 39–117)
ALT SERPL W P-5'-P-CCNC: 7 U/L (ref 1–33)
ANION GAP SERPL CALCULATED.3IONS-SCNC: 13.1 MMOL/L
AST SERPL-CCNC: 10 U/L (ref 1–32)
BILIRUB SERPL-MCNC: <0.2 MG/DL (ref 0.2–1.2)
BUN BLD-MCNC: 14 MG/DL (ref 6–20)
BUN/CREAT SERPL: 17.1 (ref 7–25)
CALCIUM SPEC-SCNC: 9.8 MG/DL (ref 8.6–10.5)
CHLORIDE SERPL-SCNC: 95 MMOL/L (ref 98–107)
CO2 SERPL-SCNC: 30.9 MMOL/L (ref 22–29)
CREAT BLD-MCNC: 0.82 MG/DL (ref 0.57–1)
DEPRECATED RDW RBC AUTO: 55.9 FL (ref 37–54)
ERYTHROCYTE [DISTWIDTH] IN BLOOD BY AUTOMATED COUNT: 15.2 % (ref 12.3–15.4)
GFR SERPL CREATININE-BSD FRML MDRD: 72 ML/MIN/1.73
GLOBULIN UR ELPH-MCNC: 3.3 GM/DL
GLUCOSE BLD-MCNC: 206 MG/DL (ref 65–99)
HBA1C MFR BLD: 7.9 %
HCT VFR BLD AUTO: 45.3 % (ref 34–46.6)
HGB BLD-MCNC: 14.2 G/DL (ref 12–15.9)
MCH RBC QN AUTO: 31.5 PG (ref 26.6–33)
MCHC RBC AUTO-ENTMCNC: 31.3 G/DL (ref 31.5–35.7)
MCV RBC AUTO: 100.4 FL (ref 79–97)
PLATELET # BLD AUTO: 284 10*3/MM3 (ref 140–450)
PMV BLD AUTO: 10.1 FL (ref 6–12)
POTASSIUM BLD-SCNC: 4.9 MMOL/L (ref 3.5–5.2)
PROT SERPL-MCNC: 7.2 G/DL (ref 6–8.5)
RBC # BLD AUTO: 4.51 10*6/MM3 (ref 3.77–5.28)
SODIUM BLD-SCNC: 139 MMOL/L (ref 136–145)
VALPROATE SERPL-MCNC: 78 MCG/ML (ref 50–125)
WBC NRBC COR # BLD: 10.74 10*3/MM3 (ref 3.4–10.8)

## 2019-04-25 PROCEDURE — 80164 ASSAY DIPROPYLACETIC ACD TOT: CPT | Performed by: PSYCHIATRY & NEUROLOGY

## 2019-04-25 PROCEDURE — 83036 HEMOGLOBIN GLYCOSYLATED A1C: CPT | Performed by: NURSE PRACTITIONER

## 2019-04-25 PROCEDURE — 36415 COLL VENOUS BLD VENIPUNCTURE: CPT

## 2019-04-25 PROCEDURE — 85027 COMPLETE CBC AUTOMATED: CPT | Performed by: PSYCHIATRY & NEUROLOGY

## 2019-04-25 PROCEDURE — 99214 OFFICE O/P EST MOD 30 MIN: CPT | Performed by: NURSE PRACTITIONER

## 2019-04-25 PROCEDURE — 80053 COMPREHEN METABOLIC PANEL: CPT | Performed by: PSYCHIATRY & NEUROLOGY

## 2019-04-25 RX ORDER — SUMATRIPTAN 50 MG/1
50 TABLET, FILM COATED ORAL
Qty: 18 TABLET | Refills: 5 | Status: SHIPPED | OUTPATIENT
Start: 2019-04-25 | End: 2019-04-25 | Stop reason: SDUPTHER

## 2019-04-25 RX ORDER — SUMATRIPTAN 50 MG/1
50 TABLET, FILM COATED ORAL ONCE AS NEEDED
Qty: 18 TABLET | Refills: 5 | Status: SHIPPED | OUTPATIENT
Start: 2019-04-25 | End: 2020-03-23 | Stop reason: SDUPTHER

## 2019-04-25 NOTE — PROGRESS NOTES
Subjective   Saadia Caceres is a 58 y.o. female.   Chief Complaint   Patient presents with   • Diabetes     A1C check       History of Present Illness   Patient is here for 3 month diabetes follow up, has been taking medications as prescribed, Does check glucose at home.Have been running under 200 until the past few days, has eaten more sweets because of Easter. This has also caused increase in LUQ pain from chronic pancreatitis.   Denies hypoglycemic episodes, polyuria, polydipsia, or paresthesias. Patient's daughter is here with her, unfortunately they tend to argue back and forth and it is difficult to keep patient focused. Daughter has not had a chance to get the ordered colonoscopy or mammogram scheduled.   Needs refill for imitrex, previously prescribed by another provider, states headaches occur several times a month.  The following portions of the patient's history were reviewed and updated as appropriate: allergies, current medications, past medical history and problem list.    Review of Systems   Constitutional: Negative for appetite change, diaphoresis, fatigue and unexpected weight change.   HENT: Positive for ear pain.    Eyes: Negative for visual disturbance.   Respiratory: Positive for cough, shortness of breath and wheezing. Negative for chest tightness.    Cardiovascular: Negative for chest pain, palpitations and leg swelling.   Gastrointestinal: Positive for abdominal distention and abdominal pain. Negative for blood in stool, constipation, diarrhea, nausea and vomiting.   Endocrine: Negative for polydipsia, polyphagia and polyuria.   Genitourinary: Negative for difficulty urinating and dysuria.   Musculoskeletal: Positive for arthralgias.   Skin: Negative for color change.   Allergic/Immunologic: Positive for environmental allergies.   Neurological: Positive for numbness (bilateral feet). Negative for dizziness, weakness and light-headedness.   Psychiatric/Behavioral: Negative for sleep  disturbance. The patient is nervous/anxious.        Objective   Physical Exam   Constitutional: She is oriented to person, place, and time. She appears well-developed and well-nourished. No distress.   HENT:   Head: Normocephalic and atraumatic.   Right Ear: External ear normal. Tympanic membrane is perforated (chronic). Tympanic membrane is not erythematous and not bulging.   Left Ear: External ear normal. Tympanic membrane is not erythematous and not bulging.   Mouth/Throat: Oropharynx is clear and moist. No oropharyngeal exudate.   Eyes: Conjunctivae are normal. No scleral icterus.   Cardiovascular: Normal rate, regular rhythm and normal heart sounds.   No murmur heard.  Pulmonary/Chest: Effort normal and breath sounds normal. No stridor. No respiratory distress. She has no wheezes. She has no rales.   Abdominal: Soft. Bowel sounds are normal. There is tenderness (LUQ). There is no guarding.   Lymphadenopathy:     She has no cervical adenopathy.   Neurological: She is alert and oriented to person, place, and time.   Skin: Skin is warm and dry. She is not diaphoretic. No erythema.   Psychiatric:   Anxious, difficult to stay on task   Vitals reviewed.      Assessment/Plan   [place] was seen today for diabetes.    Diagnoses and all orders for this visit:    Uncontrolled type 2 diabetes mellitus with complication, without long-term current use of insulin (CMS/[facility])  -     POC Glycosylated Hemoglobin (Hb A1C)    Intractable migraine without aura and without status migrainosus  -     SUMAtriptan (IMITREX) 50 MG tablet; Take 1 tablet by mouth Every 2 (Two) Hours As Needed for Migraine.    Chronic pancreatitis, unspecified pancreatitis type (CMS/[facility])  -     Discontinue: hyoscyamine (LEVSIN) 0.125 MG SL tablet; Take 1 tablet by mouth Daily As Needed for Cramping.              Results for orders placed or performed in visit on 04/25/19   POC Glycosylated Hemoglobin (Hb A1C)   Result Value Ref Range    Hemoglobin A1C 7.9 %      A1c has improved from 8.1 in Jan to 7.9 today.   Imitrex refilled for migraines, consider referral if migraine frequency increases.  Advised to cut back on sweets and overeating. This contributes to her abdominal pain and chronic pancreatitis. Advised to go to ER if pain worsens or if nausea and vomiting occur.  Patient was encouraged to keep me informed of any acute changes, lack of improvement, or any new concerning symptoms.Patient voiced understanding of all instructions and denied further questions.

## 2019-04-25 NOTE — PATIENT INSTRUCTIONS
Chronic Pancreatitis  Chronic pancreatitis is long-lasting inflammation and scarring of the pancreas. The pancreas is a gland that is located behind the stomach. It makes enzymes that help to digest food. The pancreas also releases hormones called glucagon and insulin, which help regulate blood sugar (glucose). Damage to the pancreas may affect digestion, cause pain in the upper abdomen and back, and cause diabetes. Inflammation can also irritate other organs in the abdomen near the pancreas.  At first, pancreatitis may be sudden (acute). If you have several or prolonged episodes of acute pancreatitis, the condition can turn into chronic pancreatitis.  What are the causes?  The most common cause of this condition is alcohol abuse. Other causes include:  · High (elevated) levels of triglycerides in the blood (hypertriglyceridemia).  · Gallstones or other conditions that can block the tube that drains the pancreas (pancreatic duct).  · Pancreatic cancer.  · Cystic fibrosis.  · Too much calcium in the blood (hypercalcemia), which may be caused by an overactive parathyroid gland (hyperparathyroidism).  · Certain medicines.  · Injury to the pancreas.  · Infection.  · Autoimmune pancreatitis. This is when the body's disease-fighting (immune) system attacks the pancreas.  · Genes that are passed from parent to child (inherited).    In some cases, the cause may not be known.  What increases the risk?  This condition is more likely to develop in:  · Men.  · People who are 35-55 years old.  · People who have a family history of pancreatitis.  · People who smoke tobacco.  · People who drink large amounts of alcohol over a long period of time.    What are the signs or symptoms?  Symptoms of this condition may include:  · Pain in the abdomen or upper back. Pain may get worse after eating.  · Nausea and vomiting.  · Fever.  · Weight loss.  · A change in the color and consistency of bowel movements, such as stools that are oily,  fatty, or artis-colored.    How is this diagnosed?  This condition is diagnosed based on your symptoms, your medical history, and a physical exam. You may have tests, such as:  · Blood tests.  · Stool samples.  · Biopsy of the pancreas. This is the removal of a small amount of pancreas tissue to be tested in a lab.  · Imaging tests, such as:  ? X-rays.  ? CT scan.  ? MRI.  ? Ultrasound.    How is this treated?  You may need to be treated at a hospital. Treatment may involve:  · Resting the pancreas. You may need to stop eating and drinking for a few days to give your pancreas time to recover. During this time, you will be given IV fluids to keep you hydrated.  · Controlling pain. You may be given pain medicines by mouth (orally) or as injections.  · Improving digestion. You may be given:  ? Medicines to replace your pancreatic enzymes.  ? Vitamin supplements.  ? A specific diet to follow. You may work with a diet and nutrition specialist (dietitian) to make an eating plan.  · Surgery to:  ? Clear the pancreatic ducts of any blockages, such as gallstones.  ? Remove any fluid or damaged tissue from the pancreas.    Other treatments may include:  · Preventing diabetes. Your health care provider may recommend that you:  ? Get regular screening tests for diabetes.  ? Monitor your blood glucose regularly.  · Lifestyle changes, such as stopping alcohol use.  · Steroid medicines, if your condition is caused by your immune system attacking your body's own tissues (autoimmune disease).    Follow these instructions at home:  Eating and drinking  · Do not drink alcohol. If you need help quitting, ask your health care provider.  · Follow a diet as told by your health care provider or dietitian, if this applies. This may include:  ? Limiting how much fat you eat.  ? Eating smaller meals more often.  ? Avoiding caffeine.  · Drink enough fluid to keep your urine pale yellow.  General instructions  · Take over-the-counter and  prescription medicines only as told by your health care provider. These include vitamin supplements.  · Do not drive or use heavy machinery while taking prescription pain medicine.  · If you are taking prescription pain medicine, take actions to prevent or treat constipation. Your health care provider may recommend that you:  ? Take an over-the-counter or prescription medicine for constipation.  ? Eat foods that are high in fiber such as whole grains and beans.  ? Limit foods that are high in fat and processed sugars, such as fried or sweet foods.  · Do not use any products that contain nicotine or tobacco, such as cigarettes and e-cigarettes. If you need help quitting, ask your health care provider.  · If recommended by your health care provider, monitor your blood glucose at home.  · Keep all follow-up visits as told by your health care provider. This is important.  Contact a health care provider if:  · You have pain that does not get better with medicine.  · You have a fever.  · You have sudden weight loss.  Get help right away if:  · Your pain suddenly gets worse.  · You have sudden swelling in your abdomen.  · You start to vomit often.  · You vomit blood.  · You have diarrhea that does not go away.  · You have blood in your stool.  · You become confused or you have trouble thinking clearly.  Summary  · Chronic pancreatitis is long-lasting inflammation and scarring of the pancreas. Damage to the pancreas may affect digestion, cause pain in the upper abdomen and back, and cause diabetes. Inflammation can also irritate other organs in the abdomen near the pancreas.  · Common causes of this condition are alcohol abuse, gallstones, high (elevated) levels of triglycerides, and certain medicines.  · This condition is sometimes treated at a hospital and may involve resting the pancreas, controlling pain, replacing enzymes, and avoiding alcohol.  This information is not intended to replace advice given to you by your  health care provider. Make sure you discuss any questions you have with your health care provider.  Document Released: 01/13/2017 Document Revised: 08/17/2018 Document Reviewed: 08/17/2018  [employer] Interactive Patient Education © 2019 [employer]

## 2019-04-29 DIAGNOSIS — M54.5 CHRONIC BILATERAL LOW BACK PAIN, WITH SCIATICA PRESENCE UNSPECIFIED: ICD-10-CM

## 2019-04-29 DIAGNOSIS — G89.29 CHRONIC BILATERAL LOW BACK PAIN, WITH SCIATICA PRESENCE UNSPECIFIED: ICD-10-CM

## 2019-04-29 RX ORDER — CYCLOBENZAPRINE HCL 10 MG
TABLET ORAL
Qty: 60 TABLET | Refills: 0 | Status: SHIPPED | OUTPATIENT
Start: 2019-04-29 | End: 2019-05-26 | Stop reason: SDUPTHER

## 2019-05-02 DIAGNOSIS — R11.0 CHRONIC NAUSEA: ICD-10-CM

## 2019-05-03 RX ORDER — PROMETHAZINE HYDROCHLORIDE 25 MG/1
TABLET ORAL
Qty: 12 TABLET | Refills: 0 | Status: SHIPPED | OUTPATIENT
Start: 2019-05-03 | End: 2019-05-26 | Stop reason: SDUPTHER

## 2019-05-08 DIAGNOSIS — E78.5 HYPERLIPIDEMIA, UNSPECIFIED HYPERLIPIDEMIA TYPE: ICD-10-CM

## 2019-05-08 RX ORDER — SIMVASTATIN 10 MG
10 TABLET ORAL EVERY EVENING
Qty: 90 TABLET | Refills: 1 | Status: SHIPPED | OUTPATIENT
Start: 2019-05-08 | End: 2019-09-24

## 2019-05-09 DIAGNOSIS — J44.1 CHRONIC OBSTRUCTIVE PULMONARY DISEASE WITH ACUTE EXACERBATION (HCC): ICD-10-CM

## 2019-05-09 RX ORDER — ALBUTEROL SULFATE 90 UG/1
AEROSOL, METERED RESPIRATORY (INHALATION)
Qty: 18 G | Refills: 0 | Status: SHIPPED | OUTPATIENT
Start: 2019-05-09 | End: 2019-06-01 | Stop reason: SDUPTHER

## 2019-05-10 DIAGNOSIS — L73.9 FOLLICULITIS: ICD-10-CM

## 2019-05-10 RX ORDER — MUPIROCIN CALCIUM 20 MG/G
CREAM TOPICAL
Qty: 30 G | Refills: 0 | Status: SHIPPED | OUTPATIENT
Start: 2019-05-10 | End: 2019-07-26 | Stop reason: SDUPTHER

## 2019-05-14 DIAGNOSIS — N95.1 HOT FLASHES DUE TO MENOPAUSE: ICD-10-CM

## 2019-05-14 DIAGNOSIS — N95.1 MENOPAUSAL HOT FLUSHES: ICD-10-CM

## 2019-05-14 RX ORDER — ESTRADIOL 2 MG/1
TABLET ORAL
Qty: 30 TABLET | Refills: 0 | Status: SHIPPED | OUTPATIENT
Start: 2019-05-14 | End: 2019-06-14 | Stop reason: SDUPTHER

## 2019-05-26 DIAGNOSIS — G89.29 CHRONIC BILATERAL LOW BACK PAIN, WITH SCIATICA PRESENCE UNSPECIFIED: ICD-10-CM

## 2019-05-26 DIAGNOSIS — M54.5 CHRONIC BILATERAL LOW BACK PAIN, WITH SCIATICA PRESENCE UNSPECIFIED: ICD-10-CM

## 2019-05-26 DIAGNOSIS — R11.0 CHRONIC NAUSEA: ICD-10-CM

## 2019-05-28 RX ORDER — CYCLOBENZAPRINE HCL 10 MG
TABLET ORAL
Qty: 60 TABLET | Refills: 0 | Status: SHIPPED | OUTPATIENT
Start: 2019-05-28 | End: 2019-06-10 | Stop reason: SDUPTHER

## 2019-05-28 RX ORDER — PROMETHAZINE HYDROCHLORIDE 25 MG/1
TABLET ORAL
Qty: 12 TABLET | Refills: 0 | Status: SHIPPED | OUTPATIENT
Start: 2019-05-28 | End: 2019-06-12 | Stop reason: SDUPTHER

## 2019-06-01 DIAGNOSIS — J44.1 CHRONIC OBSTRUCTIVE PULMONARY DISEASE WITH ACUTE EXACERBATION (HCC): ICD-10-CM

## 2019-06-01 RX ORDER — ALBUTEROL SULFATE 90 UG/1
AEROSOL, METERED RESPIRATORY (INHALATION)
Qty: 54 G | Refills: 0 | Status: SHIPPED | OUTPATIENT
Start: 2019-06-01 | End: 2019-08-22 | Stop reason: SDUPTHER

## 2019-06-08 DIAGNOSIS — IMO0002 UNCONTROLLED TYPE 2 DIABETES MELLITUS WITH COMPLICATION, WITH LONG-TERM CURRENT USE OF INSULIN: ICD-10-CM

## 2019-06-08 RX ORDER — INSULIN ASPART 100 [IU]/ML
INJECTION, SUSPENSION SUBCUTANEOUS
Qty: 9 ML | Refills: 0 | Status: SHIPPED | OUTPATIENT
Start: 2019-06-08 | End: 2019-06-28 | Stop reason: SDUPTHER

## 2019-06-10 DIAGNOSIS — G89.29 CHRONIC BILATERAL LOW BACK PAIN, WITH SCIATICA PRESENCE UNSPECIFIED: ICD-10-CM

## 2019-06-10 DIAGNOSIS — R60.9 EDEMA, UNSPECIFIED TYPE: ICD-10-CM

## 2019-06-10 DIAGNOSIS — M54.5 CHRONIC BILATERAL LOW BACK PAIN, WITH SCIATICA PRESENCE UNSPECIFIED: ICD-10-CM

## 2019-06-10 DIAGNOSIS — R60.1 GENERALIZED EDEMA: ICD-10-CM

## 2019-06-11 RX ORDER — CYCLOBENZAPRINE HCL 10 MG
TABLET ORAL
Qty: 60 TABLET | Refills: 0 | Status: SHIPPED | OUTPATIENT
Start: 2019-06-11 | End: 2019-09-18 | Stop reason: SDUPTHER

## 2019-06-11 RX ORDER — FUROSEMIDE 20 MG/1
TABLET ORAL
Qty: 90 TABLET | Refills: 0 | Status: SHIPPED | OUTPATIENT
Start: 2019-06-11 | End: 2019-10-21 | Stop reason: SDUPTHER

## 2019-06-12 DIAGNOSIS — R11.0 CHRONIC NAUSEA: ICD-10-CM

## 2019-06-12 RX ORDER — PROMETHAZINE HYDROCHLORIDE 25 MG/1
TABLET ORAL
Qty: 12 TABLET | Refills: 0 | Status: SHIPPED | OUTPATIENT
Start: 2019-06-12 | End: 2019-06-21 | Stop reason: SDUPTHER

## 2019-06-14 DIAGNOSIS — N95.1 MENOPAUSAL HOT FLUSHES: ICD-10-CM

## 2019-06-14 DIAGNOSIS — N95.1 HOT FLASHES DUE TO MENOPAUSE: ICD-10-CM

## 2019-06-14 RX ORDER — ESTRADIOL 2 MG/1
TABLET ORAL
Qty: 90 TABLET | Refills: 0 | Status: SHIPPED | OUTPATIENT
Start: 2019-06-14 | End: 2019-09-14 | Stop reason: SDUPTHER

## 2019-06-19 ENCOUNTER — TELEPHONE (OUTPATIENT)
Dept: FAMILY MEDICINE CLINIC | Facility: CLINIC | Age: 59
End: 2019-06-19

## 2019-06-19 DIAGNOSIS — E11.8 TYPE 2 DIABETES MELLITUS WITH COMPLICATION, WITHOUT LONG-TERM CURRENT USE OF INSULIN (HCC): ICD-10-CM

## 2019-06-19 DIAGNOSIS — R11.0 CHRONIC NAUSEA: ICD-10-CM

## 2019-06-19 RX ORDER — PROMETHAZINE HYDROCHLORIDE 25 MG/1
TABLET ORAL
Qty: 12 TABLET | Refills: 0 | OUTPATIENT
Start: 2019-06-19

## 2019-06-19 RX ORDER — LINAGLIPTIN 5 MG/1
TABLET, FILM COATED ORAL
Qty: 30 TABLET | Refills: 1 | Status: SHIPPED | OUTPATIENT
Start: 2019-06-19 | End: 2019-07-12 | Stop reason: SDUPTHER

## 2019-06-21 ENCOUNTER — TELEPHONE (OUTPATIENT)
Dept: FAMILY MEDICINE CLINIC | Facility: CLINIC | Age: 59
End: 2019-06-21

## 2019-06-21 DIAGNOSIS — R11.0 CHRONIC NAUSEA: ICD-10-CM

## 2019-06-21 RX ORDER — PROMETHAZINE HYDROCHLORIDE 25 MG/1
25 TABLET ORAL EVERY 6 HOURS PRN
Qty: 12 TABLET | Refills: 0 | Status: SHIPPED | OUTPATIENT
Start: 2019-06-21 | End: 2019-07-11 | Stop reason: SDUPTHER

## 2019-06-21 NOTE — TELEPHONE ENCOUNTER
Pt just had a refill for Phenergan on 06/12/19. It was for 12 pills. She has already taken those and is requesting more. She stated that she has to take them everyday.    promethazine (PHENERGAN) 25 MG tablet    She can be reached at:  [phone]    Her pharmacy is:  [facility] [address] [place] [address] [address] AT [facility] [address] - [phone] [facility] [phone] FX

## 2019-06-21 NOTE — TELEPHONE ENCOUNTER
I will send in 12 more tabs. If Roberta wants her to take QD, she can evaluate the need for this.

## 2019-06-28 DIAGNOSIS — IMO0002 UNCONTROLLED TYPE 2 DIABETES MELLITUS WITH COMPLICATION, WITH LONG-TERM CURRENT USE OF INSULIN: ICD-10-CM

## 2019-06-28 RX ORDER — INSULIN ASPART 100 [IU]/ML
INJECTION, SUSPENSION SUBCUTANEOUS
Qty: 9 ML | Refills: 0 | Status: SHIPPED | OUTPATIENT
Start: 2019-06-28 | End: 2019-09-03 | Stop reason: SDUPTHER

## 2019-07-11 DIAGNOSIS — R11.0 CHRONIC NAUSEA: ICD-10-CM

## 2019-07-11 DIAGNOSIS — G89.29 CHRONIC BILATERAL LOW BACK PAIN, WITH SCIATICA PRESENCE UNSPECIFIED: ICD-10-CM

## 2019-07-11 DIAGNOSIS — M54.5 CHRONIC BILATERAL LOW BACK PAIN, WITH SCIATICA PRESENCE UNSPECIFIED: ICD-10-CM

## 2019-07-11 RX ORDER — PROMETHAZINE HYDROCHLORIDE 25 MG/1
TABLET ORAL
Qty: 12 TABLET | Refills: 0 | Status: SHIPPED | OUTPATIENT
Start: 2019-07-11 | End: 2019-07-25 | Stop reason: SDUPTHER

## 2019-07-11 RX ORDER — CYCLOBENZAPRINE HCL 10 MG
TABLET ORAL
Qty: 60 TABLET | Refills: 1 | Status: SHIPPED | OUTPATIENT
Start: 2019-07-11 | End: 2019-08-20 | Stop reason: SDUPTHER

## 2019-07-12 DIAGNOSIS — E11.8 TYPE 2 DIABETES MELLITUS WITH COMPLICATION, WITHOUT LONG-TERM CURRENT USE OF INSULIN (HCC): ICD-10-CM

## 2019-07-15 RX ORDER — DICYCLOMINE HYDROCHLORIDE 10 MG/1
CAPSULE ORAL
Qty: 120 CAPSULE | Refills: 0 | Status: SHIPPED | OUTPATIENT
Start: 2019-07-15 | End: 2019-08-07 | Stop reason: SDUPTHER

## 2019-07-22 DIAGNOSIS — R11.0 CHRONIC NAUSEA: ICD-10-CM

## 2019-07-22 RX ORDER — PROMETHAZINE HYDROCHLORIDE 25 MG/1
TABLET ORAL
Qty: 12 TABLET | Refills: 0 | OUTPATIENT
Start: 2019-07-22

## 2019-07-22 NOTE — TELEPHONE ENCOUNTER
PT WANTS TO KNOW WHY HER PROMETHAZINE 10 MG WAS DENIED. PHARM SAID WE WOULD NOT FILL IT. PLEASE CALL [phone]

## 2019-07-25 ENCOUNTER — OFFICE VISIT (OUTPATIENT)
Dept: FAMILY MEDICINE CLINIC | Facility: CLINIC | Age: 59
End: 2019-07-25

## 2019-07-25 VITALS
HEART RATE: 93 BPM | WEIGHT: 249.8 LBS | HEIGHT: 63 IN | SYSTOLIC BLOOD PRESSURE: 118 MMHG | BODY MASS INDEX: 44.26 KG/M2 | DIASTOLIC BLOOD PRESSURE: 72 MMHG | OXYGEN SATURATION: 95 %

## 2019-07-25 DIAGNOSIS — H92.01 RIGHT EAR PAIN: ICD-10-CM

## 2019-07-25 DIAGNOSIS — J44.9 CHRONIC OBSTRUCTIVE PULMONARY DISEASE, UNSPECIFIED COPD TYPE (HCC): ICD-10-CM

## 2019-07-25 DIAGNOSIS — Z12.39 BREAST CANCER SCREENING: ICD-10-CM

## 2019-07-25 DIAGNOSIS — R11.0 CHRONIC NAUSEA: ICD-10-CM

## 2019-07-25 DIAGNOSIS — E11.8 TYPE 2 DIABETES MELLITUS WITH COMPLICATION, WITHOUT LONG-TERM CURRENT USE OF INSULIN (HCC): Primary | ICD-10-CM

## 2019-07-25 LAB — HBA1C MFR BLD: 7.6 %

## 2019-07-25 PROCEDURE — 83036 HEMOGLOBIN GLYCOSYLATED A1C: CPT | Performed by: NURSE PRACTITIONER

## 2019-07-25 PROCEDURE — 99214 OFFICE O/P EST MOD 30 MIN: CPT | Performed by: NURSE PRACTITIONER

## 2019-07-25 RX ORDER — OXCARBAZEPINE 300 MG/1
TABLET, FILM COATED ORAL
Refills: 3 | COMMUNITY
Start: 2019-04-25 | End: 2019-08-20

## 2019-07-25 RX ORDER — PROMETHAZINE HYDROCHLORIDE 25 MG/1
25 TABLET ORAL EVERY 6 HOURS PRN
Qty: 30 TABLET | Refills: 0 | Status: SHIPPED | OUTPATIENT
Start: 2019-07-25 | End: 2019-08-20 | Stop reason: SDUPTHER

## 2019-07-25 RX ORDER — TRAZODONE HYDROCHLORIDE 100 MG/1
TABLET ORAL
Refills: 3 | COMMUNITY
Start: 2019-05-02

## 2019-07-25 NOTE — PROGRESS NOTES
Subjective   Saadia Caceres is a 58 y.o. female.   Chief Complaint   Patient presents with   • A1c f/u       History of Present Illness   Patient is here for 3 month diabetes follow up, has been taking medications as prescribed, does check glucose at home. Glucose at home 110's to 130's, few in 200's.   States she continues to have chronic nausea, history of chronic pancreatitis. States she throws up at least 3 times a week. She has not gone to the GI referral, is having transportation issues, her daughter works and they often end of arguing when together.   Complaint of migraine headaches, stress increases these, imitrex does help.  She also has not seen the pulmonologist for COPD, continues to have shortness of breath and cough, has been using rescue inhaler, apparently not the Symbicort per her daughter, she does continue to smoke.  Continues to see Dr. Montes, psychiatrist for mental health issues. States he recently did blood work. She will ask him to send results.  Has history of perforated right ear drum, is having ear pain today.  The following portions of the patient's history were reviewed and updated as appropriate: allergies, current medications, past family history, past medical history, past social history, past surgical history and problem list.    Review of Systems   Constitutional: Negative for appetite change, diaphoresis, fatigue and unexpected weight change.   HENT: Positive for ear pain (both).    Eyes: Negative for visual disturbance.   Respiratory: Positive for cough, shortness of breath and wheezing. Negative for chest tightness.    Cardiovascular: Negative for chest pain, palpitations and leg swelling.   Gastrointestinal: Positive for abdominal distention and abdominal pain. Negative for blood in stool, constipation, diarrhea, nausea and vomiting.   Endocrine: Negative for polydipsia, polyphagia and polyuria.   Genitourinary: Negative for difficulty urinating and dysuria.    Musculoskeletal: Positive for arthralgias.   Skin: Negative for color change.   Allergic/Immunologic: Positive for environmental allergies.   Neurological: Positive for numbness (bilateral feet). Negative for dizziness, weakness and light-headedness.   Psychiatric/Behavioral: Negative for sleep disturbance. The patient is nervous/anxious.        Objective   Physical Exam   Constitutional: She appears well-developed and well-nourished. No distress.   HENT:   Head: Normocephalic and atraumatic.   Right Ear: External ear normal. Tympanic membrane is scarred. Tympanic membrane is not erythematous and not bulging. A middle ear effusion is present.   Left Ear: External ear normal. Tympanic membrane is not erythematous and not bulging. A middle ear effusion is present.   Right TM opaque   Cardiovascular: Normal rate, regular rhythm and normal heart sounds.   No murmur heard.  Pulmonary/Chest: She has wheezes (scattered expiratory).   Decreased thoughout   Vitals reviewed.      Assessment/Plan   [place] was seen today for a1c f/u.    Diagnoses and all orders for this visit:    Type 2 diabetes mellitus with complication, without long-term current use of insulin (CMS/[facility])  -     POC Glycosylated Hemoglobin (Hb A1C)    Chronic nausea  -     promethazine (PHENERGAN) 25 MG tablet; Take 1 tablet by mouth Every 6 (Six) Hours As Needed for Nausea or Vomiting.    Breast cancer screening  -     Mammo Screening Digital Tomosynthesis Bilateral With CAD; Future    Chronic obstructive pulmonary disease, unspecified COPD type (CMS/[facility])  -     umeclidinium-vilanterol (ANORO ELLIPTA) 62.5-25 MCG/INH aerosol powder  inhaler; Inhale 1 puff Daily.    Right ear pain  -     neomycin-polymyxin-hydrocortisone (CORTISPORIN) 3.5-20225-7 otic solution; Administer 3 drops to the right ear 4 (Four) Times a Day.              Results for orders placed or performed in visit on 07/25/19   POC Glycosylated Hemoglobin (Hb A1C)   Result Value Ref Range     Hemoglobin A1C 7.6 %     Diabetes is not controlled, but better than 3 months ago. Continue current medications and monitoring.   Refilled phenergan, advised patient and her daughter to reschedule with Dr. Saenz for evaluation of chronic nausea, pancreatitis and abdominal pain. She also needs to schedule colonoscopy.  Patient has been noncompliant with Symbicort, will try Anoro since only once a day dosing. Advised using albuterol only as needed for wheezing or shortness of breath, goal is to control symptoms so albuterol is not needed as often. Again encouraged smoking cessation.  Cortisporin prescribed for right ear pain. Avoiding sticking anything in ear canal.  Patient was encouraged to keep me informed of any acute changes, lack of improvement, or any new concerning symptoms.

## 2019-07-25 NOTE — PATIENT INSTRUCTIONS
Nausea, Adult  Feeling sick to your stomach (nausea) means that your stomach is upset or you feel like you have to throw up (vomit). Feeling sick to your stomach is usually not serious, but it may be an early sign of a more serious medical problem. As you feel sicker to your stomach, it can lead to throwing up (vomiting). If you throw up, or if you are not able to drink enough fluids, there is a risk of dehydration. Dehydration can make you feel tired and thirsty, have a dry mouth, and pee (urinate) less often. Older adults and people who have other diseases or a weak defense (immune) system have a higher risk of dehydration.  The main goal of treating this condition is to:  · Limit how often you feel sick to your stomach.  · Prevent throwing up and dehydration.    Follow these instructions at home:  Follow instructions from your doctor about how to care for yourself at home.  Eating and drinking  Follow these recommendations as told by your doctor:  · Take an oral rehydration solution (ORS). This is a drink that is sold at pharmacies and stores.  · Drink clear fluids in small amounts as you are able, such as:  ? Water.  ? Ice chips.  ? Fruit juice that has water added (diluted fruit juice).  ? Low-calorie sports drinks.  · Eat bland, easy to digest foods in small amounts as you are able, such as:  ? Bananas.  ? Applesauce.  ? Rice.  ? Lean meats.  ? Toast.  ? Crackers.  · Avoid drinking fluids that contain a lot of sugar or caffeine.  · Avoid alcohol.  · Avoid spicy or fatty foods.    General instructions  · Drink enough fluid to keep your pee (urine) clear or pale yellow.  · Wash your hands often. If you cannot use soap and water, use hand .  · Make sure that all people in your household wash their hands well and often.  · Rest at home while you get better.  · Take over-the-counter and prescription medicines only as told by your doctor.  · Breathe slowly and deeply when you feel sick to your  stomach.  · Watch your condition for any changes.  · Keep all follow-up visits as told by your doctor. This is important.  Contact a doctor if:  · You have a headache.  · You have new symptoms.  · You feel sicker to your stomach.  · You have a fever.  · You feel light-headed or dizzy.  · You throw up.  · You are not able to keep fluids down.  Get help right away if:  · You have pain in your chest, neck, arm, or jaw.  · You feel very weak or you pass out (faint).  · You have throw up that is bright red or looks like coffee grounds.  · You have bloody or black poop (stools), or poop that looks like tar.  · You have a very bad headache, a stiff neck, or both.  · You have very bad pain, cramping, or bloating in your belly.  · You have a rash.  · You have trouble breathing or you are breathing very quickly.  · Your heart is beating very quickly.  · Your skin feels cold and clammy.  · You feel confused.  · You have pain while peeing.  · You have signs of dehydration, such as:  ? Dark pee, or very little or no pee.  ? Cracked lips.  ? Dry mouth.  ? Sunken eyes.  ? Sleepiness.  ? Weakness.  These symptoms may be an emergency. Do not wait to see if the symptoms will go away. Get medical help right away. Call your local emergency services (911 in the [place] Do not drive yourself to the hospital.  This information is not intended to replace advice given to you by your health care provider. Make sure you discuss any questions you have with your health care provider.  Document Released: 12/06/2012 Document Revised: 05/25/2017 Document Reviewed: 08/23/2016  [employer] Interactive Patient Education © 2019 [employer]    Nausea and Vomiting, Adult  Feeling sick to your stomach (nausea) means that your stomach is upset or you feel like you have to throw up (vomit). Feeling more and more sick to your stomach can lead to throwing up. Throwing up happens when food and liquid from your stomach are thrown up and out the mouth. Throwing  up can make you feel weak and cause you to get dehydrated. Dehydration can make you tired and thirsty, make you have a dry mouth, and make it so you pee (urinate) less often. Older adults and people with other diseases or a weak defense system (immune system) are at higher risk for dehydration. If you feel sick to your stomach or if you throw up, it is important to follow instructions from your doctor about how to take care of yourself.  Follow these instructions at home:  Eating and drinking  Follow these instructions as told by your doctor:  · Take an oral rehydration solution (ORS). This is a drink that is sold at pharmacies and stores.  · Drink clear fluids in small amounts as you are able, such as:  ? Water.  ? Ice chips.  ? Diluted fruit juice.  ? Low-calorie sports drinks.  · Eat bland, easy-to-digest foods in small amounts as you are able, such as:  ? Bananas.  ? Applesauce.  ? Rice.  ? Low-fat (lean) meats.  ? Toast.  ? Crackers.  · Avoid fluids that have a lot of sugar or caffeine in them.  · Avoid alcohol.  · Avoid spicy or fatty foods.    General instructions  · Drink enough fluid to keep your pee (urine) clear or pale yellow.  · Wash your hands often. If you cannot use soap and water, use hand .  · Make sure that all people in your home wash their hands well and often.  · Take over-the-counter and prescription medicines only as told by your doctor.  · Rest at home while you get better.  · Watch your condition for any changes.  · Breathe slowly and deeply when you feel sick to your stomach.  · Keep all follow-up visits as told by your doctor. This is important.  Contact a doctor if:  · You have a fever.  · You cannot keep fluids down.  · Your symptoms get worse.  · You have new symptoms.  · You feel sick to your stomach for more than two days.  · You feel light-headed or dizzy.  · You have a headache.  · You have muscle cramps.  Get help right away if:  · You have pain in your chest, neck, arm,  or jaw.  · You feel very weak or you pass out (faint).  · You throw up again and again.  · You see blood in your throw-up.  · Your throw-up looks like black coffee grounds.  · You have bloody or black poop (stools) or poop that look like tar.  · You have a very bad headache, a stiff neck, or both.  · You have a rash.  · You have very bad pain, cramping, or bloating in your belly (abdomen).  · You have trouble breathing.  · You are breathing very quickly.  · Your heart is beating very quickly.  · Your skin feels cold and clammy.  · You feel confused.  · You have pain when you pee.  · You have signs of dehydration, such as:  ? Dark pee, hardly any pee, or no pee.  ? Cracked lips.  ? Dry mouth.  ? Sunken eyes.  ? Sleepiness.  ? Weakness.  These symptoms may be an emergency. Do not wait to see if the symptoms will go away. Get medical help right away. Call your local emergency services (911 in the [place] Do not drive yourself to the hospital.  This information is not intended to replace advice given to you by your health care provider. Make sure you discuss any questions you have with your health care provider.  Document Released: 06/05/2009 Document Revised: 07/07/2017 Document Reviewed: 08/23/2016  [employer] Interactive Patient Education © 2019 [employer]        Please call Dr. Blade Saenz's office, gastroenterologist, to schedule appointment for chronic nausea and pancreatitis, need colonoscopy also      A1c today 7.6, better than last time    Stop symbicort, try Anoro, this is a once a day inhaler. Albuterol is only as needed for shortness of breath or wheezing.    Continue other medications

## 2019-07-26 DIAGNOSIS — L73.9 FOLLICULITIS: ICD-10-CM

## 2019-07-26 RX ORDER — MUPIROCIN CALCIUM 20 MG/G
CREAM TOPICAL
Qty: 30 G | Refills: 0 | Status: SHIPPED | OUTPATIENT
Start: 2019-07-26 | End: 2020-01-10 | Stop reason: SDUPTHER

## 2019-08-02 DIAGNOSIS — J44.1 CHRONIC OBSTRUCTIVE PULMONARY DISEASE WITH ACUTE EXACERBATION (HCC): ICD-10-CM

## 2019-08-02 RX ORDER — BUDESONIDE AND FORMOTEROL FUMARATE DIHYDRATE 160; 4.5 UG/1; UG/1
AEROSOL RESPIRATORY (INHALATION)
Qty: 10.2 G | Refills: 0 | OUTPATIENT
Start: 2019-08-02

## 2019-08-07 DIAGNOSIS — R11.0 CHRONIC NAUSEA: Primary | ICD-10-CM

## 2019-08-07 RX ORDER — DICYCLOMINE HYDROCHLORIDE 10 MG/1
CAPSULE ORAL
Qty: 120 CAPSULE | Refills: 0 | Status: SHIPPED | OUTPATIENT
Start: 2019-08-07 | End: 2019-08-20 | Stop reason: SDUPTHER

## 2019-08-13 RX ORDER — ICOSAPENT ETHYL 1000 MG/1
CAPSULE ORAL
Qty: 90 CAPSULE | Refills: 0 | Status: SHIPPED | OUTPATIENT
Start: 2019-08-13 | End: 2019-11-07 | Stop reason: SDUPTHER

## 2019-08-20 ENCOUNTER — OFFICE VISIT (OUTPATIENT)
Dept: GASTROENTEROLOGY | Facility: CLINIC | Age: 59
End: 2019-08-20

## 2019-08-20 VITALS
TEMPERATURE: 97.1 F | OXYGEN SATURATION: 96 % | BODY MASS INDEX: 44.69 KG/M2 | DIASTOLIC BLOOD PRESSURE: 68 MMHG | HEIGHT: 63 IN | WEIGHT: 252.2 LBS | SYSTOLIC BLOOD PRESSURE: 110 MMHG | HEART RATE: 89 BPM

## 2019-08-20 DIAGNOSIS — Z72.0 CURRENT TOBACCO USE: ICD-10-CM

## 2019-08-20 DIAGNOSIS — R10.12 LUQ ABDOMINAL PAIN: Primary | ICD-10-CM

## 2019-08-20 DIAGNOSIS — E66.01 MORBID OBESITY WITH BMI OF 40.0-44.9, ADULT (HCC): ICD-10-CM

## 2019-08-20 DIAGNOSIS — Z12.11 SCREENING FOR COLON CANCER: ICD-10-CM

## 2019-08-20 DIAGNOSIS — R11.0 CHRONIC NAUSEA: ICD-10-CM

## 2019-08-20 DIAGNOSIS — E66.9 DIABETES MELLITUS TYPE 2 IN OBESE (HCC): ICD-10-CM

## 2019-08-20 DIAGNOSIS — K21.9 CHRONIC GERD: ICD-10-CM

## 2019-08-20 DIAGNOSIS — E11.69 DIABETES MELLITUS TYPE 2 IN OBESE (HCC): ICD-10-CM

## 2019-08-20 PROCEDURE — 99214 OFFICE O/P EST MOD 30 MIN: CPT | Performed by: NURSE PRACTITIONER

## 2019-08-20 RX ORDER — OMEPRAZOLE 40 MG/1
40 CAPSULE, DELAYED RELEASE ORAL EVERY MORNING
Qty: 30 CAPSULE | Refills: 2 | Status: SHIPPED | OUTPATIENT
Start: 2019-08-20 | End: 2019-10-21 | Stop reason: SDUPTHER

## 2019-08-20 RX ORDER — PROMETHAZINE HYDROCHLORIDE 25 MG/1
25 TABLET ORAL EVERY 6 HOURS PRN
Qty: 30 TABLET | Refills: 0 | Status: SHIPPED | OUTPATIENT
Start: 2019-08-20 | End: 2019-09-24

## 2019-08-22 DIAGNOSIS — J44.1 CHRONIC OBSTRUCTIVE PULMONARY DISEASE WITH ACUTE EXACERBATION (HCC): ICD-10-CM

## 2019-08-22 RX ORDER — ALBUTEROL SULFATE 90 UG/1
AEROSOL, METERED RESPIRATORY (INHALATION)
Qty: 54 G | Refills: 0 | Status: SHIPPED | OUTPATIENT
Start: 2019-08-22 | End: 2019-10-19 | Stop reason: SDUPTHER

## 2019-08-23 DIAGNOSIS — L73.9 FOLLICULITIS: ICD-10-CM

## 2019-08-23 PROBLEM — R10.12 LUQ ABDOMINAL PAIN: Status: ACTIVE | Noted: 2019-08-23

## 2019-08-23 PROBLEM — K21.9 CHRONIC GERD: Status: ACTIVE | Noted: 2019-08-23

## 2019-08-23 PROBLEM — K59.09 CONSTIPATION, CHRONIC: Status: ACTIVE | Noted: 2019-08-23

## 2019-08-23 RX ORDER — MUPIROCIN CALCIUM 20 MG/G
CREAM TOPICAL
Qty: 30 G | Refills: 0 | Status: SHIPPED | OUTPATIENT
Start: 2019-08-23 | End: 2020-10-06 | Stop reason: SDUPTHER

## 2019-08-23 RX ORDER — SODIUM, POTASSIUM,MAG SULFATES 17.5-3.13G
SOLUTION, RECONSTITUTED, ORAL ORAL
Qty: 2 BOTTLE | Refills: 0 | Status: SHIPPED | OUTPATIENT
Start: 2019-08-23 | End: 2019-09-16 | Stop reason: ALTCHOICE

## 2019-08-25 DIAGNOSIS — IMO0002 UNCONTROLLED TYPE 2 DIABETES MELLITUS WITH COMPLICATION, WITH LONG-TERM CURRENT USE OF INSULIN: ICD-10-CM

## 2019-08-26 RX ORDER — PEN NEEDLE, DIABETIC 31 GX5/16"
NEEDLE, DISPOSABLE MISCELLANEOUS
Qty: 200 EACH | Refills: 0 | Status: SHIPPED | OUTPATIENT
Start: 2019-08-26 | End: 2020-04-07 | Stop reason: SDUPTHER

## 2019-08-30 DIAGNOSIS — G89.29 CHRONIC BILATERAL LOW BACK PAIN, WITH SCIATICA PRESENCE UNSPECIFIED: ICD-10-CM

## 2019-08-30 DIAGNOSIS — M54.5 CHRONIC BILATERAL LOW BACK PAIN, WITH SCIATICA PRESENCE UNSPECIFIED: ICD-10-CM

## 2019-08-30 RX ORDER — CYCLOBENZAPRINE HCL 10 MG
TABLET ORAL
Qty: 90 TABLET | Refills: 0 | Status: SHIPPED | OUTPATIENT
Start: 2019-08-30 | End: 2019-09-18 | Stop reason: SDUPTHER

## 2019-09-03 DIAGNOSIS — IMO0002 UNCONTROLLED TYPE 2 DIABETES MELLITUS WITH COMPLICATION, WITH LONG-TERM CURRENT USE OF INSULIN: ICD-10-CM

## 2019-09-03 RX ORDER — INSULIN ASPART 100 [IU]/ML
INJECTION, SUSPENSION SUBCUTANEOUS
Qty: 9 ML | Refills: 0 | Status: SHIPPED | OUTPATIENT
Start: 2019-09-03 | End: 2019-09-30 | Stop reason: SDUPTHER

## 2019-09-04 DIAGNOSIS — R11.0 CHRONIC NAUSEA: ICD-10-CM

## 2019-09-04 RX ORDER — PROMETHAZINE HYDROCHLORIDE 25 MG/1
TABLET ORAL
Qty: 30 TABLET | Refills: 0 | Status: SHIPPED | OUTPATIENT
Start: 2019-09-04 | End: 2019-09-24

## 2019-09-08 DIAGNOSIS — R11.0 CHRONIC NAUSEA: ICD-10-CM

## 2019-09-09 RX ORDER — DICYCLOMINE HYDROCHLORIDE 10 MG/1
CAPSULE ORAL
Qty: 120 CAPSULE | Refills: 0 | Status: SHIPPED | OUTPATIENT
Start: 2019-09-09 | End: 2019-10-08 | Stop reason: SDUPTHER

## 2019-09-14 DIAGNOSIS — IMO0002 UNCONTROLLED TYPE 2 DIABETES MELLITUS WITH COMPLICATION, WITH LONG-TERM CURRENT USE OF INSULIN: ICD-10-CM

## 2019-09-14 DIAGNOSIS — N95.1 HOT FLASHES DUE TO MENOPAUSE: ICD-10-CM

## 2019-09-14 DIAGNOSIS — N95.1 MENOPAUSAL HOT FLUSHES: ICD-10-CM

## 2019-09-14 RX ORDER — ESTRADIOL 1 MG/1
1 TABLET ORAL DAILY
Qty: 90 TABLET | Refills: 0 | Status: SHIPPED | OUTPATIENT
Start: 2019-09-14 | End: 2019-12-16 | Stop reason: SDUPTHER

## 2019-09-16 DIAGNOSIS — Z12.11 SCREENING FOR COLON CANCER: Primary | ICD-10-CM

## 2019-09-16 RX ORDER — SODIUM, POTASSIUM,MAG SULFATES 17.5-3.13G
1 SOLUTION, RECONSTITUTED, ORAL ORAL TAKE AS DIRECTED
Qty: 2 BOTTLE | Refills: 0 | Status: SHIPPED | OUTPATIENT
Start: 2019-09-16 | End: 2019-11-30

## 2019-09-18 ENCOUNTER — ANESTHESIA EVENT (OUTPATIENT)
Dept: GASTROENTEROLOGY | Facility: HOSPITAL | Age: 59
End: 2019-09-18

## 2019-09-18 DIAGNOSIS — M54.5 CHRONIC BILATERAL LOW BACK PAIN, WITH SCIATICA PRESENCE UNSPECIFIED: ICD-10-CM

## 2019-09-18 DIAGNOSIS — G89.29 CHRONIC BILATERAL LOW BACK PAIN, WITH SCIATICA PRESENCE UNSPECIFIED: ICD-10-CM

## 2019-09-18 RX ORDER — CYCLOBENZAPRINE HCL 10 MG
TABLET ORAL
Qty: 90 TABLET | Refills: 0 | Status: SHIPPED | OUTPATIENT
Start: 2019-09-18 | End: 2019-11-03 | Stop reason: SDUPTHER

## 2019-09-19 ENCOUNTER — ANESTHESIA (OUTPATIENT)
Dept: GASTROENTEROLOGY | Facility: HOSPITAL | Age: 59
End: 2019-09-19

## 2019-09-19 ENCOUNTER — HOSPITAL ENCOUNTER (OUTPATIENT)
Facility: HOSPITAL | Age: 59
Setting detail: HOSPITAL OUTPATIENT SURGERY
Discharge: HOME | End: 2019-09-19
Attending: INTERNAL MEDICINE | Admitting: INTERNAL MEDICINE

## 2019-09-19 VITALS
TEMPERATURE: 98.4 F | HEART RATE: 98 BPM | OXYGEN SATURATION: 93 % | DIASTOLIC BLOOD PRESSURE: 62 MMHG | RESPIRATION RATE: 20 BRPM | HEIGHT: 66 IN | WEIGHT: 255 LBS | SYSTOLIC BLOOD PRESSURE: 102 MMHG | BODY MASS INDEX: 40.98 KG/M2

## 2019-09-19 DIAGNOSIS — K21.9 CHRONIC GERD: ICD-10-CM

## 2019-09-19 DIAGNOSIS — R10.12 LUQ ABDOMINAL PAIN: ICD-10-CM

## 2019-09-19 DIAGNOSIS — K59.09 CONSTIPATION, CHRONIC: ICD-10-CM

## 2019-09-19 LAB — GLUCOSE BLDC GLUCOMTR-MCNC: 163 MG/DL (ref 70–130)

## 2019-09-19 PROCEDURE — 82962 GLUCOSE BLOOD TEST: CPT

## 2019-09-19 PROCEDURE — 25010000002 PROPOFOL 10 MG/ML EMULSION: Performed by: NURSE ANESTHETIST, CERTIFIED REGISTERED

## 2019-09-19 PROCEDURE — 88305 TISSUE EXAM BY PATHOLOGIST: CPT | Performed by: INTERNAL MEDICINE

## 2019-09-19 PROCEDURE — 93005 ELECTROCARDIOGRAM TRACING: CPT | Performed by: ANESTHESIOLOGY

## 2019-09-19 PROCEDURE — 88342 IMHCHEM/IMCYTCHM 1ST ANTB: CPT | Performed by: INTERNAL MEDICINE

## 2019-09-19 PROCEDURE — 93010 ELECTROCARDIOGRAM REPORT: CPT | Performed by: INTERNAL MEDICINE

## 2019-09-19 RX ORDER — LIDOCAINE HYDROCHLORIDE 10 MG/ML
INJECTION, SOLUTION EPIDURAL; INFILTRATION; INTRACAUDAL; PERINEURAL AS NEEDED
Status: DISCONTINUED | OUTPATIENT
Start: 2019-09-19 | End: 2019-09-19 | Stop reason: SURG

## 2019-09-19 RX ORDER — PROPOFOL 10 MG/ML
VIAL (ML) INTRAVENOUS AS NEEDED
Status: DISCONTINUED | OUTPATIENT
Start: 2019-09-19 | End: 2019-09-19 | Stop reason: SURG

## 2019-09-19 RX ORDER — PROMETHAZINE HYDROCHLORIDE 25 MG/ML
6.25 INJECTION, SOLUTION INTRAMUSCULAR; INTRAVENOUS ONCE AS NEEDED
Status: DISCONTINUED | OUTPATIENT
Start: 2019-09-19 | End: 2019-09-19 | Stop reason: HOSPADM

## 2019-09-19 RX ORDER — FENTANYL CITRATE 50 UG/ML
50 INJECTION, SOLUTION INTRAMUSCULAR; INTRAVENOUS
Status: DISCONTINUED | OUTPATIENT
Start: 2019-09-19 | End: 2019-09-19 | Stop reason: HOSPADM

## 2019-09-19 RX ORDER — PROMETHAZINE HYDROCHLORIDE 25 MG/1
25 TABLET ORAL ONCE AS NEEDED
Status: DISCONTINUED | OUTPATIENT
Start: 2019-09-19 | End: 2019-09-19 | Stop reason: HOSPADM

## 2019-09-19 RX ORDER — FAMOTIDINE 10 MG/ML
20 INJECTION, SOLUTION INTRAVENOUS ONCE
Status: COMPLETED | OUTPATIENT
Start: 2019-09-19 | End: 2019-09-19

## 2019-09-19 RX ORDER — SODIUM CHLORIDE 0.9 % (FLUSH) 0.9 %
3-10 SYRINGE (ML) INJECTION AS NEEDED
Status: DISCONTINUED | OUTPATIENT
Start: 2019-09-19 | End: 2019-09-19 | Stop reason: HOSPADM

## 2019-09-19 RX ORDER — ONDANSETRON 2 MG/ML
4 INJECTION INTRAMUSCULAR; INTRAVENOUS ONCE AS NEEDED
Status: DISCONTINUED | OUTPATIENT
Start: 2019-09-19 | End: 2019-09-19

## 2019-09-19 RX ORDER — FAMOTIDINE 20 MG/1
20 TABLET, FILM COATED ORAL
Status: DISCONTINUED | OUTPATIENT
Start: 2019-09-19 | End: 2019-09-19

## 2019-09-19 RX ORDER — SODIUM CHLORIDE, SODIUM LACTATE, POTASSIUM CHLORIDE, CALCIUM CHLORIDE 600; 310; 30; 20 MG/100ML; MG/100ML; MG/100ML; MG/100ML
9 INJECTION, SOLUTION INTRAVENOUS CONTINUOUS PRN
Status: DISCONTINUED | OUTPATIENT
Start: 2019-09-19 | End: 2019-09-19 | Stop reason: HOSPADM

## 2019-09-19 RX ORDER — PROMETHAZINE HYDROCHLORIDE 25 MG/1
25 SUPPOSITORY RECTAL ONCE AS NEEDED
Status: DISCONTINUED | OUTPATIENT
Start: 2019-09-19 | End: 2019-09-19 | Stop reason: HOSPADM

## 2019-09-19 RX ORDER — SODIUM CHLORIDE 0.9 % (FLUSH) 0.9 %
3 SYRINGE (ML) INJECTION EVERY 12 HOURS SCHEDULED
Status: DISCONTINUED | OUTPATIENT
Start: 2019-09-19 | End: 2019-09-19 | Stop reason: HOSPADM

## 2019-09-19 RX ORDER — LIDOCAINE HYDROCHLORIDE 10 MG/ML
0.5 INJECTION, SOLUTION EPIDURAL; INFILTRATION; INTRACAUDAL; PERINEURAL ONCE AS NEEDED
Status: DISCONTINUED | OUTPATIENT
Start: 2019-09-19 | End: 2019-09-19 | Stop reason: HOSPADM

## 2019-09-19 RX ADMIN — PROPOFOL 100 MG: 10 INJECTION, EMULSION INTRAVENOUS at 09:13

## 2019-09-19 RX ADMIN — FAMOTIDINE 20 MG: 10 INJECTION, SOLUTION INTRAVENOUS at 09:02

## 2019-09-19 RX ADMIN — SODIUM CHLORIDE, POTASSIUM CHLORIDE, SODIUM LACTATE AND CALCIUM CHLORIDE 9 ML/HR: 600; 310; 30; 20 INJECTION, SOLUTION INTRAVENOUS at 09:02

## 2019-09-19 RX ADMIN — LIDOCAINE HYDROCHLORIDE 50 MG: 10 INJECTION, SOLUTION EPIDURAL; INFILTRATION; INTRACAUDAL; PERINEURAL at 09:13

## 2019-09-19 RX ADMIN — PROPOFOL 150 MCG/KG/MIN: 10 INJECTION, EMULSION INTRAVENOUS at 09:13

## 2019-09-19 RX ADMIN — TOPICAL ANESTHETIC 2 SPRAY: 200 SPRAY DENTAL; PERIODONTAL at 09:04

## 2019-09-19 NOTE — ADDENDUM NOTE
Addendum  created 09/19/19 1228 by Yung Aquino MD    Review and Sign - Ready for Procedure, Sign clinical note

## 2019-09-19 NOTE — ANESTHESIA PREPROCEDURE EVALUATION
Anesthesia Evaluation     Patient summary reviewed and Nursing notes reviewed   NPO Solid Status: > 8 hours  NPO Liquid Status: > 2 hours           Airway   Mallampati: II  TM distance: >3 FB  Neck ROM: full  No difficulty expected  Dental    (+) edentulous    Pulmonary - normal exam   (+) a smoker Current, COPD (MDI ) moderate, sleep apnea,   (-) shortness of breath, recent URI  Cardiovascular - normal exam    ECG reviewed    (+) hypertension, hyperlipidemia,   (-) past MI, dysrhythmias, angina    ROS comment: EKG NSR  Septal infarct     Neuro/Psych  (+) psychiatric history Schizophrenia and Bipolar,     (-) seizures, CVA  GI/Hepatic/Renal/Endo    (+) morbid obesity, GERD, GI bleeding, diabetes mellitus, hypothyroidism,   (-)  obesity, no renal disease    ROS Comment: LUQ pain     Musculoskeletal     Abdominal   (+) obese,    Substance History      OB/GYN          Other                      Anesthesia Plan    ASA 3     MAC   (Topical POM )  intravenous induction   Anesthetic plan, all risks, benefits, and alternatives have been provided, discussed and informed consent has been obtained with: patient.    Plan discussed with CRNA.

## 2019-09-19 NOTE — ANESTHESIA POSTPROCEDURE EVALUATION
Patient: Saadia Caceres    Procedure Summary     Date:  09/19/19 Room / Location:   [facility]    Anesthesia Start:  0905 Anesthesia Stop:      Procedures:       Esophagogastroduodenoscopy (N/A )      COLONOSCOPY (N/A ) Diagnosis:       LUQ abdominal pain      Chronic GERD      Constipation, chronic      (LUQ abdominal pain [R10.12])      (Chronic GERD [K21.9])      (Constipation, chronic [K59.09])    Surgeon:  Mayank Craven MD Provider:  Yung Aquino MD    Anesthesia Type:  MAC ASA Status:  3          Anesthesia Type: MAC  Last vitals  BP 80/55  111/87 (09/19/19 0855)   Temp    97.1   Pulse 103  82 (09/19/19 0855)   Resp 16  22 (09/19/19 0855)     SpO2 92  95 % (09/19/19 0855)     Post Anesthesia Care and Evaluation    Patient location during evaluation: PACU  Patient participation: complete - patient participated  Level of consciousness: awake and alert  Pain score: 0  Pain management: adequate  Airway patency: patent  Anesthetic complications: No anesthetic complications  PONV Status: none  Cardiovascular status: hemodynamically stable and acceptable  Respiratory status: nonlabored ventilation, acceptable and nasal cannula  Hydration status: acceptable

## 2019-09-23 ENCOUNTER — TELEPHONE (OUTPATIENT)
Dept: FAMILY MEDICINE CLINIC | Facility: CLINIC | Age: 59
End: 2019-09-23

## 2019-09-23 DIAGNOSIS — Z91.14 NONCOMPLIANCE WITH CPAP TREATMENT: Primary | ICD-10-CM

## 2019-09-23 LAB
CYTO UR: NORMAL
LAB AP CASE REPORT: NORMAL
LAB AP CLINICAL INFORMATION: NORMAL
PATH REPORT.FINAL DX SPEC: NORMAL
PATH REPORT.GROSS SPEC: NORMAL

## 2019-09-23 NOTE — TELEPHONE ENCOUNTER
Martha called and said that patient has not been using her trilogy machine for over 8 months. They need an order faxed that states to discontinue therapy because of patient non-compliance.    Fax to [phone]

## 2019-09-24 ENCOUNTER — TELEPHONE (OUTPATIENT)
Dept: GASTROENTEROLOGY | Facility: CLINIC | Age: 59
End: 2019-09-24

## 2019-09-24 DIAGNOSIS — B96.81 HELICOBACTER PYLORI GASTRITIS: Primary | ICD-10-CM

## 2019-09-24 DIAGNOSIS — K29.70 HELICOBACTER PYLORI GASTRITIS: Primary | ICD-10-CM

## 2019-09-24 DIAGNOSIS — R11.0 CHRONIC NAUSEA: ICD-10-CM

## 2019-09-24 RX ORDER — PROMETHAZINE HYDROCHLORIDE 25 MG/1
25 TABLET ORAL EVERY 6 HOURS PRN
Qty: 30 TABLET | Refills: 0 | Status: SHIPPED | OUTPATIENT
Start: 2019-09-24 | End: 2019-10-21 | Stop reason: SDUPTHER

## 2019-09-24 RX ORDER — AMOXICILLIN 500 MG/1
1000 CAPSULE ORAL 2 TIMES DAILY WITH MEALS
Qty: 56 CAPSULE | Refills: 0 | Status: SHIPPED | OUTPATIENT
Start: 2019-09-24 | End: 2019-10-08

## 2019-09-24 RX ORDER — CLARITHROMYCIN 500 MG/1
500 TABLET, COATED ORAL 2 TIMES DAILY WITH MEALS
Qty: 28 TABLET | Refills: 0 | Status: SHIPPED | OUTPATIENT
Start: 2019-09-24 | End: 2019-10-08

## 2019-09-24 RX ORDER — FLUCONAZOLE 200 MG/1
200 TABLET ORAL DAILY
Qty: 5 TABLET | Refills: 0 | OUTPATIENT
Start: 2019-09-24 | End: 2019-11-30

## 2019-09-24 NOTE — TELEPHONE ENCOUNTER
----- Message from Mayank Craven MD sent at 9/23/2019  3:45 PM EDT -----  Aydee:  The biopsies from the antrum did demonstrate Helicobacter pylori.  There were also changes from acid reflux on esophageal biopsies. The colon examination could not be completed due to poor bowel preparation.

## 2019-09-24 NOTE — TELEPHONE ENCOUNTER
Aydee,  I gave patient's daugther biopsy results. She agree to hold simvastatin while on antibiotics. Also request refill for phenergan and course of Diflucan for yeast infection. Patient always get yeast infection while taking antibiotics. Thanks

## 2019-09-24 NOTE — TELEPHONE ENCOUNTER
Art Grigsby let pt know that pt has H-pylori infection.       · I'm sending 2 antibiotic for 2 wks.If pt has n/v w/ antibiotic, take Phenergan 30 min before abx.  Take antibiotic w/ food.      · Continue current acid reflux medication.     · HOLD simvastatin (ZOCOR) during abx course d/t interaction    Seb Carranza schedule pt to see me in the next 4-6 wks

## 2019-09-26 NOTE — TELEPHONE ENCOUNTER
INFORMED PATIENT PHENERGAN AND DIFLUCAN HAS BEEN SENT TO PHARMACY. ALSO CLARITHROMYCIN HAS BEEN APPROVED BY INSURANCE.

## 2019-09-30 DIAGNOSIS — IMO0002 UNCONTROLLED TYPE 2 DIABETES MELLITUS WITH COMPLICATION, WITH LONG-TERM CURRENT USE OF INSULIN: ICD-10-CM

## 2019-09-30 RX ORDER — INSULIN ASPART 100 [IU]/ML
INJECTION, SUSPENSION SUBCUTANEOUS
Qty: 9 ML | Refills: 0 | Status: SHIPPED | OUTPATIENT
Start: 2019-09-30 | End: 2019-10-09 | Stop reason: SDUPTHER

## 2019-10-08 ENCOUNTER — TELEPHONE (OUTPATIENT)
Dept: FAMILY MEDICINE CLINIC | Facility: CLINIC | Age: 59
End: 2019-10-08

## 2019-10-08 DIAGNOSIS — R11.0 CHRONIC NAUSEA: ICD-10-CM

## 2019-10-08 DIAGNOSIS — R30.0 DYSURIA: Primary | ICD-10-CM

## 2019-10-08 RX ORDER — DICYCLOMINE HYDROCHLORIDE 10 MG/1
CAPSULE ORAL
Qty: 120 CAPSULE | Refills: 2 | OUTPATIENT
Start: 2019-10-08 | End: 2019-11-30

## 2019-10-08 NOTE — TELEPHONE ENCOUNTER
Please contact patient. She says has a UTI and is seeing blood. She says she is  currently taking 2 different antibiotics.

## 2019-10-08 NOTE — TELEPHONE ENCOUNTER
I have ordered a UA, patient will need to stop by the lab to give specimen, or she can schedule an appointment or go to UC. She is already on 2 antibiotics, need culture to confirm UTI to treat.

## 2019-10-09 DIAGNOSIS — IMO0002 UNCONTROLLED TYPE 2 DIABETES MELLITUS WITH COMPLICATION, WITH LONG-TERM CURRENT USE OF INSULIN: ICD-10-CM

## 2019-10-09 RX ORDER — INSULIN ASPART 100 [IU]/ML
INJECTION, SUSPENSION SUBCUTANEOUS
Qty: 9 ML | Refills: 0 | Status: SHIPPED | OUTPATIENT
Start: 2019-10-09 | End: 2019-11-14 | Stop reason: SDUPTHER

## 2019-10-19 DIAGNOSIS — J44.1 CHRONIC OBSTRUCTIVE PULMONARY DISEASE WITH ACUTE EXACERBATION (HCC): ICD-10-CM

## 2019-10-19 RX ORDER — ALBUTEROL SULFATE 90 UG/1
AEROSOL, METERED RESPIRATORY (INHALATION)
Qty: 54 G | Refills: 0 | Status: SHIPPED | OUTPATIENT
Start: 2019-10-19 | End: 2019-12-20

## 2019-10-21 DIAGNOSIS — R60.1 GENERALIZED EDEMA: ICD-10-CM

## 2019-10-21 DIAGNOSIS — K21.9 CHRONIC GERD: ICD-10-CM

## 2019-10-21 DIAGNOSIS — R11.0 CHRONIC NAUSEA: ICD-10-CM

## 2019-10-21 DIAGNOSIS — R60.9 EDEMA, UNSPECIFIED TYPE: ICD-10-CM

## 2019-10-21 RX ORDER — FUROSEMIDE 20 MG/1
20 TABLET ORAL DAILY
Qty: 90 TABLET | Refills: 0 | Status: SHIPPED | OUTPATIENT
Start: 2019-10-21 | End: 2020-01-20

## 2019-10-21 RX ORDER — PROMETHAZINE HYDROCHLORIDE 25 MG/1
25 TABLET ORAL EVERY 6 HOURS PRN
Qty: 30 TABLET | Refills: 0 | Status: SHIPPED | OUTPATIENT
Start: 2019-10-21 | End: 2019-11-27 | Stop reason: SDUPTHER

## 2019-10-21 RX ORDER — OMEPRAZOLE 40 MG/1
40 CAPSULE, DELAYED RELEASE ORAL EVERY MORNING
Qty: 30 CAPSULE | Refills: 2 | Status: SHIPPED | OUTPATIENT
Start: 2019-10-21 | End: 2020-01-20

## 2019-10-24 DIAGNOSIS — K29.70 HELICOBACTER PYLORI GASTRITIS: ICD-10-CM

## 2019-10-24 DIAGNOSIS — B96.81 HELICOBACTER PYLORI GASTRITIS: ICD-10-CM

## 2019-10-25 RX ORDER — CLARITHROMYCIN 500 MG/1
TABLET, COATED ORAL
Qty: 28 TABLET | Refills: 0 | OUTPATIENT
Start: 2019-10-25

## 2019-11-03 DIAGNOSIS — G89.29 CHRONIC BILATERAL LOW BACK PAIN: ICD-10-CM

## 2019-11-03 DIAGNOSIS — M54.50 CHRONIC BILATERAL LOW BACK PAIN: ICD-10-CM

## 2019-11-04 RX ORDER — CYCLOBENZAPRINE HCL 10 MG
TABLET ORAL
Qty: 90 TABLET | Refills: 0 | Status: SHIPPED | OUTPATIENT
Start: 2019-11-04 | End: 2020-02-11 | Stop reason: SDUPTHER

## 2019-11-05 DIAGNOSIS — E78.5 HYPERLIPIDEMIA, UNSPECIFIED HYPERLIPIDEMIA TYPE: ICD-10-CM

## 2019-11-05 RX ORDER — SIMVASTATIN 10 MG
10 TABLET ORAL EVERY EVENING
Qty: 90 TABLET | Refills: 1 | Status: SHIPPED | OUTPATIENT
Start: 2019-11-05 | End: 2020-03-23 | Stop reason: SDUPTHER

## 2019-11-05 NOTE — TELEPHONE ENCOUNTER
Please advise on simvastatin refill.   Rx was d/c from a different provider. Should patient continue? It does not look like we have ran a lipid panel on the patient recently.

## 2019-11-05 NOTE — TELEPHONE ENCOUNTER
Simvastatin was held while patient was on antibiotics for H.pylori per Aydee Car' note. These were completed, will restart statin.

## 2019-11-07 RX ORDER — ICOSAPENT ETHYL 1000 MG/1
CAPSULE ORAL
Qty: 90 CAPSULE | Refills: 0 | Status: SHIPPED | OUTPATIENT
Start: 2019-11-07 | End: 2020-02-08

## 2019-11-14 DIAGNOSIS — IMO0002 UNCONTROLLED TYPE 2 DIABETES MELLITUS WITH COMPLICATION, WITH LONG-TERM CURRENT USE OF INSULIN: ICD-10-CM

## 2019-11-14 RX ORDER — INSULIN ASPART 100 [IU]/ML
INJECTION, SUSPENSION SUBCUTANEOUS
Qty: 9 ML | Refills: 0 | Status: SHIPPED | OUTPATIENT
Start: 2019-11-14 | End: 2019-11-30 | Stop reason: SDUPTHER

## 2019-11-17 DIAGNOSIS — E03.9 HYPOTHYROIDISM, UNSPECIFIED TYPE: ICD-10-CM

## 2019-11-18 RX ORDER — LEVOTHYROXINE SODIUM 88 UG/1
88 TABLET ORAL DAILY
Qty: 90 TABLET | Refills: 0 | Status: SHIPPED | OUTPATIENT
Start: 2019-11-18 | End: 2020-02-14

## 2019-11-20 DIAGNOSIS — J44.9 CHRONIC OBSTRUCTIVE PULMONARY DISEASE, UNSPECIFIED COPD TYPE (HCC): ICD-10-CM

## 2019-11-23 PROCEDURE — 96374 THER/PROPH/DIAG INJ IV PUSH: CPT

## 2019-11-27 ENCOUNTER — TELEPHONE (OUTPATIENT)
Dept: FAMILY MEDICINE CLINIC | Facility: CLINIC | Age: 59
End: 2019-11-27

## 2019-11-27 DIAGNOSIS — R11.0 CHRONIC NAUSEA: ICD-10-CM

## 2019-11-27 RX ORDER — PROMETHAZINE HYDROCHLORIDE 25 MG/1
25 TABLET ORAL EVERY 6 HOURS PRN
Qty: 30 TABLET | Refills: 0 | OUTPATIENT
Start: 2019-11-27 | End: 2019-11-30

## 2019-11-29 ENCOUNTER — HOSPITAL ENCOUNTER (EMERGENCY)
Facility: HOSPITAL | Age: 59
Discharge: HOME | End: 2019-11-30
Attending: EMERGENCY MEDICINE | Admitting: EMERGENCY MEDICINE

## 2019-11-29 ENCOUNTER — APPOINTMENT (OUTPATIENT)
Dept: CT IMAGING | Facility: HOSPITAL | Age: 59
End: 2019-11-29

## 2019-11-29 DIAGNOSIS — R10.32 LLQ ABDOMINAL PAIN: Primary | ICD-10-CM

## 2019-11-29 DIAGNOSIS — R51.9 NONINTRACTABLE HEADACHE, UNSPECIFIED CHRONICITY PATTERN, UNSPECIFIED HEADACHE TYPE: ICD-10-CM

## 2019-11-29 DIAGNOSIS — R73.9 HYPERGLYCEMIA: ICD-10-CM

## 2019-11-29 LAB
ALBUMIN SERPL-MCNC: 3.6 G/DL (ref 3.5–5.2)
ALBUMIN/GLOB SERPL: 1.2 G/DL
ALP SERPL-CCNC: 79 U/L (ref 39–117)
ALT SERPL W P-5'-P-CCNC: 13 U/L (ref 1–33)
ANION GAP SERPL CALCULATED.3IONS-SCNC: 12 MMOL/L (ref 5–15)
AST SERPL-CCNC: 13 U/L (ref 1–32)
BASOPHILS # BLD AUTO: 0.04 10*3/MM3 (ref 0–0.2)
BASOPHILS NFR BLD AUTO: 0.4 % (ref 0–1.5)
BILIRUB SERPL-MCNC: <0.2 MG/DL (ref 0.2–1.2)
BILIRUB UR QL STRIP: NEGATIVE
BUN BLD-MCNC: 13 MG/DL (ref 6–20)
BUN/CREAT SERPL: 13.7 (ref 7–25)
CALCIUM SPEC-SCNC: 9.2 MG/DL (ref 8.6–10.5)
CHLORIDE SERPL-SCNC: 97 MMOL/L (ref 98–107)
CLARITY UR: CLEAR
CO2 SERPL-SCNC: 28 MMOL/L (ref 22–29)
COLOR UR: YELLOW
CREAT BLD-MCNC: 0.95 MG/DL (ref 0.57–1)
DEPRECATED RDW RBC AUTO: 52.9 FL (ref 37–54)
EOSINOPHIL # BLD AUTO: 0.1 10*3/MM3 (ref 0–0.4)
EOSINOPHIL NFR BLD AUTO: 1.1 % (ref 0.3–6.2)
ERYTHROCYTE [DISTWIDTH] IN BLOOD BY AUTOMATED COUNT: 14.5 % (ref 12.3–15.4)
GFR SERPL CREATININE-BSD FRML MDRD: 61 ML/MIN/1.73
GLOBULIN UR ELPH-MCNC: 3 GM/DL
GLUCOSE BLD-MCNC: 291 MG/DL (ref 65–99)
GLUCOSE UR STRIP-MCNC: ABNORMAL MG/DL
HCT VFR BLD AUTO: 40.6 % (ref 34–46.6)
HGB BLD-MCNC: 13.1 G/DL (ref 12–15.9)
HGB UR QL STRIP.AUTO: NEGATIVE
HOLD SPECIMEN: NORMAL
HOLD SPECIMEN: NORMAL
IMM GRANULOCYTES # BLD AUTO: 0.1 10*3/MM3 (ref 0–0.05)
IMM GRANULOCYTES NFR BLD AUTO: 1.1 % (ref 0–0.5)
KETONES UR QL STRIP: ABNORMAL
LEUKOCYTE ESTERASE UR QL STRIP.AUTO: NEGATIVE
LIPASE SERPL-CCNC: 41 U/L (ref 13–60)
LYMPHOCYTES # BLD AUTO: 3.6 10*3/MM3 (ref 0.7–3.1)
LYMPHOCYTES NFR BLD AUTO: 38.5 % (ref 19.6–45.3)
MCH RBC QN AUTO: 32 PG (ref 26.6–33)
MCHC RBC AUTO-ENTMCNC: 32.3 G/DL (ref 31.5–35.7)
MCV RBC AUTO: 99.3 FL (ref 79–97)
MONOCYTES # BLD AUTO: 0.58 10*3/MM3 (ref 0.1–0.9)
MONOCYTES NFR BLD AUTO: 6.2 % (ref 5–12)
NEUTROPHILS # BLD AUTO: 4.92 10*3/MM3 (ref 1.7–7)
NEUTROPHILS NFR BLD AUTO: 52.7 % (ref 42.7–76)
NITRITE UR QL STRIP: NEGATIVE
NRBC BLD AUTO-RTO: 0 /100 WBC (ref 0–0.2)
PH UR STRIP.AUTO: 7 [PH] (ref 5–8)
PLATELET # BLD AUTO: 247 10*3/MM3 (ref 140–450)
PMV BLD AUTO: 9.1 FL (ref 6–12)
POTASSIUM BLD-SCNC: 4.4 MMOL/L (ref 3.5–5.2)
PROT SERPL-MCNC: 6.6 G/DL (ref 6–8.5)
PROT UR QL STRIP: NEGATIVE
RBC # BLD AUTO: 4.09 10*6/MM3 (ref 3.77–5.28)
SODIUM BLD-SCNC: 137 MMOL/L (ref 136–145)
SP GR UR STRIP: 1.02 (ref 1–1.03)
UROBILINOGEN UR QL STRIP: ABNORMAL
WBC NRBC COR # BLD: 9.34 10*3/MM3 (ref 3.4–10.8)
WHOLE BLOOD HOLD SPECIMEN: NORMAL
WHOLE BLOOD HOLD SPECIMEN: NORMAL

## 2019-11-29 PROCEDURE — 25010000002 IOPAMIDOL 61 % SOLUTION: Performed by: EMERGENCY MEDICINE

## 2019-11-29 PROCEDURE — 83690 ASSAY OF LIPASE: CPT | Performed by: EMERGENCY MEDICINE

## 2019-11-29 PROCEDURE — 96375 TX/PRO/DX INJ NEW DRUG ADDON: CPT

## 2019-11-29 PROCEDURE — 85025 COMPLETE CBC W/AUTO DIFF WBC: CPT | Performed by: EMERGENCY MEDICINE

## 2019-11-29 PROCEDURE — 74177 CT ABD & PELVIS W/CONTRAST: CPT

## 2019-11-29 PROCEDURE — 81003 URINALYSIS AUTO W/O SCOPE: CPT | Performed by: EMERGENCY MEDICINE

## 2019-11-29 PROCEDURE — 99284 EMERGENCY DEPT VISIT MOD MDM: CPT

## 2019-11-29 PROCEDURE — 80053 COMPREHEN METABOLIC PANEL: CPT | Performed by: EMERGENCY MEDICINE

## 2019-11-29 PROCEDURE — 70450 CT HEAD/BRAIN W/O DYE: CPT

## 2019-11-29 PROCEDURE — 96374 THER/PROPH/DIAG INJ IV PUSH: CPT

## 2019-11-29 RX ORDER — SODIUM CHLORIDE 0.9 % (FLUSH) 0.9 %
10 SYRINGE (ML) INJECTION AS NEEDED
Status: DISCONTINUED | OUTPATIENT
Start: 2019-11-29 | End: 2019-11-30 | Stop reason: HOSPADM

## 2019-11-29 RX ADMIN — IOPAMIDOL 100 ML: 612 INJECTION, SOLUTION INTRAVENOUS at 23:15

## 2019-11-29 RX ADMIN — SODIUM CHLORIDE 1000 ML: 9 INJECTION, SOLUTION INTRAVENOUS at 22:23

## 2019-11-30 VITALS
HEART RATE: 81 BPM | DIASTOLIC BLOOD PRESSURE: 96 MMHG | HEIGHT: 65 IN | WEIGHT: 250 LBS | TEMPERATURE: 98.1 F | OXYGEN SATURATION: 94 % | BODY MASS INDEX: 41.65 KG/M2 | RESPIRATION RATE: 18 BRPM | SYSTOLIC BLOOD PRESSURE: 151 MMHG

## 2019-11-30 DIAGNOSIS — IMO0002 UNCONTROLLED TYPE 2 DIABETES MELLITUS WITH COMPLICATION, WITH LONG-TERM CURRENT USE OF INSULIN: ICD-10-CM

## 2019-11-30 PROCEDURE — 96374 THER/PROPH/DIAG INJ IV PUSH: CPT

## 2019-11-30 PROCEDURE — 25010000002 PROMETHAZINE PER 50 MG: Performed by: EMERGENCY MEDICINE

## 2019-11-30 PROCEDURE — 25010000002 MORPHINE PER 10 MG: Performed by: EMERGENCY MEDICINE

## 2019-11-30 PROCEDURE — 96375 TX/PRO/DX INJ NEW DRUG ADDON: CPT

## 2019-11-30 RX ORDER — DICYCLOMINE HYDROCHLORIDE 10 MG/1
10 CAPSULE ORAL 3 TIMES DAILY PRN
Qty: 15 CAPSULE | Refills: 0 | Status: SHIPPED | OUTPATIENT
Start: 2019-11-30 | End: 2020-06-22

## 2019-11-30 RX ORDER — PROMETHAZINE HYDROCHLORIDE 25 MG/1
25 TABLET ORAL EVERY 6 HOURS PRN
Qty: 10 TABLET | Refills: 0 | Status: SHIPPED | OUTPATIENT
Start: 2019-11-30 | End: 2020-01-20

## 2019-11-30 RX ORDER — MORPHINE SULFATE 4 MG/ML
4 INJECTION, SOLUTION INTRAMUSCULAR; INTRAVENOUS
Status: DISCONTINUED | OUTPATIENT
Start: 2019-11-30 | End: 2019-11-30 | Stop reason: HOSPADM

## 2019-11-30 RX ORDER — PROMETHAZINE HYDROCHLORIDE 25 MG/ML
12.5 INJECTION, SOLUTION INTRAMUSCULAR; INTRAVENOUS ONCE
Status: COMPLETED | OUTPATIENT
Start: 2019-11-30 | End: 2019-11-30

## 2019-11-30 RX ADMIN — MORPHINE SULFATE 4 MG: 4 INJECTION, SOLUTION INTRAMUSCULAR; INTRAVENOUS at 01:19

## 2019-11-30 RX ADMIN — PROMETHAZINE HYDROCHLORIDE 12.5 MG: 25 INJECTION INTRAMUSCULAR; INTRAVENOUS at 01:17

## 2019-11-30 RX ADMIN — SODIUM CHLORIDE 500 ML: 9 INJECTION, SOLUTION INTRAVENOUS at 01:15

## 2019-12-02 RX ORDER — INSULIN ASPART 100 [IU]/ML
INJECTION, SUSPENSION SUBCUTANEOUS
Qty: 9 ML | Refills: 0 | Status: SHIPPED | OUTPATIENT
Start: 2019-12-02 | End: 2020-01-10 | Stop reason: SDUPTHER

## 2019-12-16 DIAGNOSIS — N95.1 HOT FLASHES DUE TO MENOPAUSE: ICD-10-CM

## 2019-12-16 DIAGNOSIS — N95.1 MENOPAUSAL HOT FLUSHES: ICD-10-CM

## 2019-12-16 RX ORDER — ESTRADIOL 1 MG/1
TABLET ORAL
Qty: 90 TABLET | Refills: 0 | OUTPATIENT
Start: 2019-12-16

## 2019-12-16 RX ORDER — ESTRADIOL 0.5 MG/1
0.5 TABLET ORAL DAILY
Qty: 90 TABLET | Refills: 0 | Status: SHIPPED | OUTPATIENT
Start: 2019-12-16 | End: 2020-03-14

## 2019-12-16 NOTE — TELEPHONE ENCOUNTER
Attempted to contact patient to schedule an appt. No answer LVM asking to return a call.     Roberta stated she will prescribed a lower dose until patient scheduled an appt.

## 2019-12-18 ENCOUNTER — TELEPHONE (OUTPATIENT)
Dept: FAMILY MEDICINE CLINIC | Facility: CLINIC | Age: 59
End: 2019-12-18

## 2019-12-18 RX ORDER — PROMETHAZINE HYDROCHLORIDE 25 MG/1
25 TABLET ORAL EVERY 6 HOURS PRN
Qty: 10 TABLET | Refills: 0 | Status: CANCELLED | OUTPATIENT
Start: 2019-12-18

## 2019-12-19 DIAGNOSIS — IMO0002 UNCONTROLLED TYPE 2 DIABETES MELLITUS WITH COMPLICATION, WITH LONG-TERM CURRENT USE OF INSULIN: ICD-10-CM

## 2019-12-20 DIAGNOSIS — J44.1 CHRONIC OBSTRUCTIVE PULMONARY DISEASE WITH ACUTE EXACERBATION (HCC): ICD-10-CM

## 2019-12-20 RX ORDER — ALBUTEROL SULFATE 90 UG/1
AEROSOL, METERED RESPIRATORY (INHALATION)
Qty: 54 G | Refills: 0 | Status: SHIPPED | OUTPATIENT
Start: 2019-12-20 | End: 2020-03-04

## 2019-12-23 RX ORDER — PROMETHAZINE HYDROCHLORIDE 25 MG/1
TABLET ORAL
Qty: 30 TABLET | OUTPATIENT
Start: 2019-12-23

## 2019-12-24 DIAGNOSIS — J44.9 CHRONIC OBSTRUCTIVE PULMONARY DISEASE, UNSPECIFIED COPD TYPE (HCC): ICD-10-CM

## 2020-01-07 RX ORDER — DICYCLOMINE HYDROCHLORIDE 10 MG/1
CAPSULE ORAL
Qty: 120 CAPSULE | OUTPATIENT
Start: 2020-01-07

## 2020-01-10 ENCOUNTER — OFFICE VISIT (OUTPATIENT)
Dept: FAMILY MEDICINE CLINIC | Facility: CLINIC | Age: 60
End: 2020-01-10

## 2020-01-10 VITALS
HEART RATE: 84 BPM | WEIGHT: 259.2 LBS | HEIGHT: 65 IN | SYSTOLIC BLOOD PRESSURE: 118 MMHG | DIASTOLIC BLOOD PRESSURE: 78 MMHG | OXYGEN SATURATION: 95 % | TEMPERATURE: 98.3 F | BODY MASS INDEX: 43.18 KG/M2

## 2020-01-10 DIAGNOSIS — IMO0002 UNCONTROLLED TYPE 2 DIABETES MELLITUS WITH COMPLICATION, WITH LONG-TERM CURRENT USE OF INSULIN: Primary | ICD-10-CM

## 2020-01-10 DIAGNOSIS — M79.89 RIGHT LEG SWELLING: ICD-10-CM

## 2020-01-10 DIAGNOSIS — F30.9 BIPOLAR I DISORDER, SINGLE MANIC EPISODE (HCC): ICD-10-CM

## 2020-01-10 DIAGNOSIS — H65.195: ICD-10-CM

## 2020-01-10 DIAGNOSIS — R21 SKIN RASH: ICD-10-CM

## 2020-01-10 DIAGNOSIS — E11.8 TYPE 2 DIABETES MELLITUS WITH COMPLICATION, WITHOUT LONG-TERM CURRENT USE OF INSULIN (HCC): ICD-10-CM

## 2020-01-10 DIAGNOSIS — K86.1 CHRONIC PANCREATITIS, UNSPECIFIED PANCREATITIS TYPE (HCC): ICD-10-CM

## 2020-01-10 DIAGNOSIS — J44.9 CHRONIC OBSTRUCTIVE PULMONARY DISEASE, UNSPECIFIED COPD TYPE (HCC): ICD-10-CM

## 2020-01-10 DIAGNOSIS — J22 LOWER RESPIRATORY INFECTION: ICD-10-CM

## 2020-01-10 DIAGNOSIS — E66.01 MORBID OBESITY WITH BMI OF 40.0-44.9, ADULT (HCC): ICD-10-CM

## 2020-01-10 LAB — HBA1C MFR BLD: 9.9 %

## 2020-01-10 PROCEDURE — 83036 HEMOGLOBIN GLYCOSYLATED A1C: CPT | Performed by: NURSE PRACTITIONER

## 2020-01-10 PROCEDURE — 99215 OFFICE O/P EST HI 40 MIN: CPT | Performed by: NURSE PRACTITIONER

## 2020-01-10 RX ORDER — DOXYCYCLINE 100 MG/1
100 TABLET ORAL 2 TIMES DAILY
Qty: 20 TABLET | Refills: 0 | Status: SHIPPED | OUTPATIENT
Start: 2020-01-10 | End: 2020-01-20

## 2020-01-10 RX ORDER — MUPIROCIN CALCIUM 20 MG/G
CREAM TOPICAL SEE ADMIN INSTRUCTIONS
Qty: 30 G | Refills: 1 | Status: SHIPPED | OUTPATIENT
Start: 2020-01-10

## 2020-01-10 RX ORDER — FLUCONAZOLE 100 MG/1
100 TABLET ORAL ONCE
Qty: 2 TABLET | Refills: 0 | Status: SHIPPED | OUTPATIENT
Start: 2020-01-10 | End: 2020-01-10

## 2020-01-10 RX ORDER — GABAPENTIN 800 MG/1
800 TABLET ORAL 4 TIMES DAILY
COMMUNITY
End: 2022-05-24

## 2020-01-10 RX ORDER — LINAGLIPTIN 5 MG/1
TABLET, FILM COATED ORAL
Qty: 90 TABLET | Refills: 0 | Status: SHIPPED | OUTPATIENT
Start: 2020-01-10 | End: 2020-01-10 | Stop reason: SDUPTHER
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
## 2020-01-10 NOTE — PATIENT INSTRUCTIONS
Diabetes Mellitus and Standards of Medical Care  Managing diabetes (diabetes mellitus) can be complicated. Your diabetes treatment may be managed by a team of health care providers, including:  · A physician who specializes in diabetes (endocrinologist).  · A nurse practitioner or physician assistant.  · Nurses.  · A diet and nutrition specialist (registered dietitian).  · A certified diabetes educator (CDE).  · An exercise specialist.  · A pharmacist.  · An eye doctor.  · A foot specialist (podiatrist).  · A dentist.  · A primary care provider.  · A mental health provider.  Your health care providers follow guidelines to help you get the best quality of care. The following schedule is a general guideline for your diabetes management plan. Your health care providers may give you more specific instructions.  Physical exams  Upon being diagnosed with diabetes mellitus, and each year after that, your health care provider will ask about your medical and family history. He or she will also do a physical exam. Your exam may include:  · Measuring your height, weight, and body mass index (BMI).  · Checking your blood pressure. This will be done at every routine medical visit. Your target blood pressure may vary depending on your medical conditions, your age, and other factors.  · Thyroid gland exam.  · Skin exam.  · Screening for damage to your nerves (peripheral neuropathy). This may include checking the pulse in your legs and feet and checking the level of sensation in your hands and feet.  · A complete foot exam to inspect the structure and skin of your feet, including checking for cuts, bruises, redness, blisters, sores, or other problems.  · Screening for blood vessel (vascular) problems, which may include checking the pulse in your legs and feet and checking your temperature.  Blood tests  Depending on your treatment plan and your personal needs, you may have the following tests done:  · HbA1c (hemoglobin A1c). This  test provides information about blood sugar (glucose) control over the previous 2-3 months. It is used to adjust your treatment plan, if needed. This test will be done:  ? At least 2 times a year, if you are meeting your treatment goals.  ? 4 times a year, if you are not meeting your treatment goals or if treatment goals have changed.  · Lipid testing, including total, LDL, and HDL cholesterol and triglyceride levels.  ? The goal for LDL is less than 100 mg/dL (5.5 mmol/L). If you are at high risk for complications, the goal is less than 70 mg/dL (3.9 mmol/L).  ? The goal for HDL is 40 mg/dL (2.2 mmol/L) or higher for men and 50 mg/dL (2.8 mmol/L) or higher for women. An HDL cholesterol of 60 mg/dL (3.3 mmol/L) or higher gives some protection against heart disease.  ? The goal for triglycerides is less than 150 mg/dL (8.3 mmol/L).  · Liver function tests.  · Kidney function tests.  · Thyroid function tests.  Dental and eye exams  · Visit your dentist two times a year.  · If you have type 1 diabetes, your health care provider may recommend an eye exam 3-5 years after you are diagnosed, and then once a year after your first exam.  ? For children with type 1 diabetes, a health care provider may recommend an eye exam when your child is age 10 or older and has had diabetes for 3-5 years. After the first exam, your child should get an eye exam once a year.  · If you have type 2 diabetes, your health care provider may recommend an eye exam as soon as you are diagnosed, and then once a year after your first exam.  Immunizations    · The yearly flu (influenza) vaccine is recommended for everyone 6 months or older who has diabetes.  · The pneumonia (pneumococcal) vaccine is recommended for everyone 2 years or older who has diabetes. If you are 65 or older, you may get the pneumonia vaccine as a series of two separate shots.  · The hepatitis B vaccine is recommended for adults shortly after being diagnosed with  diabetes.  · Adults and children with diabetes should receive all other vaccines according to age-specific recommendations from the Centers for Disease Control and Prevention (CDC).  Mental and emotional health  Screening for symptoms of eating disorders, anxiety, and depression is recommended at the time of diagnosis and afterward as needed. If your screening shows that you have symptoms (positive screening result), you may need more evaluation and you may work with a mental health care provider.  Treatment plan  Your treatment plan will be reviewed at every medical visit. You and your health care provider will discuss:  · How you are taking your medicines, including insulin.  · Any side effects you are experiencing.  · Your blood glucose target goals.  · The frequency of your blood glucose monitoring.  · Lifestyle habits, such as activity level as well as tobacco, alcohol, and substance use.  Diabetes self-management education  Your health care provider will assess how well you are monitoring your blood glucose levels and whether you are taking your insulin correctly. He or she may refer you to:  · A certified diabetes educator to manage your diabetes throughout your life, starting at diagnosis.  · A registered dietitian who can create or review your personal nutrition plan.  · An exercise specialist who can discuss your activity level and exercise plan.  Summary  · Managing diabetes (diabetes mellitus) can be complicated. Your diabetes treatment may be managed by a team of health care providers.  · Your health care providers follow guidelines in order to help you get the best quality of care.  · Standards of care including having regular physical exams, blood tests, blood pressure monitoring, immunizations, screening tests, and education about how to manage your diabetes.  · Your health care providers may also give you more specific instructions based on your individual health.  This information is not intended  to replace advice given to you by your health care provider. Make sure you discuss any questions you have with your health care provider.  Document Released: 10/15/2010 Document Revised: 09/06/2019 Document Reviewed: 09/15/2017  [employer] Interactive Patient Education © 2019 [employer]    Chronic Obstructive Pulmonary Disease  Chronic obstructive pulmonary disease (COPD) is a long-term (chronic) lung problem. When you have COPD, it is hard for air to get in and out of your lungs. Usually the condition gets worse over time, and your lungs will never return to normal. There are things you can do to keep yourself as healthy as possible.  · Your doctor may treat your condition with:  ? Medicines.  ? Oxygen.  ? Lung surgery.  · Your doctor may also recommend:  ? Rehabilitation. This includes steps to make your body work better. It may involve a team of specialists.  ? Quitting smoking, if you smoke.  ? Exercise and changes to your diet.  ? Comfort measures (palliative care).  Follow these instructions at home:  Medicines  · Take over-the-counter and prescription medicines only as told by your doctor.  · Talk to your doctor before taking any cough or allergy medicines. You may need to avoid medicines that cause your lungs to be dry.  Lifestyle  · If you smoke, stop. Smoking makes the problem worse. If you need help quitting, ask your doctor.  · Avoid being around things that make your breathing worse. This may include smoke, chemicals, and fumes.  · Stay active, but remember to rest as well.  · Learn and use tips on how to relax.  · Make sure you get enough sleep. Most adults need at least 7 hours of sleep every night.  · Eat healthy foods. Eat smaller meals more often. Rest before meals.  Controlled breathing  Learn and use tips on how to control your breathing as told by your doctor. Try:  · Breathing in (inhaling) through your nose for 1 second. Then, pucker your lips and breath out (exhale) through your lips for 2  seconds.  · Putting one hand on your belly (abdomen). Breathe in slowly through your nose for 1 second. Your hand on your belly should move out. Pucker your lips and breathe out slowly through your lips. Your hand on your belly should move in as you breathe out.    Controlled coughing  Learn and use controlled coughing to clear mucus from your lungs. Follow these steps:  1. Lean your head a little forward.  2. Breathe in deeply.  3. Try to hold your breath for 3 seconds.  4. Keep your mouth slightly open while coughing 2 times.  5. Spit any mucus out into a tissue.  6. Rest and do the steps again 1 or 2 times as needed.  General instructions  · Make sure you get all the shots (vaccines) that your doctor recommends. Ask your doctor about a flu shot and a pneumonia shot.  · Use oxygen therapy and pulmonary rehabilitation if told by your doctor. If you need home oxygen therapy, ask your doctor if you should buy a tool to measure your oxygen level (oximeter).  · Make a COPD action plan with your doctor. This helps you to know what to do if you feel worse than usual.  · Manage any other conditions you have as told by your doctor.  · Avoid going outside when it is very hot, cold, or humid.  · Avoid people who have a sickness you can catch (contagious).  · Keep all follow-up visits as told by your doctor. This is important.  Contact a doctor if:  · You cough up more mucus than usual.  · There is a change in the color or thickness of the mucus.  · It is harder to breathe than usual.  · Your breathing is faster than usual.  · You have trouble sleeping.  · You need to use your medicines more often than usual.  · You have trouble doing your normal activities such as getting dressed or walking around the house.  Get help right away if:  · You have shortness of breath while resting.  · You have shortness of breath that stops you from:  ? Being able to talk.  ? Doing normal activities.  · Your chest hurts for longer than 5  minutes.  · Your skin color is more blue than usual.  · Your pulse oximeter shows that you have low oxygen for longer than 5 minutes.  · You have a fever.  · You feel too tired to breathe normally.  Summary  · Chronic obstructive pulmonary disease (COPD) is a long-term lung problem.  · The way your lungs work will never return to normal. Usually the condition gets worse over time. There are things you can do to keep yourself as healthy as possible.  · Take over-the-counter and prescription medicines only as told by your doctor.  · If you smoke, stop. Smoking makes the problem worse.  This information is not intended to replace advice given to you by your health care provider. Make sure you discuss any questions you have with your health care provider.  Document Released: 06/05/2009 Document Revised: 01/22/2018 Document Reviewed: 01/22/2018  [employer] Interactive Patient Education © 2019 [employer]    Peripheral Edema    Peripheral edema is swelling that is caused by a buildup of fluid. Peripheral edema most often affects the lower legs, ankles, and feet. It can also develop in the arms, hands, and face. The area of the body that has peripheral edema will look swollen. It may also feel heavy or warm. Your clothes may start to feel tight. Pressing on the area may make a temporary dent in your skin. You may not be able to move your swollen arm or leg as much as usual.  There are many causes of peripheral edema. It can happen because of a complication of other conditions such as congestive heart failure, kidney disease, or a problem with your blood circulation. It also can be a side effect of certain medicines or because of an infection. It often happens to women during pregnancy. Sometimes, the cause is not known.  Follow these instructions at home:  Managing pain, stiffness, and swelling    · Raise (elevate) your legs while you are sitting or lying down.  · Move around often to prevent stiffness and to lessen  swelling.  · Do not sit or stand for long periods of time.  · Wear support stockings as told by your health care provider.  Medicines  · Take over-the-counter and prescription medicines only as told by your health care provider.  · Your health care provider may prescribe medicine to help your body get rid of excess water (diuretic).  General instructions  · Pay attention to any changes in your symptoms.  · Follow instructions from your health care provider about limiting salt (sodium) in your diet. Sometimes, eating less salt may reduce swelling.  · Moisturize skin daily to help prevent skin from cracking and draining.  · Keep all follow-up visits as told by your health care provider. This is important.  Contact a health care provider if you have:  · A fever.  · Edema that starts suddenly or is getting worse, especially if you are pregnant or have a medical condition.  · Swelling in only one leg.  · Increased swelling, redness, or pain in one or both of your legs.  · Drainage or sores at the area where you have edema.  Get help right away if you:  · Develop shortness of breath, especially when you are lying down.  · Have pain in your chest or abdomen.  · Feel weak.  · Feel faint.  Summary  · Peripheral edema is swelling that is caused by a buildup of fluid. Peripheral edema most often affects the lower legs, ankles, and feet.  · Move around often to prevent stiffness and to lessen swelling. Do not sit or stand for long periods of time.  · Pay attention to any changes in your symptoms.  · Contact a health care provider if you have edema that starts suddenly or is getting worse, especially if you are pregnant or have a medical condition.  · Get help right away if you develop shortness of breath, especially when lying down.  This information is not intended to replace advice given to you by your health care provider. Make sure you discuss any questions you have with your health care provider.  Document Released:  01/25/2006 Document Revised: 09/11/2019 Document Reviewed: 09/11/2019  [employer] Interactive Patient Education © 2019 [employer]    Use anoro inhaler everyday.     Increase insulin by 2 units twice a day every four days until fasting glucose is 150 consistently, be sure to eat a meal immediately after insulin    Someone will call to set up right leg ultrasound     Discuss medications with psychiatrist, need to work on anxiety issues

## 2020-01-11 PROBLEM — K86.1 CHRONIC PANCREATITIS: Status: ACTIVE | Noted: 2018-08-01

## 2020-01-19 DIAGNOSIS — K21.9 CHRONIC GERD: ICD-10-CM

## 2020-01-19 DIAGNOSIS — R60.9 EDEMA, UNSPECIFIED TYPE: ICD-10-CM

## 2020-01-19 DIAGNOSIS — R60.1 GENERALIZED EDEMA: ICD-10-CM

## 2020-01-20 RX ORDER — OMEPRAZOLE 40 MG/1
CAPSULE, DELAYED RELEASE ORAL
Qty: 30 CAPSULE | Refills: 2 | Status: SHIPPED | OUTPATIENT
Start: 2020-01-20 | End: 2020-03-23 | Stop reason: SDUPTHER

## 2020-01-20 RX ORDER — PROMETHAZINE HYDROCHLORIDE 25 MG/1
TABLET ORAL
Qty: 30 TABLET | Refills: 0 | Status: SHIPPED | OUTPATIENT
Start: 2020-01-20 | End: 2020-03-13

## 2020-01-20 RX ORDER — FUROSEMIDE 20 MG/1
20 TABLET ORAL DAILY
Qty: 90 TABLET | Refills: 0 | Status: SHIPPED | OUTPATIENT
Start: 2020-01-20 | End: 2020-03-23 | Stop reason: SDUPTHER

## 2020-01-26 DIAGNOSIS — J44.9 CHRONIC OBSTRUCTIVE PULMONARY DISEASE, UNSPECIFIED COPD TYPE (HCC): ICD-10-CM

## 2020-01-28 ENCOUNTER — HOSPITAL ENCOUNTER (OUTPATIENT)
Dept: CARDIOLOGY | Facility: HOSPITAL | Age: 60
Discharge: HOME | End: 2020-01-28
Admitting: NURSE PRACTITIONER

## 2020-01-28 VITALS — WEIGHT: 259 LBS | HEIGHT: 65 IN | BODY MASS INDEX: 43.15 KG/M2

## 2020-01-28 DIAGNOSIS — M79.89 RIGHT LEG SWELLING: ICD-10-CM

## 2020-01-28 LAB
BH CV ECHO MEAS - BSA(HAYCOCK): 2.4 M^2
BH CV ECHO MEAS - BSA: 2.2 M^2
BH CV ECHO MEAS - BZI_BMI: 43.1 KILOGRAMS/M^2
BH CV ECHO MEAS - BZI_METRIC_HEIGHT: 165.1 CM
BH CV ECHO MEAS - BZI_METRIC_WEIGHT: 117.5 KG
BH CV LOWER VASCULAR LEFT COMMON FEMORAL AUGMENT: NORMAL
BH CV LOWER VASCULAR LEFT COMMON FEMORAL COMPRESS: NORMAL
BH CV LOWER VASCULAR LEFT COMMON FEMORAL PHASIC: NORMAL
BH CV LOWER VASCULAR LEFT COMMON FEMORAL SPONT: NORMAL
BH CV LOWER VASCULAR LEFT SAPHENOFEMORAL JUNCTION AUGMENT: NORMAL
BH CV LOWER VASCULAR LEFT SAPHENOFEMORAL JUNCTION COMPRESS: NORMAL
BH CV LOWER VASCULAR LEFT SAPHENOFEMORAL JUNCTION PHASIC: NORMAL
BH CV LOWER VASCULAR LEFT SAPHENOFEMORAL JUNCTION SPONT: NORMAL
BH CV LOWER VASCULAR RIGHT COMMON FEMORAL AUGMENT: NORMAL
BH CV LOWER VASCULAR RIGHT COMMON FEMORAL COMPRESS: NORMAL
BH CV LOWER VASCULAR RIGHT COMMON FEMORAL PHASIC: NORMAL
BH CV LOWER VASCULAR RIGHT COMMON FEMORAL SPONT: NORMAL
BH CV LOWER VASCULAR RIGHT DISTAL FEMORAL AUGMENT: NORMAL
BH CV LOWER VASCULAR RIGHT DISTAL FEMORAL COMPRESS: NORMAL
BH CV LOWER VASCULAR RIGHT DISTAL FEMORAL PHASIC: NORMAL
BH CV LOWER VASCULAR RIGHT DISTAL FEMORAL SPONT: NORMAL
BH CV LOWER VASCULAR RIGHT GASTRONEMIUS COMPRESS: NORMAL
BH CV LOWER VASCULAR RIGHT GREATER SAPH AK COMPRESS: NORMAL
BH CV LOWER VASCULAR RIGHT GREATER SAPH BK COMPRESS: NORMAL
BH CV LOWER VASCULAR RIGHT LESSER SAPH COMPRESS: NORMAL
BH CV LOWER VASCULAR RIGHT MID FEMORAL AUGMENT: NORMAL
BH CV LOWER VASCULAR RIGHT MID FEMORAL COMPRESS: NORMAL
BH CV LOWER VASCULAR RIGHT MID FEMORAL PHASIC: NORMAL
BH CV LOWER VASCULAR RIGHT MID FEMORAL SPONT: NORMAL
BH CV LOWER VASCULAR RIGHT PERONEAL AUGMENT: NORMAL
BH CV LOWER VASCULAR RIGHT PERONEAL COMPRESS: NORMAL
BH CV LOWER VASCULAR RIGHT POPLITEAL AUGMENT: NORMAL
BH CV LOWER VASCULAR RIGHT POPLITEAL COMPRESS: NORMAL
BH CV LOWER VASCULAR RIGHT POPLITEAL PHASIC: NORMAL
BH CV LOWER VASCULAR RIGHT POPLITEAL SPONT: NORMAL
BH CV LOWER VASCULAR RIGHT POSTERIOR TIBIAL AUGMENT: NORMAL
BH CV LOWER VASCULAR RIGHT POSTERIOR TIBIAL COMPRESS: NORMAL
BH CV LOWER VASCULAR RIGHT PROFUNDA FEMORAL AUGMENT: NORMAL
BH CV LOWER VASCULAR RIGHT PROFUNDA FEMORAL PHASIC: NORMAL
BH CV LOWER VASCULAR RIGHT PROFUNDA FEMORAL SPONT: NORMAL
BH CV LOWER VASCULAR RIGHT PROXIMAL FEMORAL AUGMENT: NORMAL
BH CV LOWER VASCULAR RIGHT PROXIMAL FEMORAL COMPRESS: NORMAL
BH CV LOWER VASCULAR RIGHT PROXIMAL FEMORAL PHASIC: NORMAL
BH CV LOWER VASCULAR RIGHT PROXIMAL FEMORAL SPONT: NORMAL
BH CV LOWER VASCULAR RIGHT SAPHENOFEMORAL JUNCTION AUGMENT: NORMAL
BH CV LOWER VASCULAR RIGHT SAPHENOFEMORAL JUNCTION COMPRESS: NORMAL
BH CV LOWER VASCULAR RIGHT SAPHENOFEMORAL JUNCTION PHASIC: NORMAL
BH CV LOWER VASCULAR RIGHT SAPHENOFEMORAL JUNCTION SPONT: NORMAL

## 2020-01-28 PROCEDURE — 93971 EXTREMITY STUDY: CPT

## 2020-01-28 PROCEDURE — 93971 EXTREMITY STUDY: CPT | Performed by: INTERNAL MEDICINE

## 2020-02-08 RX ORDER — ICOSAPENT ETHYL 1000 MG/1
CAPSULE ORAL
Qty: 90 CAPSULE | Refills: 0 | Status: SHIPPED | OUTPATIENT
Start: 2020-02-08 | End: 2020-03-23 | Stop reason: SDUPTHER

## 2020-02-11 ENCOUNTER — TELEPHONE (OUTPATIENT)
Dept: FAMILY MEDICINE CLINIC | Facility: CLINIC | Age: 60
End: 2020-02-11

## 2020-02-11 DIAGNOSIS — G89.29 CHRONIC BILATERAL LOW BACK PAIN: ICD-10-CM

## 2020-02-11 DIAGNOSIS — M54.50 CHRONIC BILATERAL LOW BACK PAIN: ICD-10-CM

## 2020-02-11 RX ORDER — CYCLOBENZAPRINE HCL 10 MG
10 TABLET ORAL 3 TIMES DAILY PRN
Qty: 90 TABLET | Refills: 0 | Status: SHIPPED | OUTPATIENT
Start: 2020-02-11 | End: 2020-02-13 | Stop reason: SDUPTHER

## 2020-02-11 NOTE — TELEPHONE ENCOUNTER
PT IS CHANGING PHARMACY I HAVE CORRECTED IT  IN HER DEMOGRAPHICS    PT NEEDS REFILLS ON  cyclobenzaprine (FLEXERIL) 10 MG tablet    CONFIRMED [facility]

## 2020-02-12 ENCOUNTER — TELEPHONE (OUTPATIENT)
Dept: FAMILY MEDICINE CLINIC | Facility: CLINIC | Age: 60
End: 2020-02-12

## 2020-02-12 NOTE — TELEPHONE ENCOUNTER
Patient called and stated that she no longer wants any of her medications sent to [facility].    Also stated that she wants the RX cyclobenzaprine (FLEXERIL) 10 MG tablet that was sent to [facility] sent to [facility].    Please advise.  Call back:[phone]      Pharmacy:  [facility] [place] Mikaela pugh

## 2020-02-12 NOTE — TELEPHONE ENCOUNTER
Pt called for refill yesterday stating she was changing pharmacies to [facility] and today she does not want this. Can you please advise? Does pt have a caregiver we should contact regarding her medication?

## 2020-02-13 DIAGNOSIS — G89.29 CHRONIC BILATERAL LOW BACK PAIN: ICD-10-CM

## 2020-02-13 DIAGNOSIS — M54.50 CHRONIC BILATERAL LOW BACK PAIN: ICD-10-CM

## 2020-02-13 RX ORDER — CYCLOBENZAPRINE HCL 10 MG
10 TABLET ORAL 3 TIMES DAILY PRN
Qty: 90 TABLET | Refills: 0 | Status: SHIPPED | OUTPATIENT
Start: 2020-02-13 | End: 2020-06-24 | Stop reason: SDUPTHER

## 2020-02-14 DIAGNOSIS — E03.9 HYPOTHYROIDISM, UNSPECIFIED TYPE: ICD-10-CM

## 2020-02-14 RX ORDER — LEVOTHYROXINE SODIUM 88 UG/1
88 TABLET ORAL DAILY
Qty: 90 TABLET | Refills: 0 | Status: SHIPPED | OUTPATIENT
Start: 2020-02-14 | End: 2020-03-23 | Stop reason: SDUPTHER

## 2020-03-04 DIAGNOSIS — J44.1 CHRONIC OBSTRUCTIVE PULMONARY DISEASE WITH ACUTE EXACERBATION (HCC): ICD-10-CM

## 2020-03-04 RX ORDER — ALBUTEROL SULFATE 90 UG/1
AEROSOL, METERED RESPIRATORY (INHALATION)
Qty: 54 G | Refills: 0 | Status: SHIPPED | OUTPATIENT
Start: 2020-03-04 | End: 2020-03-23 | Stop reason: SDUPTHER

## 2020-03-13 RX ORDER — PROMETHAZINE HYDROCHLORIDE 25 MG/1
TABLET ORAL
Qty: 30 TABLET | Refills: 0 | Status: SHIPPED | OUTPATIENT
Start: 2020-03-13 | End: 2020-04-07 | Stop reason: SDUPTHER

## 2020-03-14 DIAGNOSIS — N95.1 HOT FLASHES DUE TO MENOPAUSE: ICD-10-CM

## 2020-03-14 DIAGNOSIS — N95.1 MENOPAUSAL HOT FLUSHES: ICD-10-CM

## 2020-03-14 RX ORDER — ESTRADIOL 0.5 MG/1
TABLET ORAL
Qty: 90 TABLET | Refills: 0 | Status: SHIPPED | OUTPATIENT
Start: 2020-03-14 | End: 2020-03-23 | Stop reason: SDUPTHER

## 2020-03-23 DIAGNOSIS — E03.9 HYPOTHYROIDISM, UNSPECIFIED TYPE: ICD-10-CM

## 2020-03-23 DIAGNOSIS — R60.1 GENERALIZED EDEMA: ICD-10-CM

## 2020-03-23 DIAGNOSIS — N95.1 MENOPAUSAL HOT FLUSHES: ICD-10-CM

## 2020-03-23 DIAGNOSIS — K21.9 CHRONIC GERD: ICD-10-CM

## 2020-03-23 DIAGNOSIS — J44.1 CHRONIC OBSTRUCTIVE PULMONARY DISEASE WITH ACUTE EXACERBATION (HCC): ICD-10-CM

## 2020-03-23 DIAGNOSIS — IMO0002 UNCONTROLLED TYPE 2 DIABETES MELLITUS WITH COMPLICATION, WITH LONG-TERM CURRENT USE OF INSULIN: ICD-10-CM

## 2020-03-23 DIAGNOSIS — B37.2 SKIN YEAST INFECTION: ICD-10-CM

## 2020-03-23 DIAGNOSIS — M54.50 CHRONIC BILATERAL LOW BACK PAIN: ICD-10-CM

## 2020-03-23 DIAGNOSIS — E78.5 HYPERLIPIDEMIA, UNSPECIFIED HYPERLIPIDEMIA TYPE: ICD-10-CM

## 2020-03-23 DIAGNOSIS — R60.9 EDEMA, UNSPECIFIED TYPE: ICD-10-CM

## 2020-03-23 DIAGNOSIS — J44.9 CHRONIC OBSTRUCTIVE PULMONARY DISEASE, UNSPECIFIED COPD TYPE (HCC): ICD-10-CM

## 2020-03-23 DIAGNOSIS — G89.29 CHRONIC BILATERAL LOW BACK PAIN: ICD-10-CM

## 2020-03-23 DIAGNOSIS — H92.01 RIGHT EAR PAIN: ICD-10-CM

## 2020-03-23 DIAGNOSIS — N95.1 HOT FLASHES DUE TO MENOPAUSE: ICD-10-CM

## 2020-03-23 DIAGNOSIS — G43.019 INTRACTABLE MIGRAINE WITHOUT AURA AND WITHOUT STATUS MIGRAINOSUS: ICD-10-CM

## 2020-03-23 RX ORDER — SUMATRIPTAN 50 MG/1
50 TABLET, FILM COATED ORAL ONCE AS NEEDED
Qty: 18 TABLET | Refills: 5 | Status: SHIPPED | OUTPATIENT
Start: 2020-03-23 | End: 2020-06-24 | Stop reason: SDUPTHER

## 2020-03-23 RX ORDER — NYSTATIN 100000 U/G
OINTMENT TOPICAL 2 TIMES DAILY
Qty: 60 G | Refills: 1 | Status: CANCELLED | OUTPATIENT
Start: 2020-03-23

## 2020-03-23 RX ORDER — DIVALPROEX SODIUM 500 MG/1
TABLET, EXTENDED RELEASE ORAL
Refills: 5 | Status: CANCELLED | OUTPATIENT
Start: 2020-03-23

## 2020-03-23 RX ORDER — LEVOTHYROXINE SODIUM 88 UG/1
88 TABLET ORAL DAILY
Qty: 90 TABLET | Refills: 3 | Status: SHIPPED | OUTPATIENT
Start: 2020-03-23 | End: 2020-05-18

## 2020-03-23 RX ORDER — SIMVASTATIN 10 MG
10 TABLET ORAL EVERY EVENING
Qty: 90 TABLET | Refills: 1 | Status: SHIPPED | OUTPATIENT
Start: 2020-03-23 | End: 2020-06-24 | Stop reason: SDUPTHER

## 2020-03-23 RX ORDER — OMEPRAZOLE 40 MG/1
40 CAPSULE, DELAYED RELEASE ORAL DAILY
Qty: 90 CAPSULE | Refills: 1 | Status: SHIPPED | OUTPATIENT
Start: 2020-03-23 | End: 2020-06-24 | Stop reason: SDUPTHER

## 2020-03-23 RX ORDER — FUROSEMIDE 20 MG/1
20 TABLET ORAL DAILY
Qty: 90 TABLET | Refills: 0 | Status: SHIPPED | OUTPATIENT
Start: 2020-03-23 | End: 2020-06-16

## 2020-03-23 RX ORDER — ALBUTEROL SULFATE 90 UG/1
2 AEROSOL, METERED RESPIRATORY (INHALATION) EVERY 6 HOURS PRN
Qty: 3 INHALER | Refills: 3 | Status: SHIPPED | OUTPATIENT
Start: 2020-03-23 | End: 2020-06-24 | Stop reason: SDUPTHER

## 2020-03-23 RX ORDER — DICYCLOMINE HYDROCHLORIDE 10 MG/1
10 CAPSULE ORAL 3 TIMES DAILY PRN
Qty: 15 CAPSULE | Refills: 0 | Status: CANCELLED | OUTPATIENT
Start: 2020-03-23

## 2020-03-23 RX ORDER — ICOSAPENT ETHYL 1000 MG/1
1 CAPSULE ORAL DAILY
Qty: 90 CAPSULE | Refills: 0 | Status: SHIPPED | OUTPATIENT
Start: 2020-03-23 | End: 2020-05-26

## 2020-03-23 RX ORDER — CYCLOBENZAPRINE HCL 10 MG
10 TABLET ORAL 3 TIMES DAILY PRN
Qty: 90 TABLET | Refills: 0 | Status: CANCELLED | OUTPATIENT
Start: 2020-03-23

## 2020-03-23 RX ORDER — PROMETHAZINE HYDROCHLORIDE 25 MG/1
TABLET ORAL
Qty: 30 TABLET | Refills: 0 | OUTPATIENT
Start: 2020-03-23

## 2020-03-23 RX ORDER — PROMETHAZINE HYDROCHLORIDE 25 MG/1
25 TABLET ORAL EVERY 6 HOURS PRN
Qty: 30 TABLET | Refills: 0 | Status: CANCELLED | OUTPATIENT
Start: 2020-03-23

## 2020-03-23 RX ORDER — ESTRADIOL 0.5 MG/1
0.5 TABLET ORAL DAILY
Qty: 90 TABLET | Refills: 0 | Status: SHIPPED | OUTPATIENT
Start: 2020-03-23 | End: 2020-06-09

## 2020-03-23 RX ORDER — BLOOD-GLUCOSE METER
EACH MISCELLANEOUS
Qty: 1 EACH | Refills: 0 | Status: CANCELLED | OUTPATIENT
Start: 2020-03-23

## 2020-03-23 RX ORDER — TRAZODONE HYDROCHLORIDE 100 MG/1
TABLET ORAL
Refills: 3 | Status: CANCELLED | OUTPATIENT
Start: 2020-03-23

## 2020-03-23 NOTE — TELEPHONE ENCOUNTER
Patient is calling stating that she needs refills on all of her medications but only new the name of one which was the phenergan. Patient also stated that she would like at least 3 months of her medication so she doesn't run out during this pandemic.

## 2020-04-07 DIAGNOSIS — IMO0002 UNCONTROLLED TYPE 2 DIABETES MELLITUS WITH COMPLICATION, WITH LONG-TERM CURRENT USE OF INSULIN: ICD-10-CM

## 2020-04-07 RX ORDER — BLOOD-GLUCOSE METER
EACH MISCELLANEOUS
Qty: 1 EACH | Refills: 0 | Status: SHIPPED | OUTPATIENT
Start: 2020-04-07

## 2020-04-07 RX ORDER — PROMETHAZINE HYDROCHLORIDE 25 MG/1
25 TABLET ORAL EVERY 6 HOURS PRN
Qty: 30 TABLET | Refills: 0 | Status: SHIPPED | OUTPATIENT
Start: 2020-04-07 | End: 2020-06-02

## 2020-04-07 NOTE — TELEPHONE ENCOUNTER
PATIENT CALLED STATING SHE DROPPED HER DIABETIC METER IN WATER AND WOULD LIKE TO REQUEST ANOTHER Blood Glucose Monitoring Suppl (ONE TOUCH ULTRA 2) w/Device kit    promethazine (PHENERGAN) 25 MG tablet    B-D ULTRAFINE III SHORT PEN 31G X 8 MM misc - PT STATES SHE DOESN'T HAVE REFILLS         CAYDEN GUZMÁN   [phone]

## 2020-04-07 NOTE — TELEPHONE ENCOUNTER
PT CALLED BACK TO SEE IF SHE CAN GET AN PRESCRIPTION FOR PATCHED TO STOP SMOKING. SHE ADVISED SHE WANTED TO TRY AND STOP AN NEEDED AN PRESCRIPTION FOR THE SMOKER PATCHES.     SENT TO THE [facility] PHARMACY IN CHART     CALLBACK # [phone]

## 2020-04-08 DIAGNOSIS — IMO0002 UNCONTROLLED TYPE 2 DIABETES MELLITUS WITH COMPLICATION, WITH LONG-TERM CURRENT USE OF INSULIN: ICD-10-CM

## 2020-04-20 RX ORDER — LANCETS 30 GAUGE
EACH MISCELLANEOUS
Qty: 100 EACH | Refills: 2 | Status: SHIPPED | OUTPATIENT
Start: 2020-04-20 | End: 2020-09-15 | Stop reason: SDUPTHER

## 2020-05-01 DIAGNOSIS — IMO0002 UNCONTROLLED TYPE 2 DIABETES MELLITUS WITH COMPLICATION, WITH LONG-TERM CURRENT USE OF INSULIN: ICD-10-CM

## 2020-05-01 RX ORDER — INSULIN ASPART 100 [IU]/ML
INJECTION, SUSPENSION SUBCUTANEOUS
Qty: 15 ML | Refills: 1 | Status: SHIPPED | OUTPATIENT
Start: 2020-05-01 | End: 2020-05-18

## 2020-05-17 DIAGNOSIS — E03.9 HYPOTHYROIDISM, UNSPECIFIED TYPE: ICD-10-CM

## 2020-05-17 DIAGNOSIS — IMO0002 UNCONTROLLED TYPE 2 DIABETES MELLITUS WITH COMPLICATION, WITH LONG-TERM CURRENT USE OF INSULIN: ICD-10-CM

## 2020-05-18 RX ORDER — LEVOTHYROXINE SODIUM 88 UG/1
88 TABLET ORAL DAILY
Qty: 90 TABLET | Refills: 3 | Status: SHIPPED | OUTPATIENT
Start: 2020-05-18 | End: 2020-06-24 | Stop reason: SDUPTHER

## 2020-05-18 RX ORDER — INSULIN ASPART 100 [IU]/ML
INJECTION, SUSPENSION SUBCUTANEOUS
Qty: 15 ML | Refills: 1 | Status: SHIPPED | OUTPATIENT
Start: 2020-05-18 | End: 2020-06-24 | Stop reason: SDUPTHER

## 2020-05-26 RX ORDER — ICOSAPENT ETHYL 1000 MG/1
CAPSULE ORAL
Qty: 90 CAPSULE | Refills: 0 | Status: SHIPPED | OUTPATIENT
Start: 2020-05-26 | End: 2020-12-03 | Stop reason: SDUPTHER

## 2020-05-26 RX ORDER — ICOSAPENT ETHYL 1000 MG/1
CAPSULE ORAL
Qty: 90 CAPSULE | Refills: 0 | Status: SHIPPED | OUTPATIENT
Start: 2020-05-26 | End: 2020-06-24 | Stop reason: SDUPTHER

## 2020-06-02 RX ORDER — PROMETHAZINE HYDROCHLORIDE 25 MG/1
TABLET ORAL
Qty: 30 TABLET | Refills: 0 | Status: SHIPPED | OUTPATIENT
Start: 2020-06-02 | End: 2020-07-13

## 2020-06-09 DIAGNOSIS — N95.1 HOT FLASHES DUE TO MENOPAUSE: ICD-10-CM

## 2020-06-09 DIAGNOSIS — N95.1 MENOPAUSAL HOT FLUSHES: ICD-10-CM

## 2020-06-09 RX ORDER — ESTRADIOL 0.5 MG/1
TABLET ORAL
Qty: 90 TABLET | Refills: 0 | Status: SHIPPED | OUTPATIENT
Start: 2020-06-09 | End: 2020-09-15 | Stop reason: SDUPTHER

## 2020-06-16 DIAGNOSIS — R60.9 EDEMA, UNSPECIFIED TYPE: ICD-10-CM

## 2020-06-16 DIAGNOSIS — R60.1 GENERALIZED EDEMA: ICD-10-CM

## 2020-06-16 RX ORDER — FUROSEMIDE 20 MG/1
20 TABLET ORAL DAILY
Qty: 90 TABLET | Refills: 0 | Status: SHIPPED | OUTPATIENT
Start: 2020-06-16 | End: 2020-06-24

## 2020-06-22 RX ORDER — DICYCLOMINE HYDROCHLORIDE 10 MG/1
CAPSULE ORAL
Qty: 120 CAPSULE | Refills: 1 | Status: SHIPPED | OUTPATIENT
Start: 2020-06-22 | End: 2020-06-24 | Stop reason: SDUPTHER

## 2020-06-23 ENCOUNTER — TELEPHONE (OUTPATIENT)
Dept: FAMILY MEDICINE CLINIC | Facility: CLINIC | Age: 60
End: 2020-06-23

## 2020-06-23 NOTE — TELEPHONE ENCOUNTER
Patient states that she would like to see if Roberta can increase her lasix because her legs are swelling more than normal.  She also states that she would like to see Roberta in 2 weeks, but there is no appts available.  She can be reached at [phone]

## 2020-06-24 ENCOUNTER — OFFICE VISIT (OUTPATIENT)
Dept: FAMILY MEDICINE CLINIC | Facility: CLINIC | Age: 60
End: 2020-06-24

## 2020-06-24 VITALS
OXYGEN SATURATION: 92 % | DIASTOLIC BLOOD PRESSURE: 86 MMHG | WEIGHT: 266 LBS | BODY MASS INDEX: 44.32 KG/M2 | HEART RATE: 100 BPM | HEIGHT: 65 IN | SYSTOLIC BLOOD PRESSURE: 128 MMHG

## 2020-06-24 DIAGNOSIS — G89.29 CHRONIC BILATERAL LOW BACK PAIN: ICD-10-CM

## 2020-06-24 DIAGNOSIS — M54.50 CHRONIC BILATERAL LOW BACK PAIN: ICD-10-CM

## 2020-06-24 DIAGNOSIS — E11.51 TYPE 2 DIABETES MELLITUS WITH DIABETIC PERIPHERAL ANGIOPATHY WITHOUT GANGRENE, WITH LONG-TERM CURRENT USE OF INSULIN (HCC): Primary | Chronic | ICD-10-CM

## 2020-06-24 DIAGNOSIS — M54.50 CHRONIC MIDLINE LOW BACK PAIN WITHOUT SCIATICA: ICD-10-CM

## 2020-06-24 DIAGNOSIS — IMO0002 UNCONTROLLED TYPE 2 DIABETES MELLITUS WITH COMPLICATION, WITH LONG-TERM CURRENT USE OF INSULIN: ICD-10-CM

## 2020-06-24 DIAGNOSIS — J44.9 CHRONIC OBSTRUCTIVE PULMONARY DISEASE, UNSPECIFIED COPD TYPE (HCC): ICD-10-CM

## 2020-06-24 DIAGNOSIS — E78.5 HYPERLIPIDEMIA, UNSPECIFIED HYPERLIPIDEMIA TYPE: ICD-10-CM

## 2020-06-24 DIAGNOSIS — E03.9 HYPOTHYROIDISM, UNSPECIFIED TYPE: ICD-10-CM

## 2020-06-24 DIAGNOSIS — J44.1 CHRONIC OBSTRUCTIVE PULMONARY DISEASE WITH ACUTE EXACERBATION (HCC): ICD-10-CM

## 2020-06-24 DIAGNOSIS — K21.9 CHRONIC GERD: ICD-10-CM

## 2020-06-24 DIAGNOSIS — N95.1 MENOPAUSAL HOT FLUSHES: ICD-10-CM

## 2020-06-24 DIAGNOSIS — G89.29 CHRONIC MIDLINE LOW BACK PAIN WITHOUT SCIATICA: ICD-10-CM

## 2020-06-24 DIAGNOSIS — R60.1 GENERALIZED EDEMA: ICD-10-CM

## 2020-06-24 DIAGNOSIS — Z79.4 TYPE 2 DIABETES MELLITUS WITH DIABETIC PERIPHERAL ANGIOPATHY WITHOUT GANGRENE, WITH LONG-TERM CURRENT USE OF INSULIN (HCC): Primary | Chronic | ICD-10-CM

## 2020-06-24 DIAGNOSIS — G43.019 INTRACTABLE MIGRAINE WITHOUT AURA AND WITHOUT STATUS MIGRAINOSUS: ICD-10-CM

## 2020-06-24 DIAGNOSIS — R60.9 EDEMA, UNSPECIFIED TYPE: ICD-10-CM

## 2020-06-24 DIAGNOSIS — N95.1 HOT FLASHES DUE TO MENOPAUSE: ICD-10-CM

## 2020-06-24 PROCEDURE — 99214 OFFICE O/P EST MOD 30 MIN: CPT | Performed by: INTERNAL MEDICINE

## 2020-06-24 RX ORDER — PEN NEEDLE, DIABETIC 31 GX5/16"
1 NEEDLE, DISPOSABLE MISCELLANEOUS 3 TIMES DAILY
Qty: 90 EACH | Refills: 3 | Status: SHIPPED | OUTPATIENT
Start: 2020-06-24 | End: 2021-02-02 | Stop reason: SDUPTHER

## 2020-06-24 RX ORDER — BETHANECHOL CHLORIDE 25 MG/1
25 TABLET ORAL 3 TIMES DAILY
Qty: 90 TABLET | Refills: 3 | Status: SHIPPED | OUTPATIENT
Start: 2020-06-24 | End: 2020-12-08

## 2020-06-24 RX ORDER — OMEPRAZOLE 40 MG/1
40 CAPSULE, DELAYED RELEASE ORAL DAILY
Qty: 90 CAPSULE | Refills: 1 | Status: SHIPPED | OUTPATIENT
Start: 2020-06-24 | End: 2020-12-28

## 2020-06-24 RX ORDER — ICOSAPENT ETHYL 1000 MG/1
1 CAPSULE ORAL DAILY
Qty: 90 CAPSULE | Refills: 0 | Status: SHIPPED | OUTPATIENT
Start: 2020-06-24 | End: 2020-11-17

## 2020-06-24 RX ORDER — LIDOCAINE 50 MG/G
1 PATCH TOPICAL EVERY 24 HOURS
Qty: 30 EACH | Refills: 3 | Status: SHIPPED | OUTPATIENT
Start: 2020-06-24 | End: 2023-02-16 | Stop reason: SDUPTHER

## 2020-06-24 RX ORDER — CYCLOBENZAPRINE HCL 10 MG
10 TABLET ORAL 3 TIMES DAILY PRN
Qty: 90 TABLET | Refills: 0 | Status: SHIPPED | OUTPATIENT
Start: 2020-06-24 | End: 2021-03-12 | Stop reason: SDUPTHER

## 2020-06-24 RX ORDER — LEVOTHYROXINE SODIUM 88 UG/1
88 TABLET ORAL DAILY
Qty: 90 TABLET | Refills: 3 | Status: SHIPPED | OUTPATIENT
Start: 2020-06-24 | End: 2021-08-10

## 2020-06-24 RX ORDER — ALBUTEROL SULFATE 90 UG/1
2 AEROSOL, METERED RESPIRATORY (INHALATION) EVERY 6 HOURS PRN
Qty: 3 INHALER | Refills: 3 | Status: SHIPPED | OUTPATIENT
Start: 2020-06-24 | End: 2021-08-17

## 2020-06-24 RX ORDER — SUMATRIPTAN 50 MG/1
50 TABLET, FILM COATED ORAL ONCE AS NEEDED
Qty: 18 TABLET | Refills: 5 | Status: SHIPPED | OUTPATIENT
Start: 2020-06-24 | End: 2021-09-14

## 2020-06-24 RX ORDER — DICYCLOMINE HYDROCHLORIDE 10 MG/1
10 CAPSULE ORAL
Qty: 120 CAPSULE | Refills: 1 | Status: SHIPPED | OUTPATIENT
Start: 2020-06-24 | End: 2021-03-18 | Stop reason: SDUPTHER

## 2020-06-24 RX ORDER — FUROSEMIDE 20 MG/1
40 TABLET ORAL DAILY
Qty: 90 TABLET | Refills: 3 | Status: SHIPPED | OUTPATIENT
Start: 2020-06-24 | End: 2020-08-19

## 2020-06-24 RX ORDER — SIMVASTATIN 10 MG
10 TABLET ORAL EVERY EVENING
Qty: 90 TABLET | Refills: 1 | Status: SHIPPED | OUTPATIENT
Start: 2020-06-24 | End: 2020-12-14

## 2020-06-25 NOTE — ASSESSMENT & PLAN NOTE
Discussed the need of elevation in physical activity.  Discussed the need for compression stockings.  Recommended increasing her Lasix to 40 mg daily.

## 2020-06-25 NOTE — ASSESSMENT & PLAN NOTE
I have offered for physical therapy as well as interventional pain management.  Discussed with the patient and her sister feel chronic narcotic use would be of no benefit and possibly deleterious.  She declined physical therapy and interventional pain management.  However she did accept a prescription for Lidoderm 1 patch daily x12 hours.  Will reassess at follow-up in 6 weeks with Roberta
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
## 2020-06-25 NOTE — ASSESSMENT & PLAN NOTE
Diabetes is uncontrolled.  Stressed the importance of diet and exercise.  Explained the principal Moises behind her need for basal insulin.  I prescribed insulin glargine (Lantus 20 units at bedtime.  This can be adjusted.  I instructed them to at present keep her NovoLog at supper 26 units with meals.  She is in need of a hemoglobin A1c as well as microalbumin a complete metabolic panel.

## 2020-06-26 ENCOUNTER — PRIOR AUTHORIZATION (OUTPATIENT)
Dept: FAMILY MEDICINE CLINIC | Facility: CLINIC | Age: 60
End: 2020-06-26

## 2020-06-26 NOTE — TELEPHONE ENCOUNTER
Key: [id number]      PA started for lidocaine 5% patches.   Awaiting approval.       Key: [id number]    PA started for Lantust

## 2020-07-13 RX ORDER — PROMETHAZINE HYDROCHLORIDE 25 MG/1
TABLET ORAL
Qty: 30 TABLET | Refills: 0 | Status: SHIPPED | OUTPATIENT
Start: 2020-07-13 | End: 2020-08-17

## 2020-07-15 DIAGNOSIS — R60.9 EDEMA, UNSPECIFIED TYPE: ICD-10-CM

## 2020-07-15 DIAGNOSIS — R60.1 GENERALIZED EDEMA: ICD-10-CM

## 2020-07-16 RX ORDER — FUROSEMIDE 20 MG/1
TABLET ORAL
Qty: 90 TABLET | Refills: 3 | OUTPATIENT
Start: 2020-07-16

## 2020-07-23 ENCOUNTER — PRIOR AUTHORIZATION (OUTPATIENT)
Dept: FAMILY MEDICINE CLINIC | Facility: CLINIC | Age: 60
End: 2020-07-23

## 2020-07-23 NOTE — TELEPHONE ENCOUNTER
KEY: [id number]    PA started for Lantus Solostar.   Awaiting approval.       Alternatives include  Basaglar  Levemir  Levemir flextouch  Tresiba  Tresiba flextouch

## 2020-07-28 NOTE — TELEPHONE ENCOUNTER
Appeal has been completed and the denial has been overturned Lantus Solostar 100 unite/mL 6 pens per 30 days has been approved until further notice

## 2020-08-17 RX ORDER — PROMETHAZINE HYDROCHLORIDE 25 MG/1
TABLET ORAL
Qty: 30 TABLET | Refills: 0 | Status: SHIPPED | OUTPATIENT
Start: 2020-08-17 | End: 2020-09-23 | Stop reason: SDUPTHER

## 2020-08-19 DIAGNOSIS — R60.9 EDEMA, UNSPECIFIED TYPE: ICD-10-CM

## 2020-08-19 DIAGNOSIS — R60.1 GENERALIZED EDEMA: ICD-10-CM

## 2020-08-19 RX ORDER — FUROSEMIDE 20 MG/1
TABLET ORAL
Qty: 90 TABLET | Refills: 1 | Status: SHIPPED | OUTPATIENT
Start: 2020-08-19 | End: 2021-01-26

## 2020-09-04 ENCOUNTER — OFFICE VISIT (OUTPATIENT)
Dept: FAMILY MEDICINE CLINIC | Facility: CLINIC | Age: 60
End: 2020-09-04

## 2020-09-04 ENCOUNTER — LAB (OUTPATIENT)
Dept: LAB | Facility: HOSPITAL | Age: 60
End: 2020-09-04

## 2020-09-04 VITALS
SYSTOLIC BLOOD PRESSURE: 130 MMHG | WEIGHT: 268 LBS | HEART RATE: 93 BPM | OXYGEN SATURATION: 90 % | BODY MASS INDEX: 44.65 KG/M2 | DIASTOLIC BLOOD PRESSURE: 78 MMHG | HEIGHT: 65 IN

## 2020-09-04 DIAGNOSIS — R26.81 UNSTEADY GAIT WHEN WALKING: ICD-10-CM

## 2020-09-04 DIAGNOSIS — J43.9 PULMONARY EMPHYSEMA, UNSPECIFIED EMPHYSEMA TYPE (HCC): Chronic | ICD-10-CM

## 2020-09-04 DIAGNOSIS — R30.0 DYSURIA: ICD-10-CM

## 2020-09-04 DIAGNOSIS — Z91.81 AT HIGH RISK FOR FALLS: ICD-10-CM

## 2020-09-04 DIAGNOSIS — B37.9 ANTIBIOTIC-INDUCED YEAST INFECTION: ICD-10-CM

## 2020-09-04 DIAGNOSIS — R42 DIZZINESS: ICD-10-CM

## 2020-09-04 DIAGNOSIS — H66.93 BILATERAL OTITIS MEDIA, UNSPECIFIED OTITIS MEDIA TYPE: ICD-10-CM

## 2020-09-04 DIAGNOSIS — R60.0 BILATERAL LEG EDEMA: ICD-10-CM

## 2020-09-04 DIAGNOSIS — IMO0002 UNCONTROLLED TYPE 2 DIABETES MELLITUS WITH COMPLICATION, WITH LONG-TERM CURRENT USE OF INSULIN: Primary | ICD-10-CM

## 2020-09-04 DIAGNOSIS — Z79.899 POLYPHARMACY: ICD-10-CM

## 2020-09-04 DIAGNOSIS — T36.95XA ANTIBIOTIC-INDUCED YEAST INFECTION: ICD-10-CM

## 2020-09-04 LAB
BILIRUB BLD-MCNC: NEGATIVE MG/DL
CLARITY, POC: ABNORMAL
COLOR UR: ABNORMAL
GLUCOSE UR STRIP-MCNC: ABNORMAL MG/DL
HBA1C MFR BLD: 8.9 %
KETONES UR QL: ABNORMAL
LEUKOCYTE EST, POC: NEGATIVE
NITRITE UR-MCNC: NEGATIVE MG/ML
PH UR: 6 [PH] (ref 5–8)
PROT UR STRIP-MCNC: ABNORMAL MG/DL
RBC # UR STRIP: NEGATIVE /UL
SP GR UR: 1.02 (ref 1–1.03)
UROBILINOGEN UR QL: NORMAL

## 2020-09-04 PROCEDURE — 87086 URINE CULTURE/COLONY COUNT: CPT | Performed by: NURSE PRACTITIONER

## 2020-09-04 PROCEDURE — 99214 OFFICE O/P EST MOD 30 MIN: CPT | Performed by: NURSE PRACTITIONER

## 2020-09-04 PROCEDURE — 83036 HEMOGLOBIN GLYCOSYLATED A1C: CPT | Performed by: NURSE PRACTITIONER

## 2020-09-04 PROCEDURE — 81001 URINALYSIS AUTO W/SCOPE: CPT | Performed by: NURSE PRACTITIONER

## 2020-09-04 RX ORDER — CEPHALEXIN 500 MG/1
500 CAPSULE ORAL 2 TIMES DAILY
Qty: 14 CAPSULE | Refills: 0 | Status: SHIPPED | OUTPATIENT
Start: 2020-09-04 | End: 2020-12-03

## 2020-09-04 RX ORDER — FLUCONAZOLE 150 MG/1
150 TABLET ORAL ONCE
Qty: 2 TABLET | Refills: 0 | Status: SHIPPED | OUTPATIENT
Start: 2020-09-04 | End: 2020-09-04

## 2020-09-04 NOTE — PROGRESS NOTES
"Subjective   Saadia Caceres is a 59 y.o. female.   Chief Complaint   Patient presents with   • Leg Swelling   • Dizziness     x2 months   • Shortness of Breath       History of Present Illness   Patient is brought in by her daughter Heidi who is helping with history.  Has not been taking the lantus Dr. Oneill prescribed at her last appointment in June. She does not monitor glucose at home on a regular basis.  Shortness of breath with COPD, continues to smoke, does not intend to quit  Here with increased dizziness, needs Rolator walker order to help with ambulation around the home. Has been unsteady on her feet, has fallen with no significant injury to this point.   Sees Dr. Schaffer, psychiatrist, daughter states he has added clonazepam, will need to check on dose. Daughter puts the patient's medications in a weekly divider, but says that patient \"picks and chooses\" what she wants to take. Patient admits this is true.  The following portions of the patient's history were reviewed and updated as appropriate: allergies, current medications and problem list.    Review of Systems   Constitutional: Positive for chills. Negative for fever.   Respiratory: Positive for cough, shortness of breath and wheezing.    Neurological: Positive for numbness (stinging and burning  legs and\"everywhere\").   Psychiatric/Behavioral: Positive for confusion. Negative for dysphoric mood. The patient is nervous/anxious.        Objective   Physical Exam   Constitutional: She is oriented to person, place, and time. She appears well-developed and well-nourished. No distress.   HENT:   Right Ear: Tympanic membrane is scarred, perforated and erythematous. A middle ear effusion is present.   Left Ear: Tympanic membrane is erythematous. A middle ear effusion is present.   Cardiovascular: Normal rate, regular rhythm and normal heart sounds.   No murmur heard.  Pulmonary/Chest: Effort normal. She has wheezes (few scattered).   Musculoskeletal: " "She exhibits edema (1+ BLE).   Unsteady gait after getting off the exam table, walking to the bathroom   Neurological: She is alert and oriented to person, place, and time.   Skin: She is not diaphoretic.   Psychiatric: Her mood appears anxious.   Vitals reviewed.    /78   Pulse 93   Ht 165.1 cm (65\")   Wt 122 kg (268 lb)   SpO2 90%   BMI 44.60 kg/m²     Assessment/Plan   [place] was seen today for leg swelling, dizziness and shortness of breath.    Diagnoses and all orders for this visit:    Uncontrolled type 2 diabetes mellitus with complication, with long-term current use of insulin (CMS/[facility])  -     POC Glycosylated Hemoglobin (Hb A1C)    Bilateral leg edema    Pulmonary emphysema, unspecified emphysema type (CMS/HCC)    Dizziness    Bilateral otitis media, unspecified otitis media type  -     cephalexin (KEFLEX) 500 MG capsule; Take 1 capsule by mouth 2 (Two) Times a Day.    Unsteady gait when walking  -     Misc. Devices (ROLLER WALKER) misc; 1 Device Daily. Rollator walker    At high risk for falls  -     Misc. Devices (ROLLER WALKER) misc; 1 Device Daily. Rollator walker    Dysuria  -     POCT urinalysis dipstick, automated  -     Urine Culture - Urine, Urine, Clean Catch; Future  -     Urine Culture - Urine, Urine, Clean Catch  -     Urinalysis With Culture If Indicated - Urine, Clean Catch  -     Urinalysis, Microscopic Only - Urine, Clean Catch; Future  -     Urinalysis, Microscopic Only - Urine, Clean Catch    Polypharmacy    Antibiotic-induced yeast infection  -     fluconazole (Diflucan) 150 MG tablet; Take 1 tablet by mouth 1 (One) Time for 1 dose. Second dose 3 doses after first          Results for orders placed or performed in visit on 09/04/20   Urine Culture - Urine, Urine, Clean Catch   Result Value Ref Range    Urine Culture 50,000 CFU/mL Mixed Pooja Isolated    Urinalysis With Culture If Indicated - Urine, Clean Catch   Result Value Ref Range    Color, UA Yellow Yellow, Straw    " Appearance, UA Clear Clear    pH, UA 6.5 5.0 - 8.0    Specific Gravity, UA 1.026 1.005 - 1.030    Glucose,  mg/dL (2+) (A) Negative    Ketones, UA Trace (A) Negative    Bilirubin, UA Negative Negative    Blood, UA Negative Negative    Protein, UA 30 mg/dL (1+) (A) Negative    Leuk Esterase, UA Negative Negative    Nitrite, UA Negative Negative    Urobilinogen, UA 0.2 E.U./dL 0.2 - 1.0 E.U./dL   Urinalysis, Microscopic Only - Urine, Clean Catch   Result Value Ref Range    RBC, UA None Seen None Seen, 0-2 /HPF    WBC, UA None Seen None Seen, 0-2 /HPF    Bacteria, UA 2+ (A) None Seen /HPF    Squamous Epithelial Cells, UA 7-12 (A) None Seen, 0-2 /HPF    Hyaline Casts, UA None Seen None Seen /LPF    Methodology Manual Light Microscopy    POC Glycosylated Hemoglobin (Hb A1C)   Result Value Ref Range    Hemoglobin A1C 8.9 %   POCT urinalysis dipstick, automated   Result Value Ref Range    Color Dark Yellow Yellow, Straw, Dark Yellow, Emerald    Clarity, UA Slightly Cloudy (A) Clear    Specific Gravity  1.025 1.005 - 1.030    pH, Urine 6.0 5.0 - 8.0    Leukocytes Negative Negative    Nitrite, UA Negative Negative    Protein, POC 1+ (A) Negative mg/dL    Glucose, UA 1+ (A) Negative, 1000 mg/dL (3+) mg/dL    Ketones, UA 1+ (A) Negative    Urobilinogen, UA Normal Normal    Bilirubin Negative Negative    Blood, UA Negative Negative     Diabetes continues to be uncontrolled,advised to start the long acting insulin prescribed by Dr. Oneill, monitor blood sugar daily, bring log in at follow up  Advised compression stockings, elevate legs when sitting to help with edema, low sodium diet. Explained that high glucose can contribute the neuropathy pain in her legs, she is on max dose of Neurontin prescribed by Dr. Schaffer  Smoking cessation advised.  Pt voiced understanding of health risks of continued smoking.  Continue current inhalers, use Anoro daily  Dizziness may be related to otitis or polypharmacy, need to verify meds   Sarbjit has added  Keflex prescribed for otitis and to cover if UTI is present, diflucan for abx associated yeast. Urine sent for culture. Follow with , ENT for chronic otitis  Order for Rollator walker given, I voiced my concern about patient noncompliance with medications, recommended that the daughter monitor this closely  Patient was encouraged to keep me informed of any acute changes, lack of improvement, or any new concerning symptoms.

## 2020-09-04 NOTE — PATIENT INSTRUCTIONS
Diabetes Mellitus and Standards of Medical Care  Managing diabetes (diabetes mellitus) can be complicated. Your diabetes treatment may be managed by a team of health care providers, including:  · A physician who specializes in diabetes (endocrinologist).  · A nurse practitioner or physician assistant.  · Nurses.  · A diet and nutrition specialist (registered dietitian).  · A certified diabetes educator (CDE).  · An exercise specialist.  · A pharmacist.  · An eye doctor.  · A foot specialist (podiatrist).  · A dentist.  · A primary care provider.  · A mental health provider.  Your health care providers follow guidelines to help you get the best quality of care. The following schedule is a general guideline for your diabetes management plan. Your health care providers may give you more specific instructions.  Physical exams  Upon being diagnosed with diabetes mellitus, and each year after that, your health care provider will ask about your medical and family history. He or she will also do a physical exam. Your exam may include:  · Measuring your height, weight, and body mass index (BMI).  · Checking your blood pressure. This will be done at every routine medical visit. Your target blood pressure may vary depending on your medical conditions, your age, and other factors.  · Thyroid gland exam.  · Skin exam.  · Screening for damage to your nerves (peripheral neuropathy). This may include checking the pulse in your legs and feet and checking the level of sensation in your hands and feet.  · A complete foot exam to inspect the structure and skin of your feet, including checking for cuts, bruises, redness, blisters, sores, or other problems.  · Screening for blood vessel (vascular) problems, which may include checking the pulse in your legs and feet and checking your temperature.  Blood tests  Depending on your treatment plan and your personal needs, you may have the following tests done:  · HbA1c (hemoglobin A1c). This  test provides information about blood sugar (glucose) control over the previous 2-3 months. It is used to adjust your treatment plan, if needed. This test will be done:  ? At least 2 times a year, if you are meeting your treatment goals.  ? 4 times a year, if you are not meeting your treatment goals or if treatment goals have changed.  · Lipid testing, including total, LDL, and HDL cholesterol and triglyceride levels.  ? The goal for LDL is less than 100 mg/dL (5.5 mmol/L). If you are at high risk for complications, the goal is less than 70 mg/dL (3.9 mmol/L).  ? The goal for HDL is 40 mg/dL (2.2 mmol/L) or higher for men and 50 mg/dL (2.8 mmol/L) or higher for women. An HDL cholesterol of 60 mg/dL (3.3 mmol/L) or higher gives some protection against heart disease.  ? The goal for triglycerides is less than 150 mg/dL (8.3 mmol/L).  · Liver function tests.  · Kidney function tests.  · Thyroid function tests.  Dental and eye exams  · Visit your dentist two times a year.  · If you have type 1 diabetes, your health care provider may recommend an eye exam 3-5 years after you are diagnosed, and then once a year after your first exam.  ? For children with type 1 diabetes, a health care provider may recommend an eye exam when your child is age 10 or older and has had diabetes for 3-5 years. After the first exam, your child should get an eye exam once a year.  · If you have type 2 diabetes, your health care provider may recommend an eye exam as soon as you are diagnosed, and then once a year after your first exam.  Immunizations    · The yearly flu (influenza) vaccine is recommended for everyone 6 months or older who has diabetes.  · The pneumonia (pneumococcal) vaccine is recommended for everyone 2 years or older who has diabetes. If you are 65 or older, you may get the pneumonia vaccine as a series of two separate shots.  · The hepatitis B vaccine is recommended for adults shortly after being diagnosed with  diabetes.  · Adults and children with diabetes should receive all other vaccines according to age-specific recommendations from the Centers for Disease Control and Prevention (CDC).  Mental and emotional health  Screening for symptoms of eating disorders, anxiety, and depression is recommended at the time of diagnosis and afterward as needed. If your screening shows that you have symptoms (positive screening result), you may need more evaluation and you may work with a mental health care provider.  Treatment plan  Your treatment plan will be reviewed at every medical visit. You and your health care provider will discuss:  · How you are taking your medicines, including insulin.  · Any side effects you are experiencing.  · Your blood glucose target goals.  · The frequency of your blood glucose monitoring.  · Lifestyle habits, such as activity level as well as tobacco, alcohol, and substance use.  Diabetes self-management education  Your health care provider will assess how well you are monitoring your blood glucose levels and whether you are taking your insulin correctly. He or she may refer you to:  · A certified diabetes educator to manage your diabetes throughout your life, starting at diagnosis.  · A registered dietitian who can create or review your personal nutrition plan.  · An exercise specialist who can discuss your activity level and exercise plan.  Summary  · Managing diabetes (diabetes mellitus) can be complicated. Your diabetes treatment may be managed by a team of health care providers.  · Your health care providers follow guidelines in order to help you get the best quality of care.  · Standards of care including having regular physical exams, blood tests, blood pressure monitoring, immunizations, screening tests, and education about how to manage your diabetes.  · Your health care providers may also give you more specific instructions based on your individual health.  This information is not intended  to replace advice given to you by your health care provider. Make sure you discuss any questions you have with your health care provider.  Document Released: 10/15/2010 Document Revised: 09/06/2019 Document Reviewed: 09/15/2017  [employer] Patient Education © 2020 [employer]    Diabetic Neuropathy  Diabetic neuropathy refers to nerve damage that is caused by diabetes (diabetes mellitus). Over time, people with diabetes can develop nerve damage throughout the body. There are several types of diabetic neuropathy:  · Peripheral neuropathy. This is the most common type of diabetic neuropathy. It causes damage to nerves that carry signals between the spinal cord and other parts of the body (peripheral nerves). This usually affects nerves in the feet and legs first, and may eventually affect the hands and arms. The damage affects the ability to sense touch or temperature.  · Autonomic neuropathy. This type causes damage to nerves that control involuntary functions (autonomic nerves). These nerves carry signals that control:  ? Heartbeat.  ? Body temperature.  ? Blood pressure.  ? Urination.  ? Digestion.  ? Sweating.  ? Sexual function.  ? Response to changing blood sugar (glucose) levels.  · Focal neuropathy. This type of nerve damage affects one area of the body, such as an arm, a leg, or the face. The injury may involve one nerve or a small group of nerves. Focal neuropathy can be painful and unpredictable, and occurs most often in older adults with diabetes. This often develops suddenly, but usually improves over time and does not cause long-term problems.  · Proximal neuropathy. This type of nerve damage affects the nerves of the thighs, hips, buttocks, or legs. It causes severe pain, weakness, and muscle death (atrophy), usually in the thigh muscles. It is more common among older men and people who have type 2 diabetes. The length of recovery time may vary.  What are the causes?  Peripheral, autonomic, and focal  neuropathies are caused by diabetes that is not well controlled with treatment. The cause of proximal neuropathy is not known, but it may be caused by inflammation related to uncontrolled blood glucose levels.  What are the signs or symptoms?  Peripheral neuropathy  Peripheral neuropathy develops slowly over time. When the nerves of the feet and legs no longer work, you may experience:  · Burning, stabbing, or aching pain in the legs or feet.  · Pain or cramping in the legs or feet.  · Loss of feeling (numbness) and inability to feel pressure or pain in the feet. This can lead to:  ? Thick calluses or sores on areas of constant pressure.  ? Ulcers.  ? Reduced ability to feel temperature changes.  · Foot deformities.  · Muscle weakness.  · Loss of balance or coordination.  Autonomic neuropathy  The symptoms of autonomic neuropathy vary depending on which nerves are affected. Symptoms may include:  · Problems with digestion, such as:  ? Nausea or vomiting.  ? Poor appetite.  ? Bloating.  ? Diarrhea or constipation.  ? Trouble swallowing.  ? Losing weight without trying to.  · Problems with the heart, blood and lungs, such as:  ? Dizziness, especially when standing up.  ? Fainting.  ? Shortness of breath.  ? Irregular heartbeat.  · Bladder problems, such as:  ? Trouble starting or stopping urination.  ? Leaking urine.  ? Trouble emptying the bladder.  ? Urinary tract infections (UTIs).  · Problems with other body functions, such as:  ? Sweat. You may sweat too much or too little.  ? Temperature. You might get hot easily. Or, you might feel cold more than usual.  ? Sexual function. Men may not be able to get or maintain an erection. Women may have vaginal dryness and difficulty with arousal.  Focal neuropathy  Symptoms affect only one area of the body. Common symptoms include:  · Numbness.  · Tingling.  · Burning pain.  · Prickling feeling.  · Very sensitive skin.  · Weakness.  · Inability to move (paralysis).  · Muscle  twitching.  · Muscles getting smaller (wasting).  · Poor coordination.  · Double or blurred vision.  Proximal neuropathy  · Sudden, severe pain in the hip, thigh, or buttocks. Pain may spread from the back into the legs (sciatica).  · Pain and numbness in the arms and legs.  · Tingling.  · Loss of bladder control or bowel control.  · Weakness and wasting of thigh muscles.  · Difficulty getting up from a seated position.  · Abdominal swelling.  · Unexplained weight loss.  How is this diagnosed?  Diagnosis usually involves reviewing your medical history and any symptoms you have. Diagnosis varies depending on the type of neuropathy your health care provider suspects.  Peripheral neuropathy  Your health care provider will check areas that are affected by your nervous system (neurologic exam), such as your reflexes, how you move, and what you can feel. You may have other tests, such as:  · Blood tests.  · Removal and examination of fluid that surrounds the spinal cord (lumbar puncture).  · CT scan.  · MRI.  · A test to check the nerves that control muscles (electromyogram, EMG).  · Tests of how quickly messages pass through your nerves (nerve conduction velocity tests).  · Removal of a small piece of nerve to be examined under a microscope (biopsy).  Autonomic neuropathy  You may have tests, such as:  · Tests to measure your blood pressure and heart rate. This may include monitoring you while you are safely secured to an exam table that moves you from a lying position to an upright position (table tilt test).  · Breathing tests to check your lungs.  · Tests to check how food moves through the digestive system (gastric emptying tests).  · Blood, sweat, or urine tests.  · Ultrasound of your bladder.  · Spinal fluid tests.  Focal neuropathy  This condition may be diagnosed with:  · A neurologic exam.  · CT scan.  · MRI.  · EMG.  · Nerve conduction velocity tests.  Proximal neuropathy  There is no test to diagnose this type  of neuropathy. You may have tests to rule out other possible causes of this type of neuropathy. Tests may include:  · X-rays of your spine and lumbar region.  · Lumbar puncture.  · MRI.  How is this treated?  The goal of treatment is to keep nerve damage from getting worse. The most important part of treatment is keeping your blood glucose level and your A1C level within your target range by following your diabetes management plan. Over time, maintaining lower blood glucose levels helps lessen symptoms. In some cases, you may need prescription pain medicine.  Follow these instructions at home:    Lifestyle    · Do not use any products that contain nicotine or tobacco, such as cigarettes and e-cigarettes. If you need help quitting, ask your health care provider.  · Be physically active every day. Include strength training and balance exercises.  · Follow a healthy meal plan.  · Work with your health care provider to manage your blood pressure.  General instructions  · Follow your diabetes management plan as directed.  ? Check your blood glucose levels as directed by your health care provider.  ? Keep your blood glucose in your target range as directed by your health care provider.  ? Have your A1C level checked at least two times a year, or as often as told by your health care provider.  · Take over the counter and prescription medicines only as told by your health care provider. This includes insulin and diabetes medicine.  · Do not drive or use heavy machinery while taking prescription pain medicines.  · Check your skin and feet every day for cuts, bruises, redness, blisters, or sores.  · Keep all follow up visits as told by your health care provider. This is important.  Contact a health care provider if:  · You have burning, stabbing, or aching pain in your legs or feet.  · You are unable to feel pressure or pain in your feet.  · You develop problems with digestion, such  as:  ? Nausea.  ? Vomiting.  ? Bloating.  ? Constipation.  ? Diarrhea.  ? Abdominal pain.  · You have difficulty with urination, such as inability:  ? To control when you urinate (incontinence).  ? To completely empty the bladder (retention).  · You have palpitations.  · You feel dizzy, weak, or faint when you stand up.  Get help right away if:  · You cannot urinate.  · You have sudden weakness or loss of coordination.  · You have trouble speaking.  · You have pain or pressure in your chest.  · You have an irregular heart beat.  · You have sudden inability to move a part of your body.  Summary  · Diabetic neuropathy refers to nerve damage that is caused by diabetes. It can affect nerves throughout the entire body, causing numbness and pain in the arms, legs, digestive tract, heart, and other body systems.  · Keep your blood glucose level and your blood pressure in your target range, as directed by your health care provider. This can help prevent neuropathy from getting worse.  · Check your skin and feet every day for cuts, bruises, redness, blisters, or sores.  · Do not use any products that contain nicotine or tobacco, such as cigarettes and e-cigarettes. If you need help quitting, ask your health care provider.  This information is not intended to replace advice given to you by your health care provider. Make sure you discuss any questions you have with your health care provider.  Document Released: 02/26/2003 Document Revised: 01/30/2019 Document Reviewed: 01/22/2018  [employer] Patient Education © 2020 [employer]      Family member needs to control medications  Monitor blood sugars, take insulin as prescribed  Use Anoro inhaler everyday  Follow up with ENT about chronic ear infections

## 2020-09-05 LAB
BACTERIA SPEC AEROBE CULT: NORMAL
BACTERIA UR QL AUTO: ABNORMAL /HPF
BILIRUB UR QL STRIP: NEGATIVE
CLARITY UR: CLEAR
COLOR UR: YELLOW
GLUCOSE UR STRIP-MCNC: ABNORMAL MG/DL
HGB UR QL STRIP.AUTO: NEGATIVE
HYALINE CASTS UR QL AUTO: ABNORMAL /LPF
KETONES UR QL STRIP: ABNORMAL
LEUKOCYTE ESTERASE UR QL STRIP.AUTO: NEGATIVE
NITRITE UR QL STRIP: NEGATIVE
PH UR STRIP.AUTO: 6.5 [PH] (ref 5–8)
PROT UR QL STRIP: ABNORMAL
RBC # UR: ABNORMAL /HPF
REF LAB TEST METHOD: ABNORMAL
SP GR UR STRIP: 1.03 (ref 1–1.03)
SQUAMOUS #/AREA URNS HPF: ABNORMAL /HPF
UROBILINOGEN UR QL STRIP: ABNORMAL
WBC UR QL AUTO: ABNORMAL /HPF

## 2020-09-15 DIAGNOSIS — N95.1 MENOPAUSAL HOT FLUSHES: ICD-10-CM

## 2020-09-15 DIAGNOSIS — N95.1 HOT FLASHES DUE TO MENOPAUSE: ICD-10-CM

## 2020-09-15 RX ORDER — ESTRADIOL 0.5 MG/1
0.5 TABLET ORAL DAILY
Qty: 90 TABLET | Refills: 0 | Status: SHIPPED | OUTPATIENT
Start: 2020-09-15 | End: 2020-12-16 | Stop reason: SDUPTHER

## 2020-09-15 RX ORDER — LANCETS 30 GAUGE
1 EACH MISCELLANEOUS 2 TIMES DAILY
Qty: 100 EACH | Refills: 2 | Status: SHIPPED | OUTPATIENT
Start: 2020-09-15 | End: 2021-08-16

## 2020-09-23 RX ORDER — PROMETHAZINE HYDROCHLORIDE 25 MG/1
25 TABLET ORAL EVERY 6 HOURS PRN
Qty: 30 TABLET | Refills: 0 | Status: SHIPPED | OUTPATIENT
Start: 2020-09-23 | End: 2020-10-13 | Stop reason: SDUPTHER

## 2020-10-06 DIAGNOSIS — L73.9 FOLLICULITIS: ICD-10-CM

## 2020-10-06 RX ORDER — MUPIROCIN CALCIUM 20 MG/G
CREAM TOPICAL SEE ADMIN INSTRUCTIONS
Qty: 30 G | Refills: 0 | Status: SHIPPED | OUTPATIENT
Start: 2020-10-06 | End: 2022-05-24

## 2020-10-13 ENCOUNTER — TELEPHONE (OUTPATIENT)
Dept: FAMILY MEDICINE CLINIC | Facility: CLINIC | Age: 60
End: 2020-10-13

## 2020-10-13 RX ORDER — PROMETHAZINE HYDROCHLORIDE 25 MG/1
25 TABLET ORAL EVERY 6 HOURS PRN
Qty: 30 TABLET | Refills: 0 | Status: SHIPPED | OUTPATIENT
Start: 2020-10-13 | End: 2020-11-16 | Stop reason: SDUPTHER

## 2020-10-13 NOTE — TELEPHONE ENCOUNTER
Pt states she cant do this as Heidi is mad at her.  She will call back if she can find someone else to bring her.

## 2020-10-13 NOTE — TELEPHONE ENCOUNTER
PATIENT CALLED AND STATED SHE IS HAVING A LOT OF TROUBLE FEET AND LEGS.  HER TOES ARE CURLING OVER EACH OTHER AND SHE CAN'T GET THEM BACK STRAIGHT AND SHE'S HAVING A LOT OF PAIN AND CAN'T GET OUT OF BED SOME DAYS.  SHE ALSO SAID HER LEGS AND FEET  ARE ALSO VERY RED AND SWOLLEN.  SHE WANTED TO KNOW IF THERE'S ANYTHING YOU CAN DO TO HELP HER.    PLEASE CONTACT PATIENT TO DISCUSS.    CALLBACK:  [phone]

## 2020-10-31 DIAGNOSIS — IMO0002 UNCONTROLLED TYPE 2 DIABETES MELLITUS WITH COMPLICATION, WITH LONG-TERM CURRENT USE OF INSULIN: ICD-10-CM

## 2020-11-01 RX ORDER — INSULIN ASPART 100 [IU]/ML
30 INJECTION, SUSPENSION SUBCUTANEOUS SEE ADMIN INSTRUCTIONS
Qty: 15 ML | Refills: 1 | Status: SHIPPED | OUTPATIENT
Start: 2020-11-01 | End: 2021-02-18 | Stop reason: SDUPTHER

## 2020-11-03 ENCOUNTER — TELEPHONE (OUTPATIENT)
Dept: FAMILY MEDICINE CLINIC | Facility: CLINIC | Age: 60
End: 2020-11-03

## 2020-11-03 DIAGNOSIS — B37.9 YEAST INFECTION: Primary | ICD-10-CM

## 2020-11-03 RX ORDER — FLUCONAZOLE 150 MG/1
150 TABLET ORAL ONCE
Qty: 3 TABLET | Refills: 0 | Status: SHIPPED | OUTPATIENT
Start: 2020-11-03 | End: 2020-11-03

## 2020-11-03 NOTE — TELEPHONE ENCOUNTER
Patient requesting FLUCONAZOLE 150MG TABLETS   TAKE 1 TABLET BY MOUTH, TAKE SECOND DOSE AS DIRECTED.

## 2020-11-16 RX ORDER — PROMETHAZINE HYDROCHLORIDE 25 MG/1
25 TABLET ORAL EVERY 6 HOURS PRN
Qty: 30 TABLET | Refills: 0 | Status: SHIPPED | OUTPATIENT
Start: 2020-11-16 | End: 2020-12-02 | Stop reason: SDUPTHER

## 2020-11-16 NOTE — TELEPHONE ENCOUNTER
Last fill: 10/13/2020  Last office visit: 09/04/2020  Next office visit: 12/03/2020  CSA up-to-date? n/a  Date of last UDS:   UDS consistent:

## 2020-11-17 RX ORDER — ICOSAPENT ETHYL 1000 MG/1
CAPSULE ORAL
Qty: 90 CAPSULE | Refills: 3 | Status: SHIPPED | OUTPATIENT
Start: 2020-11-17 | End: 2020-12-03

## 2020-11-25 ENCOUNTER — TELEPHONE (OUTPATIENT)
Dept: FAMILY MEDICINE CLINIC | Facility: CLINIC | Age: 60
End: 2020-11-25

## 2020-11-25 NOTE — TELEPHONE ENCOUNTER
PATIENT CALLED AND SAID HER LEGS AND FEET ARE REALLY SWOLLEN AND SHE SAID ITS HARD TO WALK AND BURNS; SHE SAID THEY ARE WHITE AND THEN TURN BRIGHT RED; SHE SAID SHE HAS BEEN SOAKING THEM IN EPSOM SALTS BUT THAT IS NOT HELPING    PIERRE: [phone]

## 2020-12-02 RX ORDER — PROMETHAZINE HYDROCHLORIDE 25 MG/1
25 TABLET ORAL EVERY 6 HOURS PRN
Qty: 30 TABLET | Refills: 0 | Status: SHIPPED | OUTPATIENT
Start: 2020-12-02 | End: 2021-01-14 | Stop reason: SDUPTHER

## 2020-12-02 NOTE — TELEPHONE ENCOUNTER
Pharmacy Name: [id number] - [place], [place] - [address] AT [facility] [address] - [phone]  - [phone]      Pharmacy representative name: MARÍA    Pharmacy representative phone number: [phone]    What medication are you calling in regards to: promethazine (PHENERGAN) 25 MG tablet    What question does the pharmacy have: REQUESTING A REFILL FOR PATIENT    Who is the provider that prescribed the medication. LUISANA MAX    Additional notes:

## 2020-12-03 ENCOUNTER — LAB (OUTPATIENT)
Dept: LAB | Facility: HOSPITAL | Age: 60
End: 2020-12-03

## 2020-12-03 ENCOUNTER — OFFICE VISIT (OUTPATIENT)
Dept: FAMILY MEDICINE CLINIC | Facility: CLINIC | Age: 60
End: 2020-12-03

## 2020-12-03 ENCOUNTER — HOSPITAL ENCOUNTER (OUTPATIENT)
Dept: GENERAL RADIOLOGY | Facility: HOSPITAL | Age: 60
Discharge: HOME | End: 2020-12-03

## 2020-12-03 VITALS
BODY MASS INDEX: 47.98 KG/M2 | DIASTOLIC BLOOD PRESSURE: 80 MMHG | WEIGHT: 288 LBS | SYSTOLIC BLOOD PRESSURE: 132 MMHG | OXYGEN SATURATION: 98 % | HEART RATE: 99 BPM | HEIGHT: 65 IN

## 2020-12-03 DIAGNOSIS — R60.0 BILATERAL EDEMA OF LOWER EXTREMITY: ICD-10-CM

## 2020-12-03 DIAGNOSIS — E78.5 HYPERLIPIDEMIA, UNSPECIFIED HYPERLIPIDEMIA TYPE: Chronic | ICD-10-CM

## 2020-12-03 DIAGNOSIS — R06.02 SHORTNESS OF BREATH: ICD-10-CM

## 2020-12-03 DIAGNOSIS — F17.200 SMOKER UNMOTIVATED TO QUIT: ICD-10-CM

## 2020-12-03 DIAGNOSIS — Z51.81 THERAPEUTIC DRUG MONITORING: ICD-10-CM

## 2020-12-03 DIAGNOSIS — IMO0002 UNCONTROLLED TYPE 2 DIABETES MELLITUS WITH COMPLICATION, WITH LONG-TERM CURRENT USE OF INSULIN: ICD-10-CM

## 2020-12-03 DIAGNOSIS — E55.9 VITAMIN D DEFICIENCY: ICD-10-CM

## 2020-12-03 DIAGNOSIS — B37.2 SKIN YEAST INFECTION: ICD-10-CM

## 2020-12-03 DIAGNOSIS — R63.5 ABNORMAL WEIGHT GAIN: ICD-10-CM

## 2020-12-03 DIAGNOSIS — Z91.199 NONCOMPLIANCE WITH TREATMENT PLAN: ICD-10-CM

## 2020-12-03 DIAGNOSIS — Z91.14 NONCOMPLIANCE WITH MEDICATIONS: ICD-10-CM

## 2020-12-03 DIAGNOSIS — H66.90 CHRONIC OTITIS MEDIA, UNSPECIFIED OTITIS MEDIA TYPE: ICD-10-CM

## 2020-12-03 DIAGNOSIS — Z91.81 AT HIGH RISK FOR FALLS: ICD-10-CM

## 2020-12-03 DIAGNOSIS — J43.9 PULMONARY EMPHYSEMA, UNSPECIFIED EMPHYSEMA TYPE (HCC): Chronic | ICD-10-CM

## 2020-12-03 DIAGNOSIS — IMO0002 UNCONTROLLED TYPE 2 DIABETES MELLITUS WITH COMPLICATION, WITH LONG-TERM CURRENT USE OF INSULIN: Primary | ICD-10-CM

## 2020-12-03 DIAGNOSIS — E03.9 HYPOTHYROIDISM, UNSPECIFIED TYPE: ICD-10-CM

## 2020-12-03 DIAGNOSIS — I10 ESSENTIAL HYPERTENSION: Chronic | ICD-10-CM

## 2020-12-03 DIAGNOSIS — Z13.29 THYROID DISORDER SCREENING: ICD-10-CM

## 2020-12-03 LAB
25(OH)D3 SERPL-MCNC: 17.9 NG/ML (ref 30–100)
ALBUMIN SERPL-MCNC: 4 G/DL (ref 3.5–5.2)
ALBUMIN/GLOB SERPL: 1.4 G/DL
ALP SERPL-CCNC: 99 U/L (ref 39–117)
ALT SERPL W P-5'-P-CCNC: 21 U/L (ref 1–33)
ANION GAP SERPL CALCULATED.3IONS-SCNC: 6.6 MMOL/L (ref 5–15)
AST SERPL-CCNC: 15 U/L (ref 1–32)
BASOPHILS # BLD AUTO: 0.04 10*3/MM3 (ref 0–0.2)
BASOPHILS NFR BLD AUTO: 0.5 % (ref 0–1.5)
BILIRUB SERPL-MCNC: 0.2 MG/DL (ref 0–1.2)
BUN SERPL-MCNC: 12 MG/DL (ref 6–20)
BUN/CREAT SERPL: 14.3 (ref 7–25)
CALCIUM SPEC-SCNC: 8.9 MG/DL (ref 8.6–10.5)
CHLORIDE SERPL-SCNC: 95 MMOL/L (ref 98–107)
CHOLEST SERPL-MCNC: 226 MG/DL (ref 0–200)
CO2 SERPL-SCNC: 32.4 MMOL/L (ref 22–29)
CREAT SERPL-MCNC: 0.84 MG/DL (ref 0.57–1)
DEPRECATED RDW RBC AUTO: 47.5 FL (ref 37–54)
EOSINOPHIL # BLD AUTO: 0.11 10*3/MM3 (ref 0–0.4)
EOSINOPHIL NFR BLD AUTO: 1.5 % (ref 0.3–6.2)
ERYTHROCYTE [DISTWIDTH] IN BLOOD BY AUTOMATED COUNT: 15 % (ref 12.3–15.4)
GFR SERPL CREATININE-BSD FRML MDRD: 70 ML/MIN/1.73
GLOBULIN UR ELPH-MCNC: 2.8 GM/DL
GLUCOSE SERPL-MCNC: 218 MG/DL (ref 65–99)
HBA1C MFR BLD: 10 %
HCT VFR BLD AUTO: 43.7 % (ref 34–46.6)
HDLC SERPL-MCNC: 41 MG/DL (ref 40–60)
HGB BLD-MCNC: 14.6 G/DL (ref 12–15.9)
IMM GRANULOCYTES # BLD AUTO: 0.06 10*3/MM3 (ref 0–0.05)
IMM GRANULOCYTES NFR BLD AUTO: 0.8 % (ref 0–0.5)
LDLC SERPL CALC-MCNC: 138 MG/DL (ref 0–100)
LDLC/HDLC SERPL: 3.24 {RATIO}
LYMPHOCYTES # BLD AUTO: 3.75 10*3/MM3 (ref 0.7–3.1)
LYMPHOCYTES NFR BLD AUTO: 51.2 % (ref 19.6–45.3)
MCH RBC QN AUTO: 29.1 PG (ref 26.6–33)
MCHC RBC AUTO-ENTMCNC: 33.4 G/DL (ref 31.5–35.7)
MCV RBC AUTO: 87.2 FL (ref 79–97)
MONOCYTES # BLD AUTO: 0.61 10*3/MM3 (ref 0.1–0.9)
MONOCYTES NFR BLD AUTO: 8.3 % (ref 5–12)
NEUTROPHILS NFR BLD AUTO: 2.76 10*3/MM3 (ref 1.7–7)
NEUTROPHILS NFR BLD AUTO: 37.7 % (ref 42.7–76)
NRBC BLD AUTO-RTO: 0 /100 WBC (ref 0–0.2)
NT-PROBNP SERPL-MCNC: 35.8 PG/ML (ref 0–900)
PLATELET # BLD AUTO: 229 10*3/MM3 (ref 140–450)
PMV BLD AUTO: 9 FL (ref 6–12)
POTASSIUM SERPL-SCNC: 4.5 MMOL/L (ref 3.5–5.2)
PROT SERPL-MCNC: 6.8 G/DL (ref 6–8.5)
RBC # BLD AUTO: 5.01 10*6/MM3 (ref 3.77–5.28)
SODIUM SERPL-SCNC: 134 MMOL/L (ref 136–145)
T4 FREE SERPL-MCNC: 1.24 NG/DL (ref 0.93–1.7)
TRIGL SERPL-MCNC: 261 MG/DL (ref 0–150)
TSH SERPL DL<=0.05 MIU/L-ACNC: 7.73 UIU/ML (ref 0.27–4.2)
VALPROATE SERPL-MCNC: 65 MCG/ML (ref 50–125)
VLDLC SERPL-MCNC: 47 MG/DL (ref 5–40)
WBC # BLD AUTO: 7.33 10*3/MM3 (ref 3.4–10.8)

## 2020-12-03 PROCEDURE — 71046 X-RAY EXAM CHEST 2 VIEWS: CPT

## 2020-12-03 PROCEDURE — 83880 ASSAY OF NATRIURETIC PEPTIDE: CPT

## 2020-12-03 PROCEDURE — 84443 ASSAY THYROID STIM HORMONE: CPT

## 2020-12-03 PROCEDURE — 82306 VITAMIN D 25 HYDROXY: CPT

## 2020-12-03 PROCEDURE — 80165 DIPROPYLACETIC ACID FREE: CPT

## 2020-12-03 PROCEDURE — 83036 HEMOGLOBIN GLYCOSYLATED A1C: CPT | Performed by: NURSE PRACTITIONER

## 2020-12-03 PROCEDURE — 85025 COMPLETE CBC W/AUTO DIFF WBC: CPT

## 2020-12-03 PROCEDURE — 84439 ASSAY OF FREE THYROXINE: CPT

## 2020-12-03 PROCEDURE — 80053 COMPREHEN METABOLIC PANEL: CPT

## 2020-12-03 PROCEDURE — 80164 ASSAY DIPROPYLACETIC ACD TOT: CPT

## 2020-12-03 PROCEDURE — 80061 LIPID PANEL: CPT

## 2020-12-03 PROCEDURE — 99214 OFFICE O/P EST MOD 30 MIN: CPT | Performed by: NURSE PRACTITIONER

## 2020-12-03 PROCEDURE — 36415 COLL VENOUS BLD VENIPUNCTURE: CPT

## 2020-12-03 RX ORDER — PRAZOSIN HYDROCHLORIDE 2 MG/1
CAPSULE ORAL
COMMUNITY
Start: 2020-09-04

## 2020-12-03 RX ORDER — ERGOCALCIFEROL 1.25 MG/1
50000 CAPSULE ORAL WEEKLY
Qty: 12 CAPSULE | Refills: 0 | Status: SHIPPED | OUTPATIENT
Start: 2020-12-03 | End: 2021-02-26

## 2020-12-03 RX ORDER — ICOSAPENT ETHYL 1000 MG/1
2 CAPSULE ORAL 2 TIMES DAILY WITH MEALS
Qty: 360 CAPSULE | Refills: 3 | Status: SHIPPED | OUTPATIENT
Start: 2020-12-03 | End: 2023-03-27 | Stop reason: SDUPTHER

## 2020-12-03 RX ORDER — NYSTATIN 100000 U/G
OINTMENT TOPICAL 2 TIMES DAILY PRN
Qty: 30 G | Refills: 3 | Status: SHIPPED | OUTPATIENT
Start: 2020-12-03 | End: 2021-01-18 | Stop reason: SDUPTHER

## 2020-12-03 RX ORDER — GABAPENTIN 600 MG/1
600 TABLET ORAL 4 TIMES DAILY
COMMUNITY
Start: 2020-11-19
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
## 2020-12-04 ENCOUNTER — TELEPHONE (OUTPATIENT)
Dept: FAMILY MEDICINE CLINIC | Facility: CLINIC | Age: 60
End: 2020-12-04

## 2020-12-04 NOTE — TELEPHONE ENCOUNTER
Contacted patient's daughter and she verbalized understanding to all results. She will be in the office with the patient on 12/10 and will discuss further with Dr. Del Rio

## 2020-12-04 NOTE — TELEPHONE ENCOUNTER
----- Message from HEIDY Mixon sent at 12/3/2020  8:37 PM EST -----  Let patient's daughter know that [place]'s vit D is very low. I have sent in a high dose supplement to be taken once a week for 12 weeks, cholesterol and triglycerides are high, increase vascepa to 2 tablets twice a day with meals. TSH is elevated, appears that patient may not be taking her levothyroxine correctly, (should be taken first thing in the morning on empty stomach before other meds). Her BNP was in normal range, does not indicate heart failure. Chest xray shows her heart is enlarged, no pleural effusion. I recommend daughter take over management of medications, including insulin

## 2020-12-07 LAB — VALPROATE FREE SERPL-MCNC: 22.4 UG/ML (ref 6–22)

## 2020-12-08 DIAGNOSIS — K21.9 CHRONIC GERD: Primary | ICD-10-CM

## 2020-12-08 RX ORDER — BETHANECHOL CHLORIDE 25 MG/1
TABLET ORAL
Qty: 90 TABLET | Refills: 3 | Status: SHIPPED | OUTPATIENT
Start: 2020-12-08 | End: 2021-08-16

## 2020-12-10 ENCOUNTER — OFFICE VISIT (OUTPATIENT)
Dept: FAMILY MEDICINE CLINIC | Facility: CLINIC | Age: 60
End: 2020-12-10

## 2020-12-10 VITALS
BODY MASS INDEX: 46.82 KG/M2 | DIASTOLIC BLOOD PRESSURE: 80 MMHG | HEIGHT: 65 IN | OXYGEN SATURATION: 95 % | WEIGHT: 281 LBS | SYSTOLIC BLOOD PRESSURE: 134 MMHG | HEART RATE: 101 BPM

## 2020-12-10 DIAGNOSIS — E11.40 TYPE 2 DIABETES MELLITUS WITH DIABETIC NEUROPATHY, WITH LONG-TERM CURRENT USE OF INSULIN (HCC): Primary | ICD-10-CM

## 2020-12-10 DIAGNOSIS — F31.9 BIPOLAR AFFECTIVE DISORDER, REMISSION STATUS UNSPECIFIED (HCC): ICD-10-CM

## 2020-12-10 DIAGNOSIS — I10 ESSENTIAL HYPERTENSION: ICD-10-CM

## 2020-12-10 DIAGNOSIS — M79.89 SWELLING OF LOWER EXTREMITY: ICD-10-CM

## 2020-12-10 DIAGNOSIS — Z79.4 TYPE 2 DIABETES MELLITUS WITH DIABETIC NEUROPATHY, WITH LONG-TERM CURRENT USE OF INSULIN (HCC): Primary | ICD-10-CM

## 2020-12-10 DIAGNOSIS — E11.65 UNCONTROLLED TYPE 2 DIABETES MELLITUS WITH HYPERGLYCEMIA (HCC): ICD-10-CM

## 2020-12-10 DIAGNOSIS — J44.9 CHRONIC OBSTRUCTIVE PULMONARY DISEASE, UNSPECIFIED COPD TYPE (HCC): ICD-10-CM

## 2020-12-10 DIAGNOSIS — E03.9 HYPOTHYROIDISM, UNSPECIFIED TYPE: ICD-10-CM

## 2020-12-10 DIAGNOSIS — Z72.0 TOBACCO ABUSE: ICD-10-CM

## 2020-12-10 PROCEDURE — 99214 OFFICE O/P EST MOD 30 MIN: CPT | Performed by: INTERNAL MEDICINE
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
## 2020-12-12 DIAGNOSIS — E78.5 HYPERLIPIDEMIA, UNSPECIFIED HYPERLIPIDEMIA TYPE: ICD-10-CM

## 2020-12-14 ENCOUNTER — OFFICE VISIT (OUTPATIENT)
Dept: CARDIOLOGY | Facility: HOSPITAL | Age: 60
End: 2020-12-14

## 2020-12-14 ENCOUNTER — HOSPITAL ENCOUNTER (OUTPATIENT)
Dept: CARDIOLOGY | Facility: HOSPITAL | Age: 60
Discharge: HOME | End: 2020-12-14

## 2020-12-14 VITALS
SYSTOLIC BLOOD PRESSURE: 105 MMHG | DIASTOLIC BLOOD PRESSURE: 68 MMHG | RESPIRATION RATE: 22 BRPM | OXYGEN SATURATION: 93 % | BODY MASS INDEX: 47.69 KG/M2 | TEMPERATURE: 97.9 F | HEIGHT: 65 IN | WEIGHT: 286.25 LBS | HEART RATE: 81 BPM

## 2020-12-14 DIAGNOSIS — R06.02 SHORTNESS OF BREATH: ICD-10-CM

## 2020-12-14 DIAGNOSIS — R07.9 CHEST PAIN, UNSPECIFIED TYPE: ICD-10-CM

## 2020-12-14 DIAGNOSIS — E11.51 TYPE 2 DIABETES MELLITUS WITH DIABETIC PERIPHERAL ANGIOPATHY WITHOUT GANGRENE, WITH LONG-TERM CURRENT USE OF INSULIN (HCC): Chronic | ICD-10-CM

## 2020-12-14 DIAGNOSIS — R60.0 BILATERAL LOWER EXTREMITY EDEMA: ICD-10-CM

## 2020-12-14 DIAGNOSIS — Z79.4 TYPE 2 DIABETES MELLITUS WITH DIABETIC PERIPHERAL ANGIOPATHY WITHOUT GANGRENE, WITH LONG-TERM CURRENT USE OF INSULIN (HCC): Chronic | ICD-10-CM

## 2020-12-14 DIAGNOSIS — R06.02 SHORTNESS OF BREATH: Primary | ICD-10-CM

## 2020-12-14 DIAGNOSIS — E78.5 HYPERLIPIDEMIA, UNSPECIFIED HYPERLIPIDEMIA TYPE: Chronic | ICD-10-CM

## 2020-12-14 DIAGNOSIS — I10 ESSENTIAL HYPERTENSION: Chronic | ICD-10-CM

## 2020-12-14 LAB
QT INTERVAL: 372 MS
QTC INTERVAL: 437 MS

## 2020-12-14 PROCEDURE — 93005 ELECTROCARDIOGRAM TRACING: CPT | Performed by: NURSE PRACTITIONER

## 2020-12-14 PROCEDURE — 99214 OFFICE O/P EST MOD 30 MIN: CPT | Performed by: NURSE PRACTITIONER

## 2020-12-14 PROCEDURE — 93010 ELECTROCARDIOGRAM REPORT: CPT | Performed by: INTERNAL MEDICINE

## 2020-12-14 RX ORDER — SIMVASTATIN 20 MG
20 TABLET ORAL EVERY EVENING
Qty: 90 TABLET | Refills: 1 | Status: SHIPPED | OUTPATIENT
Start: 2020-12-14 | End: 2022-06-14

## 2020-12-14 RX ORDER — SIMVASTATIN 10 MG
10 TABLET ORAL EVERY EVENING
Qty: 90 TABLET | Refills: 1 | Status: SHIPPED | OUTPATIENT
Start: 2020-12-14 | End: 2020-12-14 | Stop reason: SDUPTHER

## 2020-12-14 RX ORDER — ASPIRIN 81 MG/1
81 TABLET ORAL DAILY
Qty: 90 TABLET | Refills: 1 | Status: SHIPPED | OUTPATIENT
Start: 2020-12-14 | End: 2021-06-28 | Stop reason: SDUPTHER

## 2020-12-14 RX ORDER — HYDROXYZINE 50 MG/1
50 TABLET, FILM COATED ORAL 4 TIMES DAILY
COMMUNITY
End: 2022-05-24

## 2020-12-16 DIAGNOSIS — N95.1 MENOPAUSAL HOT FLUSHES: ICD-10-CM

## 2020-12-16 DIAGNOSIS — N95.1 HOT FLASHES DUE TO MENOPAUSE: ICD-10-CM

## 2020-12-17 ENCOUNTER — OFFICE VISIT (OUTPATIENT)
Dept: FAMILY MEDICINE CLINIC | Facility: CLINIC | Age: 60
End: 2020-12-17

## 2020-12-17 DIAGNOSIS — K21.9 GASTROESOPHAGEAL REFLUX DISEASE, UNSPECIFIED WHETHER ESOPHAGITIS PRESENT: Chronic | ICD-10-CM

## 2020-12-17 DIAGNOSIS — E03.9 HYPOTHYROIDISM, UNSPECIFIED TYPE: ICD-10-CM

## 2020-12-17 DIAGNOSIS — E66.01 MORBIDLY OBESE (HCC): ICD-10-CM

## 2020-12-17 DIAGNOSIS — I10 ESSENTIAL HYPERTENSION: ICD-10-CM

## 2020-12-17 DIAGNOSIS — J43.9 PULMONARY EMPHYSEMA, UNSPECIFIED EMPHYSEMA TYPE (HCC): Chronic | ICD-10-CM

## 2020-12-17 DIAGNOSIS — E11.65 UNCONTROLLED TYPE 2 DIABETES MELLITUS WITH HYPERGLYCEMIA (HCC): Primary | ICD-10-CM

## 2020-12-17 DIAGNOSIS — E78.5 HYPERLIPIDEMIA, UNSPECIFIED HYPERLIPIDEMIA TYPE: Chronic | ICD-10-CM

## 2020-12-17 PROCEDURE — 99214 OFFICE O/P EST MOD 30 MIN: CPT | Performed by: INTERNAL MEDICINE

## 2020-12-17 RX ORDER — ESTRADIOL 0.5 MG/1
0.5 TABLET ORAL DAILY
Qty: 90 TABLET | Refills: 0 | Status: SHIPPED | OUTPATIENT
Start: 2020-12-17 | End: 2021-03-12
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
## 2020-12-21 ENCOUNTER — TELEPHONE (OUTPATIENT)
Dept: FAMILY MEDICINE CLINIC | Facility: CLINIC | Age: 60
End: 2020-12-21

## 2020-12-21 NOTE — TELEPHONE ENCOUNTER
PHARMACY RECEIVED PRESCRIPTION FOR SIMVASTATIN 10 MG BUT WONDERING IF IT SHOULD BE 20 MG.  THEY RECEIVED SAME PRESCRIPTION FROM ANOTHER DOCTOR THAT WAS 20 MG.     PLEASE CALL [phone]

## 2020-12-23 ENCOUNTER — TELEPHONE (OUTPATIENT)
Dept: FAMILY MEDICINE CLINIC | Facility: CLINIC | Age: 60
End: 2020-12-23

## 2020-12-23 NOTE — TELEPHONE ENCOUNTER
PER MARÍA WITH [facility] CALLED, STATED THEY HAVE PT SCHEDULED WITH RITCHIE VIVAS PA-C ON 2/3/21 AT 3:20 PM

## 2020-12-28 DIAGNOSIS — K21.9 CHRONIC GERD: ICD-10-CM

## 2020-12-28 RX ORDER — OMEPRAZOLE 40 MG/1
40 CAPSULE, DELAYED RELEASE ORAL DAILY
Qty: 90 CAPSULE | Refills: 1 | Status: SHIPPED | OUTPATIENT
Start: 2020-12-28 | End: 2021-06-30 | Stop reason: SDUPTHER

## 2021-01-02 ENCOUNTER — APPOINTMENT (OUTPATIENT)
Dept: PREADMISSION TESTING | Facility: HOSPITAL | Age: 61
End: 2021-01-02

## 2021-01-02 PROCEDURE — U0004 COV-19 TEST NON-CDC HGH THRU: HCPCS

## 2021-01-02 PROCEDURE — C9803 HOPD COVID-19 SPEC COLLECT: HCPCS

## 2021-01-03 LAB — SARS-COV-2 RNA RESP QL NAA+PROBE: NOT DETECTED

## 2021-01-13 RX ORDER — PROMETHAZINE HYDROCHLORIDE 25 MG/1
25 TABLET ORAL EVERY 6 HOURS PRN
Qty: 30 TABLET | Refills: 0 | OUTPATIENT
Start: 2021-01-13

## 2021-01-14 NOTE — TELEPHONE ENCOUNTER
Caller: [facility] #[id number] - [place], [place] - [address] AT [facility] [address]  [phone] [facility] [phone] FX    Relationship: Pharmacy        Medication needed:   Requested Prescriptions     Pending Prescriptions Disp Refills   • promethazine (PHENERGAN) 25 MG tablet 30 tablet 0     Sig: Take 1 tablet by mouth Every 6 (Six) Hours As Needed for Nausea or Vomiting.       When do you need the refill by: TODAY    What details did the patient provide when requesting the medication: SHE IS OUT    Does the patient have less than a 3 day supply:  [x] Yes  [] No    What is the patient's preferred pharmacy: [facility] #[id number] - [place], [place] - [address] AT [facility] [address]  [phone] [facility] [phone] FX

## 2021-01-15 RX ORDER — PROMETHAZINE HYDROCHLORIDE 25 MG/1
25 TABLET ORAL EVERY 6 HOURS PRN
Qty: 30 TABLET | Refills: 0 | Status: SHIPPED | OUTPATIENT
Start: 2021-01-15 | End: 2021-03-11 | Stop reason: SDUPTHER

## 2021-01-18 ENCOUNTER — TELEPHONE (OUTPATIENT)
Dept: FAMILY MEDICINE CLINIC | Facility: CLINIC | Age: 61
End: 2021-01-18

## 2021-01-18 DIAGNOSIS — R21 SKIN RASH: ICD-10-CM

## 2021-01-18 DIAGNOSIS — B37.2 SKIN YEAST INFECTION: ICD-10-CM

## 2021-01-18 RX ORDER — MUPIROCIN CALCIUM 20 MG/G
CREAM TOPICAL SEE ADMIN INSTRUCTIONS
Qty: 30 G | Refills: 1 | Status: CANCELLED | OUTPATIENT
Start: 2021-01-18

## 2021-01-18 RX ORDER — NYSTATIN 100000 U/G
OINTMENT TOPICAL 2 TIMES DAILY PRN
Qty: 30 G | Refills: 3 | Status: SHIPPED | OUTPATIENT
Start: 2021-01-18 | End: 2021-10-18

## 2021-01-18 NOTE — TELEPHONE ENCOUNTER
PATIENT CALLED TO REQUEST A REFILL ON MEDICATION THAT WAS PREVIOUSLY USED TO TREAT RASH BUT IS NOT SURE OF NAME OF MEDICATION BUT MIGHT START WITH AN 'N'  PATIENT STATED THAT THE CREAM IS THE ONLY ONE THAT WORKS.     PATIENT STATED THAT THE RASH HAS COME BACK UNDER HER BREASTS AND BETWEEN LEGS AND BECOMES PAINFUL.      PATIENT STATES SHE IS OUT OF MEDICATION.     PATIENT ALSO REQUESTING A CALL BACK REGARDING HER SUGAR RUNNING HIGH: [phone] .        [id number] - [place], [place] - [address] [facility] [address] - [phone]  -

## 2021-01-24 DIAGNOSIS — R60.9 EDEMA, UNSPECIFIED TYPE: ICD-10-CM

## 2021-01-24 DIAGNOSIS — R60.1 GENERALIZED EDEMA: ICD-10-CM

## 2021-01-26 RX ORDER — FUROSEMIDE 20 MG/1
TABLET ORAL
Qty: 90 TABLET | Refills: 1 | Status: SHIPPED | OUTPATIENT
Start: 2021-01-26 | End: 2021-07-26

## 2021-02-02 RX ORDER — PEN NEEDLE, DIABETIC 31 GX5/16"
1 NEEDLE, DISPOSABLE MISCELLANEOUS 3 TIMES DAILY
Qty: 90 EACH | Refills: 3 | Status: SHIPPED | OUTPATIENT
Start: 2021-02-02 | End: 2021-06-21

## 2021-02-18 ENCOUNTER — TELEPHONE (OUTPATIENT)
Dept: FAMILY MEDICINE CLINIC | Facility: CLINIC | Age: 61
End: 2021-02-18

## 2021-02-18 DIAGNOSIS — IMO0002 UNCONTROLLED TYPE 2 DIABETES MELLITUS WITH COMPLICATION, WITH LONG-TERM CURRENT USE OF INSULIN: ICD-10-CM

## 2021-02-18 RX ORDER — PROMETHAZINE HYDROCHLORIDE 25 MG/1
25 TABLET ORAL EVERY 6 HOURS PRN
Qty: 30 TABLET | Refills: 0 | Status: CANCELLED | OUTPATIENT
Start: 2021-02-18

## 2021-02-18 RX ORDER — INSULIN ASPART 100 [IU]/ML
30 INJECTION, SUSPENSION SUBCUTANEOUS
Qty: 30 ML | Refills: 5 | Status: SHIPPED | OUTPATIENT
Start: 2021-02-18 | End: 2021-02-19

## 2021-02-18 RX ORDER — INSULIN ASPART 100 [IU]/ML
30 INJECTION, SUSPENSION SUBCUTANEOUS SEE ADMIN INSTRUCTIONS
Qty: 15 ML | Refills: 1 | Status: SHIPPED | OUTPATIENT
Start: 2021-02-18 | End: 2021-02-18 | Stop reason: SDUPTHER

## 2021-02-19 DIAGNOSIS — IMO0002 UNCONTROLLED TYPE 2 DIABETES MELLITUS WITH COMPLICATION, WITH LONG-TERM CURRENT USE OF INSULIN: ICD-10-CM

## 2021-02-19 RX ORDER — INSULIN ASPART 100 [IU]/ML
28 INJECTION, SUSPENSION SUBCUTANEOUS
Qty: 30 ML | Refills: 5
Start: 2021-02-19 | End: 2021-02-22 | Stop reason: SDUPTHER

## 2021-02-22 DIAGNOSIS — IMO0002 UNCONTROLLED TYPE 2 DIABETES MELLITUS WITH COMPLICATION, WITH LONG-TERM CURRENT USE OF INSULIN: ICD-10-CM

## 2021-02-22 RX ORDER — PROMETHAZINE HYDROCHLORIDE 25 MG/1
25 TABLET ORAL EVERY 6 HOURS PRN
Qty: 30 TABLET | Refills: 0 | OUTPATIENT
Start: 2021-02-22

## 2021-02-22 RX ORDER — INSULIN ASPART 100 [IU]/ML
28 INJECTION, SUSPENSION SUBCUTANEOUS
Qty: 30 ML | Refills: 5 | Status: SHIPPED | OUTPATIENT
Start: 2021-02-22 | End: 2022-01-19

## 2021-02-22 NOTE — TELEPHONE ENCOUNTER
Caller: [facility] #[id number] - [place], [place] - [address] AT [facility] [address] - [phone] [facility] [phone] FX    Relationship: Pharmacy    Best call back number:[phone]     Medication needed:   Requested Prescriptions     Pending Prescriptions Disp Refills   • promethazine (PHENERGAN) 25 MG tablet 30 tablet 0     Sig: Take 1 tablet by mouth Every 6 (Six) Hours As Needed for Nausea or Vomiting.       When do you need the refill by: ASAP    What details did the patient provide when requesting the medication: PHARMACY CALLED TO REQUEST REFILL ON THIS MEDICATION    Does the patient have less than a 3 day supply:  [] Yes  [x] No    What is the patient's preferred pharmacy: [facility] #[id number] - [place], [place] - [address] AT [facility] [address] - [phone] [facility] [phone] FX

## 2021-02-23 NOTE — TELEPHONE ENCOUNTER
Contacted patient's daughter, she will contact our office later to schedule an appt. She is more concerned about the Novolog Rx     I advised her the the prescription has been sent in yesterday and to contact the pharmacist for when it is available for pickup

## 2021-02-26 DIAGNOSIS — E55.9 VITAMIN D DEFICIENCY: ICD-10-CM

## 2021-02-26 RX ORDER — ERGOCALCIFEROL 1.25 MG/1
CAPSULE ORAL
Qty: 12 CAPSULE | Refills: 0 | Status: SHIPPED | OUTPATIENT
Start: 2021-02-26 | End: 2022-05-17

## 2021-03-01 ENCOUNTER — TELEPHONE (OUTPATIENT)
Dept: FAMILY MEDICINE CLINIC | Facility: CLINIC | Age: 61
End: 2021-03-01

## 2021-03-03 RX ORDER — PROMETHAZINE HYDROCHLORIDE 25 MG/1
25 TABLET ORAL EVERY 6 HOURS PRN
Qty: 30 TABLET | Refills: 0 | OUTPATIENT
Start: 2021-03-03

## 2021-03-03 NOTE — TELEPHONE ENCOUNTER
Attempted to contact patient, no answer. Left voicemail letting patient know that  would like for her to schedule a visit before refilling the Phenergan because it is not typically a long term medication.    Hub may relay message, and schedule appointment.

## 2021-03-11 ENCOUNTER — TELEPHONE (OUTPATIENT)
Dept: FAMILY MEDICINE CLINIC | Facility: CLINIC | Age: 61
End: 2021-03-11

## 2021-03-11 ENCOUNTER — OFFICE VISIT (OUTPATIENT)
Dept: FAMILY MEDICINE CLINIC | Facility: CLINIC | Age: 61
End: 2021-03-11

## 2021-03-11 VITALS
WEIGHT: 286 LBS | BODY MASS INDEX: 47.59 KG/M2 | OXYGEN SATURATION: 94 % | SYSTOLIC BLOOD PRESSURE: 142 MMHG | HEART RATE: 112 BPM | DIASTOLIC BLOOD PRESSURE: 84 MMHG

## 2021-03-11 DIAGNOSIS — I10 ESSENTIAL HYPERTENSION: Chronic | ICD-10-CM

## 2021-03-11 DIAGNOSIS — E11.65 UNCONTROLLED TYPE 2 DIABETES MELLITUS WITH HYPERGLYCEMIA (HCC): Primary | ICD-10-CM

## 2021-03-11 DIAGNOSIS — M54.9 CHRONIC BACK PAIN, UNSPECIFIED BACK LOCATION, UNSPECIFIED BACK PAIN LATERALITY: ICD-10-CM

## 2021-03-11 DIAGNOSIS — G89.29 CHRONIC BACK PAIN, UNSPECIFIED BACK LOCATION, UNSPECIFIED BACK PAIN LATERALITY: ICD-10-CM

## 2021-03-11 DIAGNOSIS — R43.2 LOSS OF TASTE: ICD-10-CM

## 2021-03-11 PROCEDURE — 99214 OFFICE O/P EST MOD 30 MIN: CPT | Performed by: INTERNAL MEDICINE

## 2021-03-11 PROCEDURE — U0004 COV-19 TEST NON-CDC HGH THRU: HCPCS | Performed by: INTERNAL MEDICINE

## 2021-03-11 RX ORDER — PROMETHAZINE HYDROCHLORIDE 25 MG/1
25 TABLET ORAL EVERY 6 HOURS PRN
Qty: 30 TABLET | Refills: 0 | Status: SHIPPED | OUTPATIENT
Start: 2021-03-11 | End: 2021-03-31

## 2021-03-12 DIAGNOSIS — IMO0002 UNCONTROLLED TYPE 2 DIABETES MELLITUS WITH COMPLICATION, WITH LONG-TERM CURRENT USE OF INSULIN: ICD-10-CM

## 2021-03-12 DIAGNOSIS — N95.1 HOT FLASHES DUE TO MENOPAUSE: ICD-10-CM

## 2021-03-12 DIAGNOSIS — M54.50 CHRONIC BILATERAL LOW BACK PAIN: ICD-10-CM

## 2021-03-12 DIAGNOSIS — N95.1 MENOPAUSAL HOT FLUSHES: ICD-10-CM

## 2021-03-12 DIAGNOSIS — G89.29 CHRONIC BILATERAL LOW BACK PAIN: ICD-10-CM

## 2021-03-12 LAB — SARS-COV-2 RNA NOSE QL NAA+PROBE: NOT DETECTED

## 2021-03-12 RX ORDER — LINAGLIPTIN 5 MG/1
TABLET, FILM COATED ORAL
Qty: 90 TABLET | Refills: 3 | Status: SHIPPED | OUTPATIENT
Start: 2021-03-12 | End: 2021-10-18

## 2021-03-12 RX ORDER — ESTRADIOL 0.5 MG/1
0.5 TABLET ORAL DAILY
Qty: 90 TABLET | Refills: 0 | Status: SHIPPED | OUTPATIENT
Start: 2021-03-12 | End: 2021-09-09

## 2021-03-12 RX ORDER — CYCLOBENZAPRINE HCL 10 MG
10 TABLET ORAL 3 TIMES DAILY PRN
Qty: 90 TABLET | Refills: 0 | Status: SHIPPED | OUTPATIENT
Start: 2021-03-12 | End: 2021-04-20
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
## 2021-03-18 RX ORDER — DICYCLOMINE HYDROCHLORIDE 10 MG/1
10 CAPSULE ORAL
Qty: 120 CAPSULE | Refills: 1 | Status: SHIPPED | OUTPATIENT
Start: 2021-03-18 | End: 2021-06-17

## 2021-03-31 RX ORDER — PROMETHAZINE HYDROCHLORIDE 25 MG/1
25 TABLET ORAL DAILY PRN
Qty: 30 TABLET | Refills: 1 | Status: SHIPPED | OUTPATIENT
Start: 2021-03-31 | End: 2021-05-05

## 2021-04-19 DIAGNOSIS — M54.50 CHRONIC BILATERAL LOW BACK PAIN: ICD-10-CM

## 2021-04-19 DIAGNOSIS — G89.29 CHRONIC BILATERAL LOW BACK PAIN: ICD-10-CM

## 2021-04-20 ENCOUNTER — TELEPHONE (OUTPATIENT)
Dept: FAMILY MEDICINE CLINIC | Facility: CLINIC | Age: 61
End: 2021-04-20

## 2021-04-20 DIAGNOSIS — R11.0 NAUSEA: Primary | ICD-10-CM

## 2021-04-20 RX ORDER — CYCLOBENZAPRINE HCL 10 MG
TABLET ORAL
Qty: 90 TABLET | Refills: 0 | Status: SHIPPED | OUTPATIENT
Start: 2021-04-20 | End: 2021-10-04

## 2021-04-20 NOTE — TELEPHONE ENCOUNTER
I would not recommend taking phenergan more than once daily. She is already taking it too much. She needs to be evaluated by GI. I will refer her.

## 2021-04-20 NOTE — TELEPHONE ENCOUNTER
PATIENT STATED SHE IS VERY NAUSEOUS AND THE AMOUNT OF TIMES SHE IS ALLOWED TO TAKE THE MEDICATION IS TOO LOW. PATIENT WOULD LIKE TO HAVE MORE PHENERGAN PRESCRIBED.

## 2021-04-20 NOTE — TELEPHONE ENCOUNTER
Caller: Saadia Caceres    Relationship: Self    Best call back number: [phone]    What medications are you currently taking:   Current Outpatient Medications on File Prior to Visit   Medication Sig Dispense Refill   • albuterol sulfate  (90 Base) MCG/ACT inhaler Inhale 2 puffs Every 6 (Six) Hours As Needed for Wheezing. 3 inhaler 3   • aspirin 81 MG EC tablet Take 1 tablet by mouth Daily. 90 tablet 1   • B-D ULTRAFINE III SHORT PEN 31G X 8 MM misc Inject 1 applicator under the skin into the appropriate area as directed 3 (Three) Times a Day. 90 each 3   • bethanechol (URECHOLINE) 25 MG tablet TAKE 1 TABLET BY MOUTH THREE TIMES DAILY 90 tablet 3   • Blood Glucose Monitoring Suppl (ONE TOUCH ULTRA 2) w/Device kit Use to test blood sugars 2 times daily. 1 each 0   • cyclobenzaprine (FLEXERIL) 10 MG tablet TAKE 1 TABLET BY MOUTH THREE TIMES DAILY AS NEEDED FOR MUSCLE SPASMS 90 tablet 0   • dicyclomine (BENTYL) 10 MG capsule Take 1 capsule by mouth 4 (Four) Times a Day Before Meals & at Bedtime. 120 capsule 1   • divalproex (DEPAKOTE) 500 MG 24 hr tablet TK 4 TS PO QHS  5   • estazolam (PROSOM) 2 MG tablet Take 2 mg by mouth Every Night.     • estradiol (ESTRACE) 0.5 MG tablet TAKE 1 TABLET BY MOUTH DAILY 90 tablet 0   • furosemide (LASIX) 20 MG tablet TAKE 1 TABLET BY MOUTH DAILY 90 tablet 1   • gabapentin (NEURONTIN) 400 MG capsule TK ONE C PO HS  3   • gabapentin (NEURONTIN) 600 MG tablet Take 600 mg by mouth 4 (Four) Times a Day.     • gabapentin (NEURONTIN) 800 MG tablet Take 800 mg by mouth 4 (Four) Times a Day.     • glucose blood (ONE TOUCH ULTRA TEST) test strip USE AS DIRECTED TO TEST BLOOD SUGAR TWICE DAILY 100 each 5   • hydrOXYzine (ATARAX) 50 MG tablet Take 50 mg by mouth 4 (Four) Times a Day. As needed     • icosapent ethyl (Vascepa) 1 g capsule capsule Take 2 g by mouth 2 (Two) Times a Day With Meals for 90 days. 360 capsule 3   • Incontinence Supply Disposable (DISPOSABLE  "UNDERPADS 30\"X36\") misc 1 each Every Night. 30 each 11   • Incontinence Supply Disposable (INCONTINENCE BRIEF LARGE) misc 1 each 2 (Two) Times a Day. (Patient taking differently: 1 each As Needed.) 60 each 11   • insulin aspart prot & aspart (NovoLOG Mix 70/30 FlexPen) (70-30) 100 UNIT/ML suspension pen-injector injection Inject 0.28 mL under the skin into the appropriate area as directed 2 (Two) Times a Day Before Meals. see notes 30 mL 5   • Insulin Glargine (LANTUS SOLOSTAR) 100 UNIT/ML injection pen Inject 35 Units under the skin into the appropriate area as directed Every Night. 6 pen 6   • Insulin Pen Needle (B-D ULTRAFINE III SHORT PEN) 31G X 8 MM misc Inject 1 applicator under the skin 2 (Two) Times a Day. 200 each 0   • levothyroxine (SYNTHROID, LEVOTHROID) 88 MCG tablet Take 1 tablet by mouth Daily. 90 tablet 3   • lidocaine (LIDODERM) 5 % Place 1 patch on the skin as directed by provider Daily. Remove & Discard patch within 12 hours or as directed by MD 30 each 3   • Misc. Devices (ROLLER WALKER) misc 1 Device Daily. Rollator walker 1 each 0   • mupirocin (BACTROBAN) 2 % cream Apply  topically to the appropriate area as directed See Admin Instructions. Apply topically to the affected areas three times daily 30 g 1   • mupirocin (BACTROBAN) 2 % cream Apply  topically to the appropriate area as directed See Admin Instructions. Apply topically to the affected areas three times daily 30 g 0   • neomycin-polymyxin-hydrocortisone (CORTISPORIN) 3.5-49091-2 otic solution Administer 3 drops to the right ear 4 (Four) Times a Day. 10 mL 0   • nystatin (MYCOSTATIN) 073525 UNIT/GM ointment Apply  topically to the appropriate area as directed 2 (Two) Times a Day As Needed (yeast rash). 30 g 3   • omeprazole (priLOSEC) 40 MG capsule TAKE 1 CAPSULE BY MOUTH DAILY 90 capsule 1   • OneTouch Delica Lancets 30G misc Inject 1 each under the skin into the appropriate area as directed 2 (Two) Times a Day. 100 each 2   • " prazosin (MINIPRESS) 2 MG capsule TK 1 C PO QHS     • promethazine (PHENERGAN) 25 MG tablet Take 1 tablet by mouth Daily As Needed for Nausea or Vomiting. 30 tablet 1   • simvastatin (ZOCOR) 20 MG tablet Take 1 tablet by mouth Every Evening. (Patient taking differently: Take 40 mg by mouth Every Evening.) 90 tablet 1   • SUMAtriptan (Imitrex) 50 MG tablet Take 1 tablet by mouth 1 (One) Time As Needed for Migraine for up to 1 dose. May repeat dose in 2 hours x 1 18 tablet 5   • Tradjenta 5 MG tablet tablet TAKE 1 TABLET BY MOUTH DAILY 90 tablet 3   • traZODone (DESYREL) 100 MG tablet TK 1 T PO AT SUPPER AND 3 TS QPM  3   • umeclidinium-vilanterol (Anoro Ellipta) 62.5-25 MCG/INH aerosol powder  inhaler Inhale 1 puff Daily. 90 each 1   • vitamin D (ERGOCALCIFEROL) 1.25 MG (53074 UT) capsule capsule TAKE 1 CAPSULE BY MOUTH 1 TIME EVERY WEEK FOR 12 DOSES 12 capsule 0   • Wheat Dextrin (BENEFIBER DRINK MIX PO) Take 1 dose by mouth 2 (Two) Times a Day As Needed.     • [DISCONTINUED] cyclobenzaprine (FLEXERIL) 10 MG tablet Take 1 tablet by mouth 3 (Three) Times a Day As Needed for Muscle Spasms. for muscle spams 90 tablet 0     No current facility-administered medications on file prior to visit.        When did you start taking these medications: YEARS    Which medication are you concerned about: promethazine (PHENERGAN) 25 MG tablet    Who prescribed you this medication: ROLAN ZARATE    What are your concerns: PATIENT STATED SHE IS VERY NAUSEOUS AND THE AMOUNT OF TIMES SHE IS ALLOWED TO TAKE THE MEDICATION IS TOO LOW. PATIENT WOULD LIKE TO HAVE MORE PHENERGAN PRESCRIBED.    How long have you been taking these medications: YEARS    How long have you had these concerns: 04/20/2021

## 2021-05-05 RX ORDER — PROMETHAZINE HYDROCHLORIDE 25 MG/1
TABLET ORAL
Qty: 30 TABLET | Refills: 1 | Status: SHIPPED | OUTPATIENT
Start: 2021-05-05 | End: 2021-07-02

## 2021-05-11 ENCOUNTER — TELEPHONE (OUTPATIENT)
Dept: FAMILY MEDICINE CLINIC | Facility: CLINIC | Age: 61
End: 2021-05-11

## 2021-05-13 ENCOUNTER — TELEPHONE (OUTPATIENT)
Dept: FAMILY MEDICINE CLINIC | Facility: CLINIC | Age: 61
End: 2021-05-13

## 2021-05-13 NOTE — TELEPHONE ENCOUNTER
Caller: Sandifer,Crystal    Relationship: Emergency Contact    Best call back number: [phone]    What is the medical concern/diagnosis: NEUROLOGY    What is the provider, practice or medical service name: DR. TANNER    What is the office phone number: [phone]

## 2021-05-13 NOTE — TELEPHONE ENCOUNTER
Contacted patient's daughter. She reports that the patient has been acting unusual; experiencing hallucinations, memory loss, and erratic behavior.  I advised her that in order to initiate referral the patient will need to be evaluated     Her daughter states that she will wait until upcoming appt 6/17 to discuss further

## 2021-05-21 DIAGNOSIS — IMO0002 UNCONTROLLED TYPE 2 DIABETES MELLITUS WITH COMPLICATION, WITH LONG-TERM CURRENT USE OF INSULIN: ICD-10-CM

## 2021-05-27 ENCOUNTER — TELEPHONE (OUTPATIENT)
Dept: FAMILY MEDICINE CLINIC | Facility: CLINIC | Age: 61
End: 2021-05-27

## 2021-05-27 NOTE — TELEPHONE ENCOUNTER
The pharmacy called stating that the pt needs a meter, they already have the strips. She has been without for two weeks.        Ph: [phone]

## 2021-05-27 NOTE — TELEPHONE ENCOUNTER
Caller: Saadia Caceres    Relationship: Self    Best call back number:[phone]    Medication needed:   GLUCOSE MONITOR    When do you need the refill by: ASAP    What additional details did the patient provide when requesting the medication: PATIENT IS OUT  Does the patient have less than a 3 day supply:  [x] Yes  [] No    What is the patient's preferred pharmacy: [facility] #[id number] - [place], [place] - [address] AT [facility] [address] - [phone] [facility] [phone] FX

## 2021-06-01 DIAGNOSIS — IMO0002 UNCONTROLLED TYPE 2 DIABETES MELLITUS WITH COMPLICATION, WITH LONG-TERM CURRENT USE OF INSULIN: Primary | ICD-10-CM

## 2021-06-01 RX ORDER — BLOOD-GLUCOSE METER
1 KIT MISCELLANEOUS DAILY
Qty: 1 EACH | Refills: 0 | Status: SHIPPED | OUTPATIENT
Start: 2021-06-01

## 2021-06-03 DIAGNOSIS — E78.5 HYPERLIPIDEMIA, UNSPECIFIED HYPERLIPIDEMIA TYPE: ICD-10-CM

## 2021-06-03 RX ORDER — SIMVASTATIN 10 MG
10 TABLET ORAL EVERY EVENING
Qty: 90 TABLET | Refills: 1 | Status: SHIPPED | OUTPATIENT
Start: 2021-06-03 | End: 2021-12-07

## 2021-06-17 RX ORDER — DICYCLOMINE HYDROCHLORIDE 10 MG/1
CAPSULE ORAL
Qty: 120 CAPSULE | Refills: 1 | Status: SHIPPED | OUTPATIENT
Start: 2021-06-17 | End: 2021-08-17

## 2021-06-21 RX ORDER — PEN NEEDLE, DIABETIC 31 GX5/16"
NEEDLE, DISPOSABLE MISCELLANEOUS
Qty: 100 EACH | Refills: 11 | Status: SHIPPED | OUTPATIENT
Start: 2021-06-21 | End: 2022-08-08

## 2021-06-28 RX ORDER — ASPIRIN 81 MG/1
TABLET, COATED ORAL
Qty: 90 TABLET | Refills: 1 | OUTPATIENT
Start: 2021-06-28

## 2021-06-28 RX ORDER — ASPIRIN 81 MG/1
81 TABLET ORAL DAILY
Qty: 90 TABLET | Refills: 1 | Status: SHIPPED | OUTPATIENT
Start: 2021-06-28

## 2021-06-30 DIAGNOSIS — K21.9 CHRONIC GERD: ICD-10-CM

## 2021-07-02 RX ORDER — OMEPRAZOLE 40 MG/1
40 CAPSULE, DELAYED RELEASE ORAL DAILY
Qty: 90 CAPSULE | Refills: 1 | Status: SHIPPED | OUTPATIENT
Start: 2021-07-02 | End: 2022-01-31 | Stop reason: SDUPTHER

## 2021-07-02 RX ORDER — PROMETHAZINE HYDROCHLORIDE 25 MG/1
TABLET ORAL
Qty: 30 TABLET | Refills: 1 | Status: SHIPPED | OUTPATIENT
Start: 2021-07-02 | End: 2021-10-04

## 2021-07-26 DIAGNOSIS — R60.1 GENERALIZED EDEMA: ICD-10-CM

## 2021-07-26 DIAGNOSIS — R60.9 EDEMA, UNSPECIFIED TYPE: ICD-10-CM

## 2021-07-26 RX ORDER — FUROSEMIDE 20 MG/1
TABLET ORAL
Qty: 90 TABLET | Refills: 1 | Status: SHIPPED | OUTPATIENT
Start: 2021-07-26 | End: 2022-01-31 | Stop reason: SDUPTHER

## 2021-08-09 DIAGNOSIS — E03.9 HYPOTHYROIDISM, UNSPECIFIED TYPE: ICD-10-CM

## 2021-08-09 NOTE — TELEPHONE ENCOUNTER
Rx Refill Note  Requested Prescriptions     Pending Prescriptions Disp Refills   • levothyroxine (SYNTHROID, LEVOTHROID) 88 MCG tablet [Pharmacy Med Name: LEVOTHYROXINE 0.088MG (88MCG) TAB] 90 tablet 3     Sig: TAKE 1 TABLET BY MOUTH DAILY      Last office visit with prescribing clinician: 6/24/2020      Next office visit with prescribing clinician: Visit date not found            Brandie Camacho MA  08/09/21, 10:18 EDT

## 2021-08-10 RX ORDER — LEVOTHYROXINE SODIUM 88 UG/1
88 TABLET ORAL DAILY
Qty: 90 TABLET | Refills: 3 | Status: SHIPPED | OUTPATIENT
Start: 2021-08-10 | End: 2022-05-25

## 2021-08-14 DIAGNOSIS — J44.9 CHRONIC OBSTRUCTIVE PULMONARY DISEASE, UNSPECIFIED COPD TYPE (HCC): ICD-10-CM

## 2021-08-14 DIAGNOSIS — K21.9 CHRONIC GERD: ICD-10-CM

## 2021-08-14 DIAGNOSIS — J44.1 CHRONIC OBSTRUCTIVE PULMONARY DISEASE WITH ACUTE EXACERBATION (HCC): ICD-10-CM

## 2021-08-16 RX ORDER — LANCETS 30 GAUGE
EACH MISCELLANEOUS
Qty: 100 EACH | Refills: 2 | Status: SHIPPED | OUTPATIENT
Start: 2021-08-16 | End: 2022-08-19

## 2021-08-16 RX ORDER — BETHANECHOL CHLORIDE 25 MG/1
TABLET ORAL
Qty: 90 TABLET | Refills: 3 | Status: SHIPPED | OUTPATIENT
Start: 2021-08-16 | End: 2022-01-31 | Stop reason: SDUPTHER

## 2021-08-16 NOTE — TELEPHONE ENCOUNTER
Rx Refill Note  Requested Prescriptions     Pending Prescriptions Disp Refills   • Anoro Ellipta 62.5-25 MCG/INH aerosol powder  inhaler [Pharmacy Med Name: ANORO ELLIPTA 62.5-25 ORAL INH(30S)] 180 each      Sig: INHALE 1 PUFF BY MOUTH DAILY   • albuterol sulfate  (90 Base) MCG/ACT inhaler [Pharmacy Med Name: ALBUTEROL HFA INH (200 PUFFS)8.5GM] 25.5 g      Sig: INHALE 2 PUFFS BY MOUTH EVERY 6 HOURS AS NEEDED FOR WHEEZING      Last office visit with prescribing clinician: 3/11/2021      Next office visit with prescribing clinician: Visit date not found            Seb Kauffman MA  08/16/21, 11:44 EDT

## 2021-08-16 NOTE — TELEPHONE ENCOUNTER
Rx Refill Note  Requested Prescriptions     Pending Prescriptions Disp Refills   • dicyclomine (BENTYL) 10 MG capsule [Pharmacy Med Name: DICYCLOMINE 10MG CAPSULES] 120 capsule 1     Sig: TAKE 1 CAPSULE BY MOUTH FOUR TIMES DAILY BEFORE MEALS AND AT BEDTIME      Last office visit with prescribing clinician: 3/11/2021      Next office visit with prescribing clinician: Visit date not found            Seb Kauffman MA  08/16/21, 11:41 EDT

## 2021-08-16 NOTE — TELEPHONE ENCOUNTER
Rx Refill Note  Requested Prescriptions     Pending Prescriptions Disp Refills   • bethanechol (URECHOLINE) 25 MG tablet [Pharmacy Med Name: BETHANECHOL 25MG TABLETS] 90 tablet 3     Sig: TAKE 1 TABLET BY MOUTH THREE TIMES DAILY   • Lancets (OneTouch Delica Plus [id number]) misc [Pharmacy Med Name: ONE TOUCH DELICA PLUS 30G LANCETS] 100 each 2     Sig: USE AS DIRECTED TO TEST BLOOD SUGAR TWICE DAILY      Last office visit with prescribing clinician: 12/3/2020      Next office visit with prescribing clinician: Visit date not found            Brandie Camacho MA  08/16/21, 11:45 EDT     Called PT to make an appt. She stated she will call back to make appt .

## 2021-08-17 RX ORDER — ALBUTEROL SULFATE 90 UG/1
AEROSOL, METERED RESPIRATORY (INHALATION)
Qty: 25.5 G | Refills: 5 | Status: SHIPPED | OUTPATIENT
Start: 2021-08-17 | End: 2022-05-24 | Stop reason: SDUPTHER

## 2021-08-17 RX ORDER — DICYCLOMINE HYDROCHLORIDE 10 MG/1
CAPSULE ORAL
Qty: 120 CAPSULE | Refills: 1 | Status: SHIPPED | OUTPATIENT
Start: 2021-08-17 | End: 2021-09-16

## 2021-09-01 ENCOUNTER — OFFICE VISIT (OUTPATIENT)
Dept: FAMILY MEDICINE CLINIC | Facility: CLINIC | Age: 61
End: 2021-09-01

## 2021-09-01 ENCOUNTER — LAB (OUTPATIENT)
Dept: LAB | Facility: HOSPITAL | Age: 61
End: 2021-09-01

## 2021-09-01 VITALS
SYSTOLIC BLOOD PRESSURE: 100 MMHG | DIASTOLIC BLOOD PRESSURE: 60 MMHG | BODY MASS INDEX: 45.02 KG/M2 | HEART RATE: 119 BPM | OXYGEN SATURATION: 92 % | HEIGHT: 65 IN | WEIGHT: 270.2 LBS | TEMPERATURE: 97.1 F

## 2021-09-01 DIAGNOSIS — R30.0 DYSURIA: ICD-10-CM

## 2021-09-01 DIAGNOSIS — IMO0002 UNCONTROLLED TYPE 2 DIABETES MELLITUS WITH COMPLICATION, WITH LONG-TERM CURRENT USE OF INSULIN: ICD-10-CM

## 2021-09-01 DIAGNOSIS — G89.29 CHRONIC ABDOMINAL PAIN: ICD-10-CM

## 2021-09-01 DIAGNOSIS — R10.9 CHRONIC ABDOMINAL PAIN: Primary | ICD-10-CM

## 2021-09-01 DIAGNOSIS — E11.65 UNCONTROLLED TYPE 2 DIABETES MELLITUS WITH HYPERGLYCEMIA (HCC): ICD-10-CM

## 2021-09-01 DIAGNOSIS — G89.29 CHRONIC ABDOMINAL PAIN: Primary | ICD-10-CM

## 2021-09-01 DIAGNOSIS — R10.9 CHRONIC ABDOMINAL PAIN: ICD-10-CM

## 2021-09-01 LAB
BASOPHILS # BLD AUTO: 0.03 10*3/MM3 (ref 0–0.2)
BASOPHILS NFR BLD AUTO: 0.4 % (ref 0–1.5)
BILIRUB BLD-MCNC: ABNORMAL MG/DL
CLARITY, POC: CLEAR
COLOR UR: ABNORMAL
DEPRECATED RDW RBC AUTO: 45.7 FL (ref 37–54)
EOSINOPHIL # BLD AUTO: 0.1 10*3/MM3 (ref 0–0.4)
EOSINOPHIL NFR BLD AUTO: 1.3 % (ref 0.3–6.2)
ERYTHROCYTE [DISTWIDTH] IN BLOOD BY AUTOMATED COUNT: 14.6 % (ref 12.3–15.4)
GLUCOSE UR STRIP-MCNC: ABNORMAL MG/DL
HCT VFR BLD AUTO: 50 % (ref 34–46.6)
HGB BLD-MCNC: 16.8 G/DL (ref 12–15.9)
IMM GRANULOCYTES # BLD AUTO: 0.05 10*3/MM3 (ref 0–0.05)
IMM GRANULOCYTES NFR BLD AUTO: 0.7 % (ref 0–0.5)
KETONES UR QL: ABNORMAL
LEUKOCYTE EST, POC: ABNORMAL
LYMPHOCYTES # BLD AUTO: 3.5 10*3/MM3 (ref 0.7–3.1)
LYMPHOCYTES NFR BLD AUTO: 46 % (ref 19.6–45.3)
MCH RBC QN AUTO: 29.3 PG (ref 26.6–33)
MCHC RBC AUTO-ENTMCNC: 33.6 G/DL (ref 31.5–35.7)
MCV RBC AUTO: 87.3 FL (ref 79–97)
MONOCYTES # BLD AUTO: 0.56 10*3/MM3 (ref 0.1–0.9)
MONOCYTES NFR BLD AUTO: 7.4 % (ref 5–12)
NEUTROPHILS NFR BLD AUTO: 3.37 10*3/MM3 (ref 1.7–7)
NEUTROPHILS NFR BLD AUTO: 44.2 % (ref 42.7–76)
NITRITE UR-MCNC: POSITIVE MG/ML
NRBC BLD AUTO-RTO: 0.1 /100 WBC (ref 0–0.2)
PH UR: 5 [PH] (ref 5–8)
PLATELET # BLD AUTO: 205 10*3/MM3 (ref 140–450)
PMV BLD AUTO: 9 FL (ref 6–12)
PROT UR STRIP-MCNC: ABNORMAL MG/DL
RBC # BLD AUTO: 5.73 10*6/MM3 (ref 3.77–5.28)
RBC # UR STRIP: NEGATIVE /UL
SP GR UR: 1.03 (ref 1–1.03)
UROBILINOGEN UR QL: ABNORMAL
WBC # BLD AUTO: 7.61 10*3/MM3 (ref 3.4–10.8)

## 2021-09-01 PROCEDURE — 85025 COMPLETE CBC W/AUTO DIFF WBC: CPT

## 2021-09-01 PROCEDURE — 83690 ASSAY OF LIPASE: CPT

## 2021-09-01 PROCEDURE — 83036 HEMOGLOBIN GLYCOSYLATED A1C: CPT

## 2021-09-01 PROCEDURE — 80053 COMPREHEN METABOLIC PANEL: CPT

## 2021-09-01 PROCEDURE — 81003 URINALYSIS AUTO W/O SCOPE: CPT | Performed by: INTERNAL MEDICINE

## 2021-09-01 PROCEDURE — 99214 OFFICE O/P EST MOD 30 MIN: CPT | Performed by: INTERNAL MEDICINE

## 2021-09-01 RX ORDER — CEFDINIR 300 MG/1
300 CAPSULE ORAL 2 TIMES DAILY
Qty: 14 CAPSULE | Refills: 0 | Status: SHIPPED | OUTPATIENT
Start: 2021-09-01 | End: 2021-09-08

## 2021-09-01 RX ORDER — CLONAZEPAM 2 MG/1
2 TABLET ORAL 2 TIMES DAILY
COMMUNITY
Start: 2021-08-14
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
## 2021-09-02 LAB
ALBUMIN SERPL-MCNC: 4 G/DL (ref 3.5–5.2)
ALBUMIN/GLOB SERPL: 1.3 G/DL
ALP SERPL-CCNC: 80 U/L (ref 39–117)
ALT SERPL W P-5'-P-CCNC: 17 U/L (ref 1–33)
ANION GAP SERPL CALCULATED.3IONS-SCNC: 12 MMOL/L (ref 5–15)
AST SERPL-CCNC: 12 U/L (ref 1–32)
BILIRUB SERPL-MCNC: 0.2 MG/DL (ref 0–1.2)
BUN SERPL-MCNC: 11 MG/DL (ref 6–20)
BUN/CREAT SERPL: 13.1 (ref 7–25)
CALCIUM SPEC-SCNC: 9.6 MG/DL (ref 8.6–10.5)
CHLORIDE SERPL-SCNC: 95 MMOL/L (ref 98–107)
CO2 SERPL-SCNC: 29 MMOL/L (ref 22–29)
CREAT SERPL-MCNC: 0.84 MG/DL (ref 0.57–1)
GFR SERPL CREATININE-BSD FRML MDRD: 70 ML/MIN/1.73
GLOBULIN UR ELPH-MCNC: 3 GM/DL
GLUCOSE SERPL-MCNC: 226 MG/DL (ref 65–99)
HBA1C MFR BLD: 12.3 % (ref 4.8–5.6)
LIPASE SERPL-CCNC: 25 U/L (ref 13–60)
POTASSIUM SERPL-SCNC: 4.1 MMOL/L (ref 3.5–5.2)
PROT SERPL-MCNC: 7 G/DL (ref 6–8.5)
SODIUM SERPL-SCNC: 136 MMOL/L (ref 136–145)

## 2021-09-03 DIAGNOSIS — N95.1 MENOPAUSAL HOT FLUSHES: ICD-10-CM

## 2021-09-03 DIAGNOSIS — N95.1 HOT FLASHES DUE TO MENOPAUSE: ICD-10-CM

## 2021-09-07 NOTE — TELEPHONE ENCOUNTER
Rx Refill Note  Requested Prescriptions     Pending Prescriptions Disp Refills   • estradiol (ESTRACE) 0.5 MG tablet [Pharmacy Med Name: ESTRADIOL 0.5MG TABLETS] 90 tablet 0     Sig: TAKE 1 TABLET BY MOUTH DAILY      Last office visit with prescribing clinician: 9/1/2021      Next office visit with prescribing clinician: 10/1/2021            Natalie Loyola MA  09/07/21, 08:45 EDT

## 2021-09-09 RX ORDER — ESTRADIOL 0.5 MG/1
0.5 TABLET ORAL DAILY
Qty: 90 TABLET | Refills: 0 | Status: SHIPPED | OUTPATIENT
Start: 2021-09-09 | End: 2021-12-14

## 2021-09-13 DIAGNOSIS — G43.019 INTRACTABLE MIGRAINE WITHOUT AURA AND WITHOUT STATUS MIGRAINOSUS: ICD-10-CM

## 2021-09-14 RX ORDER — SUMATRIPTAN 50 MG/1
TABLET, FILM COATED ORAL
Qty: 18 TABLET | Refills: 5 | Status: SHIPPED | OUTPATIENT
Start: 2021-09-14 | End: 2022-09-21 | Stop reason: SDUPTHER

## 2021-09-14 NOTE — TELEPHONE ENCOUNTER
Rx Refill Note  Requested Prescriptions     Pending Prescriptions Disp Refills   • SUMAtriptan (IMITREX) 50 MG tablet [Pharmacy Med Name: SUMATRIPTAN 50MG TABLETS] 18 tablet 5     Sig: TAKE 1 TABLET BY MOUTH AS NEEDED FOR MIGRAINE. MAY REPEAT DOSE IN 2 HOURS IF NEEDED      Last office visit with prescribing clinician: 9/1/2021      Next office visit with prescribing clinician: 10/1/2021            Seb Kauffman MA  09/14/21, 10:56 EDT

## 2021-09-16 ENCOUNTER — OFFICE VISIT (OUTPATIENT)
Dept: GASTROENTEROLOGY | Facility: CLINIC | Age: 61
End: 2021-09-16

## 2021-09-16 VITALS
BODY MASS INDEX: 44.96 KG/M2 | DIASTOLIC BLOOD PRESSURE: 64 MMHG | HEART RATE: 94 BPM | TEMPERATURE: 97.7 F | HEIGHT: 65 IN | OXYGEN SATURATION: 98 % | SYSTOLIC BLOOD PRESSURE: 128 MMHG

## 2021-09-16 DIAGNOSIS — B96.81 HELICOBACTER PYLORI GASTRITIS: Primary | ICD-10-CM

## 2021-09-16 DIAGNOSIS — R10.32 LLQ PAIN: ICD-10-CM

## 2021-09-16 DIAGNOSIS — K58.2 IRRITABLE BOWEL SYNDROME WITH BOTH CONSTIPATION AND DIARRHEA: ICD-10-CM

## 2021-09-16 DIAGNOSIS — R11.0 CHRONIC NAUSEA: ICD-10-CM

## 2021-09-16 DIAGNOSIS — K29.70 HELICOBACTER PYLORI GASTRITIS: Primary | ICD-10-CM

## 2021-09-16 PROCEDURE — 99214 OFFICE O/P EST MOD 30 MIN: CPT | Performed by: NURSE PRACTITIONER

## 2021-09-16 RX ORDER — HYOSCYAMINE SULFATE 0.125 MG
0.12 TABLET ORAL EVERY 4 HOURS PRN
Qty: 120 TABLET | Refills: 2 | Status: SHIPPED | OUTPATIENT
Start: 2021-09-16 | End: 2023-01-17
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
## 2021-09-17 ENCOUNTER — PREP FOR SURGERY (OUTPATIENT)
Dept: OTHER | Facility: HOSPITAL | Age: 61
End: 2021-09-17

## 2021-09-17 DIAGNOSIS — R10.12 LUQ ABDOMINAL PAIN: Primary | ICD-10-CM

## 2021-09-20 ENCOUNTER — TELEPHONE (OUTPATIENT)
Dept: GASTROENTEROLOGY | Facility: CLINIC | Age: 61
End: 2021-09-20

## 2021-09-30 ENCOUNTER — TELEPHONE (OUTPATIENT)
Dept: FAMILY MEDICINE CLINIC | Facility: CLINIC | Age: 61
End: 2021-09-30

## 2021-10-04 DIAGNOSIS — M54.50 CHRONIC BILATERAL LOW BACK PAIN: ICD-10-CM

## 2021-10-04 DIAGNOSIS — G89.29 CHRONIC BILATERAL LOW BACK PAIN: ICD-10-CM

## 2021-10-04 RX ORDER — CYCLOBENZAPRINE HCL 10 MG
TABLET ORAL
Qty: 90 TABLET | Refills: 0 | Status: SHIPPED | OUTPATIENT
Start: 2021-10-04 | End: 2021-11-12

## 2021-10-04 RX ORDER — PROMETHAZINE HYDROCHLORIDE 25 MG/1
TABLET ORAL
Qty: 30 TABLET | Refills: 1 | Status: SHIPPED | OUTPATIENT
Start: 2021-10-04

## 2021-10-04 NOTE — TELEPHONE ENCOUNTER
Last Office Visit: 09/01/2021  Next Office Visit: none         Please review pended refill request for any changes needed on refills or quantities. Thank you!

## 2021-10-18 DIAGNOSIS — B37.2 SKIN YEAST INFECTION: ICD-10-CM

## 2021-10-18 DIAGNOSIS — IMO0002 UNCONTROLLED TYPE 2 DIABETES MELLITUS WITH COMPLICATION, WITH LONG-TERM CURRENT USE OF INSULIN: ICD-10-CM

## 2021-10-18 RX ORDER — NYSTATIN 100000 U/G
OINTMENT TOPICAL
Qty: 30 G | Refills: 3 | Status: SHIPPED | OUTPATIENT
Start: 2021-10-18 | End: 2022-05-24 | Stop reason: SDUPTHER

## 2021-10-18 RX ORDER — LINAGLIPTIN 5 MG/1
TABLET, FILM COATED ORAL
Qty: 90 TABLET | Refills: 3 | Status: SHIPPED | OUTPATIENT
Start: 2021-10-18 | End: 2022-04-07

## 2021-10-18 NOTE — TELEPHONE ENCOUNTER
Rx Refill Note  Requested Prescriptions     Pending Prescriptions Disp Refills   • nystatin (MYCOSTATIN) 011193 UNIT/GM ointment [Pharmacy Med Name: NYSTATIN OINTMENT 30GM] 30 g 3     Sig: APPLY TOPICALLY TO THE APPROPRIATE AREA AS DIRECTED TWICE DAILY AS NEEDED FOR YEAST RASH      Last office visit with prescribing clinician: 9/1/2021      Next office visit with prescribing clinician: Visit date not found     Last fill 5-14-21       Betsy Mcconnell MA  10/18/21, 10:42 EDT

## 2021-11-12 DIAGNOSIS — G89.29 CHRONIC BILATERAL LOW BACK PAIN: ICD-10-CM

## 2021-11-12 DIAGNOSIS — M54.50 CHRONIC BILATERAL LOW BACK PAIN: ICD-10-CM

## 2021-11-12 RX ORDER — CYCLOBENZAPRINE HCL 10 MG
TABLET ORAL
Qty: 90 TABLET | Refills: 0 | Status: SHIPPED | OUTPATIENT
Start: 2021-11-12 | End: 2022-01-28

## 2021-11-12 NOTE — TELEPHONE ENCOUNTER
Rx Refill Note  Requested Prescriptions     Pending Prescriptions Disp Refills   • cyclobenzaprine (FLEXERIL) 10 MG tablet [Pharmacy Med Name: CYCLOBENZAPRINE 10MG TABLETS] 90 tablet 0     Sig: TAKE 1 TABLET BY MOUTH THREE TIMES DAILY AS NEEDED FOR MUSCLE SPASMS      Last office visit with prescribing clinician: 9/1/2021      Next office visit with prescribing clinician: Visit date not found            Seb Kauffman MA  11/12/21, 10:06 EST

## 2021-11-22 ENCOUNTER — TELEPHONE (OUTPATIENT)
Dept: GASTROENTEROLOGY | Facility: CLINIC | Age: 61
End: 2021-11-22

## 2021-11-29 RX ORDER — DICYCLOMINE HYDROCHLORIDE 10 MG/1
CAPSULE ORAL
Qty: 120 CAPSULE | Refills: 1 | Status: SHIPPED | OUTPATIENT
Start: 2021-11-29 | End: 2022-01-31 | Stop reason: SDUPTHER

## 2021-11-29 NOTE — TELEPHONE ENCOUNTER
Rx Refill Note  Requested Prescriptions     Pending Prescriptions Disp Refills   • dicyclomine (BENTYL) 10 MG capsule [Pharmacy Med Name: DICYCLOMINE 10MG CAPSULES] 120 capsule 1     Sig: TAKE 1 CAPSULE BY MOUTH FOUR TIMES DAILY BEFORE MEALS AND AT BEDTIME      Last office visit with prescribing clinician: 9/1/2021      Next office visit with prescribing clinician: Visit date not found            Brandie Camacho MA  11/29/21, 10:07 EST

## 2021-12-07 DIAGNOSIS — E78.5 HYPERLIPIDEMIA, UNSPECIFIED HYPERLIPIDEMIA TYPE: ICD-10-CM

## 2021-12-07 RX ORDER — SIMVASTATIN 10 MG
10 TABLET ORAL EVERY EVENING
Qty: 90 TABLET | Refills: 1 | Status: SHIPPED | OUTPATIENT
Start: 2021-12-07 | End: 2022-06-14

## 2021-12-07 NOTE — TELEPHONE ENCOUNTER
Rx Refill Note  Requested Prescriptions     Pending Prescriptions Disp Refills   • simvastatin (ZOCOR) 10 MG tablet [Pharmacy Med Name: SIMVASTATIN 10MG TABLETS] 90 tablet 1     Sig: TAKE 1 TABLET BY MOUTH EVERY EVENING      Last office visit with prescribing clinician: 9/1/2021      Next office visit with prescribing clinician: Visit date not found            Seb Kauffman MA  12/07/21, 16:09 EST

## 2021-12-14 DIAGNOSIS — N95.1 MENOPAUSAL HOT FLUSHES: ICD-10-CM

## 2021-12-14 DIAGNOSIS — N95.1 HOT FLASHES DUE TO MENOPAUSE: ICD-10-CM

## 2021-12-14 DIAGNOSIS — IMO0002 UNCONTROLLED TYPE 2 DIABETES MELLITUS WITH COMPLICATION, WITH LONG-TERM CURRENT USE OF INSULIN: ICD-10-CM

## 2021-12-14 RX ORDER — BLOOD SUGAR DIAGNOSTIC
STRIP MISCELLANEOUS
Qty: 100 EACH | Refills: 2 | Status: SHIPPED | OUTPATIENT
Start: 2021-12-14 | End: 2022-05-12

## 2021-12-14 RX ORDER — ESTRADIOL 0.5 MG/1
0.5 TABLET ORAL DAILY
Qty: 90 TABLET | Refills: 0 | Status: SHIPPED | OUTPATIENT
Start: 2021-12-14 | End: 2022-01-31 | Stop reason: SDUPTHER

## 2021-12-14 NOTE — TELEPHONE ENCOUNTER
Rx Refill Note  Requested Prescriptions     Pending Prescriptions Disp Refills   • OneTouch Ultra test strip [Pharmacy Med Name: ONE TOUCH ULTRA BLUE TESTST(NEW)100]       Sig: USE AS DIRECTED TO TEST BLOOD SUGAR TWICE DAILY   • estradiol (ESTRACE) 0.5 MG tablet [Pharmacy Med Name: ESTRADIOL 0.5MG TABLETS] 90 tablet 0     Sig: TAKE 1 TABLET BY MOUTH DAILY      Last office visit with prescribing clinician: 9/1/2021      Next office visit with prescribing clinician: Visit date not found            Cruz Holly MA  12/14/21, 14:19 EST

## 2021-12-23 DIAGNOSIS — E78.5 HYPERLIPIDEMIA, UNSPECIFIED HYPERLIPIDEMIA TYPE: Chronic | ICD-10-CM

## 2021-12-23 NOTE — TELEPHONE ENCOUNTER
Rx Refill Note  Requested Prescriptions     Pending Prescriptions Disp Refills   • Vascepa 1 g capsule capsule [Pharmacy Med Name: VASCEPA 1GM CAPSULES] 360 capsule 3     Sig: TAKE 2 CAPSULES BY MOUTH TWICE DAILY WITH MEALS      Last office visit with prescribing clinician: 09/01/2021    Next office visit with prescribing clinician: Visit date not found     Azul Lewis MA  12/23/21, 14:55 EST     Not on current meds list     Attempted to contact patient, no answer. Unable to leave voicemail. Vascepa was discontinued and is not on patient's current medications list. Need to know if patient has been taking this medication.    Hub may relay message& document.

## 2021-12-27 RX ORDER — ICOSAPENT ETHYL 1000 MG/1
CAPSULE ORAL
Qty: 360 CAPSULE | Refills: 3 | OUTPATIENT
Start: 2021-12-27

## 2021-12-27 NOTE — TELEPHONE ENCOUNTER
Not on current meds list      Attempted to contact patient, no answer. Unable to leave voicemail. Vascepa was discontinued and is not on patient's current medications list. Need to know if patient has been taking this medication.     Hub may relay message& document.

## 2022-01-19 DIAGNOSIS — IMO0002 UNCONTROLLED TYPE 2 DIABETES MELLITUS WITH COMPLICATION, WITH LONG-TERM CURRENT USE OF INSULIN: ICD-10-CM

## 2022-01-19 RX ORDER — INSULIN ASPART 100 [IU]/ML
INJECTION, SUSPENSION SUBCUTANEOUS
Qty: 30 ML | Refills: 5 | Status: SHIPPED | OUTPATIENT
Start: 2022-01-19 | End: 2022-01-31 | Stop reason: SDUPTHER

## 2022-01-19 RX ORDER — INSULIN ASPART 100 [IU]/ML
INJECTION, SUSPENSION SUBCUTANEOUS
Qty: 30 ML | Refills: 5 | OUTPATIENT
Start: 2022-01-19

## 2022-01-27 DIAGNOSIS — M54.50 CHRONIC BILATERAL LOW BACK PAIN: ICD-10-CM

## 2022-01-27 DIAGNOSIS — G89.29 CHRONIC BILATERAL LOW BACK PAIN: ICD-10-CM

## 2022-01-28 RX ORDER — CYCLOBENZAPRINE HCL 10 MG
TABLET ORAL
Qty: 90 TABLET | Refills: 0 | Status: SHIPPED | OUTPATIENT
Start: 2022-01-28 | End: 2022-04-12

## 2022-01-28 NOTE — TELEPHONE ENCOUNTER
Rx Refill Note  Requested Prescriptions     Pending Prescriptions Disp Refills   • cyclobenzaprine (FLEXERIL) 10 MG tablet [Pharmacy Med Name: CYCLOBENZAPRINE 10MG TABLETS] 90 tablet 0     Sig: TAKE 1 TABLET BY MOUTH THREE TIMES DAILY AS NEEDED FOR MUSCLE SPASMS      Last office visit with prescribing clinician: 9/1/2021      Next office visit with prescribing clinician: 1/31/2022            GIOVANY VASQUEZ MA  01/28/22, 11:25 EST

## 2022-01-31 ENCOUNTER — TELEPHONE (OUTPATIENT)
Dept: FAMILY MEDICINE CLINIC | Facility: CLINIC | Age: 62
End: 2022-01-31

## 2022-01-31 ENCOUNTER — LAB (OUTPATIENT)
Dept: LAB | Facility: HOSPITAL | Age: 62
End: 2022-01-31

## 2022-01-31 ENCOUNTER — OFFICE VISIT (OUTPATIENT)
Dept: FAMILY MEDICINE CLINIC | Facility: CLINIC | Age: 62
End: 2022-01-31

## 2022-01-31 VITALS
HEIGHT: 65 IN | SYSTOLIC BLOOD PRESSURE: 150 MMHG | TEMPERATURE: 97.8 F | OXYGEN SATURATION: 90 % | DIASTOLIC BLOOD PRESSURE: 90 MMHG | HEART RATE: 101 BPM | WEIGHT: 268 LBS | BODY MASS INDEX: 44.65 KG/M2

## 2022-01-31 DIAGNOSIS — N95.1 MENOPAUSAL HOT FLUSHES: ICD-10-CM

## 2022-01-31 DIAGNOSIS — M54.50 CHRONIC BILATERAL LOW BACK PAIN, UNSPECIFIED WHETHER SCIATICA PRESENT: ICD-10-CM

## 2022-01-31 DIAGNOSIS — R60.9 EDEMA, UNSPECIFIED TYPE: ICD-10-CM

## 2022-01-31 DIAGNOSIS — K21.9 CHRONIC GERD: ICD-10-CM

## 2022-01-31 DIAGNOSIS — Z23 IMMUNIZATION DUE: ICD-10-CM

## 2022-01-31 DIAGNOSIS — E78.5 HYPERLIPIDEMIA, UNSPECIFIED HYPERLIPIDEMIA TYPE: ICD-10-CM

## 2022-01-31 DIAGNOSIS — Z74.09 LIMITED MOBILITY: ICD-10-CM

## 2022-01-31 DIAGNOSIS — IMO0002 UNCONTROLLED TYPE 2 DIABETES MELLITUS WITH COMPLICATION, WITH LONG-TERM CURRENT USE OF INSULIN: Primary | ICD-10-CM

## 2022-01-31 DIAGNOSIS — R60.1 GENERALIZED EDEMA: ICD-10-CM

## 2022-01-31 DIAGNOSIS — N95.1 HOT FLASHES DUE TO MENOPAUSE: ICD-10-CM

## 2022-01-31 DIAGNOSIS — IMO0002 UNCONTROLLED TYPE 2 DIABETES MELLITUS WITH COMPLICATION, WITH LONG-TERM CURRENT USE OF INSULIN: ICD-10-CM

## 2022-01-31 DIAGNOSIS — B49 FUNGAL INFECTION: ICD-10-CM

## 2022-01-31 DIAGNOSIS — G89.29 CHRONIC BILATERAL LOW BACK PAIN, UNSPECIFIED WHETHER SCIATICA PRESENT: ICD-10-CM

## 2022-01-31 DIAGNOSIS — R26.89 BALANCE DISORDER: ICD-10-CM

## 2022-01-31 LAB
ALBUMIN SERPL-MCNC: 4.3 G/DL (ref 3.5–5.2)
ALBUMIN/GLOB SERPL: 1.4 G/DL
ALP SERPL-CCNC: 84 U/L (ref 39–117)
ALT SERPL W P-5'-P-CCNC: 19 U/L (ref 1–33)
ANION GAP SERPL CALCULATED.3IONS-SCNC: 10.3 MMOL/L (ref 5–15)
AST SERPL-CCNC: 15 U/L (ref 1–32)
BASOPHILS # BLD AUTO: 0.07 10*3/MM3 (ref 0–0.2)
BASOPHILS NFR BLD AUTO: 0.7 % (ref 0–1.5)
BILIRUB SERPL-MCNC: 0.2 MG/DL (ref 0–1.2)
BILIRUB UR QL STRIP: NEGATIVE
BUN SERPL-MCNC: 16 MG/DL (ref 6–20)
BUN/CREAT SERPL: 19.5 (ref 7–25)
CALCIUM SPEC-SCNC: 10.1 MG/DL (ref 8.6–10.5)
CHLORIDE SERPL-SCNC: 96 MMOL/L (ref 98–107)
CHOLEST SERPL-MCNC: 234 MG/DL (ref 0–200)
CLARITY UR: CLEAR
CO2 SERPL-SCNC: 32.7 MMOL/L (ref 22–29)
COLOR UR: YELLOW
CREAT SERPL-MCNC: 0.82 MG/DL (ref 0.57–1)
DEPRECATED RDW RBC AUTO: 55.7 FL (ref 37–54)
EOSINOPHIL # BLD AUTO: 0.13 10*3/MM3 (ref 0–0.4)
EOSINOPHIL NFR BLD AUTO: 1.3 % (ref 0.3–6.2)
ERYTHROCYTE [DISTWIDTH] IN BLOOD BY AUTOMATED COUNT: 16.6 % (ref 12.3–15.4)
EXPIRATION DATE: NORMAL
GFR SERPL CREATININE-BSD FRML MDRD: 71 ML/MIN/1.73
GLOBULIN UR ELPH-MCNC: 3.1 GM/DL
GLUCOSE SERPL-MCNC: 166 MG/DL (ref 65–99)
GLUCOSE UR STRIP-MCNC: NEGATIVE MG/DL
HBA1C MFR BLD: 9.9 %
HCT VFR BLD AUTO: 46.9 % (ref 34–46.6)
HDLC SERPL-MCNC: 51 MG/DL (ref 40–60)
HGB BLD-MCNC: 15.5 G/DL (ref 12–15.9)
HGB UR QL STRIP.AUTO: NEGATIVE
IMM GRANULOCYTES # BLD AUTO: 0.07 10*3/MM3 (ref 0–0.05)
IMM GRANULOCYTES NFR BLD AUTO: 0.7 % (ref 0–0.5)
KETONES UR QL STRIP: NEGATIVE
LDLC SERPL CALC-MCNC: 147 MG/DL (ref 0–100)
LDLC/HDLC SERPL: 2.81 {RATIO}
LEUKOCYTE ESTERASE UR QL STRIP.AUTO: NEGATIVE
LYMPHOCYTES # BLD AUTO: 4.24 10*3/MM3 (ref 0.7–3.1)
LYMPHOCYTES NFR BLD AUTO: 42.5 % (ref 19.6–45.3)
Lab: NORMAL
MCH RBC QN AUTO: 30.4 PG (ref 26.6–33)
MCHC RBC AUTO-ENTMCNC: 33 G/DL (ref 31.5–35.7)
MCV RBC AUTO: 92 FL (ref 79–97)
MONOCYTES # BLD AUTO: 0.79 10*3/MM3 (ref 0.1–0.9)
MONOCYTES NFR BLD AUTO: 7.9 % (ref 5–12)
NEUTROPHILS NFR BLD AUTO: 4.67 10*3/MM3 (ref 1.7–7)
NEUTROPHILS NFR BLD AUTO: 46.9 % (ref 42.7–76)
NITRITE UR QL STRIP: NEGATIVE
NRBC BLD AUTO-RTO: 0.1 /100 WBC (ref 0–0.2)
PH UR STRIP.AUTO: 6 [PH] (ref 5–8)
PLATELET # BLD AUTO: 293 10*3/MM3 (ref 140–450)
PMV BLD AUTO: 9.1 FL (ref 6–12)
POTASSIUM SERPL-SCNC: 5 MMOL/L (ref 3.5–5.2)
PROT SERPL-MCNC: 7.4 G/DL (ref 6–8.5)
PROT UR QL STRIP: NEGATIVE
RBC # BLD AUTO: 5.1 10*6/MM3 (ref 3.77–5.28)
SODIUM SERPL-SCNC: 139 MMOL/L (ref 136–145)
SP GR UR STRIP: 1.01 (ref 1–1.03)
T4 FREE SERPL-MCNC: 1.26 NG/DL (ref 0.93–1.7)
TRIGL SERPL-MCNC: 198 MG/DL (ref 0–150)
TSH SERPL DL<=0.05 MIU/L-ACNC: 2.44 UIU/ML (ref 0.27–4.2)
UROBILINOGEN UR QL STRIP: NORMAL
VLDLC SERPL-MCNC: 36 MG/DL (ref 5–40)
WBC NRBC COR # BLD: 9.97 10*3/MM3 (ref 3.4–10.8)

## 2022-01-31 PROCEDURE — 80061 LIPID PANEL: CPT

## 2022-01-31 PROCEDURE — 84439 ASSAY OF FREE THYROXINE: CPT

## 2022-01-31 PROCEDURE — 83036 HEMOGLOBIN GLYCOSYLATED A1C: CPT | Performed by: INTERNAL MEDICINE

## 2022-01-31 PROCEDURE — 3046F HEMOGLOBIN A1C LEVEL >9.0%: CPT | Performed by: INTERNAL MEDICINE

## 2022-01-31 PROCEDURE — 99214 OFFICE O/P EST MOD 30 MIN: CPT | Performed by: INTERNAL MEDICINE

## 2022-01-31 PROCEDURE — 84443 ASSAY THYROID STIM HORMONE: CPT

## 2022-01-31 PROCEDURE — 85025 COMPLETE CBC W/AUTO DIFF WBC: CPT

## 2022-01-31 PROCEDURE — 91300 COVID-19 (PFIZER): CPT | Performed by: INTERNAL MEDICINE

## 2022-01-31 PROCEDURE — 81003 URINALYSIS AUTO W/O SCOPE: CPT

## 2022-01-31 PROCEDURE — 80053 COMPREHEN METABOLIC PANEL: CPT

## 2022-01-31 PROCEDURE — 0001A COVID-19 (PFIZER): CPT | Performed by: INTERNAL MEDICINE

## 2022-01-31 RX ORDER — DICYCLOMINE HYDROCHLORIDE 10 MG/1
10 CAPSULE ORAL
Qty: 120 CAPSULE | Refills: 1 | Status: SHIPPED | OUTPATIENT
Start: 2022-01-31 | End: 2023-03-27 | Stop reason: SDUPTHER

## 2022-01-31 RX ORDER — BETHANECHOL CHLORIDE 25 MG/1
25 TABLET ORAL 3 TIMES DAILY
Qty: 90 TABLET | Refills: 3 | Status: SHIPPED | OUTPATIENT
Start: 2022-01-31 | End: 2022-08-02

## 2022-01-31 RX ORDER — INSULIN ASPART 100 [IU]/ML
28 INJECTION, SUSPENSION SUBCUTANEOUS
Qty: 30 ML | Refills: 5 | Status: SHIPPED | OUTPATIENT
Start: 2022-01-31 | End: 2022-05-03

## 2022-01-31 RX ORDER — CYCLOBENZAPRINE HCL 10 MG
10 TABLET ORAL 3 TIMES DAILY PRN
Qty: 90 TABLET | Refills: 0 | Status: CANCELLED | OUTPATIENT
Start: 2022-01-31

## 2022-01-31 RX ORDER — ESTRADIOL 0.5 MG/1
0.5 TABLET ORAL DAILY
Qty: 90 TABLET | Refills: 0 | Status: SHIPPED | OUTPATIENT
Start: 2022-01-31 | End: 2022-03-15

## 2022-01-31 RX ORDER — FUROSEMIDE 20 MG/1
20 TABLET ORAL DAILY
Qty: 90 TABLET | Refills: 3 | Status: SHIPPED | OUTPATIENT
Start: 2022-01-31 | End: 2022-12-29

## 2022-01-31 RX ORDER — FLUCONAZOLE 100 MG/1
100 TABLET ORAL DAILY
Qty: 7 TABLET | Refills: 0 | Status: SHIPPED | OUTPATIENT
Start: 2022-01-31 | End: 2022-02-07

## 2022-01-31 RX ORDER — SIMVASTATIN 10 MG
10 TABLET ORAL EVERY EVENING
Qty: 90 TABLET | Refills: 1 | Status: CANCELLED | OUTPATIENT
Start: 2022-01-31

## 2022-01-31 RX ORDER — OMEPRAZOLE 40 MG/1
40 CAPSULE, DELAYED RELEASE ORAL DAILY
Qty: 90 CAPSULE | Refills: 1 | Status: SHIPPED | OUTPATIENT
Start: 2022-01-31 | End: 2022-08-02
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
## 2022-02-01 ENCOUNTER — HOME HEALTH ADMISSION (OUTPATIENT)
Dept: HOME HEALTH SERVICES | Facility: HOME HEALTHCARE | Age: 62
End: 2022-02-01

## 2022-02-01 DIAGNOSIS — IMO0002 UNCONTROLLED TYPE 2 DIABETES MELLITUS WITH COMPLICATION, WITH LONG-TERM CURRENT USE OF INSULIN: Primary | ICD-10-CM

## 2022-02-01 NOTE — TELEPHONE ENCOUNTER
Patient reports that she has pain when urinating, and is concerned that she has UTI, she has been taking Azo OTC. I discussed her recent visit notes. She states her glucose has still be elevated but has been monitoring it closely      She is very upset and believes that her daughter is withholding medicine from her. She states she feels very stressed about the care she is receiving from her daughter. I advised her that when she leaves the office make sure she receives her AVS and she can use that as a reference. I advised her that she can let us know if she needs any resources and we dont mind helping if we can. She verbalized understanding and agreed

## 2022-02-01 NOTE — TELEPHONE ENCOUNTER
Urinalysis was completely normal yesterday.  Doubt UTI based on these findings.  If she continues to have symptoms I would recommend repeat appointment for recheck of urine test and further work-up.

## 2022-02-02 NOTE — TELEPHONE ENCOUNTER
Spoke with patient's daughter. She states that patient has been experiencing vaginitis and uses AZO's OTC constantly.   She will keep us informed if she needs anything further.

## 2022-02-07 ENCOUNTER — PRIOR AUTHORIZATION (OUTPATIENT)
Dept: FAMILY MEDICINE CLINIC | Facility: CLINIC | Age: 62
End: 2022-02-07

## 2022-02-07 NOTE — TELEPHONE ENCOUNTER
Completed Prior Authorization for [employer] reader & sensor    KEY: [id number] - reader      KEY: [id number] sensor  Awaiting determination from [facility]

## 2022-02-08 NOTE — TELEPHONE ENCOUNTER
Received outcome that PA was approved by plan. Approval letter faxed to office, Called and informed pharmacy. Called and spoke to pt's daughter. Verbalized understanding.

## 2022-02-10 ENCOUNTER — TELEPHONE (OUTPATIENT)
Dept: FAMILY MEDICINE CLINIC | Facility: CLINIC | Age: 62
End: 2022-02-10

## 2022-02-10 NOTE — TELEPHONE ENCOUNTER
BERTHA FROM [facility] NEEDS A VERBAL TO START HOME HEALTH ON Monday 2/14/2022 IF SOMEONE COULD PLEASE CALL HIM

## 2022-02-11 NOTE — TELEPHONE ENCOUNTER
Called and gave new verbal orders ok to start home health. Bipin voiced understanding. Stated they will begin on monday

## 2022-02-14 ENCOUNTER — TELEPHONE (OUTPATIENT)
Dept: FAMILY MEDICINE CLINIC | Facility: CLINIC | Age: 62
End: 2022-02-14

## 2022-02-14 NOTE — TELEPHONE ENCOUNTER
Sierra from [facility] callred requesting a verbal order to change start of care date to 2/14/2022 for patient due to not being there on the weekend. Says she can be reached at [phone]

## 2022-02-14 NOTE — TELEPHONE ENCOUNTER
Called and spoke to Bettina at [facility]. Gave verbal order to change to whatever day is needed as ordered by provider. Voiced understanding. Had no further questions at this time.

## 2022-02-21 ENCOUNTER — OFFICE VISIT (OUTPATIENT)
Dept: FAMILY MEDICINE CLINIC | Facility: CLINIC | Age: 62
End: 2022-02-21

## 2022-02-21 ENCOUNTER — HOSPITAL ENCOUNTER (OUTPATIENT)
Dept: GENERAL RADIOLOGY | Facility: HOSPITAL | Age: 62
Discharge: HOME | End: 2022-02-21
Admitting: INTERNAL MEDICINE

## 2022-02-21 ENCOUNTER — IMMUNIZATION (OUTPATIENT)
Dept: FAMILY MEDICINE CLINIC | Facility: CLINIC | Age: 62
End: 2022-02-21

## 2022-02-21 VITALS
HEIGHT: 65 IN | HEART RATE: 111 BPM | OXYGEN SATURATION: 93 % | BODY MASS INDEX: 44.65 KG/M2 | WEIGHT: 268 LBS | DIASTOLIC BLOOD PRESSURE: 86 MMHG | SYSTOLIC BLOOD PRESSURE: 140 MMHG

## 2022-02-21 DIAGNOSIS — M79.675 GREAT TOE PAIN, LEFT: Primary | ICD-10-CM

## 2022-02-21 DIAGNOSIS — I10 PRIMARY HYPERTENSION: ICD-10-CM

## 2022-02-21 DIAGNOSIS — M79.675 GREAT TOE PAIN, LEFT: ICD-10-CM

## 2022-02-21 DIAGNOSIS — IMO0002 UNCONTROLLED TYPE 2 DIABETES MELLITUS WITH COMPLICATION, WITH LONG-TERM CURRENT USE OF INSULIN: ICD-10-CM

## 2022-02-21 DIAGNOSIS — Z23 IMMUNIZATION DUE: Primary | ICD-10-CM

## 2022-02-21 PROCEDURE — 99214 OFFICE O/P EST MOD 30 MIN: CPT | Performed by: INTERNAL MEDICINE

## 2022-02-21 PROCEDURE — 73660 X-RAY EXAM OF TOE(S): CPT

## 2022-02-21 PROCEDURE — 0051A COVID-19 (PFIZER) 12+ YRS: CPT | Performed by: FAMILY MEDICINE

## 2022-02-21 PROCEDURE — 91305 COVID-19 (PFIZER) 12+ YRS: CPT | Performed by: FAMILY MEDICINE

## 2022-02-21 NOTE — PROGRESS NOTES
Foot XR is normal other than mild degenerative changes. F/u with podiatry for further evaluation. She was referred today.
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
## 2022-03-10 ENCOUNTER — HOSPITAL ENCOUNTER (EMERGENCY)
Facility: HOSPITAL | Age: 62
Discharge: ANOTHER HOSPITAL | End: 2022-03-10
Attending: EMERGENCY MEDICINE | Admitting: EMERGENCY MEDICINE

## 2022-03-10 ENCOUNTER — APPOINTMENT (OUTPATIENT)
Dept: GENERAL RADIOLOGY | Facility: HOSPITAL | Age: 62
End: 2022-03-10

## 2022-03-10 VITALS
RESPIRATION RATE: 18 BRPM | TEMPERATURE: 97.9 F | SYSTOLIC BLOOD PRESSURE: 145 MMHG | DIASTOLIC BLOOD PRESSURE: 68 MMHG | HEART RATE: 89 BPM | WEIGHT: 275.57 LBS | BODY MASS INDEX: 44.29 KG/M2 | OXYGEN SATURATION: 98 % | HEIGHT: 66 IN

## 2022-03-10 DIAGNOSIS — M54.50 CHRONIC LOW BACK PAIN, UNSPECIFIED BACK PAIN LATERALITY, UNSPECIFIED WHETHER SCIATICA PRESENT: ICD-10-CM

## 2022-03-10 DIAGNOSIS — Z86.59 HISTORY OF BIPOLAR DISORDER: ICD-10-CM

## 2022-03-10 DIAGNOSIS — G89.29 CHRONIC LOW BACK PAIN, UNSPECIFIED BACK PAIN LATERALITY, UNSPECIFIED WHETHER SCIATICA PRESENT: ICD-10-CM

## 2022-03-10 DIAGNOSIS — R45.851 SUICIDAL IDEATION: Primary | ICD-10-CM

## 2022-03-10 DIAGNOSIS — Z86.39 HISTORY OF DIABETES MELLITUS: ICD-10-CM

## 2022-03-10 LAB
ALBUMIN SERPL-MCNC: 3.9 G/DL (ref 3.5–5.2)
ALBUMIN/GLOB SERPL: 1.3 G/DL
ALP SERPL-CCNC: 97 U/L (ref 39–117)
ALT SERPL W P-5'-P-CCNC: 12 U/L (ref 1–33)
AMPHET+METHAMPHET UR QL: NEGATIVE
AMPHETAMINES UR QL: NEGATIVE
ANION GAP SERPL CALCULATED.3IONS-SCNC: 13 MMOL/L (ref 5–15)
APAP SERPL-MCNC: <5 MCG/ML (ref 0–30)
AST SERPL-CCNC: 12 U/L (ref 1–32)
BARBITURATES UR QL SCN: NEGATIVE
BASOPHILS # BLD AUTO: 0.05 10*3/MM3 (ref 0–0.2)
BASOPHILS NFR BLD AUTO: 0.6 % (ref 0–1.5)
BENZODIAZ UR QL SCN: POSITIVE
BILIRUB SERPL-MCNC: <0.2 MG/DL (ref 0–1.2)
BILIRUB UR QL STRIP: NEGATIVE
BUN SERPL-MCNC: 19 MG/DL (ref 6–20)
BUN/CREAT SERPL: 22.9 (ref 7–25)
BUPRENORPHINE SERPL-MCNC: NEGATIVE NG/ML
CALCIUM SPEC-SCNC: 9.2 MG/DL (ref 8.6–10.5)
CANNABINOIDS SERPL QL: NEGATIVE
CHLORIDE SERPL-SCNC: 94 MMOL/L (ref 98–107)
CK SERPL-CCNC: 46 U/L (ref 20–180)
CLARITY UR: CLEAR
CO2 SERPL-SCNC: 26 MMOL/L (ref 22–29)
COCAINE UR QL: NEGATIVE
COLOR UR: YELLOW
CREAT SERPL-MCNC: 0.83 MG/DL (ref 0.57–1)
DEPRECATED RDW RBC AUTO: 51.5 FL (ref 37–54)
EGFRCR SERPLBLD CKD-EPI 2021: 81.3 ML/MIN/1.73
EOSINOPHIL # BLD AUTO: 0.12 10*3/MM3 (ref 0–0.4)
EOSINOPHIL NFR BLD AUTO: 1.4 % (ref 0.3–6.2)
ERYTHROCYTE [DISTWIDTH] IN BLOOD BY AUTOMATED COUNT: 15 % (ref 12.3–15.4)
ETHANOL BLD-MCNC: <10 MG/DL (ref 0–10)
FLUAV RNA RESP QL NAA+PROBE: NOT DETECTED
FLUBV RNA RESP QL NAA+PROBE: NOT DETECTED
GLOBULIN UR ELPH-MCNC: 3 GM/DL
GLUCOSE BLDC GLUCOMTR-MCNC: 300 MG/DL (ref 70–130)
GLUCOSE SERPL-MCNC: 470 MG/DL (ref 65–99)
GLUCOSE UR STRIP-MCNC: ABNORMAL MG/DL
HCT VFR BLD AUTO: 46.5 % (ref 34–46.6)
HGB BLD-MCNC: 15.6 G/DL (ref 12–15.9)
HGB UR QL STRIP.AUTO: NEGATIVE
IMM GRANULOCYTES # BLD AUTO: 0.06 10*3/MM3 (ref 0–0.05)
IMM GRANULOCYTES NFR BLD AUTO: 0.7 % (ref 0–0.5)
KETONES UR QL STRIP: NEGATIVE
LEUKOCYTE ESTERASE UR QL STRIP.AUTO: NEGATIVE
LYMPHOCYTES # BLD AUTO: 3.38 10*3/MM3 (ref 0.7–3.1)
LYMPHOCYTES NFR BLD AUTO: 38.2 % (ref 19.6–45.3)
MAGNESIUM SERPL-MCNC: 1.8 MG/DL (ref 1.6–2.6)
MCH RBC QN AUTO: 31.4 PG (ref 26.6–33)
MCHC RBC AUTO-ENTMCNC: 33.5 G/DL (ref 31.5–35.7)
MCV RBC AUTO: 93.6 FL (ref 79–97)
METHADONE UR QL SCN: NEGATIVE
MONOCYTES # BLD AUTO: 0.65 10*3/MM3 (ref 0.1–0.9)
MONOCYTES NFR BLD AUTO: 7.3 % (ref 5–12)
NEUTROPHILS NFR BLD AUTO: 4.59 10*3/MM3 (ref 1.7–7)
NEUTROPHILS NFR BLD AUTO: 51.8 % (ref 42.7–76)
NITRITE UR QL STRIP: NEGATIVE
NRBC BLD AUTO-RTO: 0 /100 WBC (ref 0–0.2)
OPIATES UR QL: NEGATIVE
OXYCODONE UR QL SCN: NEGATIVE
PCP UR QL SCN: NEGATIVE
PH UR STRIP.AUTO: 5.5 [PH] (ref 5–8)
PLATELET # BLD AUTO: 255 10*3/MM3 (ref 140–450)
PMV BLD AUTO: 8.9 FL (ref 6–12)
POTASSIUM SERPL-SCNC: 4.6 MMOL/L (ref 3.5–5.2)
PROPOXYPH UR QL: NEGATIVE
PROT SERPL-MCNC: 6.9 G/DL (ref 6–8.5)
PROT UR QL STRIP: ABNORMAL
RBC # BLD AUTO: 4.97 10*6/MM3 (ref 3.77–5.28)
SARS-COV-2 RNA RESP QL NAA+PROBE: NOT DETECTED
SODIUM SERPL-SCNC: 133 MMOL/L (ref 136–145)
SP GR UR STRIP: 1.03 (ref 1–1.03)
TRICYCLICS UR QL SCN: NEGATIVE
UROBILINOGEN UR QL STRIP: ABNORMAL
WBC NRBC COR # BLD: 8.85 10*3/MM3 (ref 3.4–10.8)

## 2022-03-10 PROCEDURE — 72100 X-RAY EXAM L-S SPINE 2/3 VWS: CPT

## 2022-03-10 PROCEDURE — 82962 GLUCOSE BLOOD TEST: CPT

## 2022-03-10 PROCEDURE — 99284 EMERGENCY DEPT VISIT MOD MDM: CPT

## 2022-03-10 PROCEDURE — C9803 HOPD COVID-19 SPEC COLLECT: HCPCS

## 2022-03-10 PROCEDURE — 82550 ASSAY OF CK (CPK): CPT | Performed by: NURSE PRACTITIONER

## 2022-03-10 PROCEDURE — 80306 DRUG TEST PRSMV INSTRMNT: CPT | Performed by: NURSE PRACTITIONER

## 2022-03-10 PROCEDURE — 83735 ASSAY OF MAGNESIUM: CPT | Performed by: NURSE PRACTITIONER

## 2022-03-10 PROCEDURE — 81003 URINALYSIS AUTO W/O SCOPE: CPT | Performed by: NURSE PRACTITIONER

## 2022-03-10 PROCEDURE — 80143 DRUG ASSAY ACETAMINOPHEN: CPT | Performed by: NURSE PRACTITIONER

## 2022-03-10 PROCEDURE — 87636 SARSCOV2 & INF A&B AMP PRB: CPT | Performed by: NURSE PRACTITIONER

## 2022-03-10 PROCEDURE — 85025 COMPLETE CBC W/AUTO DIFF WBC: CPT | Performed by: NURSE PRACTITIONER

## 2022-03-10 PROCEDURE — 36415 COLL VENOUS BLD VENIPUNCTURE: CPT

## 2022-03-10 PROCEDURE — 80053 COMPREHEN METABOLIC PANEL: CPT | Performed by: NURSE PRACTITIONER

## 2022-03-10 PROCEDURE — 63710000001 INSULIN REGULAR HUMAN PER 5 UNITS: Performed by: NURSE PRACTITIONER

## 2022-03-10 PROCEDURE — 82077 ASSAY SPEC XCP UR&BREATH IA: CPT | Performed by: NURSE PRACTITIONER

## 2022-03-10 RX ORDER — SODIUM CHLORIDE 0.9 % (FLUSH) 0.9 %
10 SYRINGE (ML) INJECTION AS NEEDED
Status: DISCONTINUED | OUTPATIENT
Start: 2022-03-10 | End: 2022-03-10 | Stop reason: HOSPADM

## 2022-03-10 RX ADMIN — INSULIN HUMAN 6 UNITS: 100 INJECTION, SOLUTION PARENTERAL at 16:14

## 2022-03-10 RX ADMIN — SODIUM CHLORIDE 2000 ML: 9 INJECTION, SOLUTION INTRAVENOUS at 16:11
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
## 2022-03-15 DIAGNOSIS — N95.1 MENOPAUSAL HOT FLUSHES: ICD-10-CM

## 2022-03-15 DIAGNOSIS — N95.1 HOT FLASHES DUE TO MENOPAUSE: ICD-10-CM

## 2022-03-15 RX ORDER — ESTRADIOL 0.5 MG/1
0.5 TABLET ORAL DAILY
Qty: 90 TABLET | Refills: 1 | Status: SHIPPED | OUTPATIENT
Start: 2022-03-15 | End: 2022-08-08 | Stop reason: SDUPTHER

## 2022-03-15 NOTE — TELEPHONE ENCOUNTER
Rx Refill Note  Requested Prescriptions     Pending Prescriptions Disp Refills   • estradiol (ESTRACE) 0.5 MG tablet [Pharmacy Med Name: ESTRADIOL 0.5MG TABLETS] 90 tablet 0     Sig: TAKE 1 TABLET BY MOUTH DAILY      Last office visit with prescribing clinician: 2/21/2022      Next office visit with prescribing clinician: 5/2/2022            Breana Rojo MA  03/15/22, 09:02 EDT
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
## 2022-04-07 RX ORDER — LINAGLIPTIN 5 MG/1
TABLET, FILM COATED ORAL
Qty: 90 TABLET | Refills: 3 | Status: SHIPPED | OUTPATIENT
Start: 2022-04-07

## 2022-04-07 NOTE — TELEPHONE ENCOUNTER
Rx Refill Note  Requested Prescriptions     Pending Prescriptions Disp Refills   • Tradjenta 5 MG tablet tablet [Pharmacy Med Name: TRADJENTA 5MG TABLETS] 90 tablet 3     Sig: TAKE 1 TABLET BY MOUTH DAILY      Last office visit with prescribing clinician: 2/21/2022      Next office visit with prescribing clinician: 5/2/2022            Cruz Holly MA  04/07/22, 08:43 EDT

## 2022-04-12 DIAGNOSIS — M54.50 CHRONIC BILATERAL LOW BACK PAIN: ICD-10-CM

## 2022-04-12 DIAGNOSIS — G89.29 CHRONIC BILATERAL LOW BACK PAIN: ICD-10-CM

## 2022-04-12 RX ORDER — CYCLOBENZAPRINE HCL 10 MG
TABLET ORAL
Qty: 90 TABLET | Refills: 0 | Status: SHIPPED | OUTPATIENT
Start: 2022-04-12 | End: 2022-06-13

## 2022-04-12 NOTE — TELEPHONE ENCOUNTER
Rx Refill Note  Requested Prescriptions     Pending Prescriptions Disp Refills   • cyclobenzaprine (FLEXERIL) 10 MG tablet [Pharmacy Med Name: CYCLOBENZAPRINE 10MG TABLETS] 90 tablet 0     Sig: TAKE 1 TABLET BY MOUTH THREE TIMES DAILY AS NEEDED FOR MUSCLE SPASMS      Last office visit with prescribing clinician: 2/21/2022      Next office visit with prescribing clinician: 5/2/2022            Killian Encarnacion MA  04/12/22, 14:44 EDT

## 2022-05-02 ENCOUNTER — OFFICE VISIT (OUTPATIENT)
Dept: FAMILY MEDICINE CLINIC | Facility: CLINIC | Age: 62
End: 2022-05-02

## 2022-05-02 VITALS
OXYGEN SATURATION: 93 % | HEART RATE: 103 BPM | BODY MASS INDEX: 42.59 KG/M2 | SYSTOLIC BLOOD PRESSURE: 128 MMHG | HEIGHT: 66 IN | DIASTOLIC BLOOD PRESSURE: 68 MMHG | WEIGHT: 265 LBS

## 2022-05-02 DIAGNOSIS — G47.00 INSOMNIA, UNSPECIFIED TYPE: ICD-10-CM

## 2022-05-02 DIAGNOSIS — I10 PRIMARY HYPERTENSION: ICD-10-CM

## 2022-05-02 DIAGNOSIS — E11.65 UNCONTROLLED TYPE 2 DIABETES MELLITUS WITH HYPERGLYCEMIA: Primary | ICD-10-CM

## 2022-05-02 DIAGNOSIS — M53.3 COCCYX PAIN: ICD-10-CM

## 2022-05-02 PROCEDURE — 99214 OFFICE O/P EST MOD 30 MIN: CPT | Performed by: INTERNAL MEDICINE

## 2022-05-03 ENCOUNTER — TELEPHONE (OUTPATIENT)
Dept: FAMILY MEDICINE CLINIC | Facility: CLINIC | Age: 62
End: 2022-05-03

## 2022-05-03 DIAGNOSIS — Z79.4 TYPE 2 DIABETES MELLITUS WITH DIABETIC PERIPHERAL ANGIOPATHY WITHOUT GANGRENE, WITH LONG-TERM CURRENT USE OF INSULIN: Primary | Chronic | ICD-10-CM

## 2022-05-03 DIAGNOSIS — E11.51 TYPE 2 DIABETES MELLITUS WITH DIABETIC PERIPHERAL ANGIOPATHY WITHOUT GANGRENE, WITH LONG-TERM CURRENT USE OF INSULIN: Primary | Chronic | ICD-10-CM

## 2022-05-03 DIAGNOSIS — E66.01 MORBIDLY OBESE: ICD-10-CM

## 2022-05-03 DIAGNOSIS — Z74.09 LIMITED MOBILITY: ICD-10-CM

## 2022-05-03 RX ORDER — INSULIN ASPART 100 [IU]/ML
30 INJECTION, SUSPENSION SUBCUTANEOUS
Qty: 54 ML | Refills: 3 | Status: SHIPPED | OUTPATIENT
Start: 2022-05-03 | End: 2023-03-02 | Stop reason: HOSPADM

## 2022-05-03 NOTE — TELEPHONE ENCOUNTER
Patient's sister called to make sure patient's updated address is noted in chart. Confirmed correct address.     She is requesting hospital bed today, she is moving to [place] on Friday, if not handled asap she will be sleeping on the floor

## 2022-05-03 NOTE — TELEPHONE ENCOUNTER
Caller: Saadia Caceres    Relationship: Self    Best call back number: [phone], PLEASE CALL SISTER- LUBA PETER [phone]    Equipment requested:  HOSPITAL BED    Reason for the request: UPDATED ADDRESS- PLEASE HAVE SENT TO NEW ADDRESS    Prescribing Provider: DR ZARATE    Additional information or concerns: PLEASE NOTE NEW ADDRESS- UPDATED IN CHART    PLEASE CALL SISTER- PER PATIENT REQUEST
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
## 2022-05-06 ENCOUNTER — HOSPITAL ENCOUNTER (OUTPATIENT)
Dept: GENERAL RADIOLOGY | Facility: HOSPITAL | Age: 62
Discharge: HOME | End: 2022-05-06
Admitting: INTERNAL MEDICINE

## 2022-05-06 DIAGNOSIS — M53.3 COCCYX PAIN: ICD-10-CM

## 2022-05-06 PROCEDURE — 72220 X-RAY EXAM SACRUM TAILBONE: CPT

## 2022-05-12 RX ORDER — BLOOD SUGAR DIAGNOSTIC
STRIP MISCELLANEOUS
Qty: 100 EACH | Refills: 0 | Status: SHIPPED | OUTPATIENT
Start: 2022-05-12 | End: 2022-08-16 | Stop reason: SDUPTHER

## 2022-05-12 NOTE — TELEPHONE ENCOUNTER
Rx Refill Note  Requested Prescriptions     Pending Prescriptions Disp Refills   • OneTouch Ultra test strip [Pharmacy Med Name: ONE TOUCH ULTRA BLUE TESTST(NEW)100]       Sig: USE TO TEST BLOOD SUGAR TWICE DAILY      Last office visit with prescribing clinician: 5/2/2022      Next office visit with prescribing clinician: 8/3/2022            Fahad Franco Rep  05/12/22, 12:43 EDT

## 2022-05-16 ENCOUNTER — TELEPHONE (OUTPATIENT)
Dept: FAMILY MEDICINE CLINIC | Facility: CLINIC | Age: 62
End: 2022-05-16

## 2022-05-16 DIAGNOSIS — E55.9 VITAMIN D DEFICIENCY: ICD-10-CM

## 2022-05-16 NOTE — TELEPHONE ENCOUNTER
Should she still be on this medication        Rx Refill Note  Requested Prescriptions     Pending Prescriptions Disp Refills   • vitamin D (ERGOCALCIFEROL) 1.25 MG (33844 UT) capsule capsule [Pharmacy Med Name: VITAMIN D2 50,000IU (ERGO) CAP RX] 12 capsule 0     Sig: TAKE 1 CAPSULE BY MOUTH 1 TIME EVERY WEEK FOR 12 DOSES      Last office visit with prescribing clinician: 5/2/2022      Next office visit with prescribing clinician: 8/3/2022            Seb Kauffman MA  05/16/22, 13:18 EDT

## 2022-05-16 NOTE — TELEPHONE ENCOUNTER
Caller: ANTON     Relationship: [facility]     Best call back number: [phone]    What was the call regarding:   ANTON FROM [facility] STATED THAT PATIENT INFORMED HER TODAY 05/16/2022 THAT OVER THE WEEKEND PATIENT HAD ATE SALTINE CRACKERS AND HAM AND SINCE THE SWELLING IN HER LEGS AND HANDS HAS INCREASED     Do you require a callback: YES

## 2022-05-17 RX ORDER — ERGOCALCIFEROL 1.25 MG/1
CAPSULE ORAL
Qty: 12 CAPSULE | Refills: 0 | Status: SHIPPED | OUTPATIENT
Start: 2022-05-17 | End: 2023-03-27 | Stop reason: SDUPTHER

## 2022-05-18 ENCOUNTER — TELEPHONE (OUTPATIENT)
Dept: FAMILY MEDICINE CLINIC | Facility: CLINIC | Age: 62
End: 2022-05-18

## 2022-05-18 DIAGNOSIS — H91.90 HEARING LOSS, UNSPECIFIED HEARING LOSS TYPE, UNSPECIFIED LATERALITY: ICD-10-CM

## 2022-05-18 DIAGNOSIS — Z12.4 CERVICAL CANCER SCREENING: ICD-10-CM

## 2022-05-18 DIAGNOSIS — N89.8 VAGINAL ITCHING: Primary | ICD-10-CM

## 2022-05-18 DIAGNOSIS — B37.9 YEAST INFECTION: Primary | ICD-10-CM

## 2022-05-18 RX ORDER — FLUCONAZOLE 100 MG/1
100 TABLET ORAL DAILY
Qty: 7 TABLET | Refills: 0 | Status: SHIPPED | OUTPATIENT
Start: 2022-05-18 | End: 2022-05-25

## 2022-05-18 NOTE — TELEPHONE ENCOUNTER
Spoke with patient regarding her salt intake. Advised her of Dr. Del Rio recommendations. She verbalized understanding and will schedule an appt if she does not get better.

## 2022-05-18 NOTE — TELEPHONE ENCOUNTER
Caller: Saadia Caceres    Relationship: Self    Best call back number: [phone]    What is the best time to reach you: ANY    Who are you requesting to speak with (clinical staff, provider,  specific staff member): CLINICAL    What was the call regarding: PATIENT IS HAVING ITCHING AROUND AND INSIDE VAGINA AND ISN'T SURE WHAT IS GOING ON. SHE WOULD LIKE TO SPEAK TO SOMEONE ON THE CLINICAL TEAM ABOUT THIS.     Do you require a callback: YES

## 2022-05-18 NOTE — TELEPHONE ENCOUNTER
Patient states that she is having itching around her groin area. She states that beginning 3 days ago she began itching around pubic area and inside her vagina. She has scheduled an appointment at 2:30 with PCP

## 2022-05-18 NOTE — TELEPHONE ENCOUNTER
Patient informed that diflucan was sent to pharmacy. She is not established with GYN and requests a referral. The patient also asked if a referral could be placed for her to obtain hearing aids.     Please advise.

## 2022-05-18 NOTE — TELEPHONE ENCOUNTER
Chacorta Del Rio, DO [address] [facility] 2 hours ago (11:01 AM)         Sent in yeast medication to pharmacy. She will need follow up with GYN if no improvement. I can refer if needed. Can cancel today's apt if there is no other issues to discuss.

## 2022-05-20 ENCOUNTER — TELEPHONE (OUTPATIENT)
Dept: FAMILY MEDICINE CLINIC | Facility: CLINIC | Age: 62
End: 2022-05-20

## 2022-05-20 DIAGNOSIS — R26.89 BALANCE DISORDER: ICD-10-CM

## 2022-05-20 DIAGNOSIS — R29.898 WEAKNESS OF BOTH LOWER EXTREMITIES: Primary | ICD-10-CM

## 2022-05-20 DIAGNOSIS — Z78.9 ACTIVITY OF DAILY LIVING ALTERATION: ICD-10-CM

## 2022-05-24 ENCOUNTER — OFFICE VISIT (OUTPATIENT)
Dept: FAMILY MEDICINE CLINIC | Facility: CLINIC | Age: 62
End: 2022-05-24

## 2022-05-24 VITALS
TEMPERATURE: 96.6 F | HEART RATE: 94 BPM | RESPIRATION RATE: 18 BRPM | SYSTOLIC BLOOD PRESSURE: 106 MMHG | DIASTOLIC BLOOD PRESSURE: 64 MMHG | WEIGHT: 256.4 LBS | BODY MASS INDEX: 41.38 KG/M2 | OXYGEN SATURATION: 93 %

## 2022-05-24 DIAGNOSIS — J44.9 CHRONIC OBSTRUCTIVE PULMONARY DISEASE, UNSPECIFIED COPD TYPE: ICD-10-CM

## 2022-05-24 DIAGNOSIS — G89.29 CHRONIC MIDLINE LOW BACK PAIN WITHOUT SCIATICA: Primary | ICD-10-CM

## 2022-05-24 DIAGNOSIS — M54.50 CHRONIC MIDLINE LOW BACK PAIN WITHOUT SCIATICA: Primary | ICD-10-CM

## 2022-05-24 DIAGNOSIS — R26.89 BALANCE DISORDER: ICD-10-CM

## 2022-05-24 DIAGNOSIS — M53.3 COCCYX PAIN: ICD-10-CM

## 2022-05-24 DIAGNOSIS — B37.2 SKIN YEAST INFECTION: ICD-10-CM

## 2022-05-24 PROCEDURE — 99214 OFFICE O/P EST MOD 30 MIN: CPT | Performed by: INTERNAL MEDICINE

## 2022-05-24 RX ORDER — RISPERIDONE 2 MG/1
2 TABLET ORAL 2 TIMES DAILY
COMMUNITY
Start: 2022-05-16

## 2022-05-24 RX ORDER — NYSTATIN 100000 U/G
OINTMENT TOPICAL 2 TIMES DAILY
Qty: 30 G | Refills: 3 | Status: SHIPPED | OUTPATIENT
Start: 2022-05-24 | End: 2023-01-23 | Stop reason: SDUPTHER

## 2022-05-24 RX ORDER — DIVALPROEX SODIUM 500 MG/1
TABLET, DELAYED RELEASE ORAL
COMMUNITY
Start: 2022-05-16 | End: 2022-05-24 | Stop reason: SDUPTHER

## 2022-05-24 RX ORDER — ALBUTEROL SULFATE 90 UG/1
2 AEROSOL, METERED RESPIRATORY (INHALATION) EVERY 6 HOURS PRN
Qty: 25.5 G | Refills: 5 | Status: SHIPPED | OUTPATIENT
Start: 2022-05-24

## 2022-05-25 DIAGNOSIS — E03.9 HYPOTHYROIDISM, UNSPECIFIED TYPE: ICD-10-CM

## 2022-05-25 RX ORDER — LEVOTHYROXINE SODIUM 88 UG/1
88 TABLET ORAL DAILY
Qty: 90 TABLET | Refills: 3 | Status: SHIPPED | OUTPATIENT
Start: 2022-05-25
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
## 2022-05-25 NOTE — TELEPHONE ENCOUNTER
Rx Refill Note  Requested Prescriptions     Pending Prescriptions Disp Refills   • levothyroxine (SYNTHROID, LEVOTHROID) 88 MCG tablet [Pharmacy Med Name: LEVOTHYROXINE 0.088MG (88MCG) TAB] 90 tablet 3     Sig: TAKE 1 TABLET BY MOUTH DAILY      Last office visit with prescribing clinician: 5/24/2022      Next office visit with prescribing clinician: 8/3/2022            Catherine Patiño MA  05/25/22, 08:23 EDT

## 2022-06-03 ENCOUNTER — TELEPHONE (OUTPATIENT)
Dept: FAMILY MEDICINE CLINIC | Facility: CLINIC | Age: 62
End: 2022-06-03

## 2022-06-03 NOTE — TELEPHONE ENCOUNTER
Caller: Saadia Caceres    Relationship: Self    Best call back number: [phone]    What medication are you requesting: SOMETHING FOR CONSTIPATION    What are your current symptoms: CONSTIPATION    How long have you been experiencing symptoms: 1 WEEK    Have you had these symptoms before:    [] Yes  [x] No    Have you been treated for these symptoms before:   [] Yes  [x] No    If a prescription is needed, what is your preferred pharmacy and phone number:  [facility] [address]    Additional notes:   PATIENT HAS CALLED REQUESTING IF SOMETHING CAN BE CALLED INTO PHARMACY TO TREAT CONSTIPATION. PATIENT STATES [facility] AND [facility] HAVE NOT WORKED.

## 2022-06-06 ENCOUNTER — TELEPHONE (OUTPATIENT)
Dept: FAMILY MEDICINE CLINIC | Facility: CLINIC | Age: 62
End: 2022-06-06

## 2022-06-06 ENCOUNTER — OFFICE VISIT (OUTPATIENT)
Dept: FAMILY MEDICINE CLINIC | Facility: CLINIC | Age: 62
End: 2022-06-06

## 2022-06-06 VITALS
BODY MASS INDEX: 41.14 KG/M2 | OXYGEN SATURATION: 92 % | WEIGHT: 256 LBS | DIASTOLIC BLOOD PRESSURE: 84 MMHG | HEART RATE: 112 BPM | HEIGHT: 66 IN | SYSTOLIC BLOOD PRESSURE: 132 MMHG

## 2022-06-06 DIAGNOSIS — K64.4 EXTERNAL HEMORRHOID: Primary | ICD-10-CM

## 2022-06-06 PROCEDURE — 99213 OFFICE O/P EST LOW 20 MIN: CPT | Performed by: INTERNAL MEDICINE
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
## 2022-06-12 DIAGNOSIS — M54.50 CHRONIC BILATERAL LOW BACK PAIN: ICD-10-CM

## 2022-06-12 DIAGNOSIS — G89.29 CHRONIC BILATERAL LOW BACK PAIN: ICD-10-CM

## 2022-06-13 DIAGNOSIS — E78.5 HYPERLIPIDEMIA, UNSPECIFIED HYPERLIPIDEMIA TYPE: ICD-10-CM

## 2022-06-13 RX ORDER — CYCLOBENZAPRINE HCL 10 MG
TABLET ORAL
Qty: 90 TABLET | Refills: 0 | Status: SHIPPED | OUTPATIENT
Start: 2022-06-13 | End: 2022-08-02

## 2022-06-13 NOTE — TELEPHONE ENCOUNTER
Rx Refill Note  Requested Prescriptions     Pending Prescriptions Disp Refills   • cyclobenzaprine (FLEXERIL) 10 MG tablet [Pharmacy Med Name: CYCLOBENZAPRINE 10MG TABLETS] 90 tablet 0     Sig: TAKE 1 TABLET BY MOUTH THREE TIMES DAILY AS NEEDED FOR MUSCLE SPASMS      Last office visit with prescribing clinician: 6/6/2022      Next office visit with prescribing clinician: 8/3/2022            Seb Kauffman MA  06/13/22, 08:43 EDT

## 2022-06-13 NOTE — TELEPHONE ENCOUNTER
Rx Refill Note  Requested Prescriptions     Pending Prescriptions Disp Refills   • simvastatin (ZOCOR) 10 MG tablet [Pharmacy Med Name: SIMVASTATIN 10MG TABLETS] 90 tablet 1     Sig: TAKE 1 TABLET BY MOUTH EVERY EVENING      Last office visit with prescribing clinician: 6/6/2022      Next office visit with prescribing clinician: 8/3/2022   Azul Lewis MA  06/13/22, 14:34 EDT     Last fill: 12/07/2021

## 2022-06-14 RX ORDER — SIMVASTATIN 10 MG
10 TABLET ORAL EVERY EVENING
Qty: 90 TABLET | Refills: 3 | Status: SHIPPED | OUTPATIENT
Start: 2022-06-14

## 2022-06-27 ENCOUNTER — TELEPHONE (OUTPATIENT)
Dept: FAMILY MEDICINE CLINIC | Facility: CLINIC | Age: 62
End: 2022-06-27

## 2022-08-02 DIAGNOSIS — G89.29 CHRONIC BILATERAL LOW BACK PAIN: ICD-10-CM

## 2022-08-02 DIAGNOSIS — K21.9 CHRONIC GERD: ICD-10-CM

## 2022-08-02 DIAGNOSIS — M54.50 CHRONIC BILATERAL LOW BACK PAIN: ICD-10-CM

## 2022-08-02 RX ORDER — OMEPRAZOLE 40 MG/1
40 CAPSULE, DELAYED RELEASE ORAL DAILY
Qty: 90 CAPSULE | Refills: 1 | Status: SHIPPED | OUTPATIENT
Start: 2022-08-02 | End: 2023-01-30

## 2022-08-02 RX ORDER — BETHANECHOL CHLORIDE 25 MG/1
TABLET ORAL
Qty: 90 TABLET | Refills: 3 | Status: SHIPPED | OUTPATIENT
Start: 2022-08-02 | End: 2022-10-31

## 2022-08-02 RX ORDER — CYCLOBENZAPRINE HCL 10 MG
TABLET ORAL
Qty: 90 TABLET | Refills: 0 | Status: SHIPPED | OUTPATIENT
Start: 2022-08-02 | End: 2022-09-01

## 2022-08-02 NOTE — TELEPHONE ENCOUNTER
Rx Refill Note  Requested Prescriptions     Pending Prescriptions Disp Refills   • bethanechol (URECHOLINE) 25 MG tablet [Pharmacy Med Name: BETHANECHOL 25MG TABLETS] 90 tablet 3     Sig: TAKE 1 TABLET BY MOUTH THREE TIMES DAILY   • omeprazole (priLOSEC) 40 MG capsule [Pharmacy Med Name: OMEPRAZOLE 40MG CAPSULES] 90 capsule 1     Sig: TAKE 1 CAPSULE BY MOUTH DAILY   • cyclobenzaprine (FLEXERIL) 10 MG tablet [Pharmacy Med Name: CYCLOBENZAPRINE 10MG TABLETS] 90 tablet 0     Sig: TAKE 1 TABLET BY MOUTH THREE TIMES DAILY AS NEEDED FOR MUSCLE SPASMS      Last office visit with prescribing clinician: 6/6/2022      Next office visit with prescribing clinician: 8/3/2022            Catherine Patiño MA  08/02/22, 08:00 EDT

## 2022-08-05 ENCOUNTER — TELEPHONE (OUTPATIENT)
Dept: FAMILY MEDICINE CLINIC | Facility: CLINIC | Age: 62
End: 2022-08-05

## 2022-08-08 DIAGNOSIS — N95.1 HOT FLASHES DUE TO MENOPAUSE: ICD-10-CM

## 2022-08-08 DIAGNOSIS — N95.1 MENOPAUSAL HOT FLUSHES: ICD-10-CM

## 2022-08-08 RX ORDER — ESTRADIOL 0.5 MG/1
0.5 TABLET ORAL DAILY
Qty: 90 TABLET | Refills: 1 | Status: SHIPPED | OUTPATIENT
Start: 2022-08-08

## 2022-08-08 RX ORDER — PEN NEEDLE, DIABETIC 31 GX5/16"
NEEDLE, DISPOSABLE MISCELLANEOUS
Qty: 100 EACH | Refills: 1 | Status: SHIPPED | OUTPATIENT
Start: 2022-08-08 | End: 2022-11-18

## 2022-08-08 NOTE — TELEPHONE ENCOUNTER
Caller: Saadia Caceres    Relationship: Self    Best call back number: [phone]    Requested Prescriptions:   Requested Prescriptions     Pending Prescriptions Disp Refills   • B-D ULTRAFINE III SHORT PEN 31G X 8 MM misc [Pharmacy Med Name: B-D PEN NDL SHRT [id number](5/16) MICHAEL] 100 each 11     Sig: USE AS DIRECTED THREE TIMES DAILY   • estradiol (ESTRACE) 0.5 MG tablet 90 tablet 1     Sig: Take 1 tablet by mouth Daily.        Pharmacy where request should be sent: [id number] [place] [address] AT [facility] [address] - [phone] [facility] [phone] FX     Additional details provided by patient: OUT OF MEDICATION     Does the patient have less than a 3 day supply:  [x] Yes  [] No    Fahad Wild Rep   08/08/22 11:11 EDT

## 2022-08-08 NOTE — TELEPHONE ENCOUNTER
Rx Refill Note  Requested Prescriptions     Pending Prescriptions Disp Refills   • B-D ULTRAFINE III SHORT PEN 31G X 8 MM misc [Pharmacy Med Name: B-D PEN NDL SHRT [id number](5/16) MICHAEL] 100 each 11     Sig: USE AS DIRECTED THREE TIMES DAILY   • estradiol (ESTRACE) 0.5 MG tablet 90 tablet 1     Sig: Take 1 tablet by mouth Daily.      Last office visit with prescribing clinician: 6/6/2022      Next office visit with prescribing clinician: 8/15/2022            Breana Rojo MA  08/08/22, 11:15 EDT

## 2022-08-15 ENCOUNTER — OFFICE VISIT (OUTPATIENT)
Dept: FAMILY MEDICINE CLINIC | Facility: CLINIC | Age: 62
End: 2022-08-15

## 2022-08-15 VITALS
HEART RATE: 102 BPM | SYSTOLIC BLOOD PRESSURE: 114 MMHG | BODY MASS INDEX: 41 KG/M2 | TEMPERATURE: 96.9 F | DIASTOLIC BLOOD PRESSURE: 76 MMHG | OXYGEN SATURATION: 93 % | WEIGHT: 254 LBS | RESPIRATION RATE: 20 BRPM

## 2022-08-15 DIAGNOSIS — R26.89 BALANCE DISORDER: ICD-10-CM

## 2022-08-15 DIAGNOSIS — E11.65 UNCONTROLLED TYPE 2 DIABETES MELLITUS WITH HYPERGLYCEMIA: Primary | ICD-10-CM

## 2022-08-15 DIAGNOSIS — R29.898 WEAKNESS OF BOTH LOWER EXTREMITIES: ICD-10-CM

## 2022-08-15 DIAGNOSIS — Z91.81 AT HIGH RISK FOR FALLS: ICD-10-CM

## 2022-08-15 DIAGNOSIS — R26.81 UNSTEADY GAIT WHEN WALKING: ICD-10-CM

## 2022-08-15 DIAGNOSIS — E66.01 MORBID (SEVERE) OBESITY DUE TO EXCESS CALORIES: ICD-10-CM

## 2022-08-15 LAB
EXPIRATION DATE: NORMAL
HBA1C MFR BLD: 10.9 %
Lab: NORMAL

## 2022-08-15 PROCEDURE — 99214 OFFICE O/P EST MOD 30 MIN: CPT | Performed by: INTERNAL MEDICINE

## 2022-08-15 PROCEDURE — 83036 HEMOGLOBIN GLYCOSYLATED A1C: CPT | Performed by: INTERNAL MEDICINE

## 2022-08-15 PROCEDURE — 3046F HEMOGLOBIN A1C LEVEL >9.0%: CPT | Performed by: INTERNAL MEDICINE

## 2022-08-15 RX ORDER — AMLODIPINE BESYLATE 10 MG/1
10 TABLET ORAL EVERY MORNING
COMMUNITY
Start: 2022-05-26

## 2022-08-15 NOTE — TELEPHONE ENCOUNTER
Caller: Saadia Caceres    Relationship: Self    Best call back number: [phone]    Requested Prescriptions:   Requested Prescriptions     Pending Prescriptions Disp Refills   • promethazine (PHENERGAN) 25 MG tablet 30 tablet 1     Sig: Take 1 tablet by mouth Every 6 (Six) Hours As Needed for Nausea or Vomiting.       traZODone (DESYREL) 100 MG tablet        clonazePAM (KlonoPIN) 2 MG tablet     gabapentin (NEURONTIN) 600 MG tablet       AND   gabapentin (NEURONTIN) 400 MG capsule         Pharmacy where request should be sent: [facility][id number] - [place], [place] - [facility] [address] AT [facility] [address] - [phone]  - [phone] FX     Additional details provided by patient:   Does the patient have less than a 3 day supply:  [x] Yes  [] No    Fahad Kyle Rep   08/15/22 15:28 EDT
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
## 2022-08-15 NOTE — TELEPHONE ENCOUNTER
PT CALLED TO GIVE PCP PATIENT AIDS NUMBER  OS THAT HER WHEEL CHAIR,ETC CAN BE ORDERED.    CANDY ADVISE.  CALL BACK:[phone]  PATIENT AIDS: [phone]

## 2022-08-16 ENCOUNTER — TELEPHONE (OUTPATIENT)
Dept: FAMILY MEDICINE CLINIC | Facility: CLINIC | Age: 62
End: 2022-08-16

## 2022-08-16 RX ORDER — PEN NEEDLE, DIABETIC 31 GX5/16"
NEEDLE, DISPOSABLE MISCELLANEOUS 3 TIMES DAILY
Qty: 100 EACH | Refills: 1 | OUTPATIENT
Start: 2022-08-16

## 2022-08-16 NOTE — TELEPHONE ENCOUNTER
Rx Refill Note  Requested Prescriptions     Pending Prescriptions Disp Refills   • B-D ULTRAFINE III SHORT PEN 31G X 8 MM misc 100 each 1     Sig: 3 (Three) Times a Day. as directed   • glucose blood (OneTouch Ultra) test strip 100 each 0     Sig: Use as instructed      Last office visit with prescribing clinician: 8/15/2022      Next office visit with prescribing clinician: 11/15/2022            Seb Kauffman MA  08/16/22, 15:38 EDT

## 2022-08-16 NOTE — TELEPHONE ENCOUNTER
Caller: Saadia Caceres    Relationship: Self    Best call back number:[phone]    Requested Prescriptions:   Requested Prescriptions     Pending Prescriptions Disp Refills   • B-D ULTRAFINE III SHORT PEN 31G X 8 MM misc 100 each 1     Sig: 3 (Three) Times a Day. as directed   • glucose blood (OneTouch Ultra) test strip 100 each 0     Sig: Use as instructed        Pharmacy where request should be sent: [facility][id number] - [place], [place] - [facility] [address] AT [facility] [address] - [phone]  - [phone] FX     Additional details provided by patient:  COMPLETELY OUT OF SUPPLIES FOR GLUCOSE METER      Does the patient have less than a 3 day supply:  [x] Yes  [] No    Fahad Serrato   08/16/22 15:00 EDT        no change

## 2022-08-17 RX ORDER — CLONAZEPAM 2 MG/1
2 TABLET ORAL 2 TIMES DAILY
OUTPATIENT
Start: 2022-08-17

## 2022-08-17 RX ORDER — PROMETHAZINE HYDROCHLORIDE 25 MG/1
25 TABLET ORAL EVERY 6 HOURS PRN
Qty: 30 TABLET | Refills: 1 | OUTPATIENT
Start: 2022-08-17

## 2022-08-17 RX ORDER — GABAPENTIN 400 MG/1
400 CAPSULE ORAL NIGHTLY
Refills: 3 | OUTPATIENT
Start: 2022-08-17

## 2022-08-17 RX ORDER — GABAPENTIN 600 MG/1
600 TABLET ORAL 4 TIMES DAILY
OUTPATIENT
Start: 2022-08-17

## 2022-08-17 RX ORDER — TRAZODONE HYDROCHLORIDE 100 MG/1
TABLET ORAL
Refills: 3 | OUTPATIENT
Start: 2022-08-17

## 2022-08-18 NOTE — TELEPHONE ENCOUNTER
Chacorta Del Rio, DO  [facility]  Clinical Pool Yesterday (10:31 AM)         Gabapentin and klonopin are both prescribed by her psychiatrist Dr. Schaffer.     I would not recommend trazodone in addition to her other sleeping medication, and it has not been prescribed by our office in the past.     Will need an apt to discuss promethazine use long term. This is usually a short term med for nausea and/or acute illness.      Called and left  for patient to return call     OK FOR HUB TO RELAY MESSAGE AND DOCUMENT

## 2022-08-18 NOTE — TELEPHONE ENCOUNTER
Rx Refill Note  Requested Prescriptions     Pending Prescriptions Disp Refills   • Lancets (OneTouch Delica Plus [id number]) misc [Pharmacy Med Name: ONE TOUCH DELICA PLUS 30G LANCETS] 100 each 2     Sig: USE AS DIRECTED TO TEST BLOOD SUGAR TWICE DAILY      Last office visit with prescribing clinician: 12/3/2020      Next office visit with prescribing clinician: 11/15/22           Catherine Patiño MA  08/18/22, 14:40 EDT

## 2022-08-19 ENCOUNTER — TELEPHONE (OUTPATIENT)
Dept: FAMILY MEDICINE CLINIC | Facility: CLINIC | Age: 62
End: 2022-08-19

## 2022-08-19 RX ORDER — LANCETS 30 GAUGE
EACH MISCELLANEOUS
Qty: 100 EACH | Refills: 2 | Status: SHIPPED | OUTPATIENT
Start: 2022-08-19

## 2022-08-19 NOTE — TELEPHONE ENCOUNTER
Caller: Saadia Caceres    Relationship: Self    Best call back number: [phone]  What orders are you requesting (i.e. lab or imaging): BEDSIDE COMMODE, WHEELCHAIR, WALKER    In what timeframe would the patient need to come in: ASAP    Where will you receive your lab/imaging services: [facility]    Additional notes:  PHONE NUMBER  [phone]

## 2022-08-23 DIAGNOSIS — R29.898 WEAKNESS OF BOTH LOWER EXTREMITIES: ICD-10-CM

## 2022-08-23 DIAGNOSIS — R26.81 UNSTEADY GAIT WHEN WALKING: ICD-10-CM

## 2022-08-23 DIAGNOSIS — E66.01 MORBID (SEVERE) OBESITY DUE TO EXCESS CALORIES: Primary | ICD-10-CM

## 2022-08-23 DIAGNOSIS — R26.89 BALANCE DISORDER: ICD-10-CM

## 2022-08-23 DIAGNOSIS — Z91.81 AT HIGH RISK FOR FALLS: ICD-10-CM

## 2022-08-24 ENCOUNTER — HOME HEALTH ADMISSION (OUTPATIENT)
Dept: HOME HEALTH SERVICES | Facility: HOME HEALTHCARE | Age: 62
End: 2022-08-24

## 2022-08-24 ENCOUNTER — TELEPHONE (OUTPATIENT)
Dept: FAMILY MEDICINE CLINIC | Facility: CLINIC | Age: 62
End: 2022-08-24

## 2022-08-24 NOTE — TELEPHONE ENCOUNTER
PATIENT HAS BEEN GETTING HELP AT HOME AND ONLY HAS 1 MORE VISIT.  SHE FEELS SHE NEEDS MORE THAN THIS.  CAN WE ORDER MORE?  [facility]OSS[facility]LY [facility].      PLEASE CALL [phone]

## 2022-08-30 ENCOUNTER — TELEPHONE (OUTPATIENT)
Dept: FAMILY MEDICINE CLINIC | Facility: CLINIC | Age: 62
End: 2022-08-30

## 2022-08-30 NOTE — TELEPHONE ENCOUNTER
Caller: Saadia Caceres    Relationship: Self    Best call back number: [phone]    What orders are you requesting (i.e. lab or imaging): TOP FOR HOSPITAL BED    In what timeframe would the patient need to come in: ASAP    Where will you receive your lab/imaging services: [facility]  [phone]  Additional notes:

## 2022-08-30 NOTE — TELEPHONE ENCOUNTER
Adina from [facility] called to see if the home health referral was meant to be sent to them due to the patient receiving home health from [facility] back in June. She stated that if the patient was meant to come to [facility] that the diagnosis would need to be changed due to insurance not covering it. She stated that if diabetes could be viewed as the cause then that would be a preferred diagnosis for the home health referral to be covered. Please advise.

## 2022-08-30 NOTE — TELEPHONE ENCOUNTER
Order has been sent to [facility], patient notified      She states that she received services that help her clean her home, she is not currently established with home health . Contacted [facility] to alert them- they will process her referral

## 2022-08-30 NOTE — TELEPHONE ENCOUNTER
Caller: Saadia Caceres    Relationship: Self    Best call back number: [phone]    What orders are you requesting (i.e. lab or imaging): HELP AT HOME; [facility]; INSURANCE WILL PAY FOR THIS    In what timeframe would the patient need to come in: ASAP    Where will you receive your lab/imaging services: AT HOME    Additional notes: SHE HAS HAD THIS ORDER CALLED IN BEFORE           .

## 2022-08-30 NOTE — TELEPHONE ENCOUNTER
Contacted patient's daughter to inquire further, she will double check to make sure and return call.

## 2022-09-01 ENCOUNTER — TELEPHONE (OUTPATIENT)
Dept: FAMILY MEDICINE CLINIC | Facility: CLINIC | Age: 62
End: 2022-09-01

## 2022-09-01 DIAGNOSIS — G89.29 CHRONIC BILATERAL LOW BACK PAIN: ICD-10-CM

## 2022-09-01 DIAGNOSIS — M54.50 CHRONIC BILATERAL LOW BACK PAIN: ICD-10-CM

## 2022-09-01 RX ORDER — CYCLOBENZAPRINE HCL 10 MG
TABLET ORAL
Qty: 90 TABLET | Refills: 0 | Status: SHIPPED | OUTPATIENT
Start: 2022-09-01 | End: 2022-10-01

## 2022-09-01 NOTE — TELEPHONE ENCOUNTER
Rx Refill Note  Requested Prescriptions     Pending Prescriptions Disp Refills   • cyclobenzaprine (FLEXERIL) 10 MG tablet [Pharmacy Med Name: CYCLOBENZAPRINE 10MG TABLETS] 90 tablet 0     Sig: TAKE 1 TABLET BY MOUTH THREE TIMES DAILY AS NEEDED FOR MUSCLE SPASMS      Last office visit with prescribing clinician: 8/15/2022      Next office visit with prescribing clinician: 9/1/2022   Azul Lewis MA  09/01/22, 10:12 EDT     Last fill: 08/02/2022

## 2022-09-01 NOTE — TELEPHONE ENCOUNTER
Contacted patient to discuss further, she is asking to continue with Home Care, non medical services    [facility]  [phone]    I spoke with Critical access hospital director, he advised me that she has completed 24 visits and she will need permission from insurance to continue.  Patient [facility]    Patient notified, she verbalized understanding and agreed

## 2022-09-01 NOTE — TELEPHONE ENCOUNTER
Caller: Saadia Caceres    Relationship: Self    Best call back number: [phone]    What orders are you requesting (i.e. lab or imaging): HELP AROUND THE HOME     Additional notes: TODAY IS PATIENT'S LAST VISIT BUT SHE STATES SHE IS NOT READY YET AND WANTING ANOTHER ORDER FOR THE HELP AROUND THE HOME

## 2022-09-07 ENCOUNTER — TELEPHONE (OUTPATIENT)
Dept: FAMILY MEDICINE CLINIC | Facility: CLINIC | Age: 62
End: 2022-09-07

## 2022-09-07 NOTE — TELEPHONE ENCOUNTER
Caller: Saadia Caceres    Relationship: Self    Best call back number: [phone]    What is the best time to reach you: ANYTIME    Who are you requesting to speak with (clinical staff, provider,  specific staff member): CLINICAL STAFF - RADHA    Do you know the name of the person who called: PATIENT     What was the call regarding: PATIENT NEEDS THE CONTACT INFORMATION FOR [facility]. PATIENT WAS UNSURE OF THE NAME.     PLEASE ADVISE.    PATIENT STATED SHE HAS HEMORRHOIDS AND HAS NOT EATEN RECENTLY. PLEASE CALL PATIENT TO DISCUSS IF NEEDED. SHE DOES NOT WANT AN APPOINTMENT    Do you require a callback: YES

## 2022-09-07 NOTE — TELEPHONE ENCOUNTER
Patient reports that she has been experiencing body aches from her neck, down her spine. She did not mention hemorrhoid pain. I attempted to schedule appointment and she declined    She denies any injury or falls. States she is depressed and does not want to leave her home for an appointment.

## 2022-09-09 ENCOUNTER — TELEPHONE (OUTPATIENT)
Dept: FAMILY MEDICINE CLINIC | Facility: CLINIC | Age: 62
End: 2022-09-09

## 2022-09-09 NOTE — TELEPHONE ENCOUNTER
Caller: [facility]    Relationship: Other    Best call back number: [phone]    Who is requesting this form or medical record from you:  HOME HEALTH SERVICES    How would you like to receive the form or medical records (pick-up, mail, fax): FAX OR PHONE CALL  If fax, what is the fax number: [phone]  If Call, what is the phone number: [phone]      Timeframe paperwork needed: ASAP    Additional notes: PATIENT RECEIVED A PA IN February FROM HER PREVIOUS PROVIDER VICKI FOR [facility] AND ITS NOW . PATIENT NEEDS A NEW PA FROM DR ZARATE FOR HOME  HEALTHCARE. DR ZARATE CAN GIVE A VERBAL PA OR FAX ONE OVER.

## 2022-09-12 NOTE — TELEPHONE ENCOUNTER
Attempted to contact [facility], no answer. Left detailed voicemail relaying provider's message

## 2022-09-15 ENCOUNTER — OFFICE VISIT (OUTPATIENT)
Dept: FAMILY MEDICINE CLINIC | Facility: CLINIC | Age: 62
End: 2022-09-15

## 2022-09-15 VITALS
SYSTOLIC BLOOD PRESSURE: 128 MMHG | HEART RATE: 117 BPM | HEIGHT: 66 IN | DIASTOLIC BLOOD PRESSURE: 74 MMHG | BODY MASS INDEX: 41.14 KG/M2 | WEIGHT: 256 LBS | OXYGEN SATURATION: 97 %

## 2022-09-15 DIAGNOSIS — M54.50 CHRONIC LOW BACK PAIN, UNSPECIFIED BACK PAIN LATERALITY, UNSPECIFIED WHETHER SCIATICA PRESENT: ICD-10-CM

## 2022-09-15 DIAGNOSIS — N32.81 OVERACTIVE BLADDER: Primary | ICD-10-CM

## 2022-09-15 DIAGNOSIS — G89.29 CHRONIC LOW BACK PAIN, UNSPECIFIED BACK PAIN LATERALITY, UNSPECIFIED WHETHER SCIATICA PRESENT: ICD-10-CM

## 2022-09-15 PROCEDURE — 99214 OFFICE O/P EST MOD 30 MIN: CPT | Performed by: INTERNAL MEDICINE

## 2022-09-15 RX ORDER — DIVALPROEX SODIUM 500 MG/1
TABLET, DELAYED RELEASE ORAL
COMMUNITY
Start: 2022-09-01
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
## 2022-09-21 DIAGNOSIS — G43.019 INTRACTABLE MIGRAINE WITHOUT AURA AND WITHOUT STATUS MIGRAINOSUS: ICD-10-CM

## 2022-09-21 NOTE — TELEPHONE ENCOUNTER
Rx Refill Note  Requested Prescriptions     Pending Prescriptions Disp Refills   • SUMAtriptan (IMITREX) 50 MG tablet 18 tablet 5     Sig: Take one tablet at onset of headache. May repeat dose one time in 2 hours if headache not relieved.      Last office visit with prescribing clinician: 9/15/2022      Next office visit with prescribing clinician: 11/15/2022            Betsy Mcconnell MA  09/21/22, 14:16 EDT

## 2022-09-22 RX ORDER — SUMATRIPTAN 50 MG/1
50 TABLET, FILM COATED ORAL
Qty: 18 TABLET | Refills: 5 | Status: SHIPPED | OUTPATIENT
Start: 2022-09-22

## 2022-10-01 DIAGNOSIS — G89.29 CHRONIC BILATERAL LOW BACK PAIN: ICD-10-CM

## 2022-10-01 DIAGNOSIS — M54.50 CHRONIC BILATERAL LOW BACK PAIN: ICD-10-CM

## 2022-10-01 RX ORDER — CYCLOBENZAPRINE HCL 10 MG
TABLET ORAL
Qty: 90 TABLET | Refills: 0 | Status: SHIPPED | OUTPATIENT
Start: 2022-10-01 | End: 2022-10-31

## 2022-10-01 NOTE — TELEPHONE ENCOUNTER
Rx Refill Note  Requested Prescriptions     Pending Prescriptions Disp Refills   • cyclobenzaprine (FLEXERIL) 10 MG tablet [Pharmacy Med Name: CYCLOBENZAPRINE 10MG TABLETS] 90 tablet 0     Sig: TAKE 1 TABLET BY MOUTH THREE TIMES DAILY AS NEEDED FOR MUSCLE SPASMS      Last office visit with prescribing clinician: 9/15/2022      Next office visit with prescribing clinician: 11/15/2022            Cruz Holly MA  10/01/22, 11:50 EDT

## 2022-10-27 ENCOUNTER — TELEPHONE (OUTPATIENT)
Dept: FAMILY MEDICINE CLINIC | Facility: CLINIC | Age: 62
End: 2022-10-27

## 2022-10-27 NOTE — TELEPHONE ENCOUNTER
PHONE CALL FROM PATIENT.  SHE NEEDS ORDER FOR [facility] IN [place], [phone] PHONE NUMBER.  SHE NEEDS HELP WITH CLEANING HOUSE AND PREPARING MEALS AND GETTING READY.      PLEASE CALL [phone]

## 2022-10-27 NOTE — TELEPHONE ENCOUNTER
Contacted patient's daughter to explain how to initiate with Home care, she verbalized understanding and agreed to discuss further with the patient

## 2022-10-27 NOTE — TELEPHONE ENCOUNTER
Spoke with patient directly to discuss home care services. She asked that I follow up with her daughter to explain    She also complains of persisting back pain, she has been referred to pain management and was approved for injection therapy but she has declined this option.

## 2022-10-28 NOTE — TELEPHONE ENCOUNTER
Caller: HEATHER    Relationship:  WITH [facility]    Best call back number: [phone]    What is the best time to reach you: ANY TIME    Who are you requesting to speak with (clinical staff, provider,  specific staff member): NURSE    Do you know the name of the person who called: HEATHER    What was the call regarding: HEATHER CALLED STATING [facility] IS GOING TO NEED A PRIOR AUTHORIZATION AND CAN CALL [phone]    Do you require a callback: NO

## 2022-10-31 DIAGNOSIS — M54.50 CHRONIC BILATERAL LOW BACK PAIN: ICD-10-CM

## 2022-10-31 DIAGNOSIS — K21.9 CHRONIC GERD: ICD-10-CM

## 2022-10-31 DIAGNOSIS — G89.29 CHRONIC BILATERAL LOW BACK PAIN: ICD-10-CM

## 2022-10-31 RX ORDER — CYCLOBENZAPRINE HCL 10 MG
TABLET ORAL
Qty: 90 TABLET | Refills: 0 | Status: SHIPPED | OUTPATIENT
Start: 2022-10-31 | End: 2022-11-30

## 2022-10-31 RX ORDER — BETHANECHOL CHLORIDE 25 MG/1
TABLET ORAL
Qty: 90 TABLET | Refills: 3 | Status: SHIPPED | OUTPATIENT
Start: 2022-10-31 | End: 2022-11-21 | Stop reason: SDUPTHER

## 2022-11-03 ENCOUNTER — TELEPHONE (OUTPATIENT)
Dept: FAMILY MEDICINE CLINIC | Facility: CLINIC | Age: 62
End: 2022-11-03
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
## 2022-11-03 NOTE — TELEPHONE ENCOUNTER
Spoke with patient's daughter to discuss further    She is requesting an updated med list to be mailed to patient;s home. While [place] is currently hospitalized, her daughter sent requests to the hospital staff for a  to help assist with IP rehab options.     Med list has been sent. I advised daughter that we will follow up more once patient has been discharged.

## 2022-11-04 ENCOUNTER — TELEPHONE (OUTPATIENT)
Dept: FAMILY MEDICINE CLINIC | Facility: CLINIC | Age: 62
End: 2022-11-04
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
## 2022-11-07 NOTE — TELEPHONE ENCOUNTER
Attempted to contact, no answer. Unable to LVM, box is full.     Hub may relay message & document.    Chacorta Del Rio DO [facility] [address] Clinical Pool 3 minutes ago (8:37 AM)     Will refer her to home health again if not done by the hospital after discharge

## 2022-11-07 NOTE — TELEPHONE ENCOUNTER
Spoke with patient's daughter, patient is requesting home care through [facility]. She needs to reinitiate this process with her insurance plan as it is a non medical service she is requesting.

## 2022-11-08 ENCOUNTER — TELEPHONE (OUTPATIENT)
Dept: FAMILY MEDICINE CLINIC | Facility: CLINIC | Age: 62
End: 2022-11-08

## 2022-11-08 NOTE — TELEPHONE ENCOUNTER
Caller: Saadia Caceres    Relationship to patient: Self    Best call back number: [phone] ROOM 578   HOME: [phone] (OKAY TO LEAVE MESSAGE)    Patient is needing: HUB READ PREVIOUS RESPONSE FROM PROVIDER AND PATIENT STATED THAT SHE DOES NOT KNOW WHEN SHE WILL BE DISCHARGED BUT WANTED TO HAVE PROVIDER TO GO AHEAD AND START THE PROCESS SO SHE WILL HAVE THIS WHEN SHE GETS DISCHARGED POSSIBLE THIS WEEK OR BEGINNING OF NEXT WEEK     PATIENT STATED THAT SHE DOES NOT HAVE HER MOBILE PHONE BUT DO NOT CALL HER DAUGHTER AND WOULD NEED TO EITHER CALL [facility] FOR HER OR LEAVE MESSAGE ON HOME NUMBER

## 2022-11-15 ENCOUNTER — TRANSITIONAL CARE MANAGEMENT TELEPHONE ENCOUNTER (OUTPATIENT)
Dept: CALL CENTER | Facility: HOSPITAL | Age: 62
End: 2022-11-15

## 2022-11-15 ENCOUNTER — TELEPHONE (OUTPATIENT)
Dept: FAMILY MEDICINE CLINIC | Facility: CLINIC | Age: 62
End: 2022-11-15

## 2022-11-15 ENCOUNTER — READMISSION MANAGEMENT (OUTPATIENT)
Dept: CALL CENTER | Facility: HOSPITAL | Age: 62
End: 2022-11-15

## 2022-11-15 NOTE — OUTREACH NOTE
Prep Survey    Flowsheet Row Responses   [facility] facility patient discharged from? Non-BH   Is LACE score < 7 ? Non-BH Discharge   Emergency Room discharge w/ pulse ox? No   Eligibility [facility]   Date of Discharge 11/13/22   Discharge Disposition Home or Self Care   Discharge diagnosis dehydration   Does the patient have one of the following disease processes/diagnoses(primary or secondary)? Other   Prep survey completed? Yes          GUZMAN NAVARRO - Registered Nurse

## 2022-11-15 NOTE — OUTREACH NOTE
Call Center TCM Note    Flowsheet Row Responses   [facility] patient discharged from? Non-   Does the patient have one of the following disease processes/diagnoses(primary or secondary)? Other   TCM attempt successful? Yes   Call start time 1624   Unsuccessful attempts --  [No updated verbal release on file from PCP group ]   Call end time 1625   Discharge diagnosis dehydration   Is patient permission given to speak with other caregiver? Yes   List who call center can speak with Heidi Jenkins    Person spoke with today (if not patient) and relationship Heidi Jenkins    Meds reviewed with patient/caregiver? Yes   Is the patient having any side effects they believe may be caused by any medication additions or changes? No   Does the patient have all medications ordered at discharge? N/A   Is the patient taking all medications as directed (includes completed medication regime)? Yes   Comments Hosp dc [facility] apt on 11/21/22 with PCP    Does the patient have an appointment with their PCP within 7 days of discharge? Yes   What is the Home health agency?  Per daughterNando PT will be visiting pt    Has home health visited the patient within 72 hours of discharge? Call prior to 72 hours   Home health comments Per daughterNando contacted her and will be visiting pt starting 11/16/22   Did the patient receive a copy of their discharge instructions? Yes   Nursing interventions Reviewed instructions with patient   What is the patient's perception of their health status since discharge? Improving   Is the patient/caregiver able to teach back signs and symptoms related to disease process for when to call PCP? Yes   Is the patient/caregiver able to teach back signs and symptoms related to disease process for when to call 911? Yes   Is the patient/caregiver able to teach back the hierarchy of who to call/visit for symptoms/problems? PCP, Specialist, Home health nurse, Urgent Care, ED, 911 Yes   If the patient is  a current smoker, are they able to teach back resources for cessation? [id number]   [facility] call completed? Yes   Call end time [id number]          Emerald Victor RN    11/15/2022, 16:28 EST

## 2022-11-18 ENCOUNTER — TELEPHONE (OUTPATIENT)
Dept: FAMILY MEDICINE CLINIC | Facility: CLINIC | Age: 62
End: 2022-11-18

## 2022-11-18 RX ORDER — PEN NEEDLE, DIABETIC 31 GX5/16"
NEEDLE, DISPOSABLE MISCELLANEOUS
Qty: 100 EACH | Refills: 1 | Status: SHIPPED | OUTPATIENT
Start: 2022-11-18

## 2022-11-18 NOTE — TELEPHONE ENCOUNTER
Caller: DERRICK    Relationship: Home Health/ [facility]     Best call back number: [phone]/ CAN LEAVE A MESSAGE WITH VERBAL OK STATING NAME AND TITLE OF CALLER    What orders are you requesting (i.e. lab or imaging): VERBAL ORDERS TO CHANGE APPOINTMENT TO 11/28/22    In what timeframe would the patient need to come in: ASAP    Where will you receive your lab/imaging services: AT HOME    Additional notes: YES

## 2022-11-21 ENCOUNTER — LAB (OUTPATIENT)
Dept: LAB | Facility: HOSPITAL | Age: 62
End: 2022-11-21

## 2022-11-21 ENCOUNTER — OFFICE VISIT (OUTPATIENT)
Dept: FAMILY MEDICINE CLINIC | Facility: CLINIC | Age: 62
End: 2022-11-21

## 2022-11-21 VITALS
HEIGHT: 66 IN | BODY MASS INDEX: 41.34 KG/M2 | OXYGEN SATURATION: 97 % | SYSTOLIC BLOOD PRESSURE: 134 MMHG | DIASTOLIC BLOOD PRESSURE: 94 MMHG | HEART RATE: 116 BPM

## 2022-11-21 DIAGNOSIS — Z79.899 POLYPHARMACY: ICD-10-CM

## 2022-11-21 DIAGNOSIS — F41.9 ANXIETY AND DEPRESSION: ICD-10-CM

## 2022-11-21 DIAGNOSIS — I10 HYPERTENSION, UNSPECIFIED TYPE: ICD-10-CM

## 2022-11-21 DIAGNOSIS — F32.A ANXIETY AND DEPRESSION: ICD-10-CM

## 2022-11-21 DIAGNOSIS — K21.9 GASTROESOPHAGEAL REFLUX DISEASE, UNSPECIFIED WHETHER ESOPHAGITIS PRESENT: ICD-10-CM

## 2022-11-21 DIAGNOSIS — R46.89 ALTERED BEHAVIOR: Primary | ICD-10-CM

## 2022-11-21 DIAGNOSIS — E78.5 DYSLIPIDEMIA: ICD-10-CM

## 2022-11-21 DIAGNOSIS — E11.65 UNCONTROLLED TYPE 2 DIABETES MELLITUS WITH HYPERGLYCEMIA: ICD-10-CM

## 2022-11-21 DIAGNOSIS — R46.89 ALTERED BEHAVIOR: ICD-10-CM

## 2022-11-21 DIAGNOSIS — N39.0 URINARY TRACT INFECTION WITHOUT HEMATURIA, SITE UNSPECIFIED: ICD-10-CM

## 2022-11-21 LAB
BASOPHILS # BLD AUTO: 0.1 10*3/MM3 (ref 0–0.2)
BASOPHILS NFR BLD AUTO: 1 % (ref 0–1.5)
BILIRUB BLD-MCNC: NEGATIVE MG/DL
CLARITY, POC: CLEAR
COLOR UR: YELLOW
DEPRECATED RDW RBC AUTO: 48 FL (ref 37–54)
EOSINOPHIL # BLD AUTO: 0.23 10*3/MM3 (ref 0–0.4)
EOSINOPHIL NFR BLD AUTO: 2.2 % (ref 0.3–6.2)
ERYTHROCYTE [DISTWIDTH] IN BLOOD BY AUTOMATED COUNT: 13.9 % (ref 12.3–15.4)
EXPIRATION DATE: ABNORMAL
GLUCOSE UR STRIP-MCNC: NEGATIVE MG/DL
HCT VFR BLD AUTO: 44.2 % (ref 34–46.6)
HGB BLD-MCNC: 14.9 G/DL (ref 12–15.9)
IMM GRANULOCYTES # BLD AUTO: 0.18 10*3/MM3 (ref 0–0.05)
IMM GRANULOCYTES NFR BLD AUTO: 1.8 % (ref 0–0.5)
KETONES UR QL: ABNORMAL
LEUKOCYTE EST, POC: NEGATIVE
LYMPHOCYTES # BLD AUTO: 3.1 10*3/MM3 (ref 0.7–3.1)
LYMPHOCYTES NFR BLD AUTO: 30.2 % (ref 19.6–45.3)
Lab: ABNORMAL
MCH RBC QN AUTO: 32 PG (ref 26.6–33)
MCHC RBC AUTO-ENTMCNC: 33.7 G/DL (ref 31.5–35.7)
MCV RBC AUTO: 94.8 FL (ref 79–97)
MONOCYTES # BLD AUTO: 0.87 10*3/MM3 (ref 0.1–0.9)
MONOCYTES NFR BLD AUTO: 8.5 % (ref 5–12)
NEUTROPHILS NFR BLD AUTO: 5.78 10*3/MM3 (ref 1.7–7)
NEUTROPHILS NFR BLD AUTO: 56.3 % (ref 42.7–76)
NITRITE UR-MCNC: NEGATIVE MG/ML
NRBC BLD AUTO-RTO: 0.1 /100 WBC (ref 0–0.2)
PH UR: 6.5 [PH] (ref 5–8)
PLATELET # BLD AUTO: 331 10*3/MM3 (ref 140–450)
PMV BLD AUTO: 9.1 FL (ref 6–12)
PROT UR STRIP-MCNC: ABNORMAL MG/DL
RBC # BLD AUTO: 4.66 10*6/MM3 (ref 3.77–5.28)
RBC # UR STRIP: NEGATIVE /UL
SP GR UR: 1.02 (ref 1–1.03)
UROBILINOGEN UR QL: NORMAL
WBC NRBC COR # BLD: 10.26 10*3/MM3 (ref 3.4–10.8)

## 2022-11-21 PROCEDURE — 85025 COMPLETE CBC W/AUTO DIFF WBC: CPT

## 2022-11-21 PROCEDURE — 80053 COMPREHEN METABOLIC PANEL: CPT

## 2022-11-21 PROCEDURE — 99495 TRANSJ CARE MGMT MOD F2F 14D: CPT | Performed by: INTERNAL MEDICINE

## 2022-11-21 PROCEDURE — 1111F DSCHRG MED/CURRENT MED MERGE: CPT | Performed by: INTERNAL MEDICINE

## 2022-11-21 PROCEDURE — 81003 URINALYSIS AUTO W/O SCOPE: CPT | Performed by: INTERNAL MEDICINE

## 2022-11-21 RX ORDER — BETHANECHOL CHLORIDE 25 MG/1
25 TABLET ORAL
COMMUNITY
Start: 2022-11-13 | End: 2023-03-02 | Stop reason: HOSPADM

## 2022-11-21 RX ORDER — BLOOD SUGAR DIAGNOSTIC
STRIP MISCELLANEOUS
Qty: 100 EACH | Refills: 11 | Status: SHIPPED | OUTPATIENT
Start: 2022-11-21
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
## 2022-11-22 LAB
ALBUMIN SERPL-MCNC: 3.8 G/DL (ref 3.5–5.2)
ALBUMIN/GLOB SERPL: 1.1 G/DL
ALP SERPL-CCNC: 106 U/L (ref 39–117)
ALT SERPL W P-5'-P-CCNC: 22 U/L (ref 1–33)
ANION GAP SERPL CALCULATED.3IONS-SCNC: 11.5 MMOL/L (ref 5–15)
AST SERPL-CCNC: 23 U/L (ref 1–32)
BILIRUB SERPL-MCNC: <0.2 MG/DL (ref 0–1.2)
BUN SERPL-MCNC: 15 MG/DL (ref 8–23)
BUN/CREAT SERPL: 20.8 (ref 7–25)
CALCIUM SPEC-SCNC: 10.3 MG/DL (ref 8.6–10.5)
CHLORIDE SERPL-SCNC: 97 MMOL/L (ref 98–107)
CO2 SERPL-SCNC: 27.5 MMOL/L (ref 22–29)
CREAT SERPL-MCNC: 0.72 MG/DL (ref 0.57–1)
EGFRCR SERPLBLD CKD-EPI 2021: 95.9 ML/MIN/1.73
GLOBULIN UR ELPH-MCNC: 3.6 GM/DL
GLUCOSE SERPL-MCNC: 144 MG/DL (ref 65–99)
POTASSIUM SERPL-SCNC: 5.1 MMOL/L (ref 3.5–5.2)
PROT SERPL-MCNC: 7.4 G/DL (ref 6–8.5)
SODIUM SERPL-SCNC: 136 MMOL/L (ref 136–145)

## 2022-11-30 DIAGNOSIS — M54.50 CHRONIC BILATERAL LOW BACK PAIN: ICD-10-CM

## 2022-11-30 DIAGNOSIS — G89.29 CHRONIC BILATERAL LOW BACK PAIN: ICD-10-CM

## 2022-11-30 RX ORDER — CYCLOBENZAPRINE HCL 10 MG
TABLET ORAL
Qty: 90 TABLET | Refills: 1 | Status: SHIPPED | OUTPATIENT
Start: 2022-11-30 | End: 2023-01-30

## 2022-11-30 NOTE — TELEPHONE ENCOUNTER
Rx Refill Note  Requested Prescriptions     Pending Prescriptions Disp Refills   • cyclobenzaprine (FLEXERIL) 10 MG tablet [Pharmacy Med Name: CYCLOBENZAPRINE 10MG TABLETS] 90 tablet 0     Sig: TAKE 1 TABLET BY MOUTH THREE TIMES DAILY AS NEEDED FOR MUSCLE SPASMS      Last office visit with prescribing clinician: 11/21/2022   Last telemedicine visit with prescribing clinician: 2/15/2023   Next office visit with prescribing clinician: 2/15/2023                         Would you like a call back once the refill request has been completed: [] Yes [] No    If the office needs to give you a call back, can they leave a voicemail: [] Yes [] No    Catherine Patiño MA  11/30/22, 10:10 EST

## 2022-12-08 ENCOUNTER — TELEPHONE (OUTPATIENT)
Dept: FAMILY MEDICINE CLINIC | Facility: CLINIC | Age: 62
End: 2022-12-08

## 2022-12-08 NOTE — TELEPHONE ENCOUNTER
Contacted patient to discuss further, she reports she has been sick for the past 2 weeks, experiencing nausea and vomiting and fatigue and concerned that she has the flu. I have scheduled upcoming appt with PCP for further evaluation.     I advised her that if she continues to decline her home health appointment, she could discharged and no longer eligible for home health services. She verbalized understanding and agreed to follow medical advice.

## 2022-12-08 NOTE — TELEPHONE ENCOUNTER
Provider: DOCTOR ZARATE  Caller: [facility]   Relationship to Patient: HOME HEALTH     Phone Number: [phone]  Reason for Call: THE CALLER STATES THAT HE IS SUPPOSED TO SEE THE PATIENT FOR PHYSICAL THERAPY THE PATIENT REFUSED PHYSICAL THERAPY THIS WEEK SAYING THAT SHE WAS RECOVERING FROM THE FLU THE CALLER STATES THAT THIS HAS BEEN A CONSISTENT BEHAVIOR HE WOULD LIKE TO MAKE THE DOCTOR AWARE OF THIS

## 2022-12-09 ENCOUNTER — TELEPHONE (OUTPATIENT)
Dept: FAMILY MEDICINE CLINIC | Facility: CLINIC | Age: 62
End: 2022-12-09

## 2022-12-09 NOTE — TELEPHONE ENCOUNTER
Provider: DR. ZARATE    Caller: KATY LOZADA    Relationship to Patient: PROVIDER, JORGE    Phone Number: [phone]    Reason for Call: PATIENT HAD A VISIT SCHEDULED TODAY AND WAS NOT AVAILABLE. PROVIDER IS GOING TO RETRY VISIT NEXT WEEK AFTER CONTACTING HER ON PHONE.

## 2022-12-12 ENCOUNTER — OFFICE VISIT (OUTPATIENT)
Dept: FAMILY MEDICINE CLINIC | Facility: CLINIC | Age: 62
End: 2022-12-12

## 2022-12-12 VITALS
SYSTOLIC BLOOD PRESSURE: 130 MMHG | HEART RATE: 106 BPM | BODY MASS INDEX: 41.34 KG/M2 | DIASTOLIC BLOOD PRESSURE: 68 MMHG | OXYGEN SATURATION: 98 % | HEIGHT: 66 IN

## 2022-12-12 DIAGNOSIS — R30.0 DYSURIA: Primary | ICD-10-CM

## 2022-12-12 DIAGNOSIS — B37.9 YEAST INFECTION: ICD-10-CM

## 2022-12-12 LAB
BILIRUB BLD-MCNC: NEGATIVE MG/DL
CLARITY, POC: CLEAR
COLOR UR: ABNORMAL
EXPIRATION DATE: ABNORMAL
GLUCOSE UR STRIP-MCNC: ABNORMAL MG/DL
KETONES UR QL: ABNORMAL
LEUKOCYTE EST, POC: NEGATIVE
Lab: ABNORMAL
NITRITE UR-MCNC: NEGATIVE MG/ML
PH UR: 6 [PH] (ref 5–8)
PROT UR STRIP-MCNC: ABNORMAL MG/DL
RBC # UR STRIP: NEGATIVE /UL
SP GR UR: 1.01 (ref 1–1.03)
UROBILINOGEN UR QL: NORMAL

## 2022-12-12 PROCEDURE — 99213 OFFICE O/P EST LOW 20 MIN: CPT | Performed by: INTERNAL MEDICINE

## 2022-12-12 PROCEDURE — 87086 URINE CULTURE/COLONY COUNT: CPT | Performed by: INTERNAL MEDICINE

## 2022-12-12 PROCEDURE — 81003 URINALYSIS AUTO W/O SCOPE: CPT | Performed by: INTERNAL MEDICINE

## 2022-12-12 RX ORDER — FLUCONAZOLE 100 MG/1
100 TABLET ORAL DAILY
Qty: 7 TABLET | Refills: 0 | Status: SHIPPED | OUTPATIENT
Start: 2022-12-12 | End: 2022-12-19
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
## 2022-12-13 LAB — BACTERIA SPEC AEROBE CULT: NO GROWTH

## 2022-12-29 DIAGNOSIS — R60.9 EDEMA, UNSPECIFIED TYPE: ICD-10-CM

## 2022-12-29 DIAGNOSIS — R60.1 GENERALIZED EDEMA: ICD-10-CM

## 2022-12-29 RX ORDER — FUROSEMIDE 20 MG/1
20 TABLET ORAL DAILY
Qty: 90 TABLET | Refills: 3 | Status: SHIPPED | OUTPATIENT
Start: 2022-12-29

## 2023-01-12 ENCOUNTER — TELEPHONE (OUTPATIENT)
Dept: FAMILY MEDICINE CLINIC | Facility: CLINIC | Age: 63
End: 2023-01-12
Payer: MEDICAID

## 2023-01-12 NOTE — TELEPHONE ENCOUNTER
Contacted pt and informed her that she would need an appt since she was seen for this last month and it has gotten better. Pt states she will call back when and schedule when checks to see when her ride can bring her.

## 2023-01-12 NOTE — TELEPHONE ENCOUNTER
Caller: Saadia Caceres    Relationship: Self    Best call back number:[phone]    What medication are you requesting: SOMETHING FOR YEAST INFECTION     What are your current symptoms: ITCHING IN PRIVATE AREA    How long have you been experiencing symptoms: 3DAYS  Have you had these symptoms before:    [x] Yes  [] No    Have you been treated for these symptoms before:   [x] Yes  [] No    If a prescription is needed, what is your preferred pharmacy and phone number: [id number] - [place], [place] - [facility] [address] AT [facility] [address]  [phone] [facility] [phone]      Additional notes:  PLEASE CALL THE PATIENT WHEN THIS MEDICATION IS CALLED IN.

## 2023-01-17 ENCOUNTER — APPOINTMENT (OUTPATIENT)
Dept: CT IMAGING | Facility: HOSPITAL | Age: 63
End: 2023-01-17
Payer: MEDICARE

## 2023-01-17 ENCOUNTER — HOSPITAL ENCOUNTER (EMERGENCY)
Facility: HOSPITAL | Age: 63
Discharge: HOME | End: 2023-01-17
Attending: EMERGENCY MEDICINE | Admitting: EMERGENCY MEDICINE
Payer: MEDICARE

## 2023-01-17 VITALS
OXYGEN SATURATION: 95 % | DIASTOLIC BLOOD PRESSURE: 80 MMHG | TEMPERATURE: 97.5 F | HEIGHT: 66 IN | HEART RATE: 91 BPM | WEIGHT: 260 LBS | SYSTOLIC BLOOD PRESSURE: 128 MMHG | BODY MASS INDEX: 41.78 KG/M2 | RESPIRATION RATE: 16 BRPM

## 2023-01-17 DIAGNOSIS — K31.9 DIABETIC GASTROPATHY: ICD-10-CM

## 2023-01-17 DIAGNOSIS — R11.2 NAUSEA AND VOMITING, UNSPECIFIED VOMITING TYPE: ICD-10-CM

## 2023-01-17 DIAGNOSIS — E11.69 DIABETIC GASTROPATHY: ICD-10-CM

## 2023-01-17 DIAGNOSIS — S09.90XA MINOR HEAD INJURY WITHOUT LOSS OF CONSCIOUSNESS, INITIAL ENCOUNTER: Primary | ICD-10-CM

## 2023-01-17 DIAGNOSIS — Z87.09 HISTORY OF COPD: ICD-10-CM

## 2023-01-17 DIAGNOSIS — Z78.9 ON SUPPLEMENTAL OXYGEN BY NASAL CANNULA: ICD-10-CM

## 2023-01-17 DIAGNOSIS — E11.65 TYPE 2 DIABETES MELLITUS WITH HYPERGLYCEMIA, UNSPECIFIED WHETHER LONG TERM INSULIN USE: ICD-10-CM

## 2023-01-17 DIAGNOSIS — F17.200 TOBACCO USE DISORDER: ICD-10-CM

## 2023-01-17 DIAGNOSIS — Z79.01 CHRONIC ANTICOAGULATION: ICD-10-CM

## 2023-01-17 LAB
ALBUMIN SERPL-MCNC: 4.1 G/DL (ref 3.5–5.2)
ALBUMIN/GLOB SERPL: 1.3 G/DL
ALP SERPL-CCNC: 106 U/L (ref 39–117)
ALT SERPL W P-5'-P-CCNC: 11 U/L (ref 1–33)
ANION GAP SERPL CALCULATED.3IONS-SCNC: 9 MMOL/L (ref 5–15)
AST SERPL-CCNC: 13 U/L (ref 1–32)
BACTERIA UR QL AUTO: ABNORMAL /HPF
BASOPHILS # BLD AUTO: 0.08 10*3/MM3 (ref 0–0.2)
BASOPHILS NFR BLD AUTO: 0.9 % (ref 0–1.5)
BILIRUB SERPL-MCNC: 0.3 MG/DL (ref 0–1.2)
BILIRUB UR QL STRIP: NEGATIVE
BUN SERPL-MCNC: 12 MG/DL (ref 8–23)
BUN/CREAT SERPL: 16.2 (ref 7–25)
CALCIUM SPEC-SCNC: 9.3 MG/DL (ref 8.6–10.5)
CHLORIDE SERPL-SCNC: 94 MMOL/L (ref 98–107)
CLARITY UR: ABNORMAL
CO2 SERPL-SCNC: 30 MMOL/L (ref 22–29)
COLOR UR: YELLOW
CREAT SERPL-MCNC: 0.74 MG/DL (ref 0.57–1)
D-LACTATE SERPL-SCNC: 1.1 MMOL/L (ref 0.5–2)
DEPRECATED RDW RBC AUTO: 48.9 FL (ref 37–54)
EGFRCR SERPLBLD CKD-EPI 2021: 92.8 ML/MIN/1.73
EOSINOPHIL # BLD AUTO: 0.18 10*3/MM3 (ref 0–0.4)
EOSINOPHIL NFR BLD AUTO: 2 % (ref 0.3–6.2)
ERYTHROCYTE [DISTWIDTH] IN BLOOD BY AUTOMATED COUNT: 14 % (ref 12.3–15.4)
GLOBULIN UR ELPH-MCNC: 3.2 GM/DL
GLUCOSE SERPL-MCNC: 413 MG/DL (ref 65–99)
GLUCOSE UR STRIP-MCNC: ABNORMAL MG/DL
HCT VFR BLD AUTO: 50.4 % (ref 34–46.6)
HGB BLD-MCNC: 16.5 G/DL (ref 12–15.9)
HGB UR QL STRIP.AUTO: ABNORMAL
HOLD SPECIMEN: NORMAL
HYALINE CASTS UR QL AUTO: ABNORMAL /LPF
IMM GRANULOCYTES # BLD AUTO: 0.06 10*3/MM3 (ref 0–0.05)
IMM GRANULOCYTES NFR BLD AUTO: 0.7 % (ref 0–0.5)
KETONES UR QL STRIP: ABNORMAL
LEUKOCYTE ESTERASE UR QL STRIP.AUTO: NEGATIVE
LIPASE SERPL-CCNC: 31 U/L (ref 13–60)
LYMPHOCYTES # BLD AUTO: 3.82 10*3/MM3 (ref 0.7–3.1)
LYMPHOCYTES NFR BLD AUTO: 43.4 % (ref 19.6–45.3)
MCH RBC QN AUTO: 31.4 PG (ref 26.6–33)
MCHC RBC AUTO-ENTMCNC: 32.7 G/DL (ref 31.5–35.7)
MCV RBC AUTO: 95.8 FL (ref 79–97)
MONOCYTES # BLD AUTO: 0.61 10*3/MM3 (ref 0.1–0.9)
MONOCYTES NFR BLD AUTO: 6.9 % (ref 5–12)
NEUTROPHILS NFR BLD AUTO: 4.05 10*3/MM3 (ref 1.7–7)
NEUTROPHILS NFR BLD AUTO: 46.1 % (ref 42.7–76)
NITRITE UR QL STRIP: NEGATIVE
NRBC BLD AUTO-RTO: 0 /100 WBC (ref 0–0.2)
PH UR STRIP.AUTO: 6 [PH] (ref 5–8)
PLATELET # BLD AUTO: 249 10*3/MM3 (ref 140–450)
PMV BLD AUTO: 9.1 FL (ref 6–12)
POTASSIUM SERPL-SCNC: 4.6 MMOL/L (ref 3.5–5.2)
PROT SERPL-MCNC: 7.3 G/DL (ref 6–8.5)
PROT UR QL STRIP: ABNORMAL
RBC # BLD AUTO: 5.26 10*6/MM3 (ref 3.77–5.28)
RBC # UR STRIP: ABNORMAL /HPF
REF LAB TEST METHOD: ABNORMAL
SODIUM SERPL-SCNC: 133 MMOL/L (ref 136–145)
SP GR UR STRIP: 1.01 (ref 1–1.03)
SQUAMOUS #/AREA URNS HPF: ABNORMAL /HPF
UROBILINOGEN UR QL STRIP: ABNORMAL
VALPROATE SERPL-MCNC: 21.8 MCG/ML (ref 50–125)
WBC # UR STRIP: ABNORMAL /HPF
WBC NRBC COR # BLD: 8.8 10*3/MM3 (ref 3.4–10.8)
WHOLE BLOOD HOLD COAG: NORMAL
WHOLE BLOOD HOLD SPECIMEN: NORMAL
YEAST URNS QL MICRO: ABNORMAL /HPF

## 2023-01-17 PROCEDURE — 81001 URINALYSIS AUTO W/SCOPE: CPT | Performed by: EMERGENCY MEDICINE

## 2023-01-17 PROCEDURE — 96374 THER/PROPH/DIAG INJ IV PUSH: CPT

## 2023-01-17 PROCEDURE — 80164 ASSAY DIPROPYLACETIC ACD TOT: CPT | Performed by: EMERGENCY MEDICINE

## 2023-01-17 PROCEDURE — 63710000001 INSULIN REGULAR HUMAN PER 5 UNITS: Performed by: EMERGENCY MEDICINE

## 2023-01-17 PROCEDURE — 80053 COMPREHEN METABOLIC PANEL: CPT | Performed by: EMERGENCY MEDICINE

## 2023-01-17 PROCEDURE — 25010000002 DROPERIDOL PER 5 MG: Performed by: EMERGENCY MEDICINE

## 2023-01-17 PROCEDURE — 99284 EMERGENCY DEPT VISIT MOD MDM: CPT

## 2023-01-17 PROCEDURE — 85025 COMPLETE CBC W/AUTO DIFF WBC: CPT | Performed by: EMERGENCY MEDICINE

## 2023-01-17 PROCEDURE — 74176 CT ABD & PELVIS W/O CONTRAST: CPT

## 2023-01-17 PROCEDURE — 70450 CT HEAD/BRAIN W/O DYE: CPT

## 2023-01-17 PROCEDURE — 83690 ASSAY OF LIPASE: CPT | Performed by: EMERGENCY MEDICINE

## 2023-01-17 PROCEDURE — 83605 ASSAY OF LACTIC ACID: CPT | Performed by: EMERGENCY MEDICINE

## 2023-01-17 RX ORDER — DROPERIDOL 2.5 MG/ML
2.5 INJECTION, SOLUTION INTRAMUSCULAR; INTRAVENOUS ONCE
Status: COMPLETED | OUTPATIENT
Start: 2023-01-17 | End: 2023-01-17

## 2023-01-17 RX ORDER — SODIUM CHLORIDE 9 MG/ML
10 INJECTION INTRAVENOUS AS NEEDED
Status: DISCONTINUED | OUTPATIENT
Start: 2023-01-17 | End: 2023-01-17 | Stop reason: HOSPADM

## 2023-01-17 RX ADMIN — SODIUM CHLORIDE, POTASSIUM CHLORIDE, SODIUM LACTATE AND CALCIUM CHLORIDE 1000 ML: 600; 310; 30; 20 INJECTION, SOLUTION INTRAVENOUS at 15:04

## 2023-01-17 RX ADMIN — INSULIN HUMAN 12 UNITS: 100 INJECTION, SOLUTION PARENTERAL at 15:03

## 2023-01-17 RX ADMIN — DROPERIDOL 2.5 MG: 2.5 INJECTION, SOLUTION INTRAMUSCULAR; INTRAVENOUS at 15:05
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
## 2023-01-18 ENCOUNTER — OFFICE VISIT (OUTPATIENT)
Dept: FAMILY MEDICINE CLINIC | Facility: CLINIC | Age: 63
End: 2023-01-18
Payer: MEDICARE

## 2023-01-18 VITALS
SYSTOLIC BLOOD PRESSURE: 128 MMHG | WEIGHT: 239 LBS | OXYGEN SATURATION: 96 % | HEIGHT: 66 IN | BODY MASS INDEX: 38.41 KG/M2 | HEART RATE: 112 BPM | TEMPERATURE: 97.1 F | DIASTOLIC BLOOD PRESSURE: 82 MMHG

## 2023-01-18 DIAGNOSIS — B37.9 YEAST INFECTION: ICD-10-CM

## 2023-01-18 DIAGNOSIS — N89.8 VAGINAL ITCHING: Primary | ICD-10-CM

## 2023-01-18 PROCEDURE — 99213 OFFICE O/P EST LOW 20 MIN: CPT | Performed by: INTERNAL MEDICINE

## 2023-01-18 RX ORDER — FLUCONAZOLE 100 MG/1
100 TABLET ORAL DAILY
Qty: 7 TABLET | Refills: 0 | Status: SHIPPED | OUTPATIENT
Start: 2023-01-18 | End: 2023-01-25
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
## 2023-01-23 DIAGNOSIS — B37.2 SKIN YEAST INFECTION: ICD-10-CM

## 2023-01-23 NOTE — TELEPHONE ENCOUNTER
Caller: Saadia Caceres    Relationship: Self    Best call back number: [phone]    Equipment requested: LARGE WHEELCHAIR     Reason for the request: TROUBLE WALKING     Prescribing Provider: ADAN ZARATE DO    Additional information or concerns:     [id number] - [place], [place] - [address] AT [facility] [address] - [phone]  - [phone] FX  [phone]      What orders are you requesting (i.e. lab or imaging): HOME HEALTH NURSE    In what timeframe would the patient need to come in: ASAP    Additional notes: PATIENT HAS A CALL BOX TO ENTER THE BUILDING . PATIENT WILL HAVE TO BUZZ THE NURSE IN.    -HELP FILLING MEDICATIONS  -CLEANING   -MAKING THE BED AND LAUNDRY     Requested Prescriptions:   Requested Prescriptions     Pending Prescriptions Disp Refills   • nystatin (MYCOSTATIN) 566953 UNIT/GM ointment 30 g 3     Sig: Apply  topically to the appropriate area as directed 2 (Two) Times a Day.        Pharmacy where request should be sent: [facility][id number] - [place], [place] - [address] AT [facility] [address] - [phone]  - [phone] FX     Additional details provided by patient: PATIENT IS REQUESTING A METER FOR HER DIABETES THAT STICKS TO THE SIDE. PATIENT DID NOT KNOW WHAT IT WAS CALLED.    Does the patient have less than a 3 day supply:  [x] Yes  [] No    Would you like a call back once the refill request has been completed: [x] Yes [] No    Fahad Rivera Rep   01/23/23 11:02 EST

## 2023-01-24 RX ORDER — NYSTATIN 100000 U/G
OINTMENT TOPICAL 2 TIMES DAILY
Qty: 30 G | Refills: 3 | Status: SHIPPED | OUTPATIENT
Start: 2023-01-24

## 2023-01-28 DIAGNOSIS — G89.29 CHRONIC BILATERAL LOW BACK PAIN: ICD-10-CM

## 2023-01-28 DIAGNOSIS — K21.9 CHRONIC GERD: ICD-10-CM

## 2023-01-28 DIAGNOSIS — M54.50 CHRONIC BILATERAL LOW BACK PAIN: ICD-10-CM

## 2023-01-28 NOTE — TELEPHONE ENCOUNTER
Rx Refill Note  Requested Prescriptions     Pending Prescriptions Disp Refills   • omeprazole (priLOSEC) 40 MG capsule [Pharmacy Med Name: OMEPRAZOLE 40MG CAPSULES] 90 capsule 1     Sig: TAKE 1 CAPSULE BY MOUTH DAILY   • bethanechol (URECHOLINE) 25 MG tablet [Pharmacy Med Name: BETHANECHOL 25MG TABLETS] 90 tablet      Sig: TAKE 1 TABLET BY MOUTH THREE TIMES DAILY   • cyclobenzaprine (FLEXERIL) 10 MG tablet [Pharmacy Med Name: CYCLOBENZAPRINE 10MG TABLETS] 90 tablet 1     Sig: TAKE 1 TABLET BY MOUTH THREE TIMES DAILY AS NEEDED FOR MUSCLE SPASMS      Last office visit with prescribing clinician: 1/18/2023   Last telemedicine visit with prescribing clinician: 2/15/2023   Next office visit with prescribing clinician: 2/15/2023                         Would you like a call back once the refill request has been completed: [] Yes [] No    If the office needs to give you a call back, can they leave a voicemail: [] Yes [] No    Seb Kauffman MA  01/28/23, 10:58 EST

## 2023-01-30 RX ORDER — BETHANECHOL CHLORIDE 25 MG/1
TABLET ORAL
Qty: 90 TABLET | OUTPATIENT
Start: 2023-01-30

## 2023-01-30 RX ORDER — OMEPRAZOLE 40 MG/1
40 CAPSULE, DELAYED RELEASE ORAL DAILY
Qty: 90 CAPSULE | Refills: 1 | Status: SHIPPED | OUTPATIENT
Start: 2023-01-30

## 2023-01-30 RX ORDER — CYCLOBENZAPRINE HCL 10 MG
TABLET ORAL
Qty: 90 TABLET | Refills: 1 | Status: SHIPPED | OUTPATIENT
Start: 2023-01-30 | End: 2023-03-29

## 2023-02-01 DIAGNOSIS — E11.65 UNCONTROLLED TYPE 2 DIABETES MELLITUS WITH HYPERGLYCEMIA: ICD-10-CM

## 2023-02-01 NOTE — TELEPHONE ENCOUNTER
Rx Refill Note  Requested Prescriptions     Pending Prescriptions Disp Refills   • Lantus SoloStar 100 UNIT/ML injection pen [Pharmacy Med Name: LANTUS SOLOSTAR PEN INJ 3ML] 18 mL      Sig: ADMINISTER 35 UNITS UNDER THE SKIN EVERY NIGHT AS DIRECTED      Last office visit with prescribing clinician: 1/18/2023   Last telemedicine visit with prescribing clinician: 2/15/2023   Next office visit with prescribing clinician: 2/15/2023                         Would you like a call back once the refill request has been completed: [] Yes [] No    If the office needs to give you a call back, can they leave a voicemail: [] Yes [] No    Seb Kauffman MA  02/01/23, 10:25 EST

## 2023-02-06 RX ORDER — INSULIN GLARGINE 100 [IU]/ML
INJECTION, SOLUTION SUBCUTANEOUS
Qty: 18 ML | Refills: 5 | Status: SHIPPED | OUTPATIENT
Start: 2023-02-06

## 2023-02-15 ENCOUNTER — TELEPHONE (OUTPATIENT)
Dept: FAMILY MEDICINE CLINIC | Facility: CLINIC | Age: 63
End: 2023-02-15

## 2023-02-15 ENCOUNTER — OFFICE VISIT (OUTPATIENT)
Dept: FAMILY MEDICINE CLINIC | Facility: CLINIC | Age: 63
End: 2023-02-15
Payer: MEDICARE

## 2023-02-15 VITALS
BODY MASS INDEX: 39.21 KG/M2 | OXYGEN SATURATION: 97 % | HEART RATE: 107 BPM | DIASTOLIC BLOOD PRESSURE: 80 MMHG | SYSTOLIC BLOOD PRESSURE: 142 MMHG | HEIGHT: 66 IN | WEIGHT: 244 LBS

## 2023-02-15 DIAGNOSIS — E66.01 MORBID (SEVERE) OBESITY DUE TO EXCESS CALORIES: ICD-10-CM

## 2023-02-15 DIAGNOSIS — M54.50 CHRONIC LOW BACK PAIN, UNSPECIFIED BACK PAIN LATERALITY, UNSPECIFIED WHETHER SCIATICA PRESENT: ICD-10-CM

## 2023-02-15 DIAGNOSIS — G89.29 CHRONIC LOW BACK PAIN, UNSPECIFIED BACK PAIN LATERALITY, UNSPECIFIED WHETHER SCIATICA PRESENT: ICD-10-CM

## 2023-02-15 DIAGNOSIS — R26.89 BALANCE DISORDER: ICD-10-CM

## 2023-02-15 DIAGNOSIS — E78.5 DYSLIPIDEMIA: ICD-10-CM

## 2023-02-15 DIAGNOSIS — R29.898 WEAKNESS OF BOTH LOWER EXTREMITIES: ICD-10-CM

## 2023-02-15 DIAGNOSIS — I10 HYPERTENSION, UNSPECIFIED TYPE: ICD-10-CM

## 2023-02-15 DIAGNOSIS — E11.65 UNCONTROLLED TYPE 2 DIABETES MELLITUS WITH HYPERGLYCEMIA: Primary | ICD-10-CM

## 2023-02-15 LAB
EXPIRATION DATE: NORMAL
HBA1C MFR BLD: 12.7 %
Lab: NORMAL

## 2023-02-15 PROCEDURE — 83036 HEMOGLOBIN GLYCOSYLATED A1C: CPT | Performed by: INTERNAL MEDICINE

## 2023-02-15 PROCEDURE — 99215 OFFICE O/P EST HI 40 MIN: CPT | Performed by: INTERNAL MEDICINE

## 2023-02-15 PROCEDURE — 3046F HEMOGLOBIN A1C LEVEL >9.0%: CPT | Performed by: INTERNAL MEDICINE

## 2023-02-15 NOTE — TELEPHONE ENCOUNTER
Caller: Saadia Caceres    Relationship: Self    Best call back number: [phone]    What is the best time to reach you: ANY TIME    Who are you requesting to speak with (clinical staff, provider,  specific staff member): DR ZARATE    Do you know the name of the person who called: SELF    What was the call regarding: PATIENT HAD AN APPOINTMENT TODAY AND IT WAS DISCUSSED AT APPOINTMENT THAT DR ZARATE WOULD BE PRESCRIBING PAIN PATCHES HOWEVER THERE IS NOTHING AT PHARMACY. PLEASE ADVISE    Do you require a callback: YES
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
## 2023-02-16 ENCOUNTER — PRIOR AUTHORIZATION (OUTPATIENT)
Dept: FAMILY MEDICINE CLINIC | Facility: CLINIC | Age: 63
End: 2023-02-16
Payer: MEDICARE

## 2023-02-16 RX ORDER — LIDOCAINE 50 MG/G
1 PATCH TOPICAL EVERY 24 HOURS
Qty: 30 EACH | Refills: 3 | Status: SHIPPED | OUTPATIENT
Start: 2023-02-16

## 2023-02-16 NOTE — TELEPHONE ENCOUNTER
(Key: [id number])  Med:Lidocaine 5% patches  Status:sent to plan, awaiting determination   Created:02/16/2023

## 2023-02-20 ENCOUNTER — TELEPHONE (OUTPATIENT)
Dept: FAMILY MEDICINE CLINIC | Facility: CLINIC | Age: 63
End: 2023-02-20

## 2023-02-20 NOTE — TELEPHONE ENCOUNTER
02/20/23- [facility] (Briana) called to give us the appointment date and time that they scheduled for the patient.  I called the patient and let her know that her appointment with  was 05/01/23 @ 12:40pm.  I gave her the phone number [phone] to call if she could not keep appt.  said that they have made two other appointments for her in year 2022, and she has been a No Show each time.

## 2023-02-21 ENCOUNTER — TELEPHONE (OUTPATIENT)
Dept: FAMILY MEDICINE CLINIC | Facility: CLINIC | Age: 63
End: 2023-02-21
Payer: MEDICARE

## 2023-02-21 NOTE — TELEPHONE ENCOUNTER
Caller: Saadia Caceres    Relationship: Self    Best call back number: [phone]    What medication are you requesting: AZO    What are your current symptoms: BLEEDING FROM VAGINA-BURNING URINATION    How long have you been experiencing symptoms: THE PAST 2 DAYS    Have you had these symptoms before:    [x] Yes  [] No    Have you been treated for these symptoms before:   [x] Yes  [] No    If a prescription is needed, what is your preferred pharmacy and phone number: [id number] - [place], [place] - [address] AT [facility] [address] - [phone] [facility] [phone]      Additional notes: PATIENT WOULD LIKE A CALL BACK EITHER WAY.

## 2023-02-23 ENCOUNTER — TELEPHONE (OUTPATIENT)
Dept: FAMILY MEDICINE CLINIC | Facility: CLINIC | Age: 63
End: 2023-02-23

## 2023-02-23 ENCOUNTER — HOSPITAL ENCOUNTER (EMERGENCY)
Facility: HOSPITAL | Age: 63
Discharge: HOME | End: 2023-02-23
Attending: EMERGENCY MEDICINE | Admitting: EMERGENCY MEDICINE
Payer: MEDICARE

## 2023-02-23 ENCOUNTER — APPOINTMENT (OUTPATIENT)
Dept: CT IMAGING | Facility: HOSPITAL | Age: 63
End: 2023-02-23
Payer: MEDICARE

## 2023-02-23 VITALS
TEMPERATURE: 97.8 F | BODY MASS INDEX: 43.32 KG/M2 | DIASTOLIC BLOOD PRESSURE: 76 MMHG | SYSTOLIC BLOOD PRESSURE: 145 MMHG | OXYGEN SATURATION: 92 % | HEIGHT: 65 IN | RESPIRATION RATE: 16 BRPM | HEART RATE: 83 BPM | WEIGHT: 260 LBS

## 2023-02-23 DIAGNOSIS — M54.50 CHRONIC MIDLINE LOW BACK PAIN WITHOUT SCIATICA: ICD-10-CM

## 2023-02-23 DIAGNOSIS — K85.90 ACUTE PANCREATITIS WITHOUT INFECTION OR NECROSIS, UNSPECIFIED PANCREATITIS TYPE: ICD-10-CM

## 2023-02-23 DIAGNOSIS — G89.29 CHRONIC MIDLINE LOW BACK PAIN WITHOUT SCIATICA: ICD-10-CM

## 2023-02-23 DIAGNOSIS — R11.2 NAUSEA AND VOMITING, UNSPECIFIED VOMITING TYPE: Primary | ICD-10-CM

## 2023-02-23 LAB
ALBUMIN SERPL-MCNC: 3.6 G/DL (ref 3.5–5.2)
ALBUMIN/GLOB SERPL: 1.1 G/DL
ALP SERPL-CCNC: 78 U/L (ref 39–117)
ALT SERPL W P-5'-P-CCNC: 8 U/L (ref 1–33)
ANION GAP SERPL CALCULATED.3IONS-SCNC: 8 MMOL/L (ref 5–15)
AST SERPL-CCNC: 9 U/L (ref 1–32)
BACTERIA UR QL AUTO: ABNORMAL /HPF
BASOPHILS # BLD AUTO: 0.1 10*3/MM3 (ref 0–0.2)
BASOPHILS NFR BLD AUTO: 1 % (ref 0–1.5)
BILIRUB SERPL-MCNC: 0.2 MG/DL (ref 0–1.2)
BILIRUB UR QL STRIP: NEGATIVE
BUN SERPL-MCNC: 14 MG/DL (ref 8–23)
BUN/CREAT SERPL: 22.6 (ref 7–25)
CALCIUM SPEC-SCNC: 8.9 MG/DL (ref 8.6–10.5)
CHLORIDE SERPL-SCNC: 100 MMOL/L (ref 98–107)
CLARITY UR: ABNORMAL
CO2 SERPL-SCNC: 29 MMOL/L (ref 22–29)
COLOR UR: YELLOW
CREAT SERPL-MCNC: 0.62 MG/DL (ref 0.57–1)
D-LACTATE SERPL-SCNC: 2.4 MMOL/L (ref 0.5–2)
DEPRECATED RDW RBC AUTO: 48.1 FL (ref 37–54)
EGFRCR SERPLBLD CKD-EPI 2021: 102.1 ML/MIN/1.73
EOSINOPHIL # BLD AUTO: 0.12 10*3/MM3 (ref 0–0.4)
EOSINOPHIL NFR BLD AUTO: 1.2 % (ref 0.3–6.2)
ERYTHROCYTE [DISTWIDTH] IN BLOOD BY AUTOMATED COUNT: 13.7 % (ref 12.3–15.4)
GLOBULIN UR ELPH-MCNC: 3.4 GM/DL
GLUCOSE SERPL-MCNC: 283 MG/DL (ref 65–99)
GLUCOSE UR STRIP-MCNC: ABNORMAL MG/DL
HCT VFR BLD AUTO: 45.9 % (ref 34–46.6)
HGB BLD-MCNC: 15.3 G/DL (ref 12–15.9)
HGB UR QL STRIP.AUTO: NEGATIVE
HOLD SPECIMEN: NORMAL
HYALINE CASTS UR QL AUTO: ABNORMAL /LPF
IMM GRANULOCYTES # BLD AUTO: 0.16 10*3/MM3 (ref 0–0.05)
IMM GRANULOCYTES NFR BLD AUTO: 1.6 % (ref 0–0.5)
KETONES UR QL STRIP: ABNORMAL
LEUKOCYTE ESTERASE UR QL STRIP.AUTO: NEGATIVE
LIPASE SERPL-CCNC: 746 U/L (ref 13–60)
LYMPHOCYTES # BLD AUTO: 4.25 10*3/MM3 (ref 0.7–3.1)
LYMPHOCYTES NFR BLD AUTO: 42.5 % (ref 19.6–45.3)
MCH RBC QN AUTO: 31.9 PG (ref 26.6–33)
MCHC RBC AUTO-ENTMCNC: 33.3 G/DL (ref 31.5–35.7)
MCV RBC AUTO: 95.6 FL (ref 79–97)
MONOCYTES # BLD AUTO: 0.69 10*3/MM3 (ref 0.1–0.9)
MONOCYTES NFR BLD AUTO: 6.9 % (ref 5–12)
NEUTROPHILS NFR BLD AUTO: 4.68 10*3/MM3 (ref 1.7–7)
NEUTROPHILS NFR BLD AUTO: 46.8 % (ref 42.7–76)
NITRITE UR QL STRIP: NEGATIVE
NRBC BLD AUTO-RTO: 0 /100 WBC (ref 0–0.2)
NT-PROBNP SERPL-MCNC: 279.8 PG/ML (ref 0–900)
PH UR STRIP.AUTO: 6 [PH] (ref 5–8)
PLAT MORPH BLD: NORMAL
PLATELET # BLD AUTO: 266 10*3/MM3 (ref 140–450)
PMV BLD AUTO: 9.7 FL (ref 6–12)
POTASSIUM SERPL-SCNC: 4.2 MMOL/L (ref 3.5–5.2)
PROT SERPL-MCNC: 7 G/DL (ref 6–8.5)
PROT UR QL STRIP: ABNORMAL
QT INTERVAL: 370 MS
QTC INTERVAL: 424 MS
RBC # BLD AUTO: 4.8 10*6/MM3 (ref 3.77–5.28)
RBC # UR STRIP: ABNORMAL /HPF
RBC MORPH BLD: NORMAL
REF LAB TEST METHOD: ABNORMAL
SODIUM SERPL-SCNC: 137 MMOL/L (ref 136–145)
SP GR UR STRIP: 1.02 (ref 1–1.03)
SQUAMOUS #/AREA URNS HPF: ABNORMAL /HPF
TROPONIN T SERPL HS-MCNC: 10 NG/L
UROBILINOGEN UR QL STRIP: ABNORMAL
WBC # UR STRIP: ABNORMAL /HPF
WBC MORPH BLD: NORMAL
WBC NRBC COR # BLD: 10 10*3/MM3 (ref 3.4–10.8)
WHOLE BLOOD HOLD COAG: NORMAL
WHOLE BLOOD HOLD SPECIMEN: NORMAL
YEAST URNS QL MICRO: ABNORMAL /HPF

## 2023-02-23 PROCEDURE — 74176 CT ABD & PELVIS W/O CONTRAST: CPT

## 2023-02-23 PROCEDURE — 83605 ASSAY OF LACTIC ACID: CPT | Performed by: EMERGENCY MEDICINE

## 2023-02-23 PROCEDURE — 83880 ASSAY OF NATRIURETIC PEPTIDE: CPT | Performed by: EMERGENCY MEDICINE

## 2023-02-23 PROCEDURE — 85025 COMPLETE CBC W/AUTO DIFF WBC: CPT | Performed by: EMERGENCY MEDICINE

## 2023-02-23 PROCEDURE — 85007 BL SMEAR W/DIFF WBC COUNT: CPT | Performed by: EMERGENCY MEDICINE

## 2023-02-23 PROCEDURE — 84484 ASSAY OF TROPONIN QUANT: CPT | Performed by: EMERGENCY MEDICINE

## 2023-02-23 PROCEDURE — 96374 THER/PROPH/DIAG INJ IV PUSH: CPT

## 2023-02-23 PROCEDURE — 36415 COLL VENOUS BLD VENIPUNCTURE: CPT

## 2023-02-23 PROCEDURE — 81001 URINALYSIS AUTO W/SCOPE: CPT | Performed by: EMERGENCY MEDICINE

## 2023-02-23 PROCEDURE — 99284 EMERGENCY DEPT VISIT MOD MDM: CPT

## 2023-02-23 PROCEDURE — 83690 ASSAY OF LIPASE: CPT | Performed by: EMERGENCY MEDICINE

## 2023-02-23 PROCEDURE — 93005 ELECTROCARDIOGRAM TRACING: CPT | Performed by: EMERGENCY MEDICINE

## 2023-02-23 PROCEDURE — 25010000002 DROPERIDOL PER 5 MG: Performed by: EMERGENCY MEDICINE

## 2023-02-23 PROCEDURE — 80053 COMPREHEN METABOLIC PANEL: CPT | Performed by: EMERGENCY MEDICINE

## 2023-02-23 RX ORDER — SODIUM CHLORIDE 9 MG/ML
10 INJECTION INTRAVENOUS AS NEEDED
Status: DISCONTINUED | OUTPATIENT
Start: 2023-02-23 | End: 2023-02-23 | Stop reason: HOSPADM

## 2023-02-23 RX ORDER — PROMETHAZINE HYDROCHLORIDE 25 MG/1
25 TABLET ORAL EVERY 6 HOURS PRN
Qty: 10 TABLET | Refills: 0 | Status: SHIPPED | OUTPATIENT
Start: 2023-02-23

## 2023-02-23 RX ORDER — OXYCODONE HYDROCHLORIDE AND ACETAMINOPHEN 5; 325 MG/1; MG/1
1 TABLET ORAL ONCE
Status: COMPLETED | OUTPATIENT
Start: 2023-02-23 | End: 2023-02-23

## 2023-02-23 RX ORDER — OXYCODONE HYDROCHLORIDE AND ACETAMINOPHEN 5; 325 MG/1; MG/1
1 TABLET ORAL EVERY 4 HOURS PRN
Qty: 10 TABLET | Refills: 0 | Status: SHIPPED | OUTPATIENT
Start: 2023-02-23

## 2023-02-23 RX ORDER — DROPERIDOL 2.5 MG/ML
2.5 INJECTION, SOLUTION INTRAMUSCULAR; INTRAVENOUS ONCE
Status: COMPLETED | OUTPATIENT
Start: 2023-02-23 | End: 2023-02-23

## 2023-02-23 RX ADMIN — DROPERIDOL 2.5 MG: 2.5 INJECTION, SOLUTION INTRAMUSCULAR; INTRAVENOUS at 07:19

## 2023-02-23 RX ADMIN — OXYCODONE HYDROCHLORIDE AND ACETAMINOPHEN 1 TABLET: 5; 325 TABLET ORAL at 09:59

## 2023-02-23 RX ADMIN — SODIUM CHLORIDE 1000 ML: 9 INJECTION, SOLUTION INTRAVENOUS at 06:19

## 2023-02-23 NOTE — ED PROVIDER NOTES
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 are the same as other medications prescribed for you. Read the directions carefully, and ask your doctor or other care provider to review them with you.            CONTINUE taking these medications    * Adjust Bath/Shower Seat misc  1 each Daily.     * Adjust Bath/Shower Seat/Back misc  1 each Daily.     * Roller Walker misc  1 Device Daily. Rollator walker     albuterol sulfate  (90 Base) MCG/ACT inhaler  Commonly known as: PROVENTIL HFA;VENTOLIN HFA;PROAIR HFA  Inhale 2 puffs Every 6 (Six) Hours As Needed for Wheezing.     amLODIPine 10 MG tablet  Commonly known as: NORVASC     Anoro Ellipta 62.5-25 MCG/ACT aerosol powder  inhaler  Generic drug: Umeclidinium-Vilanterol  Inhale 1 puff Daily.     aspirin 81 MG EC tablet  Take 1 tablet by mouth Daily.     BENEFIBER DRINK MIX PO     bethanechol 25 MG tablet  Commonly known as: URECHOLINE     clonazePAM 2 MG tablet  Commonly known as: KlonoPIN     Continuous Glucose Monitor Sup kit  1 each Daily.     cyclobenzaprine 10 MG tablet  Commonly known as: FLEXERIL  TAKE 1 TABLET BY MOUTH THREE TIMES DAILY AS NEEDED FOR MUSCLE SPASMS     dicyclomine 10 MG capsule  Commonly known as: BENTYL  Take 1 capsule by mouth 4 (Four) Times a Day Before Meals & at Bedtime.      divalproex 500 MG DR tablet  Commonly known as: DEPAKOTE     estazolam 2 MG tablet  Commonly known as: PROSOM     estradiol 0.5 MG tablet  Commonly known as: ESTRACE  Take 1 tablet by mouth Daily.     furosemide 20 MG tablet  Commonly known as: LASIX  TAKE 1 TABLET BY MOUTH DAILY     * gabapentin 400 MG capsule  Commonly known as: NEURONTIN     * gabapentin 600 MG tablet  Commonly known as: NEURONTIN     glucose monitor monitoring kit  1 each Daily.     hydrocortisone 2.5 % cream  Apply 1 application topically to the appropriate area as directed 2 (Two) Times a Day.     hydrOXYzine pamoate 50 MG capsule  Commonly known as: VISTARIL     hyoscyamine 0.125 MG SL tablet  Commonly known as: LEVSIN  Take 1 tablet by mouth Every 4 (Four) Hours As Needed for Cramping.     * Insulin Pen Needle 31G X 8 MM misc  Commonly known as: B-D ULTRAFINE III SHORT PEN  Inject 1 applicator under the skin 2 (Two) Times a Day.     * B-D ULTRAFINE III SHORT PEN 31G X 8 MM misc  Generic drug: Insulin Pen Needle  USE AS DIRECTED THREE TIMES DAILY     Lantus SoloStar 100 UNIT/ML injection pen  Generic drug: Insulin Glargine  ADMINISTER 35 UNITS UNDER THE SKIN EVERY NIGHT AS DIRECTED     levothyroxine 88 MCG tablet  Commonly known as: SYNTHROID, LEVOTHROID  TAKE 1 TABLET BY MOUTH DAILY     lidocaine 5 %  Commonly known as: LIDODERM  Place 1 patch on the skin as directed by provider Daily. Remove & Discard patch within 12 hours or as directed by MD     mupirocin 2 % cream  Commonly known as: BACTROBAN  Apply  topically to the appropriate area as directed See Admin Instructions. Apply topically to the affected areas three times daily     neomycin-polymyxin-hydrocortisone 3.5-50670-4 otic solution  Commonly known as: CORTISPORIN  Administer 3 drops to the right ear 4 (Four) Times a Day.     NovoLOG Mix 70/30 FlexPen (70-30) 100 UNIT/ML suspension pen-injector injection  Generic drug: insulin aspart prot & aspart  Inject 0.3 mL under the skin  into the appropriate area as directed 2 (Two) Times a Day Before Meals.     nystatin 457360 UNIT/GM ointment  Commonly known as: MYCOSTATIN  Apply  topically to the appropriate area as directed 2 (Two) Times a Day.     omeprazole 40 MG capsule  Commonly known as: priLOSEC  TAKE 1 CAPSULE BY MOUTH DAILY     ONE TOUCH ULTRA 2 w/Device kit  Use to test blood sugars 2 times daily.     OneTouch Delica Plus [id number] misc  USE AS DIRECTED TO TEST BLOOD SUGAR TWICE DAILY     OneTouch Ultra test strip  Generic drug: glucose blood  USE TO TEST BLOOD SUGAR TWICE DAILY     prazosin 2 MG capsule  Commonly known as: MINIPRESS     risperiDONE 2 MG tablet  Commonly known as: risperDAL     simvastatin 10 MG tablet  Commonly known as: ZOCOR  TAKE 1 TABLET BY MOUTH EVERY EVENING     SUMAtriptan 50 MG tablet  Commonly known as: IMITREX  Take 1 tablet by mouth Every 2 (Two) Hours As Needed for Migraine. Take one tablet at onset of headache. May repeat dose one time in 2 hours if headache not relieved.     Tradjenta 5 MG tablet tablet  Generic drug: linagliptin  TAKE 1 TABLET BY MOUTH DAILY     traZODone 100 MG tablet  Commonly known as: DESYREL     Vascepa 1 g capsule capsule  Generic drug: icosapent ethyl  Take 2 g by mouth 2 (Two) Times a Day With Meals for 90 days.     vitamin D 1.25 MG (16998 UT) capsule capsule  Commonly known as: ERGOCALCIFEROL  TAKE 1 CAPSULE BY MOUTH 1 TIME EVERY WEEK FOR 12 DOSES         * This list has 7 medication(s) that are the same as other medications prescribed for you. Read the directions carefully, and ask your doctor or other care provider to review them with you.               Where to Get Your Medications      These medications were sent to [id number] - [place], [place] - [address] AT [facility] [address] - [phone]  - [phone] FX  [address], [facility] [address]    Phone: [phone]   · oxyCODONE-acetaminophen 5-325 MG per  tablet  · promethazine 25 MG tablet             Comment: Please note this report has been produced using speech recognition software.      Hermelindo Patterson DO  Attending Emergency Physician       Hermelindo Patterson,   02/23/23 3570

## 2023-02-23 NOTE — TELEPHONE ENCOUNTER
Caller: [employer]    Relationship: INSURANCE    Best call back number: ASKED BUT NO PHONE NUMBER GIVEN     Who are you requesting to speak with (clinical staff, provider,  specific staff member): DR. ZARATE OR HIS NURSE    What was the call regarding: [facility] IS CALLING TO INFORM THE  THAT THE PATIENT HAS AN OPEN CARE GAP. THERE IS A FORM ON THEIR PORTAL TO HAVE THE PROVIDER FILL OUT. ON THE CARE MANAGMENT TAB.    Do you require a callback: NO

## 2023-02-24 ENCOUNTER — APPOINTMENT (OUTPATIENT)
Dept: ULTRASOUND IMAGING | Facility: HOSPITAL | Age: 63
End: 2023-02-24
Payer: MEDICARE

## 2023-02-24 ENCOUNTER — TELEPHONE (OUTPATIENT)
Dept: FAMILY MEDICINE CLINIC | Facility: CLINIC | Age: 63
End: 2023-02-24
Payer: MEDICARE

## 2023-02-24 ENCOUNTER — HOSPITAL ENCOUNTER (OUTPATIENT)
Facility: HOSPITAL | Age: 63
Setting detail: OBSERVATION
LOS: 3 days | Discharge: HOME HEALTH | End: 2023-03-02
Attending: EMERGENCY MEDICINE | Admitting: INTERNAL MEDICINE
Payer: MEDICARE

## 2023-02-24 DIAGNOSIS — R26.81 UNSTEADY GAIT WHEN WALKING: ICD-10-CM

## 2023-02-24 DIAGNOSIS — Z79.899 POLYPHARMACY: ICD-10-CM

## 2023-02-24 DIAGNOSIS — K86.1 CHRONIC PANCREATITIS, UNSPECIFIED PANCREATITIS TYPE: ICD-10-CM

## 2023-02-24 DIAGNOSIS — R11.2 NAUSEA AND VOMITING, UNSPECIFIED VOMITING TYPE: ICD-10-CM

## 2023-02-24 DIAGNOSIS — E11.65 UNCONTROLLED TYPE 2 DIABETES MELLITUS WITH HYPERGLYCEMIA: ICD-10-CM

## 2023-02-24 DIAGNOSIS — K85.90 ACUTE PANCREATITIS WITHOUT INFECTION OR NECROSIS, UNSPECIFIED PANCREATITIS TYPE: Primary | ICD-10-CM

## 2023-02-24 DIAGNOSIS — M54.50 CHRONIC LOW BACK PAIN, UNSPECIFIED BACK PAIN LATERALITY, UNSPECIFIED WHETHER SCIATICA PRESENT: ICD-10-CM

## 2023-02-24 DIAGNOSIS — J43.9 PULMONARY EMPHYSEMA, UNSPECIFIED EMPHYSEMA TYPE: Chronic | ICD-10-CM

## 2023-02-24 DIAGNOSIS — Z79.899 ENCOUNTER FOR LONG-TERM (CURRENT) USE OF MEDICATIONS: ICD-10-CM

## 2023-02-24 DIAGNOSIS — Z91.81 AT HIGH RISK FOR FALLS: ICD-10-CM

## 2023-02-24 DIAGNOSIS — R73.9 HYPERGLYCEMIA: ICD-10-CM

## 2023-02-24 DIAGNOSIS — G43.019 INTRACTABLE MIGRAINE WITHOUT AURA AND WITHOUT STATUS MIGRAINOSUS: ICD-10-CM

## 2023-02-24 DIAGNOSIS — G89.29 CHRONIC LOW BACK PAIN, UNSPECIFIED BACK PAIN LATERALITY, UNSPECIFIED WHETHER SCIATICA PRESENT: ICD-10-CM

## 2023-02-24 DIAGNOSIS — R60.1 GENERALIZED EDEMA: ICD-10-CM

## 2023-02-24 LAB
ALBUMIN SERPL-MCNC: 3.8 G/DL (ref 3.5–5.2)
ALBUMIN/GLOB SERPL: 1.4 G/DL
ALP SERPL-CCNC: 81 U/L (ref 39–117)
ALT SERPL W P-5'-P-CCNC: 8 U/L (ref 1–33)
ANION GAP SERPL CALCULATED.3IONS-SCNC: 11 MMOL/L (ref 5–15)
AST SERPL-CCNC: 8 U/L (ref 1–32)
BASOPHILS # BLD MANUAL: 0 10*3/MM3 (ref 0–0.2)
BASOPHILS NFR BLD MANUAL: 0 % (ref 0–1.5)
BILIRUB SERPL-MCNC: 0.2 MG/DL (ref 0–1.2)
BILIRUB UR QL STRIP: NEGATIVE
BUN SERPL-MCNC: 10 MG/DL (ref 8–23)
BUN/CREAT SERPL: 13.3 (ref 7–25)
CALCIUM SPEC-SCNC: 9.5 MG/DL (ref 8.6–10.5)
CHLORIDE SERPL-SCNC: 96 MMOL/L (ref 98–107)
CLARITY UR: CLEAR
CO2 SERPL-SCNC: 27 MMOL/L (ref 22–29)
COLOR UR: YELLOW
CREAT SERPL-MCNC: 0.75 MG/DL (ref 0.57–1)
D-LACTATE SERPL-SCNC: 1.9 MMOL/L (ref 0.5–2)
DEPRECATED RDW RBC AUTO: 49 FL (ref 37–54)
EGFRCR SERPLBLD CKD-EPI 2021: 91.3 ML/MIN/1.73
EOSINOPHIL # BLD MANUAL: 0.17 10*3/MM3 (ref 0–0.4)
EOSINOPHIL NFR BLD MANUAL: 2 % (ref 0.3–6.2)
ERYTHROCYTE [DISTWIDTH] IN BLOOD BY AUTOMATED COUNT: 14 % (ref 12.3–15.4)
GLOBULIN UR ELPH-MCNC: 2.7 GM/DL
GLUCOSE BLDC GLUCOMTR-MCNC: 188 MG/DL (ref 70–130)
GLUCOSE BLDC GLUCOMTR-MCNC: 88 MG/DL (ref 70–130)
GLUCOSE SERPL-MCNC: 287 MG/DL (ref 65–99)
GLUCOSE UR STRIP-MCNC: ABNORMAL MG/DL
HCT VFR BLD AUTO: 46.3 % (ref 34–46.6)
HGB BLD-MCNC: 15.3 G/DL (ref 12–15.9)
HGB UR QL STRIP.AUTO: NEGATIVE
HOLD SPECIMEN: NORMAL
KETONES UR QL STRIP: ABNORMAL
LEUKOCYTE ESTERASE UR QL STRIP.AUTO: NEGATIVE
LIPASE SERPL-CCNC: 462 U/L (ref 13–60)
LYMPHOCYTES # BLD MANUAL: 2.77 10*3/MM3 (ref 0.7–3.1)
LYMPHOCYTES NFR BLD MANUAL: 8 % (ref 5–12)
MCH RBC QN AUTO: 31.7 PG (ref 26.6–33)
MCHC RBC AUTO-ENTMCNC: 33 G/DL (ref 31.5–35.7)
MCV RBC AUTO: 96.1 FL (ref 79–97)
METAMYELOCYTES NFR BLD MANUAL: 2 % (ref 0–0)
MONOCYTES # BLD: 0.67 10*3/MM3 (ref 0.1–0.9)
MYELOCYTES NFR BLD MANUAL: 2 % (ref 0–0)
NEUTROPHILS # BLD AUTO: 4.44 10*3/MM3 (ref 1.7–7)
NEUTROPHILS NFR BLD MANUAL: 42 % (ref 42.7–76)
NEUTS BAND NFR BLD MANUAL: 11 % (ref 0–5)
NITRITE UR QL STRIP: NEGATIVE
NRBC SPEC MANUAL: 0 /100 WBC (ref 0–0.2)
PH UR STRIP.AUTO: 6 [PH] (ref 5–8)
PLAT MORPH BLD: NORMAL
PLATELET # BLD AUTO: 258 10*3/MM3 (ref 140–450)
PMV BLD AUTO: 8.9 FL (ref 6–12)
POTASSIUM SERPL-SCNC: 5.1 MMOL/L (ref 3.5–5.2)
PROT SERPL-MCNC: 6.5 G/DL (ref 6–8.5)
PROT UR QL STRIP: ABNORMAL
RBC # BLD AUTO: 4.82 10*6/MM3 (ref 3.77–5.28)
RBC MORPH BLD: NORMAL
SODIUM SERPL-SCNC: 134 MMOL/L (ref 136–145)
SP GR UR STRIP: 1.03 (ref 1–1.03)
UROBILINOGEN UR QL STRIP: ABNORMAL
VALPROATE SERPL-MCNC: 134 MCG/ML (ref 50–125)
VARIANT LYMPHS NFR BLD MANUAL: 33 % (ref 19.6–45.3)
WBC MORPH BLD: NORMAL
WBC NRBC COR # BLD: 8.38 10*3/MM3 (ref 3.4–10.8)
WHOLE BLOOD HOLD COAG: NORMAL
WHOLE BLOOD HOLD SPECIMEN: NORMAL

## 2023-02-24 PROCEDURE — G0378 HOSPITAL OBSERVATION PER HR: HCPCS

## 2023-02-24 PROCEDURE — 96361 HYDRATE IV INFUSION ADD-ON: CPT

## 2023-02-24 PROCEDURE — 85025 COMPLETE CBC W/AUTO DIFF WBC: CPT | Performed by: EMERGENCY MEDICINE

## 2023-02-24 PROCEDURE — 99285 EMERGENCY DEPT VISIT HI MDM: CPT

## 2023-02-24 PROCEDURE — 96374 THER/PROPH/DIAG INJ IV PUSH: CPT

## 2023-02-24 PROCEDURE — 63710000001 INSULIN REGULAR HUMAN PER 5 UNITS: Performed by: INTERNAL MEDICINE

## 2023-02-24 PROCEDURE — 85007 BL SMEAR W/DIFF WBC COUNT: CPT | Performed by: EMERGENCY MEDICINE

## 2023-02-24 PROCEDURE — 80164 ASSAY DIPROPYLACETIC ACD TOT: CPT | Performed by: EMERGENCY MEDICINE

## 2023-02-24 PROCEDURE — 36415 COLL VENOUS BLD VENIPUNCTURE: CPT

## 2023-02-24 PROCEDURE — 96376 TX/PRO/DX INJ SAME DRUG ADON: CPT

## 2023-02-24 PROCEDURE — 25010000002 MORPHINE PER 10 MG: Performed by: INTERNAL MEDICINE

## 2023-02-24 PROCEDURE — 96372 THER/PROPH/DIAG INJ SC/IM: CPT

## 2023-02-24 PROCEDURE — 96375 TX/PRO/DX INJ NEW DRUG ADDON: CPT

## 2023-02-24 PROCEDURE — 80053 COMPREHEN METABOLIC PANEL: CPT | Performed by: EMERGENCY MEDICINE

## 2023-02-24 PROCEDURE — 99223 1ST HOSP IP/OBS HIGH 75: CPT | Performed by: INTERNAL MEDICINE

## 2023-02-24 PROCEDURE — 76705 ECHO EXAM OF ABDOMEN: CPT

## 2023-02-24 PROCEDURE — 87040 BLOOD CULTURE FOR BACTERIA: CPT | Performed by: EMERGENCY MEDICINE

## 2023-02-24 PROCEDURE — 96360 HYDRATION IV INFUSION INIT: CPT

## 2023-02-24 PROCEDURE — 94640 AIRWAY INHALATION TREATMENT: CPT

## 2023-02-24 PROCEDURE — 83690 ASSAY OF LIPASE: CPT | Performed by: EMERGENCY MEDICINE

## 2023-02-24 PROCEDURE — 25010000002 PROMETHAZINE PER 50 MG: Performed by: EMERGENCY MEDICINE

## 2023-02-24 PROCEDURE — 83605 ASSAY OF LACTIC ACID: CPT | Performed by: EMERGENCY MEDICINE

## 2023-02-24 PROCEDURE — 94799 UNLISTED PULMONARY SVC/PX: CPT

## 2023-02-24 PROCEDURE — 81003 URINALYSIS AUTO W/O SCOPE: CPT | Performed by: EMERGENCY MEDICINE

## 2023-02-24 PROCEDURE — 25010000002 ENOXAPARIN PER 10 MG: Performed by: INTERNAL MEDICINE

## 2023-02-24 PROCEDURE — 82962 GLUCOSE BLOOD TEST: CPT

## 2023-02-24 RX ORDER — SODIUM CHLORIDE 0.9 % (FLUSH) 0.9 %
10 SYRINGE (ML) INJECTION AS NEEDED
Status: DISCONTINUED | OUTPATIENT
Start: 2023-02-24 | End: 2023-03-02 | Stop reason: HOSPADM

## 2023-02-24 RX ORDER — RISPERIDONE 1 MG/1
2 TABLET ORAL 2 TIMES DAILY
Status: DISCONTINUED | OUTPATIENT
Start: 2023-02-24 | End: 2023-03-02 | Stop reason: HOSPADM

## 2023-02-24 RX ORDER — ASPIRIN 81 MG/1
81 TABLET ORAL DAILY
Status: DISCONTINUED | OUTPATIENT
Start: 2023-02-24 | End: 2023-03-02 | Stop reason: HOSPADM

## 2023-02-24 RX ORDER — PANTOPRAZOLE SODIUM 40 MG/1
40 TABLET, DELAYED RELEASE ORAL
Status: DISCONTINUED | OUTPATIENT
Start: 2023-02-25 | End: 2023-03-02 | Stop reason: HOSPADM

## 2023-02-24 RX ORDER — IBUPROFEN 600 MG/1
1 TABLET ORAL
Status: DISCONTINUED | OUTPATIENT
Start: 2023-02-24 | End: 2023-02-28

## 2023-02-24 RX ORDER — SODIUM CHLORIDE 0.9 % (FLUSH) 0.9 %
10 SYRINGE (ML) INJECTION EVERY 12 HOURS SCHEDULED
Status: DISCONTINUED | OUTPATIENT
Start: 2023-02-24 | End: 2023-03-02 | Stop reason: HOSPADM

## 2023-02-24 RX ORDER — IPRATROPIUM BROMIDE AND ALBUTEROL SULFATE 2.5; .5 MG/3ML; MG/3ML
3 SOLUTION RESPIRATORY (INHALATION)
Status: DISCONTINUED | OUTPATIENT
Start: 2023-02-24 | End: 2023-02-28

## 2023-02-24 RX ORDER — GABAPENTIN 400 MG/1
400 CAPSULE ORAL NIGHTLY
Status: DISCONTINUED | OUTPATIENT
Start: 2023-02-24 | End: 2023-02-24

## 2023-02-24 RX ORDER — OXYCODONE HYDROCHLORIDE AND ACETAMINOPHEN 5; 325 MG/1; MG/1
1 TABLET ORAL EVERY 4 HOURS PRN
Status: DISCONTINUED | OUTPATIENT
Start: 2023-02-24 | End: 2023-02-28

## 2023-02-24 RX ORDER — PROMETHAZINE HYDROCHLORIDE 25 MG/1
25 TABLET ORAL EVERY 6 HOURS PRN
Status: DISCONTINUED | OUTPATIENT
Start: 2023-02-24 | End: 2023-03-02 | Stop reason: HOSPADM

## 2023-02-24 RX ORDER — DICYCLOMINE HYDROCHLORIDE 10 MG/1
10 CAPSULE ORAL
Status: DISCONTINUED | OUTPATIENT
Start: 2023-02-24 | End: 2023-03-02 | Stop reason: HOSPADM

## 2023-02-24 RX ORDER — SODIUM CHLORIDE 9 MG/ML
150 INJECTION, SOLUTION INTRAVENOUS CONTINUOUS
Status: DISCONTINUED | OUTPATIENT
Start: 2023-02-24 | End: 2023-02-27

## 2023-02-24 RX ORDER — POLYETHYLENE GLYCOL 3350 17 G/17G
17 POWDER, FOR SOLUTION ORAL DAILY PRN
Status: DISCONTINUED | OUTPATIENT
Start: 2023-02-24 | End: 2023-03-02 | Stop reason: HOSPADM

## 2023-02-24 RX ORDER — SODIUM CHLORIDE 9 MG/ML
10 INJECTION INTRAVENOUS AS NEEDED
Status: DISCONTINUED | OUTPATIENT
Start: 2023-02-24 | End: 2023-02-24

## 2023-02-24 RX ORDER — AMOXICILLIN 250 MG
2 CAPSULE ORAL 2 TIMES DAILY
Status: DISCONTINUED | OUTPATIENT
Start: 2023-02-24 | End: 2023-03-02 | Stop reason: HOSPADM

## 2023-02-24 RX ORDER — GABAPENTIN 300 MG/1
600 CAPSULE ORAL EVERY 6 HOURS SCHEDULED
Status: DISCONTINUED | OUTPATIENT
Start: 2023-02-24 | End: 2023-02-28

## 2023-02-24 RX ORDER — NALOXONE HCL 0.4 MG/ML
0.4 VIAL (ML) INJECTION
Status: DISCONTINUED | OUTPATIENT
Start: 2023-02-24 | End: 2023-02-28

## 2023-02-24 RX ORDER — TERAZOSIN 1 MG/1
1 CAPSULE ORAL NIGHTLY
Status: DISCONTINUED | OUTPATIENT
Start: 2023-02-24 | End: 2023-02-27

## 2023-02-24 RX ORDER — PROCHLORPERAZINE EDISYLATE 5 MG/ML
5 INJECTION INTRAMUSCULAR; INTRAVENOUS EVERY 6 HOURS PRN
Status: DISCONTINUED | OUTPATIENT
Start: 2023-02-24 | End: 2023-03-02 | Stop reason: HOSPADM

## 2023-02-24 RX ORDER — ENOXAPARIN SODIUM 100 MG/ML
40 INJECTION SUBCUTANEOUS NIGHTLY
Status: DISCONTINUED | OUTPATIENT
Start: 2023-02-24 | End: 2023-02-25

## 2023-02-24 RX ORDER — BISACODYL 10 MG
10 SUPPOSITORY, RECTAL RECTAL DAILY PRN
Status: DISCONTINUED | OUTPATIENT
Start: 2023-02-24 | End: 2023-03-02 | Stop reason: HOSPADM

## 2023-02-24 RX ORDER — BISACODYL 5 MG/1
5 TABLET, DELAYED RELEASE ORAL DAILY PRN
Status: DISCONTINUED | OUTPATIENT
Start: 2023-02-24 | End: 2023-03-02 | Stop reason: HOSPADM

## 2023-02-24 RX ORDER — AMLODIPINE BESYLATE 10 MG/1
10 TABLET ORAL DAILY
Status: DISCONTINUED | OUTPATIENT
Start: 2023-02-25 | End: 2023-03-02 | Stop reason: HOSPADM

## 2023-02-24 RX ORDER — TRAZODONE HYDROCHLORIDE 100 MG/1
100 TABLET ORAL NIGHTLY
Status: DISCONTINUED | OUTPATIENT
Start: 2023-02-24 | End: 2023-03-02 | Stop reason: HOSPADM

## 2023-02-24 RX ORDER — SODIUM CHLORIDE 9 MG/ML
125 INJECTION, SOLUTION INTRAVENOUS CONTINUOUS
Status: DISCONTINUED | OUTPATIENT
Start: 2023-02-24 | End: 2023-02-24

## 2023-02-24 RX ORDER — CLONAZEPAM 1 MG/1
2 TABLET ORAL 2 TIMES DAILY
Status: DISCONTINUED | OUTPATIENT
Start: 2023-02-24 | End: 2023-02-24

## 2023-02-24 RX ORDER — BETHANECHOL CHLORIDE 10 MG/1
25 TABLET ORAL 3 TIMES DAILY
Status: DISCONTINUED | OUTPATIENT
Start: 2023-02-24 | End: 2023-02-28

## 2023-02-24 RX ORDER — MORPHINE SULFATE 2 MG/ML
1 INJECTION, SOLUTION INTRAMUSCULAR; INTRAVENOUS EVERY 4 HOURS PRN
Status: DISCONTINUED | OUTPATIENT
Start: 2023-02-24 | End: 2023-02-28

## 2023-02-24 RX ORDER — DEXTROSE MONOHYDRATE 25 G/50ML
25 INJECTION, SOLUTION INTRAVENOUS
Status: DISCONTINUED | OUTPATIENT
Start: 2023-02-24 | End: 2023-03-02 | Stop reason: HOSPADM

## 2023-02-24 RX ORDER — DIVALPROEX SODIUM 500 MG/1
500 TABLET, DELAYED RELEASE ORAL DAILY
Status: DISCONTINUED | OUTPATIENT
Start: 2023-02-25 | End: 2023-03-02 | Stop reason: HOSPADM

## 2023-02-24 RX ORDER — SODIUM CHLORIDE 0.9 % (FLUSH) 0.9 %
10 SYRINGE (ML) INJECTION AS NEEDED
Status: DISCONTINUED | OUTPATIENT
Start: 2023-02-24 | End: 2023-02-24

## 2023-02-24 RX ORDER — ALBUTEROL SULFATE 2.5 MG/3ML
2.5 SOLUTION RESPIRATORY (INHALATION) EVERY 6 HOURS PRN
Status: DISCONTINUED | OUTPATIENT
Start: 2023-02-24 | End: 2023-03-02 | Stop reason: HOSPADM

## 2023-02-24 RX ORDER — DIVALPROEX SODIUM 500 MG/1
1500 TABLET, DELAYED RELEASE ORAL NIGHTLY
Status: DISCONTINUED | OUTPATIENT
Start: 2023-02-24 | End: 2023-03-02 | Stop reason: HOSPADM

## 2023-02-24 RX ORDER — ATORVASTATIN CALCIUM 10 MG/1
10 TABLET, FILM COATED ORAL NIGHTLY
Status: DISCONTINUED | OUTPATIENT
Start: 2023-02-24 | End: 2023-02-24

## 2023-02-24 RX ORDER — LEVOTHYROXINE SODIUM 88 UG/1
88 TABLET ORAL
Status: DISCONTINUED | OUTPATIENT
Start: 2023-02-25 | End: 2023-03-02 | Stop reason: HOSPADM

## 2023-02-24 RX ORDER — NICOTINE POLACRILEX 4 MG
15 LOZENGE BUCCAL
Status: DISCONTINUED | OUTPATIENT
Start: 2023-02-24 | End: 2023-03-02 | Stop reason: HOSPADM

## 2023-02-24 RX ORDER — SODIUM CHLORIDE 9 MG/ML
40 INJECTION, SOLUTION INTRAVENOUS AS NEEDED
Status: DISCONTINUED | OUTPATIENT
Start: 2023-02-24 | End: 2023-03-02 | Stop reason: HOSPADM

## 2023-02-24 RX ORDER — CYCLOBENZAPRINE HCL 10 MG
10 TABLET ORAL 3 TIMES DAILY PRN
Status: DISCONTINUED | OUTPATIENT
Start: 2023-02-24 | End: 2023-03-02 | Stop reason: HOSPADM

## 2023-02-24 RX ADMIN — OXYCODONE HYDROCHLORIDE AND ACETAMINOPHEN 1 TABLET: 5; 325 TABLET ORAL at 15:44

## 2023-02-24 RX ADMIN — INSULIN HUMAN 2 UNITS: 100 INJECTION, SOLUTION PARENTERAL at 15:45

## 2023-02-24 RX ADMIN — SODIUM CHLORIDE 150 ML/HR: 9 INJECTION, SOLUTION INTRAVENOUS at 15:34

## 2023-02-24 RX ADMIN — MORPHINE SULFATE 1 MG: 2 INJECTION, SOLUTION INTRAMUSCULAR; INTRAVENOUS at 19:57

## 2023-02-24 RX ADMIN — Medication 10 ML: at 21:52

## 2023-02-24 RX ADMIN — IPRATROPIUM BROMIDE AND ALBUTEROL SULFATE 3 ML: 2.5; .5 SOLUTION RESPIRATORY (INHALATION) at 20:00

## 2023-02-24 RX ADMIN — PROMETHAZINE HYDROCHLORIDE 12.5 MG: 25 INJECTION INTRAMUSCULAR; INTRAVENOUS at 13:48

## 2023-02-24 RX ADMIN — BETHANECHOL CHLORIDE 25 MG: 10 TABLET ORAL at 15:44

## 2023-02-24 RX ADMIN — MORPHINE SULFATE 1 MG: 2 INJECTION, SOLUTION INTRAMUSCULAR; INTRAVENOUS at 23:50

## 2023-02-24 RX ADMIN — SODIUM CHLORIDE 150 ML/HR: 9 INJECTION, SOLUTION INTRAVENOUS at 22:07

## 2023-02-24 RX ADMIN — ASPIRIN 81 MG: 81 TABLET, COATED ORAL at 15:43

## 2023-02-24 RX ADMIN — SODIUM CHLORIDE 125 ML/HR: 9 INJECTION, SOLUTION INTRAVENOUS at 12:25

## 2023-02-24 RX ADMIN — ENOXAPARIN SODIUM 40 MG: 40 INJECTION SUBCUTANEOUS at 21:52

## 2023-02-24 RX ADMIN — PROMETHAZINE HYDROCHLORIDE 12.5 MG: 25 INJECTION INTRAMUSCULAR; INTRAVENOUS at 23:50

## 2023-02-24 NOTE — TELEPHONE ENCOUNTER
Caller: Saadia Caceres    Relationship: Self    Best call back number: [phone]    What is the best time to reach you: ASAP    Who are you requesting to speak with (clinical staff, provider,  specific staff member): CLINICAL    Do you know the name of the person who called: [place]    What was the call regarding: PAIN FROM PANCREATITIS    Do you require a callback: YES

## 2023-02-25 LAB
ALBUMIN SERPL-MCNC: 3.5 G/DL (ref 3.5–5.2)
ALBUMIN/GLOB SERPL: 1.4 G/DL
ALP SERPL-CCNC: 68 U/L (ref 39–117)
ALT SERPL W P-5'-P-CCNC: 6 U/L (ref 1–33)
ANION GAP SERPL CALCULATED.3IONS-SCNC: 9 MMOL/L (ref 5–15)
AST SERPL-CCNC: 7 U/L (ref 1–32)
BILIRUB SERPL-MCNC: 0.2 MG/DL (ref 0–1.2)
BUN SERPL-MCNC: 11 MG/DL (ref 8–23)
BUN/CREAT SERPL: 22 (ref 7–25)
CALCIUM SPEC-SCNC: 9 MG/DL (ref 8.6–10.5)
CHLORIDE SERPL-SCNC: 102 MMOL/L (ref 98–107)
CO2 SERPL-SCNC: 30 MMOL/L (ref 22–29)
CREAT SERPL-MCNC: 0.5 MG/DL (ref 0.57–1)
DEPRECATED RDW RBC AUTO: 50.8 FL (ref 37–54)
EGFRCR SERPLBLD CKD-EPI 2021: 107.5 ML/MIN/1.73
ERYTHROCYTE [DISTWIDTH] IN BLOOD BY AUTOMATED COUNT: 14.2 % (ref 12.3–15.4)
GLOBULIN UR ELPH-MCNC: 2.5 GM/DL
GLUCOSE BLDC GLUCOMTR-MCNC: 107 MG/DL (ref 70–130)
GLUCOSE BLDC GLUCOMTR-MCNC: 107 MG/DL (ref 70–130)
GLUCOSE BLDC GLUCOMTR-MCNC: 108 MG/DL (ref 70–130)
GLUCOSE BLDC GLUCOMTR-MCNC: 144 MG/DL (ref 70–130)
GLUCOSE BLDC GLUCOMTR-MCNC: 84 MG/DL (ref 70–130)
GLUCOSE BLDC GLUCOMTR-MCNC: 84 MG/DL (ref 70–130)
GLUCOSE SERPL-MCNC: 81 MG/DL (ref 65–99)
HCT VFR BLD AUTO: 44.5 % (ref 34–46.6)
HGB BLD-MCNC: 14.4 G/DL (ref 12–15.9)
MCH RBC QN AUTO: 31.3 PG (ref 26.6–33)
MCHC RBC AUTO-ENTMCNC: 32.4 G/DL (ref 31.5–35.7)
MCV RBC AUTO: 96.7 FL (ref 79–97)
PLATELET # BLD AUTO: 249 10*3/MM3 (ref 140–450)
PMV BLD AUTO: 9.3 FL (ref 6–12)
POTASSIUM SERPL-SCNC: 3.9 MMOL/L (ref 3.5–5.2)
PROT SERPL-MCNC: 6 G/DL (ref 6–8.5)
RBC # BLD AUTO: 4.6 10*6/MM3 (ref 3.77–5.28)
SODIUM SERPL-SCNC: 141 MMOL/L (ref 136–145)
WBC NRBC COR # BLD: 7.47 10*3/MM3 (ref 3.4–10.8)

## 2023-02-25 PROCEDURE — 99232 SBSQ HOSP IP/OBS MODERATE 35: CPT | Performed by: HOSPITALIST

## 2023-02-25 PROCEDURE — 63710000001 INSULIN DETEMIR PER 5 UNITS: Performed by: INTERNAL MEDICINE

## 2023-02-25 PROCEDURE — 80053 COMPREHEN METABOLIC PANEL: CPT | Performed by: INTERNAL MEDICINE

## 2023-02-25 PROCEDURE — 82962 GLUCOSE BLOOD TEST: CPT

## 2023-02-25 PROCEDURE — 85027 COMPLETE CBC AUTOMATED: CPT | Performed by: INTERNAL MEDICINE

## 2023-02-25 PROCEDURE — 94799 UNLISTED PULMONARY SVC/PX: CPT

## 2023-02-25 PROCEDURE — 96376 TX/PRO/DX INJ SAME DRUG ADON: CPT

## 2023-02-25 PROCEDURE — 94664 DEMO&/EVAL PT USE INHALER: CPT

## 2023-02-25 PROCEDURE — 96361 HYDRATE IV INFUSION ADD-ON: CPT

## 2023-02-25 PROCEDURE — 25010000002 ENOXAPARIN PER 10 MG: Performed by: HOSPITALIST

## 2023-02-25 PROCEDURE — 25010000002 MORPHINE PER 10 MG: Performed by: INTERNAL MEDICINE

## 2023-02-25 PROCEDURE — 96372 THER/PROPH/DIAG INJ SC/IM: CPT

## 2023-02-25 RX ORDER — ENOXAPARIN SODIUM 100 MG/ML
40 INJECTION SUBCUTANEOUS 2 TIMES DAILY
Status: DISCONTINUED | OUTPATIENT
Start: 2023-02-25 | End: 2023-03-02 | Stop reason: HOSPADM

## 2023-02-25 RX ORDER — SUMATRIPTAN 50 MG/1
50 TABLET, FILM COATED ORAL
Status: DISCONTINUED | OUTPATIENT
Start: 2023-02-25 | End: 2023-03-02 | Stop reason: HOSPADM

## 2023-02-25 RX ADMIN — DICYCLOMINE HYDROCHLORIDE 10 MG: 10 CAPSULE ORAL at 08:44

## 2023-02-25 RX ADMIN — LEVOTHYROXINE SODIUM 88 MCG: 88 TABLET ORAL at 08:45

## 2023-02-25 RX ADMIN — DICYCLOMINE HYDROCHLORIDE 10 MG: 10 CAPSULE ORAL at 17:21

## 2023-02-25 RX ADMIN — IPRATROPIUM BROMIDE AND ALBUTEROL SULFATE 3 ML: 2.5; .5 SOLUTION RESPIRATORY (INHALATION) at 13:31

## 2023-02-25 RX ADMIN — TERAZOSIN HYDROCHLORIDE 1 MG: 1 CAPSULE ORAL at 20:02

## 2023-02-25 RX ADMIN — GABAPENTIN 600 MG: 300 CAPSULE ORAL at 11:26

## 2023-02-25 RX ADMIN — RISPERIDONE 2 MG: 1 TABLET ORAL at 08:51

## 2023-02-25 RX ADMIN — SODIUM CHLORIDE 150 ML/HR: 9 INJECTION, SOLUTION INTRAVENOUS at 10:30

## 2023-02-25 RX ADMIN — BETHANECHOL CHLORIDE 25 MG: 10 TABLET ORAL at 08:44

## 2023-02-25 RX ADMIN — DIVALPROEX SODIUM 500 MG: 500 TABLET, DELAYED RELEASE ORAL at 08:53

## 2023-02-25 RX ADMIN — PANTOPRAZOLE SODIUM 40 MG: 40 TABLET, DELAYED RELEASE ORAL at 08:46

## 2023-02-25 RX ADMIN — IPRATROPIUM BROMIDE AND ALBUTEROL SULFATE 3 ML: 2.5; .5 SOLUTION RESPIRATORY (INHALATION) at 18:54

## 2023-02-25 RX ADMIN — DICYCLOMINE HYDROCHLORIDE 10 MG: 10 CAPSULE ORAL at 11:26

## 2023-02-25 RX ADMIN — ASPIRIN 81 MG: 81 TABLET, COATED ORAL at 08:45

## 2023-02-25 RX ADMIN — ENOXAPARIN SODIUM 40 MG: 40 INJECTION SUBCUTANEOUS at 20:03

## 2023-02-25 RX ADMIN — AMLODIPINE BESYLATE 10 MG: 10 TABLET ORAL at 08:44

## 2023-02-25 RX ADMIN — ENOXAPARIN SODIUM 40 MG: 40 INJECTION SUBCUTANEOUS at 11:26

## 2023-02-25 RX ADMIN — SODIUM CHLORIDE 150 ML/HR: 9 INJECTION, SOLUTION INTRAVENOUS at 17:22

## 2023-02-25 RX ADMIN — MORPHINE SULFATE 1 MG: 2 INJECTION, SOLUTION INTRAMUSCULAR; INTRAVENOUS at 03:45

## 2023-02-25 RX ADMIN — OXYCODONE HYDROCHLORIDE AND ACETAMINOPHEN 1 TABLET: 5; 325 TABLET ORAL at 14:37

## 2023-02-25 RX ADMIN — GABAPENTIN 600 MG: 300 CAPSULE ORAL at 17:22

## 2023-02-25 RX ADMIN — BETHANECHOL CHLORIDE 25 MG: 10 TABLET ORAL at 15:42

## 2023-02-25 RX ADMIN — OXYCODONE HYDROCHLORIDE AND ACETAMINOPHEN 1 TABLET: 5; 325 TABLET ORAL at 08:45

## 2023-02-25 RX ADMIN — Medication 10 ML: at 08:45

## 2023-02-25 RX ADMIN — TRAZODONE HYDROCHLORIDE 100 MG: 100 TABLET ORAL at 20:02

## 2023-02-25 RX ADMIN — BETHANECHOL CHLORIDE 25 MG: 10 TABLET ORAL at 20:03

## 2023-02-25 RX ADMIN — ALBUTEROL SULFATE 2.5 MG: 2.5 SOLUTION RESPIRATORY (INHALATION) at 01:46

## 2023-02-25 RX ADMIN — MORPHINE SULFATE 1 MG: 2 INJECTION, SOLUTION INTRAMUSCULAR; INTRAVENOUS at 16:18

## 2023-02-25 RX ADMIN — RISPERIDONE 2 MG: 1 TABLET ORAL at 20:02

## 2023-02-25 RX ADMIN — IPRATROPIUM BROMIDE AND ALBUTEROL SULFATE 3 ML: 2.5; .5 SOLUTION RESPIRATORY (INHALATION) at 07:31

## 2023-02-25 RX ADMIN — OXYCODONE HYDROCHLORIDE AND ACETAMINOPHEN 1 TABLET: 5; 325 TABLET ORAL at 20:02

## 2023-02-25 RX ADMIN — DIVALPROEX SODIUM 1500 MG: 500 TABLET, DELAYED RELEASE ORAL at 20:02

## 2023-02-25 RX ADMIN — SENNOSIDES AND DOCUSATE SODIUM 2 TABLET: 50; 8.6 TABLET ORAL at 08:45

## 2023-02-25 RX ADMIN — DICYCLOMINE HYDROCHLORIDE 10 MG: 10 CAPSULE ORAL at 20:02

## 2023-02-26 LAB
GLUCOSE BLDC GLUCOMTR-MCNC: 118 MG/DL (ref 70–130)
GLUCOSE BLDC GLUCOMTR-MCNC: 147 MG/DL (ref 70–130)
GLUCOSE BLDC GLUCOMTR-MCNC: 155 MG/DL (ref 70–130)

## 2023-02-26 PROCEDURE — 25010000002 MORPHINE PER 10 MG: Performed by: INTERNAL MEDICINE

## 2023-02-26 PROCEDURE — 94664 DEMO&/EVAL PT USE INHALER: CPT

## 2023-02-26 PROCEDURE — 25010000002 PROMETHAZINE PER 50 MG: Performed by: EMERGENCY MEDICINE

## 2023-02-26 PROCEDURE — 25010000002 ENOXAPARIN PER 10 MG: Performed by: HOSPITALIST

## 2023-02-26 PROCEDURE — 94799 UNLISTED PULMONARY SVC/PX: CPT

## 2023-02-26 PROCEDURE — 96376 TX/PRO/DX INJ SAME DRUG ADON: CPT

## 2023-02-26 PROCEDURE — 63710000001 INSULIN DETEMIR PER 5 UNITS: Performed by: INTERNAL MEDICINE

## 2023-02-26 PROCEDURE — 82962 GLUCOSE BLOOD TEST: CPT

## 2023-02-26 PROCEDURE — 96361 HYDRATE IV INFUSION ADD-ON: CPT

## 2023-02-26 PROCEDURE — 99232 SBSQ HOSP IP/OBS MODERATE 35: CPT | Performed by: HOSPITALIST

## 2023-02-26 PROCEDURE — 96372 THER/PROPH/DIAG INJ SC/IM: CPT

## 2023-02-26 PROCEDURE — 63710000001 PROMETHAZINE PER 25 MG: Performed by: INTERNAL MEDICINE

## 2023-02-26 RX ORDER — CLONAZEPAM 1 MG/1
2 TABLET ORAL 2 TIMES DAILY PRN
Status: DISCONTINUED | OUTPATIENT
Start: 2023-02-26 | End: 2023-02-28

## 2023-02-26 RX ADMIN — DICYCLOMINE HYDROCHLORIDE 10 MG: 10 CAPSULE ORAL at 11:59

## 2023-02-26 RX ADMIN — OXYCODONE HYDROCHLORIDE AND ACETAMINOPHEN 1 TABLET: 5; 325 TABLET ORAL at 12:12

## 2023-02-26 RX ADMIN — INSULIN DETEMIR 20 UNITS: 100 INJECTION, SOLUTION SUBCUTANEOUS at 23:40

## 2023-02-26 RX ADMIN — DIVALPROEX SODIUM 1500 MG: 500 TABLET, DELAYED RELEASE ORAL at 20:22

## 2023-02-26 RX ADMIN — OXYCODONE HYDROCHLORIDE AND ACETAMINOPHEN 1 TABLET: 5; 325 TABLET ORAL at 00:19

## 2023-02-26 RX ADMIN — TERAZOSIN HYDROCHLORIDE 1 MG: 1 CAPSULE ORAL at 20:22

## 2023-02-26 RX ADMIN — SODIUM CHLORIDE 150 ML/HR: 9 INJECTION, SOLUTION INTRAVENOUS at 20:23

## 2023-02-26 RX ADMIN — CLONAZEPAM 2 MG: 1 TABLET ORAL at 14:46

## 2023-02-26 RX ADMIN — RISPERIDONE 2 MG: 1 TABLET ORAL at 20:22

## 2023-02-26 RX ADMIN — AMLODIPINE BESYLATE 10 MG: 10 TABLET ORAL at 08:01

## 2023-02-26 RX ADMIN — GABAPENTIN 600 MG: 300 CAPSULE ORAL at 11:59

## 2023-02-26 RX ADMIN — MORPHINE SULFATE 1 MG: 2 INJECTION, SOLUTION INTRAMUSCULAR; INTRAVENOUS at 13:11

## 2023-02-26 RX ADMIN — ENOXAPARIN SODIUM 40 MG: 40 INJECTION SUBCUTANEOUS at 20:23

## 2023-02-26 RX ADMIN — PROMETHAZINE HYDROCHLORIDE 12.5 MG: 25 INJECTION INTRAMUSCULAR; INTRAVENOUS at 05:49

## 2023-02-26 RX ADMIN — IPRATROPIUM BROMIDE AND ALBUTEROL SULFATE 3 ML: 2.5; .5 SOLUTION RESPIRATORY (INHALATION) at 21:11

## 2023-02-26 RX ADMIN — MORPHINE SULFATE 1 MG: 2 INJECTION, SOLUTION INTRAMUSCULAR; INTRAVENOUS at 21:17

## 2023-02-26 RX ADMIN — GABAPENTIN 600 MG: 300 CAPSULE ORAL at 00:19

## 2023-02-26 RX ADMIN — CYCLOBENZAPRINE 10 MG: 10 TABLET, FILM COATED ORAL at 07:59

## 2023-02-26 RX ADMIN — BETHANECHOL CHLORIDE 25 MG: 10 TABLET ORAL at 20:22

## 2023-02-26 RX ADMIN — ASPIRIN 81 MG: 81 TABLET, COATED ORAL at 08:01

## 2023-02-26 RX ADMIN — PANTOPRAZOLE SODIUM 40 MG: 40 TABLET, DELAYED RELEASE ORAL at 05:48

## 2023-02-26 RX ADMIN — IPRATROPIUM BROMIDE AND ALBUTEROL SULFATE 3 ML: 2.5; .5 SOLUTION RESPIRATORY (INHALATION) at 07:43

## 2023-02-26 RX ADMIN — PROMETHAZINE HYDROCHLORIDE 25 MG: 25 TABLET ORAL at 13:11

## 2023-02-26 RX ADMIN — TRAZODONE HYDROCHLORIDE 100 MG: 100 TABLET ORAL at 20:22

## 2023-02-26 RX ADMIN — ENOXAPARIN SODIUM 40 MG: 40 INJECTION SUBCUTANEOUS at 08:01

## 2023-02-26 RX ADMIN — GABAPENTIN 600 MG: 300 CAPSULE ORAL at 05:48

## 2023-02-26 RX ADMIN — OXYCODONE HYDROCHLORIDE AND ACETAMINOPHEN 1 TABLET: 5; 325 TABLET ORAL at 04:07

## 2023-02-26 RX ADMIN — RISPERIDONE 2 MG: 1 TABLET ORAL at 08:04

## 2023-02-26 RX ADMIN — DICYCLOMINE HYDROCHLORIDE 10 MG: 10 CAPSULE ORAL at 07:58

## 2023-02-26 RX ADMIN — OXYCODONE HYDROCHLORIDE AND ACETAMINOPHEN 1 TABLET: 5; 325 TABLET ORAL at 20:30

## 2023-02-26 RX ADMIN — OXYCODONE HYDROCHLORIDE AND ACETAMINOPHEN 1 TABLET: 5; 325 TABLET ORAL at 07:59

## 2023-02-26 RX ADMIN — IPRATROPIUM BROMIDE AND ALBUTEROL SULFATE 3 ML: 2.5; .5 SOLUTION RESPIRATORY (INHALATION) at 13:25

## 2023-02-26 RX ADMIN — MORPHINE SULFATE 1 MG: 2 INJECTION, SOLUTION INTRAMUSCULAR; INTRAVENOUS at 04:12

## 2023-02-26 RX ADMIN — DIVALPROEX SODIUM 500 MG: 500 TABLET, DELAYED RELEASE ORAL at 08:04

## 2023-02-26 RX ADMIN — SENNOSIDES AND DOCUSATE SODIUM 2 TABLET: 50; 8.6 TABLET ORAL at 08:01

## 2023-02-26 RX ADMIN — BETHANECHOL CHLORIDE 25 MG: 10 TABLET ORAL at 08:01

## 2023-02-26 RX ADMIN — LEVOTHYROXINE SODIUM 88 MCG: 88 TABLET ORAL at 05:48

## 2023-02-26 RX ADMIN — DICYCLOMINE HYDROCHLORIDE 10 MG: 10 CAPSULE ORAL at 20:22

## 2023-02-26 RX ADMIN — GABAPENTIN 600 MG: 300 CAPSULE ORAL at 23:44

## 2023-02-27 LAB
GLUCOSE BLDC GLUCOMTR-MCNC: 138 MG/DL (ref 70–130)
GLUCOSE BLDC GLUCOMTR-MCNC: 145 MG/DL (ref 70–130)
GLUCOSE BLDC GLUCOMTR-MCNC: 149 MG/DL (ref 70–130)
GLUCOSE BLDC GLUCOMTR-MCNC: 155 MG/DL (ref 70–130)
GLUCOSE BLDC GLUCOMTR-MCNC: 162 MG/DL (ref 70–130)

## 2023-02-27 PROCEDURE — 96376 TX/PRO/DX INJ SAME DRUG ADON: CPT

## 2023-02-27 PROCEDURE — 94799 UNLISTED PULMONARY SVC/PX: CPT

## 2023-02-27 PROCEDURE — G0108 DIAB MANAGE TRN  PER INDIV: HCPCS

## 2023-02-27 PROCEDURE — 63710000001 INSULIN DETEMIR PER 5 UNITS: Performed by: INTERNAL MEDICINE

## 2023-02-27 PROCEDURE — 25010000002 MORPHINE PER 10 MG: Performed by: INTERNAL MEDICINE

## 2023-02-27 PROCEDURE — 96361 HYDRATE IV INFUSION ADD-ON: CPT

## 2023-02-27 PROCEDURE — 99232 SBSQ HOSP IP/OBS MODERATE 35: CPT | Performed by: HOSPITALIST

## 2023-02-27 PROCEDURE — 82962 GLUCOSE BLOOD TEST: CPT

## 2023-02-27 PROCEDURE — 25010000002 ENOXAPARIN PER 10 MG: Performed by: HOSPITALIST

## 2023-02-27 PROCEDURE — 96372 THER/PROPH/DIAG INJ SC/IM: CPT

## 2023-02-27 RX ORDER — SODIUM CHLORIDE 450 MG/100ML
75 INJECTION, SOLUTION INTRAVENOUS CONTINUOUS
Status: ACTIVE | OUTPATIENT
Start: 2023-02-27 | End: 2023-02-28

## 2023-02-27 RX ORDER — TERAZOSIN 2 MG/1
2 CAPSULE ORAL NIGHTLY
Status: DISCONTINUED | OUTPATIENT
Start: 2023-02-27 | End: 2023-02-28

## 2023-02-27 RX ADMIN — SODIUM CHLORIDE 150 ML/HR: 9 INJECTION, SOLUTION INTRAVENOUS at 13:57

## 2023-02-27 RX ADMIN — DICYCLOMINE HYDROCHLORIDE 10 MG: 10 CAPSULE ORAL at 20:57

## 2023-02-27 RX ADMIN — DIVALPROEX SODIUM 500 MG: 500 TABLET, DELAYED RELEASE ORAL at 08:17

## 2023-02-27 RX ADMIN — CYCLOBENZAPRINE 10 MG: 10 TABLET, FILM COATED ORAL at 21:03

## 2023-02-27 RX ADMIN — Medication 10 ML: at 21:00

## 2023-02-27 RX ADMIN — TERAZOSIN HYDROCHLORIDE 2 MG: 2 CAPSULE ORAL at 20:57

## 2023-02-27 RX ADMIN — GABAPENTIN 600 MG: 300 CAPSULE ORAL at 17:12

## 2023-02-27 RX ADMIN — RISPERIDONE 2 MG: 1 TABLET ORAL at 21:04

## 2023-02-27 RX ADMIN — CLONAZEPAM 2 MG: 1 TABLET ORAL at 15:15

## 2023-02-27 RX ADMIN — PANTOPRAZOLE SODIUM 40 MG: 40 TABLET, DELAYED RELEASE ORAL at 05:31

## 2023-02-27 RX ADMIN — DICYCLOMINE HYDROCHLORIDE 10 MG: 10 CAPSULE ORAL at 17:12

## 2023-02-27 RX ADMIN — OXYCODONE HYDROCHLORIDE AND ACETAMINOPHEN 1 TABLET: 5; 325 TABLET ORAL at 03:51

## 2023-02-27 RX ADMIN — DICYCLOMINE HYDROCHLORIDE 10 MG: 10 CAPSULE ORAL at 10:51

## 2023-02-27 RX ADMIN — AMLODIPINE BESYLATE 10 MG: 10 TABLET ORAL at 08:17

## 2023-02-27 RX ADMIN — ENOXAPARIN SODIUM 40 MG: 40 INJECTION SUBCUTANEOUS at 08:18

## 2023-02-27 RX ADMIN — SODIUM CHLORIDE 75 ML/HR: 4.5 INJECTION, SOLUTION INTRAVENOUS at 18:29

## 2023-02-27 RX ADMIN — OXYCODONE HYDROCHLORIDE AND ACETAMINOPHEN 1 TABLET: 5; 325 TABLET ORAL at 13:57

## 2023-02-27 RX ADMIN — CLONAZEPAM 2 MG: 1 TABLET ORAL at 02:02

## 2023-02-27 RX ADMIN — ENOXAPARIN SODIUM 40 MG: 40 INJECTION SUBCUTANEOUS at 20:59

## 2023-02-27 RX ADMIN — SENNOSIDES AND DOCUSATE SODIUM 2 TABLET: 50; 8.6 TABLET ORAL at 08:17

## 2023-02-27 RX ADMIN — ASPIRIN 81 MG: 81 TABLET, COATED ORAL at 08:17

## 2023-02-27 RX ADMIN — MORPHINE SULFATE 1 MG: 2 INJECTION, SOLUTION INTRAMUSCULAR; INTRAVENOUS at 22:05

## 2023-02-27 RX ADMIN — SENNOSIDES AND DOCUSATE SODIUM 2 TABLET: 50; 8.6 TABLET ORAL at 21:00

## 2023-02-27 RX ADMIN — MORPHINE SULFATE 1 MG: 2 INJECTION, SOLUTION INTRAMUSCULAR; INTRAVENOUS at 10:50

## 2023-02-27 RX ADMIN — DICYCLOMINE HYDROCHLORIDE 10 MG: 10 CAPSULE ORAL at 08:17

## 2023-02-27 RX ADMIN — Medication 10 ML: at 08:22

## 2023-02-27 RX ADMIN — SODIUM CHLORIDE 150 ML/HR: 9 INJECTION, SOLUTION INTRAVENOUS at 06:53

## 2023-02-27 RX ADMIN — MORPHINE SULFATE 1 MG: 2 INJECTION, SOLUTION INTRAMUSCULAR; INTRAVENOUS at 06:40

## 2023-02-27 RX ADMIN — OXYCODONE HYDROCHLORIDE AND ACETAMINOPHEN 1 TABLET: 5; 325 TABLET ORAL at 20:59

## 2023-02-27 RX ADMIN — BETHANECHOL CHLORIDE 25 MG: 10 TABLET ORAL at 20:57

## 2023-02-27 RX ADMIN — INSULIN DETEMIR 20 UNITS: 100 INJECTION, SOLUTION SUBCUTANEOUS at 20:58

## 2023-02-27 RX ADMIN — GABAPENTIN 600 MG: 300 CAPSULE ORAL at 23:43

## 2023-02-27 RX ADMIN — IPRATROPIUM BROMIDE AND ALBUTEROL SULFATE 3 ML: 2.5; .5 SOLUTION RESPIRATORY (INHALATION) at 13:46

## 2023-02-27 RX ADMIN — BETHANECHOL CHLORIDE 25 MG: 10 TABLET ORAL at 15:15

## 2023-02-27 RX ADMIN — GABAPENTIN 600 MG: 300 CAPSULE ORAL at 12:13

## 2023-02-27 RX ADMIN — BETHANECHOL CHLORIDE 25 MG: 10 TABLET ORAL at 08:17

## 2023-02-27 RX ADMIN — RISPERIDONE 2 MG: 1 TABLET ORAL at 08:20

## 2023-02-27 RX ADMIN — TRAZODONE HYDROCHLORIDE 100 MG: 100 TABLET ORAL at 20:57

## 2023-02-27 RX ADMIN — GABAPENTIN 600 MG: 300 CAPSULE ORAL at 05:31

## 2023-02-27 RX ADMIN — IPRATROPIUM BROMIDE AND ALBUTEROL SULFATE 3 ML: 2.5; .5 SOLUTION RESPIRATORY (INHALATION) at 09:26

## 2023-02-27 RX ADMIN — MORPHINE SULFATE 1 MG: 2 INJECTION, SOLUTION INTRAMUSCULAR; INTRAVENOUS at 18:08

## 2023-02-27 RX ADMIN — IPRATROPIUM BROMIDE AND ALBUTEROL SULFATE 3 ML: 2.5; .5 SOLUTION RESPIRATORY (INHALATION) at 21:17

## 2023-02-27 RX ADMIN — OXYCODONE HYDROCHLORIDE AND ACETAMINOPHEN 1 TABLET: 5; 325 TABLET ORAL at 08:17

## 2023-02-27 RX ADMIN — DIVALPROEX SODIUM 1500 MG: 500 TABLET, DELAYED RELEASE ORAL at 21:03

## 2023-02-27 RX ADMIN — LEVOTHYROXINE SODIUM 88 MCG: 88 TABLET ORAL at 05:31

## 2023-02-28 LAB
ANION GAP SERPL CALCULATED.3IONS-SCNC: 6 MMOL/L (ref 5–15)
ARTERIAL PATENCY WRIST A: ABNORMAL
ATMOSPHERIC PRESS: ABNORMAL MM[HG]
BASE EXCESS BLDA CALC-SCNC: 4.3 MMOL/L (ref 0–2)
BDY SITE: ABNORMAL
BODY TEMPERATURE: 37 C
BUN SERPL-MCNC: 3 MG/DL (ref 8–23)
BUN/CREAT SERPL: 5.8 (ref 7–25)
CALCIUM SPEC-SCNC: 9.3 MG/DL (ref 8.6–10.5)
CHLORIDE SERPL-SCNC: 102 MMOL/L (ref 98–107)
CO2 BLDA-SCNC: 31.3 MMOL/L (ref 22–33)
CO2 SERPL-SCNC: 32 MMOL/L (ref 22–29)
COHGB MFR BLD: 1.3 % (ref 0–2)
CREAT SERPL-MCNC: 0.52 MG/DL (ref 0.57–1)
CRP SERPL-MCNC: 0.66 MG/DL (ref 0–0.5)
DEPRECATED RDW RBC AUTO: 51.8 FL (ref 37–54)
EGFRCR SERPLBLD CKD-EPI 2021: 106.5 ML/MIN/1.73
EPAP: 0
ERYTHROCYTE [DISTWIDTH] IN BLOOD BY AUTOMATED COUNT: 13.9 % (ref 12.3–15.4)
GLUCOSE BLDC GLUCOMTR-MCNC: 119 MG/DL (ref 70–130)
GLUCOSE BLDC GLUCOMTR-MCNC: 153 MG/DL (ref 70–130)
GLUCOSE BLDC GLUCOMTR-MCNC: 154 MG/DL (ref 70–130)
GLUCOSE BLDC GLUCOMTR-MCNC: 166 MG/DL (ref 70–130)
GLUCOSE SERPL-MCNC: 151 MG/DL (ref 65–99)
HCO3 BLDA-SCNC: 29.9 MMOL/L (ref 20–26)
HCT VFR BLD AUTO: 44.3 % (ref 34–46.6)
HCT VFR BLD CALC: 45.2 % (ref 38–51)
HGB BLD-MCNC: 14.1 G/DL (ref 12–15.9)
HGB BLDA-MCNC: 14.7 G/DL (ref 14–18)
INHALED O2 CONCENTRATION: 21 %
IPAP: 0
LIPASE SERPL-CCNC: 68 U/L (ref 13–60)
MCH RBC QN AUTO: 31.5 PG (ref 26.6–33)
MCHC RBC AUTO-ENTMCNC: 31.8 G/DL (ref 31.5–35.7)
MCV RBC AUTO: 99.1 FL (ref 79–97)
METHGB BLD QL: 0.2 % (ref 0–1.5)
MODALITY: ABNORMAL
NOTE: ABNORMAL
OSMOLALITY SERPL: 299 MOSM/KG (ref 275–295)
OXYHGB MFR BLDV: 94.2 % (ref 94–99)
PAW @ PEAK INSP FLOW SETTING VENT: 0 CMH2O
PCO2 BLDA: 47.2 MM HG (ref 35–45)
PCO2 TEMP ADJ BLD: 47.2 MM HG (ref 35–45)
PH BLDA: 7.41 PH UNITS (ref 7.35–7.45)
PH, TEMP CORRECTED: 7.41 PH UNITS
PLATELET # BLD AUTO: 224 10*3/MM3 (ref 140–450)
PMV BLD AUTO: 8.9 FL (ref 6–12)
PO2 BLDA: 74.6 MM HG (ref 83–108)
PO2 TEMP ADJ BLD: 74.6 MM HG (ref 83–108)
POTASSIUM SERPL-SCNC: 4 MMOL/L (ref 3.5–5.2)
RBC # BLD AUTO: 4.47 10*6/MM3 (ref 3.77–5.28)
SODIUM SERPL-SCNC: 140 MMOL/L (ref 136–145)
TOTAL RATE: 0 BREATHS/MINUTE
WBC NRBC COR # BLD: 5.66 10*3/MM3 (ref 3.4–10.8)

## 2023-02-28 PROCEDURE — 94660 CPAP INITIATION&MGMT: CPT

## 2023-02-28 PROCEDURE — 94799 UNLISTED PULMONARY SVC/PX: CPT

## 2023-02-28 PROCEDURE — 25010000002 MORPHINE PER 10 MG: Performed by: INTERNAL MEDICINE

## 2023-02-28 PROCEDURE — 97161 PT EVAL LOW COMPLEX 20 MIN: CPT

## 2023-02-28 PROCEDURE — 86140 C-REACTIVE PROTEIN: CPT | Performed by: HOSPITALIST

## 2023-02-28 PROCEDURE — 63710000001 INSULIN LISPRO (HUMAN) PER 5 UNITS: Performed by: HOSPITALIST

## 2023-02-28 PROCEDURE — 96372 THER/PROPH/DIAG INJ SC/IM: CPT

## 2023-02-28 PROCEDURE — 85027 COMPLETE CBC AUTOMATED: CPT | Performed by: HOSPITALIST

## 2023-02-28 PROCEDURE — 94761 N-INVAS EAR/PLS OXIMETRY MLT: CPT

## 2023-02-28 PROCEDURE — 99232 SBSQ HOSP IP/OBS MODERATE 35: CPT | Performed by: HOSPITALIST

## 2023-02-28 PROCEDURE — 96376 TX/PRO/DX INJ SAME DRUG ADON: CPT

## 2023-02-28 PROCEDURE — 82962 GLUCOSE BLOOD TEST: CPT

## 2023-02-28 PROCEDURE — 63710000001 INSULIN REGULAR HUMAN PER 5 UNITS: Performed by: INTERNAL MEDICINE

## 2023-02-28 PROCEDURE — 96375 TX/PRO/DX INJ NEW DRUG ADDON: CPT

## 2023-02-28 PROCEDURE — 36600 WITHDRAWAL OF ARTERIAL BLOOD: CPT

## 2023-02-28 PROCEDURE — 25010000002 ENOXAPARIN PER 10 MG: Performed by: HOSPITALIST

## 2023-02-28 PROCEDURE — G0378 HOSPITAL OBSERVATION PER HR: HCPCS

## 2023-02-28 PROCEDURE — 25010000002 PROCHLORPERAZINE 10 MG/2ML SOLUTION: Performed by: INTERNAL MEDICINE

## 2023-02-28 PROCEDURE — 83050 HGB METHEMOGLOBIN QUAN: CPT

## 2023-02-28 PROCEDURE — 83690 ASSAY OF LIPASE: CPT | Performed by: HOSPITALIST

## 2023-02-28 PROCEDURE — 82375 ASSAY CARBOXYHB QUANT: CPT

## 2023-02-28 PROCEDURE — 80048 BASIC METABOLIC PNL TOTAL CA: CPT | Performed by: HOSPITALIST

## 2023-02-28 PROCEDURE — 63710000001 INSULIN DETEMIR PER 5 UNITS: Performed by: HOSPITALIST

## 2023-02-28 PROCEDURE — 82805 BLOOD GASES W/O2 SATURATION: CPT

## 2023-02-28 PROCEDURE — 83930 ASSAY OF BLOOD OSMOLALITY: CPT | Performed by: HOSPITALIST

## 2023-02-28 RX ORDER — INSULIN LISPRO 100 [IU]/ML
0-7 INJECTION, SOLUTION INTRAVENOUS; SUBCUTANEOUS
Status: DISCONTINUED | OUTPATIENT
Start: 2023-02-28 | End: 2023-03-02 | Stop reason: HOSPADM

## 2023-02-28 RX ORDER — TERAZOSIN 2 MG/1
4 CAPSULE ORAL NIGHTLY
Status: DISCONTINUED | OUTPATIENT
Start: 2023-02-28 | End: 2023-03-02 | Stop reason: HOSPADM

## 2023-02-28 RX ORDER — SODIUM PHOSPHATE,MONO-DIBASIC 19G-7G/118
1 ENEMA (ML) RECTAL ONCE
Status: COMPLETED | OUTPATIENT
Start: 2023-02-28 | End: 2023-02-28

## 2023-02-28 RX ORDER — SODIUM PHOSPHATE,MONO-DIBASIC 19G-7G/118
1 ENEMA (ML) RECTAL ONCE
Status: DISCONTINUED | OUTPATIENT
Start: 2023-02-28 | End: 2023-02-28

## 2023-02-28 RX ORDER — GABAPENTIN 400 MG/1
400 CAPSULE ORAL EVERY 8 HOURS SCHEDULED
Status: DISCONTINUED | OUTPATIENT
Start: 2023-02-28 | End: 2023-03-02 | Stop reason: HOSPADM

## 2023-02-28 RX ORDER — CLONAZEPAM 1 MG/1
1 TABLET ORAL 2 TIMES DAILY PRN
Status: DISCONTINUED | OUTPATIENT
Start: 2023-02-28 | End: 2023-03-02 | Stop reason: HOSPADM

## 2023-02-28 RX ORDER — IBUPROFEN 600 MG/1
1 TABLET ORAL
Status: DISCONTINUED | OUTPATIENT
Start: 2023-02-28 | End: 2023-02-28 | Stop reason: SDUPTHER

## 2023-02-28 RX ORDER — OXYCODONE HYDROCHLORIDE AND ACETAMINOPHEN 5; 325 MG/1; MG/1
1 TABLET ORAL EVERY 8 HOURS PRN
Status: DISCONTINUED | OUTPATIENT
Start: 2023-02-28 | End: 2023-03-02 | Stop reason: HOSPADM

## 2023-02-28 RX ORDER — NICOTINE POLACRILEX 4 MG
15 LOZENGE BUCCAL
Status: DISCONTINUED | OUTPATIENT
Start: 2023-02-28 | End: 2023-02-28 | Stop reason: SDUPTHER

## 2023-02-28 RX ORDER — DEXTROSE MONOHYDRATE 25 G/50ML
25 INJECTION, SOLUTION INTRAVENOUS
Status: DISCONTINUED | OUTPATIENT
Start: 2023-02-28 | End: 2023-02-28 | Stop reason: SDUPTHER

## 2023-02-28 RX ADMIN — GABAPENTIN 600 MG: 300 CAPSULE ORAL at 11:50

## 2023-02-28 RX ADMIN — BETHANECHOL CHLORIDE 25 MG: 10 TABLET ORAL at 08:02

## 2023-02-28 RX ADMIN — ASPIRIN 81 MG: 81 TABLET, COATED ORAL at 08:01

## 2023-02-28 RX ADMIN — MORPHINE SULFATE 1 MG: 2 INJECTION, SOLUTION INTRAMUSCULAR; INTRAVENOUS at 06:00

## 2023-02-28 RX ADMIN — DICYCLOMINE HYDROCHLORIDE 10 MG: 10 CAPSULE ORAL at 17:49

## 2023-02-28 RX ADMIN — PANTOPRAZOLE SODIUM 40 MG: 40 TABLET, DELAYED RELEASE ORAL at 05:52

## 2023-02-28 RX ADMIN — LEVOTHYROXINE SODIUM 88 MCG: 88 TABLET ORAL at 05:52

## 2023-02-28 RX ADMIN — PROCHLORPERAZINE EDISYLATE 5 MG: 5 INJECTION INTRAMUSCULAR; INTRAVENOUS at 08:02

## 2023-02-28 RX ADMIN — TERAZOSIN HYDROCHLORIDE 4 MG: 2 CAPSULE ORAL at 20:42

## 2023-02-28 RX ADMIN — INSULIN HUMAN 2 UNITS: 100 INJECTION, SOLUTION PARENTERAL at 05:52

## 2023-02-28 RX ADMIN — DICYCLOMINE HYDROCHLORIDE 10 MG: 10 CAPSULE ORAL at 20:41

## 2023-02-28 RX ADMIN — SODIUM PHOSPHATE 1 ENEMA: 7; 19 ENEMA RECTAL at 17:15

## 2023-02-28 RX ADMIN — ENOXAPARIN SODIUM 40 MG: 40 INJECTION SUBCUTANEOUS at 08:00

## 2023-02-28 RX ADMIN — OXYCODONE HYDROCHLORIDE AND ACETAMINOPHEN 1 TABLET: 5; 325 TABLET ORAL at 17:49

## 2023-02-28 RX ADMIN — RISPERIDONE 2 MG: 1 TABLET ORAL at 20:42

## 2023-02-28 RX ADMIN — ENOXAPARIN SODIUM 40 MG: 40 INJECTION SUBCUTANEOUS at 20:42

## 2023-02-28 RX ADMIN — GABAPENTIN 600 MG: 300 CAPSULE ORAL at 05:52

## 2023-02-28 RX ADMIN — DICYCLOMINE HYDROCHLORIDE 10 MG: 10 CAPSULE ORAL at 11:50

## 2023-02-28 RX ADMIN — TRAZODONE HYDROCHLORIDE 100 MG: 100 TABLET ORAL at 20:42

## 2023-02-28 RX ADMIN — Medication 10 ML: at 20:43

## 2023-02-28 RX ADMIN — INSULIN HUMAN 2 UNITS: 100 INJECTION, SOLUTION PARENTERAL at 11:50

## 2023-02-28 RX ADMIN — CYCLOBENZAPRINE 10 MG: 10 TABLET, FILM COATED ORAL at 20:42

## 2023-02-28 RX ADMIN — RISPERIDONE 2 MG: 1 TABLET ORAL at 08:01

## 2023-02-28 RX ADMIN — DIVALPROEX SODIUM 1500 MG: 500 TABLET, DELAYED RELEASE ORAL at 20:42

## 2023-02-28 RX ADMIN — GABAPENTIN 400 MG: 400 CAPSULE ORAL at 20:41

## 2023-02-28 RX ADMIN — DICYCLOMINE HYDROCHLORIDE 10 MG: 10 CAPSULE ORAL at 08:01

## 2023-02-28 RX ADMIN — OXYCODONE HYDROCHLORIDE AND ACETAMINOPHEN 1 TABLET: 5; 325 TABLET ORAL at 08:02

## 2023-02-28 RX ADMIN — Medication 10 ML: at 08:03

## 2023-02-28 RX ADMIN — AMLODIPINE BESYLATE 10 MG: 10 TABLET ORAL at 08:01

## 2023-02-28 RX ADMIN — INSULIN LISPRO 2 UNITS: 100 INJECTION, SOLUTION INTRAVENOUS; SUBCUTANEOUS at 21:49

## 2023-02-28 RX ADMIN — IPRATROPIUM BROMIDE AND ALBUTEROL SULFATE 3 ML: 2.5; .5 SOLUTION RESPIRATORY (INHALATION) at 08:21

## 2023-02-28 RX ADMIN — DIVALPROEX SODIUM 500 MG: 500 TABLET, DELAYED RELEASE ORAL at 08:03

## 2023-02-28 RX ADMIN — OXYCODONE HYDROCHLORIDE AND ACETAMINOPHEN 1 TABLET: 5; 325 TABLET ORAL at 02:32

## 2023-02-28 RX ADMIN — INSULIN DETEMIR 15 UNITS: 100 INJECTION, SOLUTION SUBCUTANEOUS at 20:45

## 2023-03-01 LAB
ANION GAP SERPL CALCULATED.3IONS-SCNC: 11 MMOL/L (ref 5–15)
BACTERIA SPEC AEROBE CULT: NORMAL
BACTERIA SPEC AEROBE CULT: NORMAL
BUN SERPL-MCNC: 3 MG/DL (ref 8–23)
BUN/CREAT SERPL: 6.5 (ref 7–25)
CALCIUM SPEC-SCNC: 9.3 MG/DL (ref 8.6–10.5)
CHLORIDE SERPL-SCNC: 101 MMOL/L (ref 98–107)
CO2 SERPL-SCNC: 30 MMOL/L (ref 22–29)
CREAT SERPL-MCNC: 0.46 MG/DL (ref 0.57–1)
DEPRECATED RDW RBC AUTO: 49.7 FL (ref 37–54)
EGFRCR SERPLBLD CKD-EPI 2021: 109.7 ML/MIN/1.73
ERYTHROCYTE [DISTWIDTH] IN BLOOD BY AUTOMATED COUNT: 14 % (ref 12.3–15.4)
GLUCOSE BLDC GLUCOMTR-MCNC: 116 MG/DL (ref 70–130)
GLUCOSE BLDC GLUCOMTR-MCNC: 155 MG/DL (ref 70–130)
GLUCOSE BLDC GLUCOMTR-MCNC: 157 MG/DL (ref 70–130)
GLUCOSE BLDC GLUCOMTR-MCNC: 161 MG/DL (ref 70–130)
GLUCOSE SERPL-MCNC: 109 MG/DL (ref 65–99)
HCT VFR BLD AUTO: 44.1 % (ref 34–46.6)
HGB BLD-MCNC: 14.6 G/DL (ref 12–15.9)
MAGNESIUM SERPL-MCNC: 1.7 MG/DL (ref 1.6–2.4)
MCH RBC QN AUTO: 31.9 PG (ref 26.6–33)
MCHC RBC AUTO-ENTMCNC: 33.1 G/DL (ref 31.5–35.7)
MCV RBC AUTO: 96.5 FL (ref 79–97)
PHOSPHATE SERPL-MCNC: 4.2 MG/DL (ref 2.5–4.5)
PLATELET # BLD AUTO: 232 10*3/MM3 (ref 140–450)
PMV BLD AUTO: 8.9 FL (ref 6–12)
POTASSIUM SERPL-SCNC: 3.9 MMOL/L (ref 3.5–5.2)
RBC # BLD AUTO: 4.57 10*6/MM3 (ref 3.77–5.28)
SODIUM SERPL-SCNC: 142 MMOL/L (ref 136–145)
TSH SERPL DL<=0.05 MIU/L-ACNC: 2.01 UIU/ML (ref 0.27–4.2)
WBC NRBC COR # BLD: 6.97 10*3/MM3 (ref 3.4–10.8)

## 2023-03-01 PROCEDURE — 84443 ASSAY THYROID STIM HORMONE: CPT | Performed by: HOSPITALIST

## 2023-03-01 PROCEDURE — 80048 BASIC METABOLIC PNL TOTAL CA: CPT | Performed by: HOSPITALIST

## 2023-03-01 PROCEDURE — 63710000001 INSULIN DETEMIR PER 5 UNITS: Performed by: INTERNAL MEDICINE

## 2023-03-01 PROCEDURE — 25010000002 ENOXAPARIN PER 10 MG: Performed by: HOSPITALIST

## 2023-03-01 PROCEDURE — 85027 COMPLETE CBC AUTOMATED: CPT | Performed by: HOSPITALIST

## 2023-03-01 PROCEDURE — 63710000001 INSULIN LISPRO (HUMAN) PER 5 UNITS: Performed by: HOSPITALIST

## 2023-03-01 PROCEDURE — 84100 ASSAY OF PHOSPHORUS: CPT | Performed by: HOSPITALIST

## 2023-03-01 PROCEDURE — G0378 HOSPITAL OBSERVATION PER HR: HCPCS

## 2023-03-01 PROCEDURE — 82962 GLUCOSE BLOOD TEST: CPT

## 2023-03-01 PROCEDURE — 96372 THER/PROPH/DIAG INJ SC/IM: CPT

## 2023-03-01 PROCEDURE — 99232 SBSQ HOSP IP/OBS MODERATE 35: CPT | Performed by: INTERNAL MEDICINE

## 2023-03-01 PROCEDURE — 83735 ASSAY OF MAGNESIUM: CPT | Performed by: HOSPITALIST

## 2023-03-01 RX ADMIN — INSULIN LISPRO 2 UNITS: 100 INJECTION, SOLUTION INTRAVENOUS; SUBCUTANEOUS at 11:54

## 2023-03-01 RX ADMIN — ENOXAPARIN SODIUM 40 MG: 40 INJECTION SUBCUTANEOUS at 20:19

## 2023-03-01 RX ADMIN — OXYCODONE HYDROCHLORIDE AND ACETAMINOPHEN 1 TABLET: 5; 325 TABLET ORAL at 11:54

## 2023-03-01 RX ADMIN — DIVALPROEX SODIUM 500 MG: 500 TABLET, DELAYED RELEASE ORAL at 10:17

## 2023-03-01 RX ADMIN — RISPERIDONE 2 MG: 1 TABLET ORAL at 10:19

## 2023-03-01 RX ADMIN — Medication 10 ML: at 20:19

## 2023-03-01 RX ADMIN — DICYCLOMINE HYDROCHLORIDE 10 MG: 10 CAPSULE ORAL at 16:55

## 2023-03-01 RX ADMIN — INSULIN LISPRO 2 UNITS: 100 INJECTION, SOLUTION INTRAVENOUS; SUBCUTANEOUS at 20:23

## 2023-03-01 RX ADMIN — TERAZOSIN HYDROCHLORIDE 4 MG: 2 CAPSULE ORAL at 20:18

## 2023-03-01 RX ADMIN — AMLODIPINE BESYLATE 10 MG: 10 TABLET ORAL at 10:18

## 2023-03-01 RX ADMIN — PANTOPRAZOLE SODIUM 40 MG: 40 TABLET, DELAYED RELEASE ORAL at 05:25

## 2023-03-01 RX ADMIN — ENOXAPARIN SODIUM 40 MG: 40 INJECTION SUBCUTANEOUS at 10:17

## 2023-03-01 RX ADMIN — OXYCODONE HYDROCHLORIDE AND ACETAMINOPHEN 1 TABLET: 5; 325 TABLET ORAL at 20:18

## 2023-03-01 RX ADMIN — INSULIN DETEMIR 10 UNITS: 100 INJECTION, SOLUTION SUBCUTANEOUS at 20:19

## 2023-03-01 RX ADMIN — INSULIN LISPRO 2 UNITS: 100 INJECTION, SOLUTION INTRAVENOUS; SUBCUTANEOUS at 17:00

## 2023-03-01 RX ADMIN — CYCLOBENZAPRINE 10 MG: 10 TABLET, FILM COATED ORAL at 16:55

## 2023-03-01 RX ADMIN — LEVOTHYROXINE SODIUM 88 MCG: 88 TABLET ORAL at 05:25

## 2023-03-01 RX ADMIN — ASPIRIN 81 MG: 81 TABLET, COATED ORAL at 11:54

## 2023-03-01 RX ADMIN — DICYCLOMINE HYDROCHLORIDE 10 MG: 10 CAPSULE ORAL at 10:19

## 2023-03-01 RX ADMIN — TRAZODONE HYDROCHLORIDE 100 MG: 100 TABLET ORAL at 20:18

## 2023-03-01 RX ADMIN — DICYCLOMINE HYDROCHLORIDE 10 MG: 10 CAPSULE ORAL at 20:19

## 2023-03-01 RX ADMIN — GABAPENTIN 400 MG: 400 CAPSULE ORAL at 20:18

## 2023-03-01 RX ADMIN — GABAPENTIN 400 MG: 400 CAPSULE ORAL at 13:32

## 2023-03-01 RX ADMIN — Medication 10 ML: at 10:19

## 2023-03-01 RX ADMIN — GABAPENTIN 400 MG: 400 CAPSULE ORAL at 05:25

## 2023-03-01 RX ADMIN — DIVALPROEX SODIUM 1500 MG: 500 TABLET, DELAYED RELEASE ORAL at 20:18

## 2023-03-01 RX ADMIN — CLONAZEPAM 1 MG: 1 TABLET ORAL at 13:33

## 2023-03-01 RX ADMIN — RISPERIDONE 2 MG: 1 TABLET ORAL at 20:18

## 2023-03-01 NOTE — PROGRESS NOTES
[facility] Medicine Services  PROGRESS NOTE    Patient Name: Saadia Caceres  : 1960  MRN: [id number]    Date of Admission: 2023  Primary Care Physician: Chacorta Del Rio DO    Subjective   Subjective     CC:  Pancreatitis    HPI:  Patient continues to have significant abd pain. Anxious about eating creamy things. Tolerating some water.  Had an enema last night with significant output    ROS:  General: denies fevers or chills  CV: denies chest pain  Resp: denies shortness of breath      Objective   Objective     Vital Signs:   Temp:  [97.5 °F (36.4 °C)-97.7 °F (36.5 °C)] 97.6 °F (36.4 °C)  Heart Rate:  [57-92] 63  Resp:  [18] 18  BP: (131-158)/(68-91) 158/86  Flow (L/min):  [3-4] 3     Physical Exam:  Constitutional: No acute distress, awake, alert, sitting for a long  Respiratory: Clear to auscultation bilaterally, respiratory effort normal   Cardiovascular: RRR, no murmurs, rubs, or gallops  Gastrointestinal: Positive bowel sounds, soft, tender over epigastrum and LUE  Musculoskeletal: No bilateral ankle edema  Psychiatric: Appropriate affect, cooperative  Neurologic: Oriented x 3, no focal deficits  Skin: No rashes      Results Reviewed:  LAB RESULTS:      Lab 23  0520 23  0403 23  0329 23  1146 23  0610   WBC 6.97 5.66 7.47 8.38 10.00   HEMOGLOBIN 14.6 14.1 14.4 15.3 15.3   HEMATOCRIT 44.1 44.3 44.5 46.3 45.9   PLATELETS 232 224 249 258 266   NEUTROS ABS  --   --   --  4.44 4.68   IMMATURE GRANS (ABS)  --   --   --   --  0.16*   LYMPHS ABS  --   --   --   --  4.25*   MONOS ABS  --   --   --   --  0.69   EOS ABS  --   --   --  0.17 0.12   MCV 96.5 99.1* 96.7 96.1 95.6   CRP  --  0.66*  --   --   --    LACTATE  --   --   --  1.9 2.4*         Lab 23  0520 23  0403 23  0329 23  1146 23  0812   SODIUM 142 140 141 134* 137   POTASSIUM 3.9 4.0 3.9 5.1 4.2   CHLORIDE 101 102 102 96* 100   CO2 30.0* 32.0* 30.0*  27.0 29.0   ANION GAP 11.0 6.0 9.0 11.0 8.0   BUN 3* 3* 11 10 14   CREATININE 0.46* 0.52* 0.50* 0.75 0.62   EGFR 109.7 106.5 107.5 91.3 102.1   GLUCOSE 109* 151* 81 287* 283*   CALCIUM 9.3 9.3 9.0 9.5 8.9   MAGNESIUM 1.7  --   --   --   --    PHOSPHORUS 4.2  --   --   --   --    TSH 2.010  --   --   --   --          Lab 02/28/23  0403 02/25/23  0329 02/24/23  1146 02/23/23  0812   TOTAL PROTEIN  --  6.0 6.5 7.0   ALBUMIN  --  3.5 3.8 3.6   GLOBULIN  --  2.5 2.7 3.4   ALT (SGPT)  --  6 8 8   AST (SGOT)  --  7 8 9   BILIRUBIN  --  0.2 0.2 0.2   ALK PHOS  --  68 81 78   LIPASE 68*  --  462* 746*         Lab 02/23/23  0812   PROBNP 279.8   HSTROP T 10*                 Lab 02/28/23  1508   PH, ARTERIAL 7.410   PCO2, ARTERIAL 47.2*   PO2 ART 74.6*   FIO2 21   HCO3 ART 29.9*   BASE EXCESS ART 4.3*   CARBOXYHEMOGLOBIN 1.3     Brief Urine Lab Results  (Last result in the past 365 days)      Color   Clarity   Blood   Leuk Est   Nitrite   Protein   CREAT   Urine HCG        02/24/23 1152 Yellow   Clear   Negative   Negative   Negative   Trace                 Microbiology Results Abnormal     Procedure Component Value - Date/Time    Blood Culture - Blood, Arm, Left [[id number]]  (Normal) Collected: 02/24/23 1224    Lab Status: Preliminary result Specimen: Blood from Arm, Left Updated: 02/28/23 1301     Blood Culture No growth at 4 days    Blood Culture - Blood, Arm, Right [[id number]]  (Normal) Collected: 02/24/23 1224    Lab Status: Preliminary result Specimen: Blood from Arm, Right Updated: 02/28/23 1301     Blood Culture No growth at 4 days          No radiology results from the last 24 hrs        Current medications:  Scheduled Meds:amLODIPine, 10 mg, Oral, Daily  aspirin, 81 mg, Oral, Daily  dicyclomine, 10 mg, Oral, 4x Daily AC & at Bedtime  divalproex, 1,500 mg, Oral, Nightly  divalproex, 500 mg, Oral, Daily  enoxaparin, 40 mg, Subcutaneous, BID  gabapentin, 400 mg, Oral, Q8H  insulin detemir, 10 Units, Subcutaneous,  Nightly  insulin lispro, 0-7 Units, Subcutaneous, 4x Daily With Meals & Nightly  levothyroxine, 88 mcg, Oral, Q AM  pantoprazole, 40 mg, Oral, Q AM  risperiDONE, 2 mg, Oral, BID  senna-docusate sodium, 2 tablet, Oral, BID  sodium chloride, 10 mL, Intravenous, Q12H  terazosin, 4 mg, Oral, Nightly  traZODone, 100 mg, Oral, Nightly      Continuous Infusions:   PRN Meds:.•  albuterol  •  senna-docusate sodium **AND** polyethylene glycol **AND** bisacodyl **AND** bisacodyl  •  clonazePAM  •  cyclobenzaprine  •  dextrose  •  dextrose  •  oxyCODONE-acetaminophen  •  prochlorperazine  •  promethazine  •  promethazine  •  sodium chloride  •  sodium chloride  •  SUMAtriptan    Assessment & Plan   Assessment & Plan     Active Hospital Problems    Diagnosis  POA   • **Acute pancreatitis without infection or necrosis, unspecified pancreatitis type [K85.90]  Yes      Resolved Hospital Problems   No resolved problems to display.        Saadia Caceres is a 62 y.o. female history of pancreatitis and uncontrolled diabetes presented with epigastric abdominal pain with nausea and vomiting for 2 days.  Lipase is elevated at 7.46.  Lactate was elevated 2.4.  ET abdomen pelvis showed no acute findings.  She was started on IV fluids.        Acute on chronic pancreatitis  -Clear liquid diet, advance as tolerated  -Hold home Lasix  -Wean narcotics  -holding bethanechol    Diabetes (Uncontrolled) with neuropathy  -Hemoglobin A1c greater than 10  -Continue sliding scale insulin  -Continue Levemir, will decrease dose due to low normal glucose this morning, lower than hospital goal  -New gabapentin    Hypothyroidism  -Continue Synthroid    Sleep dysfunction  -Continue Prosom, trazodone    Hypertension  -Continue amlodipine 10 mg daily  -Continue terazosin 4 mg oral nightly    Chronic Anxiety  -Continue home Klonopin    Bipolar disorder  -Continue Depakote, Risperdal    Obesity  -Complicates all aspects of care    Expected Discharge  Location and Transportation: home  Expected Discharge   Expected Discharge Date and Time     Expected Discharge Date Expected Discharge Time    Mar 2, 2023            DVT prophylaxis:  Medical DVT prophylaxis orders are present.     AM-PAC 6 Clicks Score (PT): 16 (03/01/23 0820)    CODE STATUS:   Code Status and Medical Interventions:   Ordered at: 02/24/23 1428     Level Of Support Discussed With:    Patient     Code Status (Patient has no pulse and is not breathing):    CPR (Attempt to Resuscitate)     Medical Interventions (Patient has pulse or is breathing):    Full Support     This patient's problems and plans were partially entered by my partner and updated as appropriate by me 03/01/23. Today is my first day evaluating this patient's active medical problems. I Personally reviewed chart and adjusted note to reflect daily changes in management/clinical condition. Copied text in this note has been reviewed and is accurate as of 3/1/23.        Dulce Maria Martinez MD  03/01/23

## 2023-03-01 NOTE — PLAN OF CARE
Problem: Adult Inpatient Plan of Care  Goal: Plan of Care Review  Outcome: Ongoing, Progressing  Flowsheets (Taken 3/1/2023 1724)  Progress: no change  Plan of Care Reviewed With: patient  Goal: Patient-Specific Goal (Individualized)  Outcome: Ongoing, Progressing  Goal: Absence of Hospital-Acquired Illness or Injury  Outcome: Ongoing, Progressing  Intervention: Identify and Manage Fall Risk  Recent Flowsheet Documentation  Taken 3/1/2023 1600 by Tarsha Buitrago RN  Safety Promotion/Fall Prevention:   assistive device/personal items within reach   clutter free environment maintained   nonskid shoes/slippers when out of bed   room organization consistent   safety round/check completed  Taken 3/1/2023 1400 by Tarsha Buitrago RN  Safety Promotion/Fall Prevention:   assistive device/personal items within reach   clutter free environment maintained   nonskid shoes/slippers when out of bed   room organization consistent   safety round/check completed  Taken 3/1/2023 1200 by Tarsha Buitrago RN  Safety Promotion/Fall Prevention:   activity supervised   assistive device/personal items within reach   clutter free environment maintained   nonskid shoes/slippers when out of bed   room organization consistent   safety round/check completed  Taken 3/1/2023 1020 by Tarsha Buitrago, RN  Safety Promotion/Fall Prevention:   activity supervised   assistive device/personal items within reach   clutter free environment maintained   nonskid shoes/slippers when out of bed   safety round/check completed   room organization consistent  Taken 3/1/2023 0820 by Tarsha Buitrago, RN  Safety Promotion/Fall Prevention:   activity supervised   assistive device/personal items within reach   clutter free environment maintained   fall prevention program maintained   gait belt   nonskid shoes/slippers when out of bed   room organization consistent   safety round/check completed  Intervention: Prevent Skin Injury  Recent Flowsheet Documentation  Taken  3/1/2023 1600 by Tarsha Buitrago RN  Body Position:   neutral body alignment   neutral head position  Skin Protection:   adhesive use limited   incontinence pads utilized   protective footwear used   tubing/devices free from skin contact  Taken 3/1/2023 1400 by Tarsha Buitrago RN  Body Position: position changed independently  Skin Protection:   adhesive use limited   incontinence pads utilized   protective footwear used   tubing/devices free from skin contact  Taken 3/1/2023 1200 by Tarsha Buitrago RN  Body Position: position changed independently  Skin Protection:   adhesive use limited   incontinence pads utilized   protective footwear used   tubing/devices free from skin contact  Taken 3/1/2023 1020 by Tarsha Buitrago RN  Body Position: position changed independently  Skin Protection:   adhesive use limited   incontinence pads utilized   protective footwear used   tubing/devices free from skin contact  Taken 3/1/2023 0820 by Tarsha Buitrago RN  Body Position:   position changed independently   neutral body alignment   neutral head position   left   tilted  Skin Protection:   adhesive use limited   incontinence pads utilized   protective footwear used   tubing/devices free from skin contact  Intervention: Prevent and Manage VTE (Venous Thromboembolism) Risk  Recent Flowsheet Documentation  Taken 3/1/2023 1600 by Tarsha Buitrago RN  Activity Management: ambulated to bathroom  Taken 3/1/2023 1400 by Tarsha Buitrago RN  Activity Management:   up ad pako   ambulated to bathroom   back to bed  Taken 3/1/2023 1200 by Tarsha Buitrago RN  Activity Management:   ambulated to bathroom   back to bed  Taken 3/1/2023 1020 by Tarsha Buitraog RN  Activity Management: ambulated to bathroom  VTE Prevention/Management: (SQ heparin therapy) other (see comments)  Taken 3/1/2023 0820 by Tarsha Buitrago RN  Activity Management: activity adjusted per tolerance  VTE Prevention/Management: (lovenox therapy) other (see comments)  Intervention:  Prevent Infection  Recent Flowsheet Documentation  Taken 3/1/2023 1600 by Tarsha Buitrago, RN  Infection Prevention:   environmental surveillance performed   equipment surfaces disinfected   hand hygiene promoted   rest/sleep promoted   single patient room provided   visitors restricted/screened  Taken 3/1/2023 1400 by Tarsha Buitrago RN  Infection Prevention:   environmental surveillance performed   equipment surfaces disinfected   hand hygiene promoted   rest/sleep promoted   single patient room provided   visitors restricted/screened  Taken 3/1/2023 1200 by Tarsha Buitrago RN  Infection Prevention:   environmental surveillance performed   equipment surfaces disinfected   hand hygiene promoted   rest/sleep promoted   single patient room provided   visitors restricted/screened  Taken 3/1/2023 1020 by Tarsha Buitrago RN  Infection Prevention:   environmental surveillance performed   equipment surfaces disinfected   hand hygiene promoted   rest/sleep promoted   single patient room provided   visitors restricted/screened  Taken 3/1/2023 0820 by Tarsha Buitrago RN  Infection Prevention:   environmental surveillance performed   equipment surfaces disinfected   hand hygiene promoted   rest/sleep promoted   single patient room provided   visitors restricted/screened  Goal: Optimal Comfort and Wellbeing  Outcome: Ongoing, Progressing  Intervention: Monitor Pain and Promote Comfort  Recent Flowsheet Documentation  Taken 3/1/2023 1600 by Tarsha Buitrago RN  Pain Management Interventions: quiet environment facilitated  Taken 3/1/2023 1400 by Tarsha Buitrago RN  Pain Management Interventions: quiet environment facilitated  Taken 3/1/2023 1334 by Tarsha Buitrago RN  Pain Management Interventions: see MAR  Taken 3/1/2023 1154 by Tarsha Buitrago RN  Pain Management Interventions: see MAR  Taken 3/1/2023 0820 by Tarsha Buitrago RN  Pain Management Interventions: quiet environment facilitated  Intervention: Provide Person-Centered  Care  Recent Flowsheet Documentation  Taken 3/1/2023 1600 by Tarsha Buitrago, RN  Trust Relationship/Rapport:   care explained   choices provided   emotional support provided   empathic listening provided   questions answered   questions encouraged   reassurance provided   thoughts/feelings acknowledged  Taken 3/1/2023 1400 by Tarsha Buitrago, RN  Trust Relationship/Rapport:   care explained   choices provided   emotional support provided   empathic listening provided   questions answered   questions encouraged   reassurance provided   thoughts/feelings acknowledged  Taken 3/1/2023 1200 by Tarsha Buitrago, RN  Trust Relationship/Rapport:   care explained   choices provided   emotional support provided   empathic listening provided   questions answered   questions encouraged   reassurance provided   thoughts/feelings acknowledged  Taken 3/1/2023 1020 by Tarsha Buitrago, RN  Trust Relationship/Rapport:   choices provided   care explained   emotional support provided   empathic listening provided   questions encouraged   thoughts/feelings acknowledged   reassurance provided   questions answered  Goal: Readiness for Transition of Care  Outcome: Ongoing, Progressing   Goal Outcome Evaluation:  Plan of Care Reviewed With: patient        Progress: no change

## 2023-03-01 NOTE — PLAN OF CARE
Problem: Adult Inpatient Plan of Care  Goal: Plan of Care Review  Outcome: Ongoing, Progressing     Problem: Adult Inpatient Plan of Care  Goal: Absence of Hospital-Acquired Illness or Injury  Intervention: Identify and Manage Fall Risk  Recent Flowsheet Documentation  Taken 3/1/2023 0400 by Denny Jiang RN  Safety Promotion/Fall Prevention:   activity supervised   assistive device/personal items within reach   clutter free environment maintained   fall prevention program maintained   lighting adjusted   safety round/check completed   toileting scheduled   room organization consistent   nonskid shoes/slippers when out of bed  Taken 3/1/2023 0200 by Denny Jiang RN  Safety Promotion/Fall Prevention:   activity supervised   assistive device/personal items within reach   clutter free environment maintained   fall prevention program maintained   lighting adjusted   safety round/check completed   toileting scheduled   room organization consistent   nonskid shoes/slippers when out of bed  Taken 3/1/2023 0000 by Denny Jiang RN  Safety Promotion/Fall Prevention:   activity supervised   assistive device/personal items within reach   clutter free environment maintained   fall prevention program maintained   lighting adjusted   safety round/check completed   toileting scheduled   room organization consistent   nonskid shoes/slippers when out of bed  Taken 2/28/2023 2200 by Denny Jiang RN  Safety Promotion/Fall Prevention:   activity supervised   assistive device/personal items within reach   clutter free environment maintained   fall prevention program maintained   lighting adjusted   safety round/check completed   toileting scheduled   room organization consistent   nonskid shoes/slippers when out of bed  Taken 2/28/2023 2000 by Denny Jiang RN  Safety Promotion/Fall Prevention:   activity supervised   assistive device/personal items within reach   clutter free environment maintained   fall prevention  program maintained   lighting adjusted   safety round/check completed   toileting scheduled   room organization consistent   nonskid shoes/slippers when out of bed     Problem: Adult Inpatient Plan of Care  Goal: Absence of Hospital-Acquired Illness or Injury  Intervention: Prevent Skin Injury  Recent Flowsheet Documentation  Taken 3/1/2023 0400 by Denny Jiang RN  Body Position: weight shifting  Taken 3/1/2023 0200 by Denny Jiang RN  Body Position: left  Taken 3/1/2023 0000 by Denny Jiang RN  Body Position: position changed independently  Taken 2/28/2023 2200 by Denny Jiang RN  Body Position: position changed independently  Taken 2/28/2023 2000 by Denny Jiang RN  Body Position: position changed independently  Skin Protection:   adhesive use limited   incontinence pads utilized   tubing/devices free from skin contact   transparent dressing maintained   skin-to-skin areas padded   skin-to-device areas padded

## 2023-03-01 NOTE — CASE MANAGEMENT/SOCIAL WORK
Continued Stay Note   [facility]     Patient Name: Saadia Caceres  MRN: [id number]  Today's Date: 3/1/2023    Admit Date: 2/24/2023    Plan: Home   Discharge Plan     Row Name 03/01/23 0909       Plan    Plan Home    Patient/Family in Agreement with Plan yes    Plan Comments Spoke to patient at bedside. Plan is home. Patient denies any discharge needs at this time. CM will continue to follow.    Final Discharge Disposition Code 01 - home or self-care               Discharge Codes    No documentation.               Expected Discharge Date and Time     Expected Discharge Date Expected Discharge Time    Feb 28, 2023             Carl Foote RN

## 2023-03-02 ENCOUNTER — READMISSION MANAGEMENT (OUTPATIENT)
Dept: CALL CENTER | Facility: HOSPITAL | Age: 63
End: 2023-03-02
Payer: MEDICARE

## 2023-03-02 VITALS
HEIGHT: 65 IN | OXYGEN SATURATION: 93 % | TEMPERATURE: 98.9 F | SYSTOLIC BLOOD PRESSURE: 135 MMHG | RESPIRATION RATE: 20 BRPM | DIASTOLIC BLOOD PRESSURE: 69 MMHG | BODY MASS INDEX: 44.82 KG/M2 | WEIGHT: 269 LBS | HEART RATE: 65 BPM

## 2023-03-02 LAB
GLUCOSE BLDC GLUCOMTR-MCNC: 129 MG/DL (ref 70–130)
GLUCOSE BLDC GLUCOMTR-MCNC: 196 MG/DL (ref 70–130)

## 2023-03-02 PROCEDURE — 97530 THERAPEUTIC ACTIVITIES: CPT

## 2023-03-02 PROCEDURE — 96372 THER/PROPH/DIAG INJ SC/IM: CPT

## 2023-03-02 PROCEDURE — 82962 GLUCOSE BLOOD TEST: CPT

## 2023-03-02 PROCEDURE — G0378 HOSPITAL OBSERVATION PER HR: HCPCS

## 2023-03-02 PROCEDURE — 97116 GAIT TRAINING THERAPY: CPT

## 2023-03-02 PROCEDURE — 99239 HOSP IP/OBS DSCHRG MGMT >30: CPT | Performed by: INTERNAL MEDICINE

## 2023-03-02 PROCEDURE — 25010000002 ENOXAPARIN PER 10 MG: Performed by: HOSPITALIST

## 2023-03-02 PROCEDURE — 63710000001 INSULIN LISPRO (HUMAN) PER 5 UNITS: Performed by: HOSPITALIST

## 2023-03-02 RX ADMIN — OXYCODONE HYDROCHLORIDE AND ACETAMINOPHEN 1 TABLET: 5; 325 TABLET ORAL at 08:15

## 2023-03-02 RX ADMIN — ENOXAPARIN SODIUM 40 MG: 40 INJECTION SUBCUTANEOUS at 08:16

## 2023-03-02 RX ADMIN — GABAPENTIN 400 MG: 400 CAPSULE ORAL at 05:38

## 2023-03-02 RX ADMIN — INSULIN LISPRO 2 UNITS: 100 INJECTION, SOLUTION INTRAVENOUS; SUBCUTANEOUS at 11:50

## 2023-03-02 RX ADMIN — SUMATRIPTAN SUCCINATE 50 MG: 50 TABLET ORAL at 10:31

## 2023-03-02 RX ADMIN — Medication 10 ML: at 08:16

## 2023-03-02 RX ADMIN — DICYCLOMINE HYDROCHLORIDE 10 MG: 10 CAPSULE ORAL at 08:15

## 2023-03-02 RX ADMIN — ASPIRIN 81 MG: 81 TABLET, COATED ORAL at 08:15

## 2023-03-02 RX ADMIN — DICYCLOMINE HYDROCHLORIDE 10 MG: 10 CAPSULE ORAL at 11:50

## 2023-03-02 RX ADMIN — DIVALPROEX SODIUM 500 MG: 500 TABLET, DELAYED RELEASE ORAL at 08:16

## 2023-03-02 RX ADMIN — GABAPENTIN 400 MG: 400 CAPSULE ORAL at 13:22

## 2023-03-02 RX ADMIN — RISPERIDONE 2 MG: 1 TABLET ORAL at 09:22

## 2023-03-02 RX ADMIN — PANTOPRAZOLE SODIUM 40 MG: 40 TABLET, DELAYED RELEASE ORAL at 05:38

## 2023-03-02 RX ADMIN — AMLODIPINE BESYLATE 10 MG: 10 TABLET ORAL at 08:15

## 2023-03-02 RX ADMIN — LEVOTHYROXINE SODIUM 88 MCG: 88 TABLET ORAL at 05:38

## 2023-03-02 NOTE — CASE MANAGEMENT/SOCIAL WORK
Continued Stay Note   Fatou     Patient Name: Saadia Caceres  MRN: [id number]  Today's Date: 3/2/2023    Admit Date: 2/24/2023    Plan: Home with [facility] SN[facility]   Discharge Plan     Row Name 03/02/23 0957       Plan    Plan Home with [facility] SN/PT/OT    Patient/Family in Agreement with Plan yes    Plan Comments Spoke to matthew  bedside. Patient is agreeable to [facility]. Spoke with Mary at [facility] and made the referral. Plan is home with [facility] SN/PT/OT. CM will continue to follow.    Final Discharge Disposition Code 06 - home with home health care               Discharge Codes    No documentation.               Expected Discharge Date and Time     Expected Discharge Date Expected Discharge Time    Mar 2, 2023             Carl Foote RN

## 2023-03-02 NOTE — PLAN OF CARE
Goal Outcome Evaluation:  Plan of Care Reviewed With: patient        Progress: improving  Outcome Evaluation: GOALS SET AT INITIAL EVAL MET. PT REPORTS TO FEEL ANXIOUS WITH THERAPY QUESTIONS BUT IS COOPERATIVE AND DEMONSTRATES IMPROVED FUNCTIONAL MOBILITY. [facility] [place] HAS BEEN ALLOWING PT UP AD PILY. PT AMBULATED 200 FEET WITH R WALKER WITHOUT LOB BUT IS LIMITED BY FATIGUE AND SOA. PT REQUIRED 1 REST BREAK WITH CUES FOR PLB. O2 SATS STABLE ON RA. PLANS TO D/C HOME WITH HHPT TODAY. PT HAS R WALKER AT HOME. D/C P.T.
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
## 2023-03-02 NOTE — OUTREACH NOTE
Prep Survey    Flowsheet Row Responses   Alevism facility patient discharged from? [place]   Is LACE score < 7 ? No   Eligibility [facility]   Date of Admission 02/24/23   Date of Discharge 03/02/23   Discharge Disposition Home or Self Care   Discharge diagnosis Acute pancreatitis    Does the patient have one of the following disease processes/diagnoses(primary or secondary)? Other   Does the patient have Home health ordered? Yes   What is the Home health agency?  [facility]    Is there a DME ordered? No   Prep survey completed? Yes          LUBA TRUJILLO - Registered Nurse

## 2023-03-02 NOTE — PROGRESS NOTES
"                    Clinical Nutrition       Patient Name: Saadia Caceres  YOB: 1960  MRN: [id number]  Date of Encounter: 03/02/23 13:47 EST  Admission date: 2/24/2023      Reason for Visit   Follow-up protocol    EMR Reviewed     EMR Reviewed: yes     Admission Diagnosis:  Acute pancreatitis without infection or necrosis, unspecified pancreatitis type [K85.90]    Problem List:    Acute pancreatitis without infection or necrosis, unspecified pancreatitis type          Anthropometric      Flowsheet Rows    Flowsheet Row First Filed Value   Admission Height 165.1 cm (65\") Documented at 02/24/2023 1048   Admission Weight 122 kg (269 lb) Documented at 02/24/2023 1048            Height: 165.1 cm (65\")  Last Filed Weight: Weight: 122 kg (269 lb) (02/24/23 1048)     BMI: BMI (Calculated): 44.8  BMI classification: Obese Class III extreme obesity: > or equal to 40kg/m2   IBW:  125lbs    UBW:     Last 15 Recorded Weights  View Complete Flowsheet  Weight Weight (kg) Weight (lbs) Weight Method VISIT REPORT   2/24/2023 122.018 kg 269 lb - -   2/23/2023 117.935 kg 260 lb Stated -   2/15/2023 110.678 kg 244 lb - Report   1/18/2023 108.41 kg 239 lb - Report   1/17/2023 117.935 kg 260 lb Stated -   11/21/2022 (No Data)  (No Data)  - Report   9/15/2022 116.121 kg 256 lb - Report   8/15/2022 115.214 kg 254 lb - Report   6/6/2022 116.121 kg 256 lb - Report   5/24/2022 116.302 kg 256 lb 6.4 oz - Report   5/2/2022 120.203 kg 265 lb - Report   3/10/2022 125 kg 275 lb 9.2 oz Standing scale -   2/21/2022 121.564 kg 268 lb - Report   1/31/2022 121.564 kg 268 lb - Report   9/16/2021 (No Data)  (No Data)  - Report     Weight change:   No documented weight change     Reported/Observed/Food/Nutrition Related - Comments     3/2) Pt seen as f/u for diet education. Pt difficult to redirect at times and appears to have limited understanding of food items and how each effect health. Reviewed Pancreatitis nutrition therapy and " DM Diet education - including portion sizes, recommended foods vs not-recommended foods. Pt agreeable to OP diet education with RD.      Able to tolerate oatmeal today without difficulty. Pt lives on limited income and may have difficulty obtaining appropriate food items to support nutrition needs.     ) Pt sleeping at time of visit. Attempted to arouse but pt was not appropriate for diet education at this time. Diet education materials left at bedside - will plan to follow-up as able. Suspect would appreciate OP DM education.      Current Nutrition Prescription     Diet: Liquid Diets; Full Liquid; Texture: Regular Texture (IDDSI 7); Fluid Consistency: Thin (IDDSI 0)  No active supplement orders      Average Intake from [facility]: 100% x2 meals documented    Nutrition Diagnosis     Problem Food and nutrition knowledge deficit   Etiology DM diet & pancreatitis   Signs/Symptoms Diet recall and limited ability to demonstrate materials    Status:    Actions     Follow treatment progress, Care plan reviewed     Educated on pancreatitis and DM nutrition therapy  Consult placed for OP DM education     Monitor Per Protocol      Kiesha Bullard, MS,RD,LD,   Time Spent: 30min

## 2023-03-02 NOTE — PLAN OF CARE
Problem: Adult Inpatient Plan of Care  Goal: Plan of Care Review  Outcome: Ongoing, Progressing     Problem: Adult Inpatient Plan of Care  Goal: Absence of Hospital-Acquired Illness or Injury  Intervention: Identify and Manage Fall Risk  Recent Flowsheet Documentation  Taken 3/2/2023 0400 by Denny Jiang RN  Safety Promotion/Fall Prevention:   assistive device/personal items within reach   clutter free environment maintained   lighting adjusted   nonskid shoes/slippers when out of bed   room organization consistent   safety round/check completed  Taken 3/2/2023 0200 by Denny Jiang RN  Safety Promotion/Fall Prevention:   assistive device/personal items within reach   clutter free environment maintained   lighting adjusted   nonskid shoes/slippers when out of bed   room organization consistent   safety round/check completed  Taken 3/2/2023 0000 by Denny Jiang RN  Safety Promotion/Fall Prevention:   assistive device/personal items within reach   clutter free environment maintained   lighting adjusted   nonskid shoes/slippers when out of bed   room organization consistent   safety round/check completed  Taken 3/1/2023 2200 by Denny Jiang RN  Safety Promotion/Fall Prevention:   assistive device/personal items within reach   clutter free environment maintained   lighting adjusted   nonskid shoes/slippers when out of bed   room organization consistent   safety round/check completed  Taken 3/1/2023 2000 by Denny Jiang RN  Safety Promotion/Fall Prevention:   assistive device/personal items within reach   clutter free environment maintained   lighting adjusted   nonskid shoes/slippers when out of bed   room organization consistent   safety round/check completed     Problem: Adult Inpatient Plan of Care  Goal: Optimal Comfort and Wellbeing  Outcome: Ongoing, Progressing     Problem: Adult Inpatient Plan of Care  Goal: Readiness for Transition of Care  Outcome: Ongoing, Progressing

## 2023-03-02 NOTE — CASE MANAGEMENT/SOCIAL WORK
Continued Stay Note   Fatou     Patient Name: Saadia Caceres  MRN: [id number]  Today's Date: 3/2/2023    Admit Date: 2/24/2023    Plan: Home with [facility] SN/PT/OT   Discharge Plan     Row Name 03/02/23 1121       Plan    [facility]    Final Discharge Disposition Code 06 - home with home health care    Final Note Plan is home with [facility] SN/PT/OT. Daughter will transport. Patient denies any other discharge needs.    Row Name 03/02/23 0957       Plan    Plan Home with [facility] SN/PT/OT    Patient/Family in Agreement with Plan yes    [facility]. Spoke with Mary at [facility] and made the referral. Plan is home with [facility] SN/PT/OT. CM will continue to follow.    Final Discharge Disposition Code 06 - home with home health care               Discharge Codes    No documentation.               Expected Discharge Date and Time     Expected Discharge Date Expected Discharge Time    Mar 2, 2023             Carl Foote RN

## 2023-03-02 NOTE — DISCHARGE SUMMARY
[facility] Medicine Services  DISCHARGE SUMMARY    Patient Name: Saadia Caceres  : 1960  MRN: [id number]    Date of Admission: 2023 10:51 AM  Date of Discharge:  3/2/23  Primary Care Physician: Chacorta Del Rio DO    Consults     No orders found from 2023 to 2023.          Hospital Course     Presenting Problem:   Acute pancreatitis without infection or necrosis, unspecified pancreatitis type [K85.90]    Active Hospital Problems    Diagnosis  POA   • **Acute pancreatitis without infection or necrosis, unspecified pancreatitis type [K85.90]  Yes      Resolved Hospital Problems   No resolved problems to display.          Hospital Course:  Saadia Caceres is a 62 y.o. female history of pancreatitis and uncontrolled diabetes presented with epigastric abdominal pain with nausea and vomiting for 2 days.  Lipase is elevated at 7.46. Lactate was elevated 2.4.  CT abdomen pelvis showed no acute findings.  She was started on IV fluids.  Patient's pain improved, her diet was advanced as tolerated.  She did have some hypoglycemia during admission and was requiring much less insulin than she generally requires at home.  Counseled patient to hold off on taking her her 70/30 as well as her Lantus at home as she is not completely back to her normal p.o. intake.  Recommended she continue taking her Trulicity.  She will continue to check her blood sugars 3 times a day.  Counseled patient to get in touch with her primary care doctor for sugars run high.  Otherwise she can follow-up with her PCP in 5 days.      Discharge Follow Up Recommendations for outpatient labs/diagnostics:  Follow-up with PCP in 5 days    Day of Discharge     HPI:   Patient was able to tolerate her oatmeal this morning without any issues.  Feels ready to go home.  Pain improved.    Review of Systems  General: denies fevers or chills  CV: denies chest pain  Resp: denies shortness of breath  Abd:  denies abd pain, nausea      Vital Signs:   Temp:  [97.6 °F (36.4 °C)-98.4 °F (36.9 °C)] 98.4 °F (36.9 °C)  Heart Rate:  [] 58  Resp:  [18-20] 20  BP: (130-142)/(65-85) 132/85  Flow (L/min):  [3] 3      Physical Exam:  Constitutional: No acute distress, awake, alert, sitting in bedside chair  Respiratory: Clear to auscultation bilaterally, respiratory effort normal   Cardiovascular: RRR, no murmurs, rubs, or gallops  Gastrointestinal: Positive bowel sounds, soft, nontender, nondistended  Musculoskeletal: No bilateral ankle edema  Psychiatric: Appropriate affect, cooperative  Neurologic: Oriented x 3, no focal deficits      Pertinent  and/or Most Recent Results     LAB RESULTS:      Lab 03/01/23 0520 02/28/23 0403 02/25/23 0329 02/24/23  1146   WBC 6.97 5.66 7.47 8.38   HEMOGLOBIN 14.6 14.1 14.4 15.3   HEMATOCRIT 44.1 44.3 44.5 46.3   PLATELETS 232 224 249 258   NEUTROS ABS  --   --   --  4.44   EOS ABS  --   --   --  0.17   MCV 96.5 99.1* 96.7 96.1   CRP  --  0.66*  --   --    LACTATE  --   --   --  1.9         Lab 03/01/23 0520 02/28/23 0403 02/25/23 0329 02/24/23  1146   SODIUM 142 140 141 134*   POTASSIUM 3.9 4.0 3.9 5.1   CHLORIDE 101 102 102 96*   CO2 30.0* 32.0* 30.0* 27.0   ANION GAP 11.0 6.0 9.0 11.0   BUN 3* 3* 11 10   CREATININE 0.46* 0.52* 0.50* 0.75   EGFR 109.7 106.5 107.5 91.3   GLUCOSE 109* 151* 81 287*   CALCIUM 9.3 9.3 9.0 9.5   MAGNESIUM 1.7  --   --   --    PHOSPHORUS 4.2  --   --   --    TSH 2.010  --   --   --          Lab 02/28/23 0403 02/25/23 0329 02/24/23  1146   TOTAL PROTEIN  --  6.0 6.5   ALBUMIN  --  3.5 3.8   GLOBULIN  --  2.5 2.7   ALT (SGPT)  --  6 8   AST (SGOT)  --  7 8   BILIRUBIN  --  0.2 0.2   ALK PHOS  --  68 81   LIPASE 68*  --  462*                     Lab 02/28/23  1508   PH, ARTERIAL 7.410   PCO2, ARTERIAL 47.2*   PO2 ART 74.6*   FIO2 21   HCO3 ART 29.9*   BASE EXCESS ART 4.3*   CARBOXYHEMOGLOBIN 1.3     Brief Urine Lab Results  (Last result in the past 365  days)      Color   Clarity   Blood   Leuk Est   Nitrite   Protein   CREAT   Urine HCG        02/24/23 1152 Yellow   Clear   Negative   Negative   Negative   Trace               Microbiology Results (last 10 days)     Procedure Component Value - Date/Time    Blood Culture - Blood, Arm, Left [[id number]]  (Normal) Collected: 02/24/23 1224    Lab Status: Final result Specimen: Blood from Arm, Left Updated: 03/01/23 1301     Blood Culture No growth at 5 days    Blood Culture - Blood, Arm, Right [[id number]]  (Normal) Collected: 02/24/23 1224    Lab Status: Final result Specimen: Blood from Arm, Right Updated: 03/01/23 1301     Blood Culture No growth at 5 days          CT Abdomen Pelvis Without Contrast    Result Date: 2/23/2023  CT ABDOMEN PELVIS WO CONTRAST Date of Exam: 2/23/2023 7:17 AM EST Indication: Epigastric pain, right rib pain, nausea vomiting, tailbone pain. Comparison: CT abdomen and pelvis 11/17/2023. Technique: Axial CT images were obtained of the abdomen and pelvis without the administration of contrast. Reconstructed coronal and sagittal images were also obtained. Automated exposure control and iterative construction methods were used. Findings: Minimal dependent atelectasis is present in the lung bases. There are no acute basilar consolidations. Heart size is normal. The liver, gallbladder, spleen, pancreas, adrenals, and kidneys have a normal noncontrast appearance. The appendix is normal. Mild colonic stool burden is present. There is no indication of active the large or small bowel inflammation or obstruction. Hysterectomy changes are present. The urinary bladder and rectum are within normal limits. Grade 1 anterolisthesis L5 upon S1 secondary to bilateral pars defects with degenerative loss of disc height. No acute osseous abnormality.     Impression: No acute findings within the abdomen or pelvis. Electronically Signed: Annabella Jackson  2/23/2023 7:44 AM EST  Workstation ID: [id number]    US  Gallbladder    Result Date: 2/24/2023  US GALLBLADDER Date of Exam: 2/24/2023 6:30 PM EST Indication: abdominal pain, possible pancreatitis, eval gallbladder. Comparison: Gallbladder ultrasound 8/1/2018. Abdominal CT scan 2/23/2023 Technique: Grayscale and color Doppler ultrasound evaluation of the right upper quadrant was performed. Findings: Pancreas is moderately well visualized and where seen appears grossly normal. Right and left liver lobes appear grossly normal in morphology, with mildly increased echogenicity typical of fatty liver change. No liver masses are identified. No intra or extrahepatic biliary ductal dilatation is seen. There is expected portal vein and hepatic vein waveform and directional flow. Gallbladder is distended, typical fasting state and contains a subtle, weakly echogenic debris layer. No formed gallstones are seen and no gallbladder wall thickening is appreciated. Common duct measures between 5 and 6 mm in diameter. The right kidney appears normal and measures 12.2 cm in length. No right upper quadrant ascites is seen.     Impression: 1. Subtle gallbladder sludge/debris. No visible gallstones or evidence of gallbladder wall inflammation. No evidence of biliary ductal dilatation. 2. Mildly echogenic liver parenchyma typical fatty liver change. Electronically Signed: Andrew Rabago  2/24/2023 8:50 PM EST  Workstation ID: [id number]      Results for orders placed during the hospital encounter of 01/28/20    Duplex Venous Lower Extremity - Right CAR    Interpretation Summary  · Normal right lower extremity venous duplex scan.      Results for orders placed during the hospital encounter of 01/28/20    Duplex Venous Lower Extremity - Right CAR    Interpretation Summary  · Normal right lower extremity venous duplex scan.          Plan for Follow-up of Pending Labs/Results:     Discharge Details        Discharge Medications      Changes to Medications      Instructions Start Date   gabapentin 600 MG  "tablet  Commonly known as: NEURONTIN  What changed: Another medication with the same name was removed. Continue taking this medication, and follow the directions you see here.   600 mg, Oral, 4 Times Daily      Incontinence Brief Large misc  What changed:   · when to take this  · reasons to take this   1 each, Does not apply, 2 Times Daily      Disposable Underpads 30\"x36\" misc  What changed: Another medication with the same name was changed. Make sure you understand how and when to take each.   1 each, Does not apply, Nightly         Continue These Medications      Instructions Start Date   Adjust Bath/Shower Seat misc   1 each, Does not apply, Daily      Adjust Bath/Shower Seat/Back misc   1 each, Does not apply, Daily      Roller Walker misc   1 Device, Does not apply, Daily, Rollator walker      albuterol sulfate  (90 Base) MCG/ACT inhaler  Commonly known as: PROVENTIL HFA;VENTOLIN HFA;PROAIR HFA   2 puffs, Inhalation, Every 6 Hours PRN      amLODIPine 10 MG tablet  Commonly known as: NORVASC   10 mg, Oral, Every Morning      Anoro Ellipta 62.5-25 MCG/ACT aerosol powder  inhaler  Generic drug: Umeclidinium-Vilanterol   1 puff, Inhalation, Daily      aspirin 81 MG EC tablet   81 mg, Oral, Daily      BENEFIBER DRINK MIX PO   1 dose, Oral, 2 Times Daily PRN      clonazePAM 2 MG tablet  Commonly known as: KlonoPIN   2 mg, Oral, 2 Times Daily      Continuous Glucose Monitor Sup kit   1 each, Does not apply, Daily      cyclobenzaprine 10 MG tablet  Commonly known as: FLEXERIL   TAKE 1 TABLET BY MOUTH THREE TIMES DAILY AS NEEDED FOR MUSCLE SPASMS      dicyclomine 10 MG capsule  Commonly known as: BENTYL   10 mg, Oral, 4 Times Daily Before Meals & Nightly      divalproex 500 MG DR tablet  Commonly known as: DEPAKOTE   TAKE 1 TABLET EVERY MORNING AND 3 TABLETS EVERY NIGHT AT BEDTIME      estazolam 2 MG tablet  Commonly known as: PROSOM   2 mg, Oral, Nightly      estradiol 0.5 MG tablet  Commonly known as: " ESTRACE   0.5 mg, Oral, Daily      furosemide 20 MG tablet  Commonly known as: LASIX   20 mg, Oral, Daily      glucose monitor monitoring kit   1 each, Does not apply, Daily      hydrocortisone 2.5 % cream   1 application, Topical, 2 Times Daily      hydrOXYzine pamoate 50 MG capsule  Commonly known as: VISTARIL   50 mg, Oral, 4 Times Daily PRN      hyoscyamine 0.125 MG SL tablet  Commonly known as: LEVSIN   0.125 mg, Oral, Every 4 Hours PRN      Insulin Pen Needle 31G X 8 MM misc  Commonly known as: B-D ULTRAFINE III SHORT PEN   Inject 1 applicator under the skin 2 (Two) Times a Day.      B-D ULTRAFINE III SHORT PEN 31G X 8 MM misc  Generic drug: Insulin Pen Needle   USE AS DIRECTED THREE TIMES DAILY      Lantus SoloStar 100 UNIT/ML injection pen  Generic drug: Insulin Glargine   ADMINISTER 35 UNITS UNDER THE SKIN EVERY NIGHT AS DIRECTED      levothyroxine 88 MCG tablet  Commonly known as: SYNTHROID, LEVOTHROID   88 mcg, Oral, Daily      lidocaine 5 %  Commonly known as: LIDODERM   1 patch, Transdermal, Every 24 Hours, Remove & Discard patch within 12 hours or as directed by MD      mupirocin 2 % cream  Commonly known as: BACTROBAN   Topical, See Admin Instructions, Apply topically to the affected areas three times daily      neomycin-polymyxin-hydrocortisone 3.5-36031-0 otic solution  Commonly known as: CORTISPORIN   3 drops, Right Ear, 4 Times Daily      nystatin 037239 UNIT/GM ointment  Commonly known as: MYCOSTATIN   Topical, 2 Times Daily      omeprazole 40 MG capsule  Commonly known as: priLOSEC   40 mg, Oral, Daily      ONE TOUCH ULTRA 2 w/Device kit   Use to test blood sugars 2 times daily.      OneTouch Delica Plus Uyottr54P misc   USE AS DIRECTED TO TEST BLOOD SUGAR TWICE DAILY      OneTouch Ultra test strip  Generic drug: glucose blood   USE TO TEST BLOOD SUGAR TWICE DAILY      oxyCODONE-acetaminophen 5-325 MG per tablet  Commonly known as: PERCOCET   1 tablet, Oral, Every 4 Hours PRN      prazosin 2 MG  capsule  Commonly known as: MINIPRESS   TK 1 C PO QHS      promethazine 25 MG tablet  Commonly known as: PHENERGAN   TAKE 1 TABLET BY MOUTH EVERY 6 HOURS AS NEEDED FOR NAUSEA OR VOMITING      promethazine 25 MG tablet  Commonly known as: PHENERGAN   25 mg, Oral, Every 6 Hours PRN      risperiDONE 2 MG tablet  Commonly known as: risperDAL   2 mg, Oral, 2 Times Daily      simvastatin 10 MG tablet  Commonly known as: ZOCOR   10 mg, Oral, Every Evening      SUMAtriptan 50 MG tablet  Commonly known as: IMITREX   50 mg, Oral, Every 2 Hours PRN, Take one tablet at onset of headache. May repeat dose one time in 2 hours if headache not relieved.      Tradjenta 5 MG tablet tablet  Generic drug: linagliptin   TAKE 1 TABLET BY MOUTH DAILY      traZODone 100 MG tablet  Commonly known as: DESYREL   TK 1 T PO AT SUPPER AND 3 TS QPM      Vascepa 1 g capsule capsule  Generic drug: icosapent ethyl   2 g, Oral, 2 Times Daily With Meals      vitamin D 1.25 MG (19649 UT) capsule capsule  Commonly known as: ERGOCALCIFEROL   TAKE 1 CAPSULE BY MOUTH 1 TIME EVERY WEEK FOR 12 DOSES         Stop These Medications    bethanechol 25 MG tablet  Commonly known as: URECHOLINE     NovoLOG Mix 70/30 FlexPen (70-30) 100 UNIT/ML suspension pen-injector injection  Generic drug: insulin aspart prot & aspart            Allergies   Allergen Reactions   • Hydrocodone Nausea Only   • Ibuprofen Nausea Only   • Lipitor [Atorvastatin] Myalgia   • Lortab [Hydrocodone-Acetaminophen]    • Ondansetron Itching   • Rosuvastatin          Discharge Disposition:  Home or Self Care    Diet:  Hospital:  Diet Order   Procedures   • Diet: Liquid Diets; Full Liquid; Texture: Regular Texture (IDDSI 7); Fluid Consistency: Thin (IDDSI 0)       Activity:  Activity Instructions     Activity as Tolerated            Restrictions or Other Recommendations:         CODE STATUS:    Code Status and Medical Interventions:   Ordered at: 02/24/23 2679     Level Of Support Discussed With:     Patient     Code Status (Patient has no pulse and is not breathing):    CPR (Attempt to Resuscitate)     Medical Interventions (Patient has pulse or is breathing):    Full Support       No future appointments.    Additional Instructions for the Follow-ups that You Need to Schedule     Ambulatory Referral to Home Health   As directed      Face to Face Visit Date: 3/2/2023    Follow-up provider for Plan of Care?: I will be treating the patient on an ongoing basis.  Please send me the Plan of Care for signature.    Follow-up provider: CHACORTA ZARATE [[id number]]    Reason/Clinical Findings: Pancreatitis    Describe mobility limitations that make leaving home difficult: Impaired mobility    Nursing/Therapeutic Services Requested: Skilled Nursing Physical Therapy Occupational Therapy    Skilled nursing orders: Medication education Pain management COPD management Cardiopulmonary assessments    PT orders: Total joint pathway Therapeutic exercise Gait Training Strengthening Home safety assessment    Weight Bearing Status: As Tolerated    Occupational orders: Activities of daily living Energy conservation Strengthening Home safety assessment    Frequency: 1 Week 1         Discharge Follow-up with PCP   As directed       Currently Documented PCP:    Chacorta Zarate DO    PCP Phone Number:    [phone]     Follow Up Details: 5 days                     Dulce Maria Martinez MD  03/02/23      Time Spent on Discharge:  I spent  45  minutes on this discharge activity which included: face-to-face encounter with the patient, reviewing the data in the system, coordination of the care with the nursing staff as well as consultants, documentation, and entering orders.

## 2023-03-03 ENCOUNTER — TRANSITIONAL CARE MANAGEMENT TELEPHONE ENCOUNTER (OUTPATIENT)
Dept: CALL CENTER | Facility: HOSPITAL | Age: 63
End: 2023-03-03
Payer: MEDICARE

## 2023-03-03 NOTE — OUTREACH NOTE
Call Center TCM Note    Flowsheet Row Responses   [facility] patient discharged from? [facility]   Does the patient have one of the following disease processes/diagnoses(primary or secondary)? Other   TCM attempt successful? Yes   Call start time 1129   Call end time 1132   Discharge diagnosis Acute pancreatitis    Prescription comments sister-in-law will picking up medications today   Comments Hospital f/u with PCP Dr. Del Rio on 3/10   Does the patient have an appointment with their PCP within 7 days of discharge? Yes   What is the Home health agency?  [facility]    Has home health visited the patient within 72 hours of discharge? Call prior to 72 hours   Psychosocial issues? No   Did the patient receive a copy of their discharge instructions? Yes   What is the patient's perception of their health status since discharge? Improving   Is the patient/caregiver able to teach back the hierarchy of who to call/visit for symptoms/problems? PCP, Specialist, Home health nurse, Urgent Care, ED, 911 Yes   TCM call completed? Yes   Wrap up additional comments Doing well, all questions addressed, confirmed appt with PCP for 3/10.   Call end time 1132   Would this patient benefit from a Referral to Amb Social Work? No   Is the patient interested in additional calls from an ambulatory ?  NOTE:  applies to high risk patients requiring additional follow-up. No          Daisy Benítez RN    3/3/2023, 11:32 EST

## 2023-03-06 ENCOUNTER — TELEPHONE (OUTPATIENT)
Dept: FAMILY MEDICINE CLINIC | Facility: CLINIC | Age: 63
End: 2023-03-06
Payer: MEDICARE

## 2023-03-06 NOTE — TELEPHONE ENCOUNTER
Contacted patient to discuss further, she states that she is concerned about UTI and has abdominal pain. Offered a sooner appt and patient declined. She prefers to wait until 3/10

## 2023-03-06 NOTE — TELEPHONE ENCOUNTER
Pt. Called stating that she was discharged from the hospital last week, and that she had a Hospital F/U scheduled with  on 03/10/23. Said she needed medications that she was out of and didn't thinks she could wait until 3/10/23. She listed several medications, but none were on her medication list from here. She said maybe they were from another doctor. She did mention 81 mg aspirin that is on the list (OTC) She believes she may have a UTI now. I asked if she wanted me to look for an appt for tomorrow. She said she didn't have a way here tomorrow. I told her I would put a note in that she called.

## 2023-03-06 NOTE — TELEPHONE ENCOUNTER
Caller: [facility]    Relationship: OCCUPATIONAL THERAPIST    Best call back number:[phone]    What orders are you requesting (i.e. lab or imaging): CONTINUE OCCUPATIONAL THERAPY ONCE A WEEK FOR 4 WEEKS    In what timeframe would the patient need to come in: ASAP    Where will you receive your lab/imaging services: IN HOME    Additional notes: [facility] WOULD LIKE VERBAL ORDERS TO CONTINUE OCCUPATIONAL THERAPY

## 2023-03-06 NOTE — TELEPHONE ENCOUNTER
PHONE CALL FROM [facility].  THEY NEED VERBAL ORDERS FOR NURSING TWO TIMES A WEEK FOR 2 WEEK AND ONCE WEEKLY FOR 4 WEEKS AND A  EVALUATION TO HELP WITH COMMUNITY RESOURCES.      PLEASE CALL SHIRA @ [phone]

## 2023-03-08 ENCOUNTER — TELEPHONE (OUTPATIENT)
Dept: FAMILY MEDICINE CLINIC | Facility: CLINIC | Age: 63
End: 2023-03-08
Payer: MEDICARE

## 2023-03-08 NOTE — TELEPHONE ENCOUNTER
Caller: [facility]    Relationship: Home Health    Best call back number: [phone]    What orders are you requesting (i.e. lab or imaging): VERBAL ORDERS FOR PT    In what timeframe would the patient need to come in: ONCE A WEEK FOR FOUR WEEKS    Where will you receive your lab/imaging services: HOME    Additional notes: PHYSICAL THERAPIST NEEDS VERBAL ORDERS.

## 2023-03-09 ENCOUNTER — TELEPHONE (OUTPATIENT)
Dept: FAMILY MEDICINE CLINIC | Facility: CLINIC | Age: 63
End: 2023-03-09
Payer: MEDICARE

## 2023-03-09 NOTE — TELEPHONE ENCOUNTER
Caller: SAAEL    Relationship: [facility]    Best call back number: [phone]    What form or medical record are you requesting: LAST OFFICE NOTES    Who is requesting this form or medical record from you: ASAEL WITH [facility]    How would you like to receive the form or medical records (pick-up, mail, fax):   If fax, what is the fax number: [phone]    Timeframe paperwork needed: ASAP    Additional notes:

## 2023-03-13 ENCOUNTER — OFFICE VISIT (OUTPATIENT)
Dept: FAMILY MEDICINE CLINIC | Facility: CLINIC | Age: 63
End: 2023-03-13
Payer: MEDICARE

## 2023-03-13 ENCOUNTER — LAB (OUTPATIENT)
Dept: LAB | Facility: HOSPITAL | Age: 63
End: 2023-03-13
Payer: MEDICARE

## 2023-03-13 VITALS
HEART RATE: 115 BPM | HEIGHT: 65 IN | SYSTOLIC BLOOD PRESSURE: 130 MMHG | OXYGEN SATURATION: 91 % | WEIGHT: 240 LBS | DIASTOLIC BLOOD PRESSURE: 86 MMHG | BODY MASS INDEX: 39.99 KG/M2

## 2023-03-13 DIAGNOSIS — N39.0 URINARY TRACT INFECTION WITHOUT HEMATURIA, SITE UNSPECIFIED: ICD-10-CM

## 2023-03-13 DIAGNOSIS — K85.90 ACUTE PANCREATITIS, UNSPECIFIED COMPLICATION STATUS, UNSPECIFIED PANCREATITIS TYPE: ICD-10-CM

## 2023-03-13 DIAGNOSIS — Z79.4 TYPE 2 DIABETES MELLITUS WITH DIABETIC PERIPHERAL ANGIOPATHY WITHOUT GANGRENE, WITH LONG-TERM CURRENT USE OF INSULIN: Primary | Chronic | ICD-10-CM

## 2023-03-13 DIAGNOSIS — E11.51 TYPE 2 DIABETES MELLITUS WITH DIABETIC PERIPHERAL ANGIOPATHY WITHOUT GANGRENE, WITH LONG-TERM CURRENT USE OF INSULIN: Primary | Chronic | ICD-10-CM

## 2023-03-13 DIAGNOSIS — N39.0 RECURRENT UTI: ICD-10-CM

## 2023-03-13 DIAGNOSIS — R30.0 DYSURIA: ICD-10-CM

## 2023-03-13 LAB
BILIRUB BLD-MCNC: ABNORMAL MG/DL
CLARITY, POC: ABNORMAL
COLOR UR: ABNORMAL
EXPIRATION DATE: ABNORMAL
EXPIRATION DATE: ABNORMAL
GLUCOSE UR STRIP-MCNC: ABNORMAL MG/DL
HBA1C MFR BLD: 11.6 %
KETONES UR QL: ABNORMAL
LEUKOCYTE EST, POC: ABNORMAL
Lab: ABNORMAL
Lab: ABNORMAL
NITRITE UR-MCNC: POSITIVE MG/ML
PH UR: 5 [PH] (ref 5–8)
PROT UR STRIP-MCNC: ABNORMAL MG/DL
RBC # UR STRIP: NEGATIVE /UL
SP GR UR: 1.02 (ref 1–1.03)
UROBILINOGEN UR QL: ABNORMAL

## 2023-03-13 PROCEDURE — 1111F DSCHRG MED/CURRENT MED MERGE: CPT | Performed by: INTERNAL MEDICINE

## 2023-03-13 PROCEDURE — 85025 COMPLETE CBC W/AUTO DIFF WBC: CPT

## 2023-03-13 PROCEDURE — 1159F MED LIST DOCD IN RCRD: CPT | Performed by: INTERNAL MEDICINE

## 2023-03-13 PROCEDURE — 81003 URINALYSIS AUTO W/O SCOPE: CPT | Performed by: INTERNAL MEDICINE

## 2023-03-13 PROCEDURE — 99495 TRANSJ CARE MGMT MOD F2F 14D: CPT | Performed by: INTERNAL MEDICINE

## 2023-03-13 PROCEDURE — 3046F HEMOGLOBIN A1C LEVEL >9.0%: CPT | Performed by: INTERNAL MEDICINE

## 2023-03-13 PROCEDURE — 80053 COMPREHEN METABOLIC PANEL: CPT

## 2023-03-13 PROCEDURE — 83036 HEMOGLOBIN GLYCOSYLATED A1C: CPT | Performed by: INTERNAL MEDICINE

## 2023-03-13 PROCEDURE — 3079F DIAST BP 80-89 MM HG: CPT | Performed by: INTERNAL MEDICINE

## 2023-03-13 PROCEDURE — 3075F SYST BP GE 130 - 139MM HG: CPT | Performed by: INTERNAL MEDICINE

## 2023-03-13 PROCEDURE — 83690 ASSAY OF LIPASE: CPT

## 2023-03-13 RX ORDER — CIPROFLOXACIN 500 MG/1
500 TABLET, FILM COATED ORAL 2 TIMES DAILY
Qty: 14 TABLET | Refills: 0 | Status: SHIPPED | OUTPATIENT
Start: 2023-03-13 | End: 2023-03-20
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
## 2023-03-14 LAB
ALBUMIN SERPL-MCNC: 4.3 G/DL (ref 3.5–5.2)
ALBUMIN/GLOB SERPL: 1.3 G/DL
ALP SERPL-CCNC: 90 U/L (ref 39–117)
ALT SERPL W P-5'-P-CCNC: 10 U/L (ref 1–33)
ANION GAP SERPL CALCULATED.3IONS-SCNC: 11 MMOL/L (ref 5–15)
AST SERPL-CCNC: 12 U/L (ref 1–32)
BASOPHILS # BLD AUTO: 0.08 10*3/MM3 (ref 0–0.2)
BASOPHILS NFR BLD AUTO: 0.7 % (ref 0–1.5)
BILIRUB SERPL-MCNC: 0.2 MG/DL (ref 0–1.2)
BUN SERPL-MCNC: 20 MG/DL (ref 8–23)
BUN/CREAT SERPL: 21.1 (ref 7–25)
CALCIUM SPEC-SCNC: 10.5 MG/DL (ref 8.6–10.5)
CHLORIDE SERPL-SCNC: 93 MMOL/L (ref 98–107)
CO2 SERPL-SCNC: 27 MMOL/L (ref 22–29)
CREAT SERPL-MCNC: 0.95 MG/DL (ref 0.57–1)
DEPRECATED RDW RBC AUTO: 47.9 FL (ref 37–54)
EGFRCR SERPLBLD CKD-EPI 2021: 68.7 ML/MIN/1.73
EOSINOPHIL # BLD AUTO: 0.08 10*3/MM3 (ref 0–0.4)
EOSINOPHIL NFR BLD AUTO: 0.7 % (ref 0.3–6.2)
ERYTHROCYTE [DISTWIDTH] IN BLOOD BY AUTOMATED COUNT: 14.2 % (ref 12.3–15.4)
GLOBULIN UR ELPH-MCNC: 3.4 GM/DL
GLUCOSE SERPL-MCNC: 395 MG/DL (ref 65–99)
HCT VFR BLD AUTO: 48 % (ref 34–46.6)
HGB BLD-MCNC: 16.3 G/DL (ref 12–15.9)
IMM GRANULOCYTES # BLD AUTO: 0.14 10*3/MM3 (ref 0–0.05)
IMM GRANULOCYTES NFR BLD AUTO: 1.3 % (ref 0–0.5)
LIPASE SERPL-CCNC: 56 U/L (ref 13–60)
LYMPHOCYTES # BLD AUTO: 4.13 10*3/MM3 (ref 0.7–3.1)
LYMPHOCYTES NFR BLD AUTO: 38.3 % (ref 19.6–45.3)
MCH RBC QN AUTO: 31.9 PG (ref 26.6–33)
MCHC RBC AUTO-ENTMCNC: 34 G/DL (ref 31.5–35.7)
MCV RBC AUTO: 93.9 FL (ref 79–97)
MONOCYTES # BLD AUTO: 0.7 10*3/MM3 (ref 0.1–0.9)
MONOCYTES NFR BLD AUTO: 6.5 % (ref 5–12)
NEUTROPHILS NFR BLD AUTO: 5.66 10*3/MM3 (ref 1.7–7)
NEUTROPHILS NFR BLD AUTO: 52.5 % (ref 42.7–76)
NRBC BLD AUTO-RTO: 0.1 /100 WBC (ref 0–0.2)
PLATELET # BLD AUTO: 314 10*3/MM3 (ref 140–450)
PMV BLD AUTO: 9.6 FL (ref 6–12)
POTASSIUM SERPL-SCNC: 5.4 MMOL/L (ref 3.5–5.2)
PROT SERPL-MCNC: 7.7 G/DL (ref 6–8.5)
RBC # BLD AUTO: 5.11 10*6/MM3 (ref 3.77–5.28)
SODIUM SERPL-SCNC: 131 MMOL/L (ref 136–145)
WBC NRBC COR # BLD: 10.79 10*3/MM3 (ref 3.4–10.8)

## 2023-03-15 ENCOUNTER — TELEPHONE (OUTPATIENT)
Dept: FAMILY MEDICINE CLINIC | Facility: CLINIC | Age: 63
End: 2023-03-15
Payer: MEDICARE

## 2023-03-15 NOTE — TELEPHONE ENCOUNTER
Caller: ASA[facility] LUNA [facility]    Relationship: Home Health    Best call back number: [phone]    What form or medical record are you requesting: VISIT NOTE    Who is requesting this form or medical record from you: SELF    How would you like to receive the form or medical records (pick-up, mail, fax): FAX  If fax, what is the fax number: [phone]      Additional notes: LAST VISIT NOTE PLEASE

## 2023-03-20 ENCOUNTER — TELEPHONE (OUTPATIENT)
Dept: FAMILY MEDICINE CLINIC | Facility: CLINIC | Age: 63
End: 2023-03-20

## 2023-03-20 NOTE — TELEPHONE ENCOUNTER
JEREMY CALLED WITH AND UPDATE ON THE PATIENT'S CONDITION.    HER PAIN LEVEL WAS AT AN 8 TODAY.    SHE'S BEEN VOMITING SINCE Friday AND HAS BEEN VERY TIRED.    HOME HEALTH NURSE IS GOING TO SEE HER NOW.    PHONE: [phone]

## 2023-03-23 ENCOUNTER — TELEPHONE (OUTPATIENT)
Dept: FAMILY MEDICINE CLINIC | Facility: CLINIC | Age: 63
End: 2023-03-23
Payer: MEDICARE

## 2023-03-23 NOTE — TELEPHONE ENCOUNTER
Dafne called from  Urology. Said patient needed to have (2) abnormal Urine Cultures for Dr. Akbar & Dr. Smyth to accept the referral, and she does not see those in patient's chart. She will send the referral back to their clinical team to see if they can accept the referral without those (2) abnormal Urine Cultures.

## 2023-03-23 NOTE — TELEPHONE ENCOUNTER
Contacted patient to provide resources for Ariana Huertas, she reports that she has already applied to this program and needs to follow up with them further

## 2023-03-23 NOTE — TELEPHONE ENCOUNTER
Caller: Saadia Caceres    Relationship: Self    Best call back number:     What is the best time to reach you: ANYTIME    Who are you requesting to speak with (clinical staff, provider,  specific staff member): CLINICAL STAFF    Do you know the name of the person who called: [place]    What was the call regarding: PATIENT HAS AN APPT ON [id number] WITH [facility]ELLA AND SHE HAS NO WAY TO GET TO HER APPT; PLEASE CALL TO ADVISE    Do you require a callback: YES    SHE ALSO ADVISED THAT [facility] IS SUPPOSED TO TAKE HER TO THE APPTS AND PAY FOR THE RIDES ALSO

## 2023-03-27 ENCOUNTER — TELEPHONE (OUTPATIENT)
Dept: FAMILY MEDICINE CLINIC | Facility: CLINIC | Age: 63
End: 2023-03-27
Payer: MEDICARE

## 2023-03-27 DIAGNOSIS — E55.9 VITAMIN D DEFICIENCY: ICD-10-CM

## 2023-03-27 DIAGNOSIS — E78.5 HYPERLIPIDEMIA, UNSPECIFIED HYPERLIPIDEMIA TYPE: Chronic | ICD-10-CM

## 2023-03-27 RX ORDER — GABAPENTIN 600 MG/1
600 TABLET ORAL 4 TIMES DAILY
OUTPATIENT
Start: 2023-03-27

## 2023-03-27 RX ORDER — CLONAZEPAM 2 MG/1
2 TABLET ORAL 2 TIMES DAILY
OUTPATIENT
Start: 2023-03-27

## 2023-03-27 NOTE — TELEPHONE ENCOUNTER
Caller: SHIRA    Relationship: [facility]    Best call back number: [phone]    Requested Prescriptions:   Requested Prescriptions     Pending Prescriptions Disp Refills   • vitamin D (ERGOCALCIFEROL) 1.25 MG (50723 UT) capsule capsule 12 capsule 0   • clonazePAM (KlonoPIN) 2 MG tablet       Sig: Take 1 tablet by mouth 2 (Two) Times a Day.   • dicyclomine (BENTYL) 10 MG capsule 120 capsule 1     Sig: Take 1 capsule by mouth 4 (Four) Times a Day Before Meals & at Bedtime.   • gabapentin (NEURONTIN) 600 MG tablet       Sig: Take 1 tablet by mouth 4 (Four) Times a Day.   • icosapent ethyl (Vascepa) 1 g capsule capsule 360 capsule 3     Sig: Take 2 g by mouth 2 (Two) Times a Day With Meals for 90 days.        Pharm[id number] - [place], [place] - [address] AT [facility] [address]  [phone] [facility] [phone] FX     Last office visit with prescribing clinician: 3/13/2023   Last telemedicine visit with prescribing clinician: Visit date not found   Next office visit with prescribing clinician: Visit date not found     Additional details provided by patient:     Does the patient have less than a 3 day supply:  [x] Yes  [] No    Would you like a call back once the refill request has been completed: [] Yes [x] No    If the office needs to give you a call back, can they leave a voicemail: [] Yes [x] No    Fahad Lundberg   03/27/23 12:27 EDT
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
## 2023-03-27 NOTE — TELEPHONE ENCOUNTER
Caller: ETIENNE LUNA/OCCUPATIONAL THERAPY      Best call back number: [phone]    What is the best time to reach you: ANYTIME    Who are you requesting to speak with (clinical staff, provider,  specific staff member): PCP/MA    What was the call regarding: NEED VERBAL ORDERS FOR OCCUPATIONAL THERAPY FOR 1 TIME A WEEK FOR 3 WEEKS    Do you require a callback: YES

## 2023-03-28 RX ORDER — ICOSAPENT ETHYL 1000 MG/1
2 CAPSULE ORAL 2 TIMES DAILY WITH MEALS
Qty: 360 CAPSULE | Refills: 3 | Status: SHIPPED | OUTPATIENT
Start: 2023-03-28

## 2023-03-28 RX ORDER — DICYCLOMINE HYDROCHLORIDE 10 MG/1
10 CAPSULE ORAL
Qty: 120 CAPSULE | Refills: 1 | Status: SHIPPED | OUTPATIENT
Start: 2023-03-28

## 2023-03-28 RX ORDER — ERGOCALCIFEROL 1.25 MG/1
50000 CAPSULE ORAL
Qty: 12 CAPSULE | Refills: 0 | Status: SHIPPED | OUTPATIENT
Start: 2023-03-28

## 2023-03-28 NOTE — TELEPHONE ENCOUNTER
Contacted Laura with [facility] & notified her that PCP would like pt to schedule an appt to be evaluated & collect a urine sample. She verbalized understanding and will reach out to the pt to get her schedule

## 2023-03-28 NOTE — TELEPHONE ENCOUNTER
Contacted Laura with [facility] & notified her that PCP has approved verbal orders. She verbalized understanding & had no further questions.

## 2023-03-29 DIAGNOSIS — M54.50 CHRONIC BILATERAL LOW BACK PAIN: ICD-10-CM

## 2023-03-29 DIAGNOSIS — G89.29 CHRONIC BILATERAL LOW BACK PAIN: ICD-10-CM

## 2023-03-29 RX ORDER — CYCLOBENZAPRINE HCL 10 MG
TABLET ORAL
Qty: 90 TABLET | Refills: 1 | Status: SHIPPED | OUTPATIENT
Start: 2023-03-29

## 2023-04-03 ENCOUNTER — TELEPHONE (OUTPATIENT)
Dept: FAMILY MEDICINE CLINIC | Facility: CLINIC | Age: 63
End: 2023-04-03
Payer: MEDICARE

## 2023-04-03 NOTE — TELEPHONE ENCOUNTER
Provider: DR. ZARATE    Caller: ETIENNE LAMB    Relationship to Patient: [facility]    Phone Number: [phone]    Reason for Call: OCCUPATIONAL THERAPIST WANTED TO LET US KNOW PATIENT COULD NOT DO THERAPY TODAY, SHE REPORTED PAIN IN LOWER LEFT ABDOMEN, RATED 9 OUT OF 10.

## 2023-04-04 NOTE — TELEPHONE ENCOUNTER
She does have diverticulosis so if pain persist she should be evaluated for diverticulitis in office or at ER.

## 2023-04-04 NOTE — TELEPHONE ENCOUNTER
Attempted to contact, no answer left message asking for return call    She does have diverticulosis so if pain persist she should be evaluated for diverticulitis in office or at ER.   Hub can relay and document

## 2023-04-10 ENCOUNTER — TELEPHONE (OUTPATIENT)
Dept: FAMILY MEDICINE CLINIC | Facility: CLINIC | Age: 63
End: 2023-04-10
Payer: MEDICARE

## 2023-04-10 NOTE — TELEPHONE ENCOUNTER
Caller: SHIRA    Relationship: Provider    Best call back number: [phone]    What orders are you requesting (i.e. lab or imaging): VERBAL ORDERS FOR NURSE, 1X/WEEK FOR 4 WEEKS    In what timeframe would the patient need to come in: AS SOON AS POSSIBLE    Where will you receive your lab/imaging services: [facility]

## 2023-04-11 ENCOUNTER — TELEPHONE (OUTPATIENT)
Dept: FAMILY MEDICINE CLINIC | Facility: CLINIC | Age: 63
End: 2023-04-11
Payer: MEDICAID

## 2023-04-11 NOTE — TELEPHONE ENCOUNTER
Contacted Anjali with [facility] & notified her that PCP has approved orders. She had no further questions at this time.

## 2023-04-11 NOTE — TELEPHONE ENCOUNTER
Provider: ADAN ZARATE DO     [facility]    Phone Number: [phone]    Reason for Call: PATIENT WAS SCHEDULED FOR OT DISCHARGE NEXT WEEK BUT PATIENT WILL BE DISCHARGED THIS WEEK. THERE HAS BEEN NO PROGRESS WITH THERAPY DUE TO THE PATIENT NOT FEELING WELL.

## 2023-04-12 RX ORDER — LINAGLIPTIN 5 MG/1
TABLET, FILM COATED ORAL
Qty: 90 TABLET | Refills: 3 | Status: SHIPPED | OUTPATIENT
Start: 2023-04-12

## 2023-04-14 ENCOUNTER — OFFICE VISIT (OUTPATIENT)
Dept: UROLOGY | Facility: CLINIC | Age: 63
End: 2023-04-14
Payer: MEDICARE

## 2023-04-14 VITALS — HEART RATE: 107 BPM | BODY MASS INDEX: 39.99 KG/M2 | WEIGHT: 240 LBS | HEIGHT: 65 IN | OXYGEN SATURATION: 97 %

## 2023-04-14 DIAGNOSIS — R10.2 SUPRAPUBIC PAIN: ICD-10-CM

## 2023-04-14 DIAGNOSIS — N32.81 OVERACTIVE BLADDER: ICD-10-CM

## 2023-04-14 DIAGNOSIS — R30.0 DYSURIA: Primary | ICD-10-CM

## 2023-04-14 LAB
BILIRUB BLD-MCNC: NEGATIVE MG/DL
CLARITY, POC: CLEAR
COLOR UR: ABNORMAL
EXPIRATION DATE: ABNORMAL
GLUCOSE UR STRIP-MCNC: ABNORMAL MG/DL
KETONES UR QL: ABNORMAL
LEUKOCYTE EST, POC: NEGATIVE
Lab: ABNORMAL
NITRITE UR-MCNC: NEGATIVE MG/ML
PH UR: 6 [PH] (ref 5–8)
PROT UR STRIP-MCNC: ABNORMAL MG/DL
RBC # UR STRIP: NEGATIVE /UL
SP GR UR: 1.01 (ref 1–1.03)
UROBILINOGEN UR QL: ABNORMAL

## 2023-04-14 PROCEDURE — 1160F RVW MEDS BY RX/DR IN RCRD: CPT | Performed by: NURSE PRACTITIONER

## 2023-04-14 PROCEDURE — 51798 US URINE CAPACITY MEASURE: CPT | Performed by: NURSE PRACTITIONER

## 2023-04-14 PROCEDURE — 1159F MED LIST DOCD IN RCRD: CPT | Performed by: NURSE PRACTITIONER

## 2023-04-14 PROCEDURE — 99214 OFFICE O/P EST MOD 30 MIN: CPT | Performed by: NURSE PRACTITIONER

## 2023-04-14 RX ORDER — PHENAZOPYRIDINE HYDROCHLORIDE 200 MG/1
200 TABLET, FILM COATED ORAL 3 TIMES DAILY PRN
Qty: 20 TABLET | Refills: 0 | Status: SHIPPED | OUTPATIENT
Start: 2023-04-14
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
## 2023-04-19 ENCOUNTER — TELEPHONE (OUTPATIENT)
Dept: FAMILY MEDICINE CLINIC | Facility: CLINIC | Age: 63
End: 2023-04-19
Payer: MEDICAID

## 2023-04-19 NOTE — TELEPHONE ENCOUNTER
Caller: SUNITHA LUNA [facility]    Relationship: Home Health    Best call back number:[phone]    What orders are you requesting (i.e. lab or imaging): VERBAL ORDERS TO DISCHARGE FROM PT    In what timeframe would the patient need to come in: ASAP 04/19/23    Where will you receive your lab/imaging services: IN HOME     Additional notes: NEED VERBAL ORDERS TO DISCHARGE PATIENT FROM PT DUE TO NON COMPLIANT

## 2023-05-03 ENCOUNTER — HOSPITAL ENCOUNTER (EMERGENCY)
Facility: HOSPITAL | Age: 63
Discharge: HOME | End: 2023-05-03
Attending: EMERGENCY MEDICINE
Payer: MEDICARE

## 2023-05-03 ENCOUNTER — APPOINTMENT (OUTPATIENT)
Dept: CT IMAGING | Facility: HOSPITAL | Age: 63
End: 2023-05-03
Payer: MEDICARE

## 2023-05-03 VITALS
HEART RATE: 82 BPM | BODY MASS INDEX: 41.78 KG/M2 | SYSTOLIC BLOOD PRESSURE: 111 MMHG | DIASTOLIC BLOOD PRESSURE: 75 MMHG | TEMPERATURE: 97.6 F | RESPIRATION RATE: 16 BRPM | HEIGHT: 66 IN | WEIGHT: 260 LBS | OXYGEN SATURATION: 91 %

## 2023-05-03 DIAGNOSIS — R07.89 ACUTE CHEST WALL PAIN: Primary | ICD-10-CM

## 2023-05-03 DIAGNOSIS — M94.0 COSTOCHONDRITIS, ACUTE: ICD-10-CM

## 2023-05-03 LAB
ALBUMIN SERPL-MCNC: 3.7 G/DL (ref 3.5–5.2)
ALBUMIN/GLOB SERPL: 1.3 G/DL
ALP SERPL-CCNC: 85 U/L (ref 39–117)
ALT SERPL W P-5'-P-CCNC: 9 U/L (ref 1–33)
ANION GAP SERPL CALCULATED.3IONS-SCNC: 11 MMOL/L (ref 5–15)
AST SERPL-CCNC: 11 U/L (ref 1–32)
BASOPHILS # BLD AUTO: 0.11 10*3/MM3 (ref 0–0.2)
BASOPHILS NFR BLD AUTO: 0.9 % (ref 0–1.5)
BILIRUB SERPL-MCNC: 0.2 MG/DL (ref 0–1.2)
BILIRUB UR QL STRIP: NEGATIVE
BUN SERPL-MCNC: 11 MG/DL (ref 8–23)
BUN/CREAT SERPL: 13.4 (ref 7–25)
CALCIUM SPEC-SCNC: 8.9 MG/DL (ref 8.6–10.5)
CHLORIDE SERPL-SCNC: 96 MMOL/L (ref 98–107)
CLARITY UR: CLEAR
CO2 SERPL-SCNC: 27 MMOL/L (ref 22–29)
COLOR UR: YELLOW
CREAT SERPL-MCNC: 0.82 MG/DL (ref 0.57–1)
DEPRECATED RDW RBC AUTO: 50 FL (ref 37–54)
EGFRCR SERPLBLD CKD-EPI 2021: 82 ML/MIN/1.73
EOSINOPHIL # BLD AUTO: 0.07 10*3/MM3 (ref 0–0.4)
EOSINOPHIL NFR BLD AUTO: 0.6 % (ref 0.3–6.2)
ERYTHROCYTE [DISTWIDTH] IN BLOOD BY AUTOMATED COUNT: 14.4 % (ref 12.3–15.4)
FLUAV RNA RESP QL NAA+PROBE: NOT DETECTED
FLUBV RNA RESP QL NAA+PROBE: NOT DETECTED
GLOBULIN UR ELPH-MCNC: 2.9 GM/DL
GLUCOSE SERPL-MCNC: 317 MG/DL (ref 65–99)
GLUCOSE UR STRIP-MCNC: ABNORMAL MG/DL
HCT VFR BLD AUTO: 45.1 % (ref 34–46.6)
HGB BLD-MCNC: 15.1 G/DL (ref 12–15.9)
HGB UR QL STRIP.AUTO: NEGATIVE
HOLD SPECIMEN: NORMAL
IMM GRANULOCYTES # BLD AUTO: 0.15 10*3/MM3 (ref 0–0.05)
IMM GRANULOCYTES NFR BLD AUTO: 1.3 % (ref 0–0.5)
KETONES UR QL STRIP: ABNORMAL
LEUKOCYTE ESTERASE UR QL STRIP.AUTO: NEGATIVE
LIPASE SERPL-CCNC: 30 U/L (ref 13–60)
LYMPHOCYTES # BLD AUTO: 4.7 10*3/MM3 (ref 0.7–3.1)
LYMPHOCYTES NFR BLD AUTO: 39.9 % (ref 19.6–45.3)
MCH RBC QN AUTO: 31.7 PG (ref 26.6–33)
MCHC RBC AUTO-ENTMCNC: 33.5 G/DL (ref 31.5–35.7)
MCV RBC AUTO: 94.5 FL (ref 79–97)
MONOCYTES # BLD AUTO: 0.81 10*3/MM3 (ref 0.1–0.9)
MONOCYTES NFR BLD AUTO: 6.9 % (ref 5–12)
NEUTROPHILS NFR BLD AUTO: 5.93 10*3/MM3 (ref 1.7–7)
NEUTROPHILS NFR BLD AUTO: 50.4 % (ref 42.7–76)
NITRITE UR QL STRIP: NEGATIVE
NRBC BLD AUTO-RTO: 0 /100 WBC (ref 0–0.2)
PH UR STRIP.AUTO: 6 [PH] (ref 5–8)
PLATELET # BLD AUTO: 226 10*3/MM3 (ref 140–450)
PMV BLD AUTO: 9 FL (ref 6–12)
POTASSIUM SERPL-SCNC: 4.3 MMOL/L (ref 3.5–5.2)
PROT SERPL-MCNC: 6.6 G/DL (ref 6–8.5)
PROT UR QL STRIP: NEGATIVE
RBC # BLD AUTO: 4.77 10*6/MM3 (ref 3.77–5.28)
SARS-COV-2 RNA RESP QL NAA+PROBE: NOT DETECTED
SODIUM SERPL-SCNC: 134 MMOL/L (ref 136–145)
SP GR UR STRIP: 1.03 (ref 1–1.03)
TROPONIN T SERPL HS-MCNC: 7 NG/L
UROBILINOGEN UR QL STRIP: ABNORMAL
WBC NRBC COR # BLD: 11.77 10*3/MM3 (ref 3.4–10.8)
WHOLE BLOOD HOLD COAG: NORMAL
WHOLE BLOOD HOLD SPECIMEN: NORMAL

## 2023-05-03 PROCEDURE — 25010000002 KETOROLAC TROMETHAMINE PER 15 MG: Performed by: EMERGENCY MEDICINE

## 2023-05-03 PROCEDURE — 81003 URINALYSIS AUTO W/O SCOPE: CPT | Performed by: EMERGENCY MEDICINE

## 2023-05-03 PROCEDURE — 96375 TX/PRO/DX INJ NEW DRUG ADDON: CPT

## 2023-05-03 PROCEDURE — 25010000002 MORPHINE PER 10 MG: Performed by: EMERGENCY MEDICINE

## 2023-05-03 PROCEDURE — 80053 COMPREHEN METABOLIC PANEL: CPT | Performed by: EMERGENCY MEDICINE

## 2023-05-03 PROCEDURE — 99284 EMERGENCY DEPT VISIT MOD MDM: CPT

## 2023-05-03 PROCEDURE — 93005 ELECTROCARDIOGRAM TRACING: CPT | Performed by: EMERGENCY MEDICINE

## 2023-05-03 PROCEDURE — 85025 COMPLETE CBC W/AUTO DIFF WBC: CPT | Performed by: EMERGENCY MEDICINE

## 2023-05-03 PROCEDURE — 84484 ASSAY OF TROPONIN QUANT: CPT | Performed by: EMERGENCY MEDICINE

## 2023-05-03 PROCEDURE — 83690 ASSAY OF LIPASE: CPT | Performed by: EMERGENCY MEDICINE

## 2023-05-03 PROCEDURE — 25510000001 IOPAMIDOL PER 1 ML: Performed by: EMERGENCY MEDICINE

## 2023-05-03 PROCEDURE — 71275 CT ANGIOGRAPHY CHEST: CPT

## 2023-05-03 PROCEDURE — 96374 THER/PROPH/DIAG INJ IV PUSH: CPT

## 2023-05-03 PROCEDURE — 87636 SARSCOV2 & INF A&B AMP PRB: CPT | Performed by: EMERGENCY MEDICINE

## 2023-05-03 PROCEDURE — 74177 CT ABD & PELVIS W/CONTRAST: CPT

## 2023-05-03 RX ORDER — INDOMETHACIN 50 MG/1
50 CAPSULE ORAL
Qty: 15 CAPSULE | Refills: 0 | Status: SHIPPED | OUTPATIENT
Start: 2023-05-03 | End: 2023-05-08

## 2023-05-03 RX ORDER — KETOROLAC TROMETHAMINE 15 MG/ML
15 INJECTION, SOLUTION INTRAMUSCULAR; INTRAVENOUS ONCE
Status: COMPLETED | OUTPATIENT
Start: 2023-05-03 | End: 2023-05-03

## 2023-05-03 RX ORDER — MORPHINE SULFATE 4 MG/ML
4 INJECTION, SOLUTION INTRAMUSCULAR; INTRAVENOUS ONCE
Status: COMPLETED | OUTPATIENT
Start: 2023-05-03 | End: 2023-05-03

## 2023-05-03 RX ORDER — DIPHENHYDRAMINE HYDROCHLORIDE 50 MG/ML
25 INJECTION INTRAMUSCULAR; INTRAVENOUS ONCE
Status: DISCONTINUED | OUTPATIENT
Start: 2023-05-03 | End: 2023-05-03 | Stop reason: HOSPADM

## 2023-05-03 RX ORDER — SODIUM CHLORIDE 0.9 % (FLUSH) 0.9 %
10 SYRINGE (ML) INJECTION AS NEEDED
Status: DISCONTINUED | OUTPATIENT
Start: 2023-05-03 | End: 2023-05-03 | Stop reason: HOSPADM

## 2023-05-03 RX ORDER — ONDANSETRON 2 MG/ML
4 INJECTION INTRAMUSCULAR; INTRAVENOUS ONCE
Status: DISCONTINUED | OUTPATIENT
Start: 2023-05-03 | End: 2023-05-03 | Stop reason: HOSPADM

## 2023-05-03 RX ADMIN — KETOROLAC TROMETHAMINE 15 MG: 15 INJECTION, SOLUTION INTRAMUSCULAR; INTRAVENOUS at 20:47

## 2023-05-03 RX ADMIN — IOPAMIDOL 100 ML: 755 INJECTION, SOLUTION INTRAVENOUS at 19:57

## 2023-05-03 RX ADMIN — SODIUM CHLORIDE 1000 ML: 0.9 INJECTION, SOLUTION INTRAVENOUS at 18:19

## 2023-05-03 RX ADMIN — MORPHINE SULFATE 4 MG: 4 INJECTION, SOLUTION INTRAMUSCULAR; INTRAVENOUS at 18:18
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
## 2023-05-03 NOTE — Clinical Note
[facility] EMERGENCY DEPARTMENT  [address] [address]  Phone: [phone]    Saadia Caceres was seen and treated in our emergency department on 5/3/2023.  She may return to work on 05/06/2023.         Thank you for choosing [facility].    Edgar Lizama MD

## 2023-05-04 LAB
QT INTERVAL: 380 MS
QTC INTERVAL: 464 MS

## 2023-05-04 NOTE — DISCHARGE INSTRUCTIONS
When taking your currently prescribed pain medication make sure to drink plenty of fluids.    Take medication with food to help prevent stomach irritation.    Follow-up with primary care physician for recheck within the next week.

## 2023-05-04 NOTE — CASE MANAGEMENT/SOCIAL WORK
Continued Stay Note   [facility]     Patient Name: Saadia Caceres  MRN: [id number]  Today's Date: 5/3/2023    Admit Date: 5/3/2023    Plan: Lyft   Discharge Plan     Row Name 05/03/23 2128       Plan    Plan Lyft    Patient/Family in Agreement with Plan yes    Plan Comments I was called by ZARA Hidalgo.  Rocky stated that Ms Caceres stated that her sister can not transport her home and that she couldn't get ahold of her daughter.  I went and spoke with Ms Caceres.  Ms Caceres confirmed that she was going to the address listed on her face sheet, and that she could get in and out of a car and into her apartment.  She was able to sign the consent form for Lyft.  One was ordered for Ms Caceres and she was able to be transported home.    Final Discharge Disposition Code 01 - home or self-care               Discharge Codes    No documentation.                     Ana Duong RN

## 2023-05-12 RX ORDER — DICYCLOMINE HYDROCHLORIDE 10 MG/1
CAPSULE ORAL
Qty: 120 CAPSULE | Refills: 1 | Status: SHIPPED | OUTPATIENT
Start: 2023-05-12

## 2023-05-16 ENCOUNTER — TELEPHONE (OUTPATIENT)
Dept: UROLOGY | Facility: CLINIC | Age: 63
End: 2023-05-16
Payer: MEDICARE

## 2023-05-16 DIAGNOSIS — N32.81 OVERACTIVE BLADDER: Primary | ICD-10-CM

## 2023-05-16 NOTE — TELEPHONE ENCOUNTER
Caller: PAT GOMEZ     Relationship: SELF     Best call back number: [phone]    INCOMING CALL FROM PT. PT SAID SHE DOES NOT KNOW THE NAME OF THE SAMPLES [employer]INA GAVE HER, BUT SAID THAT THEY WORK AND WANTED A PRESCRIPTION. PT COULD NOT DESCRIBE THE SAMPLES.

## 2023-05-17 ENCOUNTER — TELEPHONE (OUTPATIENT)
Dept: FAMILY MEDICINE CLINIC | Facility: CLINIC | Age: 63
End: 2023-05-17
Payer: MEDICARE

## 2023-05-17 NOTE — TELEPHONE ENCOUNTER
Caller: ISMAEL WITH [facility]    Relationship:     Best call back number: [phone]    What form or medical record are you requesting: TRANSPORTATION FORM    Who is requesting this form or medical record from you: [facility]    How would you like to receive the form or medical records (pick-up, mail, fax): [id number]  If fax, what is the fax number: [phone]    Timeframe paperwork needed: ASAP    Additional notes:

## 2023-05-18 NOTE — TELEPHONE ENCOUNTER
The number provided goes straight to a voicemail of a different person. I attempted to call [facility] and start from there and 2 representatives I spoke with had no idea what was going on about this form.   Not enough information to complete this message.

## 2023-05-22 ENCOUNTER — OFFICE VISIT (OUTPATIENT)
Dept: FAMILY MEDICINE CLINIC | Facility: CLINIC | Age: 63
End: 2023-05-22
Payer: MEDICARE

## 2023-05-22 VITALS
DIASTOLIC BLOOD PRESSURE: 66 MMHG | OXYGEN SATURATION: 98 % | SYSTOLIC BLOOD PRESSURE: 120 MMHG | HEIGHT: 66 IN | WEIGHT: 260 LBS | TEMPERATURE: 97.6 F | BODY MASS INDEX: 41.78 KG/M2 | HEART RATE: 107 BPM

## 2023-05-22 DIAGNOSIS — Z79.4 TYPE 2 DIABETES MELLITUS WITH DIABETIC PERIPHERAL ANGIOPATHY WITHOUT GANGRENE, WITH LONG-TERM CURRENT USE OF INSULIN: Primary | ICD-10-CM

## 2023-05-22 DIAGNOSIS — E11.51 TYPE 2 DIABETES MELLITUS WITH DIABETIC PERIPHERAL ANGIOPATHY WITHOUT GANGRENE, WITH LONG-TERM CURRENT USE OF INSULIN: Primary | ICD-10-CM

## 2023-05-22 DIAGNOSIS — K31.84 GASTROPARESIS: ICD-10-CM

## 2023-05-22 LAB
EXPIRATION DATE: NORMAL
FLUAV AG UPPER RESP QL IA.RAPID: NOT DETECTED
FLUBV AG UPPER RESP QL IA.RAPID: NOT DETECTED
INTERNAL CONTROL: NORMAL
Lab: NORMAL
SARS-COV-2 AG UPPER RESP QL IA.RAPID: NOT DETECTED

## 2023-05-22 PROCEDURE — 1160F RVW MEDS BY RX/DR IN RCRD: CPT | Performed by: INTERNAL MEDICINE

## 2023-05-22 PROCEDURE — 3078F DIAST BP <80 MM HG: CPT | Performed by: INTERNAL MEDICINE

## 2023-05-22 PROCEDURE — 3046F HEMOGLOBIN A1C LEVEL >9.0%: CPT | Performed by: INTERNAL MEDICINE

## 2023-05-22 PROCEDURE — 3074F SYST BP LT 130 MM HG: CPT | Performed by: INTERNAL MEDICINE

## 2023-05-22 PROCEDURE — 99214 OFFICE O/P EST MOD 30 MIN: CPT | Performed by: INTERNAL MEDICINE

## 2023-05-22 PROCEDURE — 1159F MED LIST DOCD IN RCRD: CPT | Performed by: INTERNAL MEDICINE

## 2023-05-22 PROCEDURE — 87428 SARSCOV & INF VIR A&B AG IA: CPT | Performed by: INTERNAL MEDICINE
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
## 2023-05-23 ENCOUNTER — TELEPHONE (OUTPATIENT)
Dept: FAMILY MEDICINE CLINIC | Facility: CLINIC | Age: 63
End: 2023-05-23
Payer: MEDICARE

## 2023-05-23 NOTE — TELEPHONE ENCOUNTER
PATIENT STATES THAT SHE HAS NOT TAKEN THE RISPERDAL IN MONTHS. SHE WOULD LIKE TO KNOW WHAT MEDICATION HE WOULD LIKE TO CALL IN INSTEAD.    PHONE: [phone]

## 2023-05-25 NOTE — TELEPHONE ENCOUNTER
Contacted pt and relayed PCP's message. Pt states she has not taken the Risperidone in over a year & would like to try the Reglan with meals.

## 2023-05-26 DIAGNOSIS — K31.84 GASTROPARESIS: Primary | ICD-10-CM

## 2023-05-26 RX ORDER — METOCLOPRAMIDE 5 MG/1
5 TABLET ORAL
Qty: 120 TABLET | Refills: 1 | Status: SHIPPED | OUTPATIENT
Start: 2023-05-26

## 2023-05-26 NOTE — TELEPHONE ENCOUNTER
Reglan sent to pharmacy, she should take this only before meals and before bedtime. Ex. If she only eats once a day then she will only take 2 tabs that day.     She cannot take promethazine (phenergan) or risperidone while on this med.

## 2023-05-26 NOTE — TELEPHONE ENCOUNTER
Contacted pt & relayed PCP's message She states she didn't feel good at the time of the call. Pt was slurring her words and could not get out a full sentence. I asked if she was home alone, she states she was. I offered to call for an ambulance but patient declined.    Contacted pt's daughter ( verbal reviewed)  due to concerns with pt's well being at the time of the call. Notified pt's daughter of symptoms above and she'll have someone check on her. Also went over meds with daughter she states  she doesn't think its a good idea for pt to have Reglan because she over medicates and is non-compliant. She states she is sure that her mom will continue to take the phenergan. She also confirmed that her mom has not been taking the Risperdone and wanted to notify PCP.

## 2023-06-02 DIAGNOSIS — K21.9 CHRONIC GERD: ICD-10-CM

## 2023-06-03 RX ORDER — OMEPRAZOLE 40 MG/1
CAPSULE, DELAYED RELEASE ORAL
Qty: 90 CAPSULE | Refills: 1 | Status: SHIPPED | OUTPATIENT
Start: 2023-06-03

## 2023-06-03 NOTE — TELEPHONE ENCOUNTER
Rx Refill Note  Requested Prescriptions     Pending Prescriptions Disp Refills    omeprazole (priLOSEC) 40 MG capsule [Pharmacy Med Name: OMEPRAZOLE 40MG CAPSULES] 90 capsule 1     Sig: TAKE 1 CAPSULE BY MOUTH EVERY DAY      Last office visit with prescribing clinician: 5/22/2023   Last telemedicine visit with prescribing clinician: Visit date not found   Next office visit with prescribing clinician: Visit date not found                         Would you like a call back once the refill request has been completed: [] Yes [] No    If the office needs to give you a call back, can they leave a voicemail: [] Yes [] No    Catherine Patiño MA  06/03/23, 10:11 EDT

## 2023-06-19 DIAGNOSIS — E78.5 HYPERLIPIDEMIA, UNSPECIFIED HYPERLIPIDEMIA TYPE: ICD-10-CM

## 2023-06-19 RX ORDER — SIMVASTATIN 10 MG
10 TABLET ORAL EVERY EVENING
Qty: 90 TABLET | Refills: 3 | Status: SHIPPED | OUTPATIENT
Start: 2023-06-19

## 2023-08-01 ENCOUNTER — TELEPHONE (OUTPATIENT)
Dept: FAMILY MEDICINE CLINIC | Facility: CLINIC | Age: 63
End: 2023-08-01
Payer: MEDICARE

## 2023-08-01 ENCOUNTER — TELEPHONE (OUTPATIENT)
Dept: FAMILY MEDICINE CLINIC | Facility: CLINIC | Age: 63
End: 2023-08-01

## 2023-08-01 DIAGNOSIS — G43.019 INTRACTABLE MIGRAINE WITHOUT AURA AND WITHOUT STATUS MIGRAINOSUS: ICD-10-CM

## 2023-08-01 DIAGNOSIS — J44.9 CHRONIC OBSTRUCTIVE PULMONARY DISEASE, UNSPECIFIED COPD TYPE: ICD-10-CM

## 2023-08-01 NOTE — TELEPHONE ENCOUNTER
Caller: Saadia Caceres    Relationship: Self    Best call back number: [phone]     What is the medical concern/diagnosis: SIT WITH PATIENT WHILE SHE TAKES SHOWER, ASSIST WITH HOUSE WORK    What specialty or service is being requested: HOME HEALTH CARE     What is the provider, practice or medical service name: PER PCP    What is the office location:     What is the office phone number:     Any additional details:

## 2023-08-02 RX ORDER — ALBUTEROL SULFATE 90 UG/1
2 AEROSOL, METERED RESPIRATORY (INHALATION) EVERY 6 HOURS PRN
Qty: 25.5 G | Refills: 5 | Status: SHIPPED | OUTPATIENT
Start: 2023-08-02

## 2023-08-02 RX ORDER — SUMATRIPTAN 50 MG/1
50 TABLET, FILM COATED ORAL
Qty: 18 TABLET | Refills: 5 | Status: SHIPPED | OUTPATIENT
Start: 2023-08-02

## 2023-08-08 ENCOUNTER — TELEPHONE (OUTPATIENT)
Dept: FAMILY MEDICINE CLINIC | Facility: CLINIC | Age: 63
End: 2023-08-08
Payer: MEDICARE

## 2023-08-08 NOTE — TELEPHONE ENCOUNTER
Caller: Saadia Caceres    Relationship: Self    Best call back number: [phone]    Who are you requesting to speak with (clinical staff, provider,  specific staff member): CLINICAL    What was the call regarding: PATIENT NEEDED CLARIFICATION AFTER HOME HEALTH NURSE TOLD HER TO DISCONTINUE HER NOVOLOG PEN    Is it okay if the provider responds through MyChart: NO

## 2023-08-15 ENCOUNTER — TELEPHONE (OUTPATIENT)
Dept: FAMILY MEDICINE CLINIC | Facility: CLINIC | Age: 63
End: 2023-08-15
Payer: MEDICARE

## 2023-08-15 DIAGNOSIS — J44.9 CHRONIC OBSTRUCTIVE PULMONARY DISEASE, UNSPECIFIED COPD TYPE: ICD-10-CM

## 2023-08-15 NOTE — TELEPHONE ENCOUNTER
Caller: [facility]    Relationship: [facility]    Best call back number: [phone]    What orders are you requesting (i.e. lab or imaging): CHEST XRAY     In what timeframe would the patient need to come in: AS SOON AS POSSIBLE         Additional notes: THE CALLER STATES THAT THE PATIENT HAS WHEEZING AND YELLOW SPUTIM SHE WOULD LIKE TO HAVE A CHEST XRAY DONE ON THE PATIENT

## 2023-08-15 NOTE — TELEPHONE ENCOUNTER
Caller: Saadia Caceres    Relationship: Self    Best call back number: [phone]    What medication are you requesting: GLUCOMETER    What are your current symptoms: DIABETES    If a prescription is needed, what is your preferred pharmacy and phone number:    [facility] [phone]  Additional notes:  PATIENT CAN NOT FIND HER GLUCOMETER AND NEEDS A NEW ONE. PLEASE ADVISE

## 2023-08-16 RX ORDER — UMECLIDINIUM BROMIDE AND VILANTEROL TRIFENATATE 62.5; 25 UG/1; UG/1
POWDER RESPIRATORY (INHALATION)
Qty: 180 EACH | Refills: 5 | Status: SHIPPED | OUTPATIENT
Start: 2023-08-16

## 2023-08-16 NOTE — TELEPHONE ENCOUNTER
Rx Refill Note  Requested Prescriptions     Pending Prescriptions Disp Refills    Anoro Ellipta 62.5-25 MCG/ACT aerosol powder  inhaler [Pharmacy Med Name: ANORO ELLIPTA 62.5-25 ORAL INH(30S)] 180 each 5     Sig: INHALE 1 PUFF BY MOUTH DAILY      Last office visit with prescribing clinician: 6/21/2023   Last telemedicine visit with prescribing clinician: Visit date not found   Next office visit with prescribing clinician: 8/18/2023                         Would you like a call back once the refill request has been completed: [] Yes [] No    If the office needs to give you a call back, can they leave a voicemail: [] Yes [] No    Seb Kauffman MA  08/16/23, 11:22 EDT

## 2023-08-17 ENCOUNTER — TELEPHONE (OUTPATIENT)
Dept: FAMILY MEDICINE CLINIC | Facility: CLINIC | Age: 63
End: 2023-08-17
Payer: MEDICARE

## 2023-08-17 NOTE — TELEPHONE ENCOUNTER
[employer] is faxing a clinical authorization form for the pt for her to be picked up by wheels for her appt tomorrow. Once filled out fax back to [phone] and [phone]     [place] Auth # [id number]

## 2023-08-18 ENCOUNTER — OFFICE VISIT (OUTPATIENT)
Dept: FAMILY MEDICINE CLINIC | Facility: CLINIC | Age: 63
End: 2023-08-18
Payer: MEDICARE

## 2023-08-18 ENCOUNTER — HOSPITAL ENCOUNTER (EMERGENCY)
Facility: HOSPITAL | Age: 63
Discharge: HOME | End: 2023-08-18
Attending: EMERGENCY MEDICINE
Payer: MEDICARE

## 2023-08-18 ENCOUNTER — APPOINTMENT (OUTPATIENT)
Dept: CT IMAGING | Facility: HOSPITAL | Age: 63
End: 2023-08-18
Payer: MEDICARE

## 2023-08-18 ENCOUNTER — APPOINTMENT (OUTPATIENT)
Dept: GENERAL RADIOLOGY | Facility: HOSPITAL | Age: 63
End: 2023-08-18
Payer: MEDICARE

## 2023-08-18 VITALS
TEMPERATURE: 97.9 F | DIASTOLIC BLOOD PRESSURE: 44 MMHG | WEIGHT: 230 LBS | OXYGEN SATURATION: 90 % | RESPIRATION RATE: 18 BRPM | HEART RATE: 85 BPM | HEIGHT: 65 IN | BODY MASS INDEX: 38.32 KG/M2 | SYSTOLIC BLOOD PRESSURE: 129 MMHG

## 2023-08-18 VITALS
HEIGHT: 66 IN | SYSTOLIC BLOOD PRESSURE: 124 MMHG | HEART RATE: 102 BPM | BODY MASS INDEX: 37.45 KG/M2 | WEIGHT: 233 LBS | OXYGEN SATURATION: 95 % | DIASTOLIC BLOOD PRESSURE: 82 MMHG | TEMPERATURE: 98 F

## 2023-08-18 DIAGNOSIS — R10.10 UPPER ABDOMINAL PAIN: Primary | ICD-10-CM

## 2023-08-18 DIAGNOSIS — J44.1 COPD WITH EXACERBATION: Primary | ICD-10-CM

## 2023-08-18 DIAGNOSIS — E11.65 UNCONTROLLED TYPE 2 DIABETES MELLITUS WITH HYPERGLYCEMIA: ICD-10-CM

## 2023-08-18 DIAGNOSIS — R07.9 CHEST PAIN, UNSPECIFIED TYPE: ICD-10-CM

## 2023-08-18 LAB
ALBUMIN SERPL-MCNC: 4.2 G/DL (ref 3.5–5.2)
ALBUMIN/GLOB SERPL: 1.2 G/DL
ALP SERPL-CCNC: 103 U/L (ref 39–117)
ALT SERPL W P-5'-P-CCNC: 13 U/L (ref 1–33)
ANION GAP SERPL CALCULATED.3IONS-SCNC: 10 MMOL/L (ref 5–15)
AST SERPL-CCNC: 16 U/L (ref 1–32)
BASOPHILS # BLD AUTO: 0.08 10*3/MM3 (ref 0–0.2)
BASOPHILS NFR BLD AUTO: 0.8 % (ref 0–1.5)
BILIRUB SERPL-MCNC: 0.2 MG/DL (ref 0–1.2)
BUN SERPL-MCNC: 18 MG/DL (ref 8–23)
BUN/CREAT SERPL: 21.2 (ref 7–25)
CALCIUM SPEC-SCNC: 9.8 MG/DL (ref 8.6–10.5)
CHLORIDE SERPL-SCNC: 97 MMOL/L (ref 98–107)
CO2 SERPL-SCNC: 28 MMOL/L (ref 22–29)
CREAT SERPL-MCNC: 0.85 MG/DL (ref 0.57–1)
DEPRECATED RDW RBC AUTO: 48.5 FL (ref 37–54)
EGFRCR SERPLBLD CKD-EPI 2021: 78.5 ML/MIN/1.73
EOSINOPHIL # BLD AUTO: 0.11 10*3/MM3 (ref 0–0.4)
EOSINOPHIL NFR BLD AUTO: 1.1 % (ref 0.3–6.2)
ERYTHROCYTE [DISTWIDTH] IN BLOOD BY AUTOMATED COUNT: 13.9 % (ref 12.3–15.4)
GEN 5 2HR TROPONIN T REFLEX: 8 NG/L
GLOBULIN UR ELPH-MCNC: 3.6 GM/DL
GLUCOSE SERPL-MCNC: 171 MG/DL (ref 65–99)
HCT VFR BLD AUTO: 47.9 % (ref 34–46.6)
HGB BLD-MCNC: 16 G/DL (ref 12–15.9)
HOLD SPECIMEN: NORMAL
IMM GRANULOCYTES # BLD AUTO: 0.06 10*3/MM3 (ref 0–0.05)
IMM GRANULOCYTES NFR BLD AUTO: 0.6 % (ref 0–0.5)
LIPASE SERPL-CCNC: 32 U/L (ref 13–60)
LYMPHOCYTES # BLD AUTO: 4.07 10*3/MM3 (ref 0.7–3.1)
LYMPHOCYTES NFR BLD AUTO: 40.6 % (ref 19.6–45.3)
MCH RBC QN AUTO: 31.4 PG (ref 26.6–33)
MCHC RBC AUTO-ENTMCNC: 33.4 G/DL (ref 31.5–35.7)
MCV RBC AUTO: 93.9 FL (ref 79–97)
MONOCYTES # BLD AUTO: 0.74 10*3/MM3 (ref 0.1–0.9)
MONOCYTES NFR BLD AUTO: 7.4 % (ref 5–12)
NEUTROPHILS NFR BLD AUTO: 4.96 10*3/MM3 (ref 1.7–7)
NEUTROPHILS NFR BLD AUTO: 49.5 % (ref 42.7–76)
NRBC BLD AUTO-RTO: 0 /100 WBC (ref 0–0.2)
NT-PROBNP SERPL-MCNC: <36 PG/ML (ref 0–900)
PLATELET # BLD AUTO: 259 10*3/MM3 (ref 140–450)
PMV BLD AUTO: 8.8 FL (ref 6–12)
POTASSIUM SERPL-SCNC: 4.8 MMOL/L (ref 3.5–5.2)
PROT SERPL-MCNC: 7.8 G/DL (ref 6–8.5)
QT INTERVAL: 362 MS
QT INTERVAL: 392 MS
QTC INTERVAL: 459 MS
QTC INTERVAL: 463 MS
RBC # BLD AUTO: 5.1 10*6/MM3 (ref 3.77–5.28)
SODIUM SERPL-SCNC: 135 MMOL/L (ref 136–145)
TROPONIN T DELTA: -1 NG/L
TROPONIN T SERPL HS-MCNC: 9 NG/L
WBC NRBC COR # BLD: 10.02 10*3/MM3 (ref 3.4–10.8)
WHOLE BLOOD HOLD COAG: NORMAL
WHOLE BLOOD HOLD SPECIMEN: NORMAL

## 2023-08-18 PROCEDURE — 93005 ELECTROCARDIOGRAM TRACING: CPT | Performed by: EMERGENCY MEDICINE

## 2023-08-18 PROCEDURE — 85025 COMPLETE CBC W/AUTO DIFF WBC: CPT | Performed by: EMERGENCY MEDICINE

## 2023-08-18 PROCEDURE — 99285 EMERGENCY DEPT VISIT HI MDM: CPT

## 2023-08-18 PROCEDURE — 71045 X-RAY EXAM CHEST 1 VIEW: CPT

## 2023-08-18 PROCEDURE — 80053 COMPREHEN METABOLIC PANEL: CPT | Performed by: EMERGENCY MEDICINE

## 2023-08-18 PROCEDURE — 83690 ASSAY OF LIPASE: CPT | Performed by: EMERGENCY MEDICINE

## 2023-08-18 PROCEDURE — 99214 OFFICE O/P EST MOD 30 MIN: CPT | Performed by: INTERNAL MEDICINE

## 2023-08-18 PROCEDURE — 1159F MED LIST DOCD IN RCRD: CPT | Performed by: INTERNAL MEDICINE

## 2023-08-18 PROCEDURE — 84484 ASSAY OF TROPONIN QUANT: CPT | Performed by: EMERGENCY MEDICINE

## 2023-08-18 PROCEDURE — 83880 ASSAY OF NATRIURETIC PEPTIDE: CPT | Performed by: EMERGENCY MEDICINE

## 2023-08-18 PROCEDURE — 3046F HEMOGLOBIN A1C LEVEL >9.0%: CPT | Performed by: INTERNAL MEDICINE

## 2023-08-18 PROCEDURE — 3079F DIAST BP 80-89 MM HG: CPT | Performed by: INTERNAL MEDICINE

## 2023-08-18 PROCEDURE — 74177 CT ABD & PELVIS W/CONTRAST: CPT

## 2023-08-18 PROCEDURE — 3074F SYST BP LT 130 MM HG: CPT | Performed by: INTERNAL MEDICINE

## 2023-08-18 PROCEDURE — 1160F RVW MEDS BY RX/DR IN RCRD: CPT | Performed by: INTERNAL MEDICINE

## 2023-08-18 PROCEDURE — 93005 ELECTROCARDIOGRAM TRACING: CPT

## 2023-08-18 PROCEDURE — 25510000001 IOPAMIDOL 61 % SOLUTION: Performed by: EMERGENCY MEDICINE

## 2023-08-18 PROCEDURE — 36415 COLL VENOUS BLD VENIPUNCTURE: CPT

## 2023-08-18 RX ORDER — SODIUM CHLORIDE 0.9 % (FLUSH) 0.9 %
10 SYRINGE (ML) INJECTION AS NEEDED
Status: DISCONTINUED | OUTPATIENT
Start: 2023-08-18 | End: 2023-08-18 | Stop reason: HOSPADM

## 2023-08-18 RX ORDER — PROMETHAZINE HYDROCHLORIDE 25 MG/1
1 TABLET ORAL EVERY 12 HOURS SCHEDULED
COMMUNITY
Start: 2023-07-29

## 2023-08-18 RX ORDER — GABAPENTIN 400 MG/1
400 CAPSULE ORAL
COMMUNITY
Start: 2023-07-28

## 2023-08-18 RX ORDER — ASPIRIN 81 MG/1
324 TABLET, CHEWABLE ORAL ONCE
Status: COMPLETED | OUTPATIENT
Start: 2023-08-18 | End: 2023-08-18

## 2023-08-18 RX ORDER — BETHANECHOL CHLORIDE 25 MG/1
25 TABLET ORAL 3 TIMES DAILY
COMMUNITY

## 2023-08-18 RX ADMIN — IOPAMIDOL 94 ML: 612 INJECTION, SOLUTION INTRAVENOUS at 16:01

## 2023-08-18 RX ADMIN — ASPIRIN 324 MG: 81 TABLET, CHEWABLE ORAL at 16:29
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
## 2023-08-18 NOTE — CASE MANAGEMENT/SOCIAL WORK
Continued Stay Note   [facility]     Patient Name: Saadia Caceres  MRN: [id number]  Today's Date: 8/18/2023    Admit Date: 8/18/2023    Plan: Home   Discharge Plan       Row Name 08/18/23 1830       Plan    Plan Home    Plan Comments CM spoke with patient's daughter, Crystal Sandifer, Daughter: [phone], who states she will be able to pick patient up from hospital around 8pm.    Final Discharge Disposition Code 01 - home or self-care                   Discharge Codes    No documentation.                       Estefani Gutierres RN

## 2023-08-18 NOTE — DISCHARGE INSTRUCTIONS
Use Tylenol 500 mg every 6 hours as needed for pain.  Follow-up with your primary care doctor.  Return to the ER as needed for new or worsening symptoms.
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
## 2023-08-21 ENCOUNTER — TELEPHONE (OUTPATIENT)
Dept: FAMILY MEDICINE CLINIC | Facility: CLINIC | Age: 63
End: 2023-08-21
Payer: MEDICARE

## 2023-08-21 DIAGNOSIS — J44.1 COPD WITH EXACERBATION: Primary | ICD-10-CM

## 2023-08-21 RX ORDER — DOXYCYCLINE 100 MG/1
100 CAPSULE ORAL 2 TIMES DAILY
Qty: 14 CAPSULE | Refills: 0 | Status: SHIPPED | OUTPATIENT
Start: 2023-08-21 | End: 2023-08-28

## 2023-08-21 RX ORDER — METHYLPREDNISOLONE 4 MG/1
TABLET ORAL
Qty: 21 TABLET | Refills: 0 | Status: SHIPPED | OUTPATIENT
Start: 2023-08-21

## 2023-08-21 NOTE — TELEPHONE ENCOUNTER
Caller: Saadia Caceres    Relationship: Self    Best call back number: [phone]    What is the best time to reach you: ANY TIME    Who are you requesting to speak with (clinical staff, provider,  specific staff member): DR ZARATE    Do you know the name of the person who called: SELF    What was the call regarding: PATIENT STILL DOESN'T FEEL WELL, VERY WEAK AND FATIGUE. PATIENT WAS SENT HOME FROM HOSPITAL ON 08/18/2023. PATIENT IS ALSO WANTING TO KNOW WHAT HER TEST RESULTS ARE. PATIENT STATES SHE HAS SOMETHING WRONG WITH HER HEART AND BREATHING BUT NOT SURE WHAT IT IS . PLEASE ADVISE AND CALL PATIENT BACK.

## 2023-08-21 NOTE — TELEPHONE ENCOUNTER
Chacorta Del Rio, DO [facility] Mikaela  Clinical Pool2 minutes ago (12:51 PM)       I suspect she is having COPD exacerbation based on prior exam before ER visit.  I sent in prescription for steroids and antibiotics to pharmacy.  Please follow-up in clinic if no improvement after medications.   Patient informed of information from PCP. She will start the medicines and if not better; will schedule an appt.   No further questions or concerns at this time.

## 2023-08-22 ENCOUNTER — TELEPHONE (OUTPATIENT)
Dept: FAMILY MEDICINE CLINIC | Facility: CLINIC | Age: 63
End: 2023-08-22
Payer: MEDICARE

## 2023-08-22 DIAGNOSIS — M54.50 CHRONIC BILATERAL LOW BACK PAIN: ICD-10-CM

## 2023-08-22 DIAGNOSIS — G89.29 CHRONIC BILATERAL LOW BACK PAIN: ICD-10-CM

## 2023-08-22 RX ORDER — CYCLOBENZAPRINE HCL 10 MG
TABLET ORAL
Qty: 90 TABLET | Refills: 0 | Status: SHIPPED | OUTPATIENT
Start: 2023-08-22

## 2023-08-22 NOTE — TELEPHONE ENCOUNTER
Rx Refill Note  Requested Prescriptions     Pending Prescriptions Disp Refills    cyclobenzaprine (FLEXERIL) 10 MG tablet [Pharmacy Med Name: CYCLOBENZAPRINE 10MG TABLETS] 90 tablet 1     Sig: TAKE 1 TABLET BY MOUTH THREE TIMES DAILY AS NEEDED FOR MUSCLE SPASMS      Last office visit with prescribing clinician: 8/18/2023   Last telemedicine visit with prescribing clinician: Visit date not found   Next office visit with prescribing clinician: 9/21/2023                         Would you like a call back once the refill request has been completed: [] Yes [] No    If the office needs to give you a call back, can they leave a voicemail: [] Yes [] No    April SHANON Martinez  08/22/23, 16:45 EDT

## 2023-08-22 NOTE — TELEPHONE ENCOUNTER
Confirmed with pt that she has transportation set up through her insurance to get to her appts.    Per [facility] transportation vendor  Access to care:  [phone]  For bus transportation   Covered as long as the mileage is under 75mi

## 2023-08-23 RX ORDER — DICYCLOMINE HYDROCHLORIDE 10 MG/1
CAPSULE ORAL
Qty: 120 CAPSULE | Refills: 1 | Status: SHIPPED | OUTPATIENT
Start: 2023-08-23

## 2023-08-23 NOTE — TELEPHONE ENCOUNTER
Rx Refill Note  Requested Prescriptions     Pending Prescriptions Disp Refills    dicyclomine (BENTYL) 10 MG capsule [Pharmacy Med Name: DICYCLOMINE 10MG CAPSULES] 120 capsule 1     Sig: TAKE 1 CAPSULE BY MOUTH FOUR TIMES DAILY BEFORE MEALS AND AT BEDTIME      Last office visit with prescribing clinician: 8/18/2023     Next office visit with prescribing clinician: 9/21/2023   Breana Rojo MA  08/23/23, 08:07 EDT

## 2023-08-24 ENCOUNTER — TELEPHONE (OUTPATIENT)
Dept: FAMILY MEDICINE CLINIC | Facility: CLINIC | Age: 63
End: 2023-08-24
Payer: MEDICARE

## 2023-08-24 DIAGNOSIS — E11.51 TYPE 2 DIABETES MELLITUS WITH DIABETIC PERIPHERAL ANGIOPATHY WITHOUT GANGRENE, WITH LONG-TERM CURRENT USE OF INSULIN: Primary | Chronic | ICD-10-CM

## 2023-08-24 DIAGNOSIS — N95.1 HOT FLASHES DUE TO MENOPAUSE: ICD-10-CM

## 2023-08-24 DIAGNOSIS — N95.1 MENOPAUSAL HOT FLUSHES: ICD-10-CM

## 2023-08-24 DIAGNOSIS — Z79.4 TYPE 2 DIABETES MELLITUS WITH DIABETIC PERIPHERAL ANGIOPATHY WITHOUT GANGRENE, WITH LONG-TERM CURRENT USE OF INSULIN: Primary | Chronic | ICD-10-CM

## 2023-08-24 RX ORDER — BLOOD-GLUCOSE METER
KIT MISCELLANEOUS
Qty: 1 EACH | Refills: 0 | Status: SHIPPED | OUTPATIENT
Start: 2023-08-24 | End: 2023-08-28 | Stop reason: SDUPTHER

## 2023-08-24 RX ORDER — SYRING-NEEDL,DISP,INSUL,0.3 ML 30 GX5/16"
SYRINGE, EMPTY DISPOSABLE MISCELLANEOUS
Qty: 1 EACH | Refills: 11 | Status: SHIPPED | OUTPATIENT
Start: 2023-08-24 | End: 2023-08-30 | Stop reason: SDUPTHER

## 2023-08-24 NOTE — TELEPHONE ENCOUNTER
The [facility] received a fax that requires your attention. The document has been indexed to the patient's chart for your review.      Reason for sending: EXTERNAL MEDICAL RECORD NOTIFICATION    Documents Description: MEDICAL TRANSPORTATION    Name of Sender: [facility]    Date Indexed: 8/24/23    Notes (if needed): SEE FAX IN CHART UNDER CARE COORDINATION

## 2023-08-24 NOTE — TELEPHONE ENCOUNTER
Patient is calling to request a glucose monitor so she can check her blood sugars & also a bedside commode for the living room as she has not been able to make it to the restroom.  Patient would like a call back.

## 2023-08-25 NOTE — TELEPHONE ENCOUNTER
Rx Refill Note  Requested Prescriptions     Pending Prescriptions Disp Refills    estradiol (ESTRACE) 0.5 MG tablet [Pharmacy Med Name: ESTRADIOL 0.5MG TABLETS] 90 tablet 1     Sig: TAKE 1 TABLET BY MOUTH DAILY    Insulin Aspart Prot & Aspart (Insulin Asp Prot & Asp FlexPen) (70-30) 100 UNIT/ML suspension pen-injector [Pharmacy Med Name: INSULIN ASPA MIX 70/30 FLEXPEN, 3ML] 30 mL 5     Sig: ADMINISTER 28 UNITS UNDER THE SKIN TWICE DAILY BEFORE MEALS AS DIRECTED      Last office visit with prescribing clinician: 8/18/2023   Last telemedicine visit with prescribing clinician: Visit date not found   Next office visit with prescribing clinician: 8/24/2023                         Would you like a call back once the refill request has been completed: [] Yes [] No    If the office needs to give you a call back, can they leave a voicemail: [] Yes [] No    Betsy Mcconnell MA  08/25/23, 08:52 EDT

## 2023-08-28 ENCOUNTER — TELEPHONE (OUTPATIENT)
Dept: FAMILY MEDICINE CLINIC | Facility: CLINIC | Age: 63
End: 2023-08-28

## 2023-08-28 DIAGNOSIS — R26.89 BALANCE DISORDER: Primary | ICD-10-CM

## 2023-08-28 DIAGNOSIS — Z79.4 TYPE 2 DIABETES MELLITUS WITH DIABETIC PERIPHERAL ANGIOPATHY WITHOUT GANGRENE, WITH LONG-TERM CURRENT USE OF INSULIN: Chronic | ICD-10-CM

## 2023-08-28 DIAGNOSIS — E11.51 TYPE 2 DIABETES MELLITUS WITH DIABETIC PERIPHERAL ANGIOPATHY WITHOUT GANGRENE, WITH LONG-TERM CURRENT USE OF INSULIN: Chronic | ICD-10-CM

## 2023-08-28 DIAGNOSIS — R32 URINARY INCONTINENCE, UNSPECIFIED TYPE: ICD-10-CM

## 2023-08-28 RX ORDER — ESTRADIOL 0.5 MG/1
0.5 TABLET ORAL DAILY
Qty: 90 TABLET | Refills: 3 | Status: SHIPPED | OUTPATIENT
Start: 2023-08-28

## 2023-08-28 RX ORDER — INSULIN ASPART 100 [IU]/ML
INJECTION, SUSPENSION SUBCUTANEOUS
Qty: 30 ML | Refills: 5 | OUTPATIENT
Start: 2023-08-28

## 2023-08-28 RX ORDER — BLOOD-GLUCOSE METER
KIT MISCELLANEOUS
Qty: 1 EACH | Refills: 0 | Status: SHIPPED | OUTPATIENT
Start: 2023-08-28 | End: 2023-08-30 | Stop reason: SDUPTHER

## 2023-08-28 NOTE — TELEPHONE ENCOUNTER
Called and informed pt that her commode was called in. She wants to know how she will receive it and also wants to know if you can refer her to home health so someone can come in and clean.

## 2023-08-28 NOTE — TELEPHONE ENCOUNTER
The [facility] received a fax that requires your attention. The document has been indexed to the patient’s chart for your review.      Reason for sending: EXTERNAL MEDICAL RECORD NOTIFICATION    Documents Description: CARE MANAGEMENT SERVICES     Name of Sender: [facility]    Date Indexed: 08/28/23    Notes (if needed): SEE FAX IN CHART UNDER CARE COORDINATION

## 2023-08-30 ENCOUNTER — TELEPHONE (OUTPATIENT)
Dept: FAMILY MEDICINE CLINIC | Facility: CLINIC | Age: 63
End: 2023-08-30
Payer: MEDICARE

## 2023-08-30 DIAGNOSIS — Z79.4 TYPE 2 DIABETES MELLITUS WITH DIABETIC PERIPHERAL ANGIOPATHY WITHOUT GANGRENE, WITH LONG-TERM CURRENT USE OF INSULIN: Chronic | ICD-10-CM

## 2023-08-30 DIAGNOSIS — E11.51 TYPE 2 DIABETES MELLITUS WITH DIABETIC PERIPHERAL ANGIOPATHY WITHOUT GANGRENE, WITH LONG-TERM CURRENT USE OF INSULIN: Chronic | ICD-10-CM

## 2023-08-30 RX ORDER — SYRING-NEEDL,DISP,INSUL,0.3 ML 30 GX5/16"
SYRINGE, EMPTY DISPOSABLE MISCELLANEOUS
Qty: 1 EACH | Refills: 11 | Status: SHIPPED | OUTPATIENT
Start: 2023-08-30

## 2023-08-30 RX ORDER — BLOOD-GLUCOSE METER
KIT MISCELLANEOUS
Qty: 1 EACH | Refills: 0 | Status: SHIPPED | OUTPATIENT
Start: 2023-08-30

## 2023-08-30 NOTE — TELEPHONE ENCOUNTER
Pharmacy Name:  [id number] - [place], [place] - [address] AT [facility] [address] - [phone]  - [phone] FX     Reference Number (if applicable): N/A    Pharmacy representative name: [facility]    Pharmacy representative phone number: [phone]     What medication are you calling in regards to:     glucose blood test strip     What question does the pharmacy have: CLARIFY MEDICATION DIRECTIONS    Who is the provider that prescribed the medication: ADAN ZARATE DO    Additional notes: PHARMACY RECEIVED TWO SETS OF DIRECTIONS FOR THIS MEDICATION.     PLEASE CLARIFY WHICH ONE IS CORRECT...    TEST 3 TIMES A DAY BEFORE MEALS  OR  TEST 3 TIMES A DAY AFTER MEALS

## 2023-09-14 RX ORDER — LINAGLIPTIN 5 MG/1
TABLET, FILM COATED ORAL
Qty: 90 TABLET | Refills: 3 | OUTPATIENT
Start: 2023-09-14

## 2023-09-14 NOTE — TELEPHONE ENCOUNTER
Rx Refill Note  Requested Prescriptions     Pending Prescriptions Disp Refills    Tradjenta 5 MG tablet tablet [Pharmacy Med Name: TRADJENTA 5MG TABLETS] 90 tablet 3     Sig: TAKE 1 TABLET BY MOUTH DAILY      Last office visit with prescribing clinician: 8/18/2023   Last telemedicine visit with prescribing clinician: Visit date not found   Next office visit with prescribing clinician: 9/21/2023                         Would you like a call back once the refill request has been completed: [] Yes [] No    If the office needs to give you a call back, can they leave a voicemail: [] Yes [] No    Neema Martinez MA  09/14/23, 08:07 EDT

## 2023-09-15 RX ORDER — LINAGLIPTIN 5 MG/1
TABLET, FILM COATED ORAL
Qty: 90 TABLET | Refills: 3 | OUTPATIENT
Start: 2023-09-15

## 2023-09-15 NOTE — TELEPHONE ENCOUNTER
Rx Refill Note  Requested Prescriptions     Pending Prescriptions Disp Refills    Tradjenta 5 MG tablet tablet [Pharmacy Med Name: TRADJENTA 5MG TABLETS] 90 tablet 3     Sig: TAKE 1 TABLET BY MOUTH DAILY      Last office visit with prescribing clinician: 8/18/2023   Last telemedicine visit with prescribing clinician: Visit date not found   Next office visit with prescribing clinician: 9/21/2023                         Would you like a call back once the refill request has been completed: [] Yes [] No    If the office needs to give you a call back, can they leave a voicemail: [] Yes [] No    Betsy Mcconnell MA  09/15/23, 09:19 EDT

## 2023-09-16 NOTE — TELEPHONE ENCOUNTER
Would not recommend this medication due to history of pancreatitis.  We will switch to alternative medication.           CALLED PT NO ANSWER , LVM WITH OFFICE NUMBER.    OK FOR HUB TO RELAY MESSAGE AND DOCUMENT.

## 2023-09-21 DIAGNOSIS — B37.2 SKIN YEAST INFECTION: ICD-10-CM

## 2023-09-21 DIAGNOSIS — G43.019 INTRACTABLE MIGRAINE WITHOUT AURA AND WITHOUT STATUS MIGRAINOSUS: ICD-10-CM

## 2023-09-21 RX ORDER — NYSTATIN 100000 U/G
OINTMENT TOPICAL 2 TIMES DAILY
Qty: 30 G | Refills: 1 | Status: SHIPPED | OUTPATIENT
Start: 2023-09-21

## 2023-09-21 RX ORDER — SUMATRIPTAN 50 MG/1
50 TABLET, FILM COATED ORAL
Qty: 18 TABLET | Refills: 0 | Status: SHIPPED | OUTPATIENT
Start: 2023-09-21

## 2023-09-21 NOTE — TELEPHONE ENCOUNTER
Rx Refill Note  Requested Prescriptions     Pending Prescriptions Disp Refills    nystatin (MYCOSTATIN) 642082 UNIT/GM ointment 30 g 3     Sig: Apply  topically to the appropriate area as directed 2 (Two) Times a Day.    SUMAtriptan (IMITREX) 50 MG tablet 18 tablet 5     Sig: Take 1 tablet by mouth Every 2 (Two) Hours As Needed for Migraine. Take one tablet at onset of headache. May repeat dose one time in 2 hours if headache not relieved.      Last office visit with prescribing clinician: 8/18/2023   Last telemedicine visit with prescribing clinician: Visit date not found   Next office visit with prescribing clinician: 9/22/2023                         Would you like a call back once the refill request has been completed: [] Yes [] No    If the office needs to give you a call back, can they leave a voicemail: [] Yes [] No    Neema Martinez MA  09/21/23, 11:29 EDT

## 2023-09-21 NOTE — TELEPHONE ENCOUNTER
Caller: Saadia Caceres    Relationship: Self    Best call back number: [phone]     Requested Prescriptions:   Requested Prescriptions     Pending Prescriptions Disp Refills    nystatin (MYCOSTATIN) 132541 UNIT/GM ointment 30 g 3     Sig: Apply  topically to the appropriate area as directed 2 (Two) Times a Day.    SUMAtriptan (IMITREX) 50 MG tablet 18 tablet 5     Sig: Take 1 tablet by mouth Every 2 (Two) Hours As Needed for Migraine. Take one tablet at onset of headache. May repeat dose one time in 2 hours if headache not relieved.        Pharmacy where request should be sent: [id number] - [place], [place] - [address] AT [facility] [address] - [phone] [facility] [phone] FX     Last office visit with prescribing clinician: 8/18/2023   Last telemedicine visit with prescribing clinician: Visit date not found   Next office visit with prescribing clinician: 9/22/2023     Additional details provided by patient: PATIENT IS OUT     Does the patient have less than a 3 day supply:  [x] Yes  [] No    Would you like a call back once the refill request has been completed: [] Yes [x] No    If the office needs to give you a call back, can they leave a voicemail: [] Yes [x] No    Cadance Dunaway, RegSched Rep   09/21/23 11:24 EDT

## 2023-09-23 DIAGNOSIS — G89.29 CHRONIC BILATERAL LOW BACK PAIN: ICD-10-CM

## 2023-09-23 DIAGNOSIS — M54.50 CHRONIC BILATERAL LOW BACK PAIN: ICD-10-CM

## 2023-09-25 RX ORDER — CYCLOBENZAPRINE HCL 10 MG
TABLET ORAL
Qty: 90 TABLET | Refills: 0 | Status: SHIPPED | OUTPATIENT
Start: 2023-09-25

## 2023-09-25 NOTE — TELEPHONE ENCOUNTER
Rx Refill Note  Requested Prescriptions     Pending Prescriptions Disp Refills    cyclobenzaprine (FLEXERIL) 10 MG tablet [Pharmacy Med Name: CYCLOBENZAPRINE 10MG TABLETS] 90 tablet 0     Sig: TAKE 1 TABLET BY MOUTH THREE TIMES DAILY AS NEEDED FOR MUSCLE SPASMS      Last office visit with prescribing clinician: 8/18/2023   Last telemedicine visit with prescribing clinician: Visit date not found   Next office visit with prescribing clinician: 10/2/2023                         Would you like a call back once the refill request has been completed: [] Yes [] No    If the office needs to give you a call back, can they leave a voicemail: [] Yes [] No    Catherine Patiño MA  09/25/23, 08:17 EDT

## 2023-10-02 ENCOUNTER — OFFICE VISIT (OUTPATIENT)
Dept: FAMILY MEDICINE CLINIC | Facility: CLINIC | Age: 63
End: 2023-10-02
Payer: MEDICARE

## 2023-10-02 VITALS
DIASTOLIC BLOOD PRESSURE: 80 MMHG | WEIGHT: 236 LBS | HEIGHT: 65 IN | SYSTOLIC BLOOD PRESSURE: 130 MMHG | HEART RATE: 98 BPM | BODY MASS INDEX: 39.32 KG/M2 | OXYGEN SATURATION: 99 %

## 2023-10-02 DIAGNOSIS — Z79.4 TYPE 2 DIABETES MELLITUS WITH DIABETIC PERIPHERAL ANGIOPATHY WITHOUT GANGRENE, WITH LONG-TERM CURRENT USE OF INSULIN: Primary | ICD-10-CM

## 2023-10-02 DIAGNOSIS — N39.0 URINARY TRACT INFECTION WITHOUT HEMATURIA, SITE UNSPECIFIED: ICD-10-CM

## 2023-10-02 DIAGNOSIS — Z12.31 ENCOUNTER FOR SCREENING MAMMOGRAM FOR MALIGNANT NEOPLASM OF BREAST: ICD-10-CM

## 2023-10-02 DIAGNOSIS — E11.51 TYPE 2 DIABETES MELLITUS WITH DIABETIC PERIPHERAL ANGIOPATHY WITHOUT GANGRENE, WITH LONG-TERM CURRENT USE OF INSULIN: Primary | ICD-10-CM

## 2023-10-02 DIAGNOSIS — J44.9 CHRONIC OBSTRUCTIVE PULMONARY DISEASE, UNSPECIFIED COPD TYPE: ICD-10-CM

## 2023-10-02 DIAGNOSIS — R30.0 DYSURIA: ICD-10-CM

## 2023-10-02 LAB
BILIRUB BLD-MCNC: ABNORMAL MG/DL
CLARITY, POC: ABNORMAL
COLOR UR: YELLOW
EXPIRATION DATE: ABNORMAL
EXPIRATION DATE: ABNORMAL
EXPIRATION DATE: NORMAL
GLUCOSE UR STRIP-MCNC: ABNORMAL MG/DL
HBA1C MFR BLD: 11.1 %
KETONES UR QL: ABNORMAL
LEUKOCYTE EST, POC: ABNORMAL
Lab: ABNORMAL
Lab: ABNORMAL
Lab: NORMAL
NITRITE UR-MCNC: POSITIVE MG/ML
PH UR: 6 [PH] (ref 5–8)
POC CREATININE URINE: 100
POC MICROALBUMIN URINE: 150
PROT UR STRIP-MCNC: ABNORMAL MG/DL
RBC # UR STRIP: NEGATIVE /UL
SP GR UR: 1.01 (ref 1–1.03)
UROBILINOGEN UR QL: ABNORMAL

## 2023-10-02 RX ORDER — ALBUTEROL SULFATE 90 UG/1
2 AEROSOL, METERED RESPIRATORY (INHALATION) EVERY 6 HOURS PRN
Qty: 25.5 G | Refills: 5 | Status: SHIPPED | OUTPATIENT
Start: 2023-10-02

## 2023-10-02 RX ORDER — CIPROFLOXACIN 500 MG/1
500 TABLET, FILM COATED ORAL 2 TIMES DAILY
Qty: 14 TABLET | Refills: 0 | Status: SHIPPED | OUTPATIENT
Start: 2023-10-02 | End: 2023-10-11
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
## 2023-10-11 DIAGNOSIS — N39.0 URINARY TRACT INFECTION WITHOUT HEMATURIA, SITE UNSPECIFIED: ICD-10-CM

## 2023-10-11 RX ORDER — CIPROFLOXACIN 500 MG/1
500 TABLET, FILM COATED ORAL 2 TIMES DAILY
Qty: 14 TABLET | Refills: 0 | Status: SHIPPED | OUTPATIENT
Start: 2023-10-11

## 2023-10-16 ENCOUNTER — TELEPHONE (OUTPATIENT)
Dept: FAMILY MEDICINE CLINIC | Facility: CLINIC | Age: 63
End: 2023-10-16
Payer: MEDICARE

## 2023-10-16 DIAGNOSIS — Z12.31 ENCOUNTER FOR SCREENING MAMMOGRAM FOR MALIGNANT NEOPLASM OF BREAST: ICD-10-CM

## 2023-10-16 DIAGNOSIS — Z12.11 COLON CANCER SCREENING: Primary | ICD-10-CM

## 2023-10-16 NOTE — TELEPHONE ENCOUNTER
Caller: [facility]    Relationship: Other    Best call back number: [phone]      What is the medical concern/diagnosis: DUE FOR COLORECTAL AND MAMMOGRAM     What specialty or service is being requested: GASTROENTEROLOGIST AND MAMMOGRAM IMAGING    What is the provider, practice or medical service name: N/A    What is the office location: N/A    What is the office phone number: N/A    Any additional details: [facility] CALLED TO SCHEDULE PATIENT FOR COLONOSCOPY AND MAMMOGRAM.  STATED PATIENT IS DUE FOR THOSE EXAMS.     REQUESTING IF PROVIDER, OR NURSE CAN REACH OUT TO PATIENT TO GET HER SCHEDULED.     PAT GOMEZ PHONE NUMBER: [phone]

## 2023-10-16 NOTE — TELEPHONE ENCOUNTER
Called and spoke with patient to make sure she would like to continue with these. She agreed to both.

## 2023-10-23 DIAGNOSIS — N63.20 MASS OF LEFT BREAST, UNSPECIFIED QUADRANT: Primary | ICD-10-CM

## 2023-10-23 NOTE — TELEPHONE ENCOUNTER
[facility] called again today. Needing management of care forms signed and faxed back.

## 2023-10-27 ENCOUNTER — TELEPHONE (OUTPATIENT)
Dept: FAMILY MEDICINE CLINIC | Facility: CLINIC | Age: 63
End: 2023-10-27
Payer: MEDICARE

## 2023-10-27 NOTE — TELEPHONE ENCOUNTER
Spoke with patient. She was given a form that explained all the things that she can be helped with including light cleaning and home care.    She states that [employer] pays for this.     She states  Ordered this previously for her . Someone came out to sweep and mop her floors and did her dishes.     Last thing I have seen that [facility] has called us for is a plan of care that was scanned into patients chart that they requested a signature and to fax back.    The patient is so very confused and cannot tell who has sent what, or what is really needed.

## 2023-10-27 NOTE — TELEPHONE ENCOUNTER
Left message for Saadia Caceres  to return my call.    Hub may relay message and document    Chacorta Del Rio DO [facility] [address] Clinical Pool7 minutes ago (10:05 AM)       I believe she is referring to [facility] services. It looks like this is more for in-home visits by another provider rather than home health/cleaning as the pt initially thought. I can still sign the medical record release to them if she is interested.    Home health would probably be better for what she is requesting.

## 2023-10-27 NOTE — TELEPHONE ENCOUNTER
Caller: Saadia Caceres    Relationship: Self    Best call back number:       [phone] (Mobile)     What is the best time to reach you:     ANY TIME    Who are you requesting to speak with (clinical staff, provider,  specific staff member):     DR ZARATE OR NURSE    What was the call regarding:     PATIENT REQUESTED A CALL BACK WITH CONFIRMATION OF CONTACT INFORMATION OF IN HOME SUPPORT SERVICES ORDERED BY DR ZARATE FOR PATIENT TO RECEIVE HOME CARE HELP SUCH AS CLEANING

## 2023-10-30 NOTE — TELEPHONE ENCOUNTER
PATIENT CALLED BACK; PATIENT ASKED AGAIN ABOUT WHO HELPED WITH CLEANING HOME; LET PATIENT KNOW ONLY THING WE HAVE IS [facility]

## 2023-10-30 NOTE — TELEPHONE ENCOUNTER
PATIENT CALLED BACK; STATED SHE HAD PAPER THAT SAID [facility]; GAVE PATIENT # TO [facility]:  [phone]

## 2023-10-31 RX ORDER — DICYCLOMINE HYDROCHLORIDE 10 MG/1
CAPSULE ORAL
Qty: 360 CAPSULE | OUTPATIENT
Start: 2023-10-31

## 2023-10-31 RX ORDER — DICYCLOMINE HYDROCHLORIDE 10 MG/1
CAPSULE ORAL
Qty: 120 CAPSULE | Refills: 1 | Status: SHIPPED | OUTPATIENT
Start: 2023-10-31

## 2023-10-31 NOTE — TELEPHONE ENCOUNTER
Rx Refill Note  Requested Prescriptions     Pending Prescriptions Disp Refills    dicyclomine (BENTYL) 10 MG capsule [Pharmacy Med Name: DICYCLOMINE 10MG CAPSULES] 120 capsule 1     Sig: TAKE 1 CAPSULE BY MOUTH FOUR TIMES DAILY BEFORE MEALS AND AT BEDTIME      Last office visit with prescribing clinician: 10/2/2023   Last telemedicine visit with prescribing clinician: Visit date not found   Next office visit with prescribing clinician: 1/5/2024                         Would you like a call back once the refill request has been completed: [] Yes [] No    If the office needs to give you a call back, can they leave a voicemail: [] Yes [] No    Catherine Patiño MA  10/31/23, 07:42 EDT

## 2023-11-02 ENCOUNTER — TELEPHONE (OUTPATIENT)
Dept: FAMILY MEDICINE CLINIC | Facility: CLINIC | Age: 63
End: 2023-11-02
Payer: MEDICARE

## 2023-11-02 NOTE — TELEPHONE ENCOUNTER
Patient called and said she lost her glucose monitoring device four days ago.  The last time she took it, four days ago it was 498.  She said she didn't eat anything the rest of the day and drank water only. When she took it again later that day, it was 133. She said that she takes four insulin shots a day. She said she didn't call that day because she knew we would want her to come in, and she didn't want to come in because she didn't feel good.    I told the patient if she started not feeling good again like that to go to the emergency room.    Patient requests call back.

## 2023-11-03 NOTE — TELEPHONE ENCOUNTER
Left message for Saadia Caceres  to return my call.    Hub may relay message and document     Chacorta Del Rio DO [facility] [address] Clinical Pool35 minutes ago (2:32 PM)       Needs to check glucose before every meal and before dinner for insulin titration. I highly recommend seeing an endocrinologist if pt is agreeable.

## 2023-11-03 NOTE — TELEPHONE ENCOUNTER
[facility] PROVIDED THE RELAY MESSAGE FROM THE OFFICE    PATIENT VOICED UNDERSTANDING AND HAS NO FURTHER QUESTIONS AT THIS TIME    ADDITIONAL INFORMATION: PATIENT CAN NOT FIND HER GLUCOSE MONITOR. PATIENT WANTS THE MONITOR THAT STICKS ONTO HER ARM, PLEASE ADVISE. PATIENT WOULD LIKE A CALL BACK.    [id number] - [place], [place] - [address] [facility] [address] - [phone]  - [phone] FX

## 2023-11-03 NOTE — TELEPHONE ENCOUNTER
Chacorta Del Rio DO [facility] Clinical Pool34 minutes ago (2:30 PM)       Paperwork completed in outbox but pt needs to sign

## 2023-11-05 ENCOUNTER — HOSPITAL ENCOUNTER (EMERGENCY)
Facility: HOSPITAL | Age: 63
Discharge: HOME | End: 2023-11-05
Attending: EMERGENCY MEDICINE | Admitting: EMERGENCY MEDICINE
Payer: MEDICARE

## 2023-11-05 ENCOUNTER — APPOINTMENT (OUTPATIENT)
Dept: GENERAL RADIOLOGY | Facility: HOSPITAL | Age: 63
End: 2023-11-05
Payer: MEDICARE

## 2023-11-05 VITALS
OXYGEN SATURATION: 90 % | WEIGHT: 235.89 LBS | RESPIRATION RATE: 18 BRPM | HEART RATE: 96 BPM | TEMPERATURE: 97.5 F | DIASTOLIC BLOOD PRESSURE: 80 MMHG | BODY MASS INDEX: 39.3 KG/M2 | HEIGHT: 65 IN | SYSTOLIC BLOOD PRESSURE: 143 MMHG

## 2023-11-05 DIAGNOSIS — S05.02XA CORNEAL ABRASION, LEFT, INITIAL ENCOUNTER: Primary | ICD-10-CM

## 2023-11-05 LAB
ALBUMIN SERPL-MCNC: 4.2 G/DL (ref 3.5–5.2)
ALBUMIN/GLOB SERPL: 1.6 G/DL
ALP SERPL-CCNC: 98 U/L (ref 39–117)
ALT SERPL W P-5'-P-CCNC: 17 U/L (ref 1–33)
ANION GAP SERPL CALCULATED.3IONS-SCNC: 9 MMOL/L (ref 5–15)
AST SERPL-CCNC: 14 U/L (ref 1–32)
ATMOSPHERIC PRESS: ABNORMAL MM[HG]
B-OH-BUTYR SERPL-SCNC: 0.14 MMOL/L (ref 0.02–0.27)
BASE EXCESS BLDV CALC-SCNC: 5.3 MMOL/L (ref -2–2)
BASOPHILS # BLD AUTO: 0.06 10*3/MM3 (ref 0–0.2)
BASOPHILS NFR BLD AUTO: 0.7 % (ref 0–1.5)
BDY SITE: ABNORMAL
BILIRUB SERPL-MCNC: <0.2 MG/DL (ref 0–1.2)
BODY TEMPERATURE: 37 C
BUN SERPL-MCNC: 15 MG/DL (ref 8–23)
BUN/CREAT SERPL: 15.6 (ref 7–25)
CALCIUM SPEC-SCNC: 9.4 MG/DL (ref 8.6–10.5)
CHLORIDE SERPL-SCNC: 102 MMOL/L (ref 98–107)
CO2 BLDA-SCNC: 35.7 MMOL/L (ref 22–33)
CO2 SERPL-SCNC: 32 MMOL/L (ref 22–29)
COHGB MFR BLD: 6.2 %
CREAT SERPL-MCNC: 0.96 MG/DL (ref 0.57–1)
DEPRECATED RDW RBC AUTO: 53.8 FL (ref 37–54)
EGFRCR SERPLBLD CKD-EPI 2021: 67.5 ML/MIN/1.73
EOSINOPHIL # BLD AUTO: 0.23 10*3/MM3 (ref 0–0.4)
EOSINOPHIL NFR BLD AUTO: 2.7 % (ref 0.3–6.2)
EPAP: 0
ERYTHROCYTE [DISTWIDTH] IN BLOOD BY AUTOMATED COUNT: 15 % (ref 12.3–15.4)
GLOBULIN UR ELPH-MCNC: 2.7 GM/DL
GLUCOSE BLDC GLUCOMTR-MCNC: 181 MG/DL (ref 70–130)
GLUCOSE BLDC GLUCOMTR-MCNC: 199 MG/DL (ref 70–130)
GLUCOSE SERPL-MCNC: 199 MG/DL (ref 65–99)
HCO3 BLDV-SCNC: 33.8 MMOL/L (ref 22–28)
HCT VFR BLD AUTO: 49.2 % (ref 34–46.6)
HGB BLD-MCNC: 15.9 G/DL (ref 12–15.9)
HGB BLDA-MCNC: 15.3 G/DL (ref 14–18)
IMM GRANULOCYTES # BLD AUTO: 0.06 10*3/MM3 (ref 0–0.05)
IMM GRANULOCYTES NFR BLD AUTO: 0.7 % (ref 0–0.5)
INHALED O2 CONCENTRATION: 36 %
IPAP: 0
LYMPHOCYTES # BLD AUTO: 4.26 10*3/MM3 (ref 0.7–3.1)
LYMPHOCYTES NFR BLD AUTO: 49.2 % (ref 19.6–45.3)
Lab: ABNORMAL
MCH RBC QN AUTO: 31.5 PG (ref 26.6–33)
MCHC RBC AUTO-ENTMCNC: 32.3 G/DL (ref 31.5–35.7)
MCV RBC AUTO: 97.6 FL (ref 79–97)
METHGB BLD QL: 0.6 %
MODALITY: ABNORMAL
MONOCYTES # BLD AUTO: 0.61 10*3/MM3 (ref 0.1–0.9)
MONOCYTES NFR BLD AUTO: 7 % (ref 5–12)
NEUTROPHILS NFR BLD AUTO: 3.44 10*3/MM3 (ref 1.7–7)
NEUTROPHILS NFR BLD AUTO: 39.7 % (ref 42.7–76)
NOTIFIED BY: ABNORMAL
NOTIFIED WHO: ABNORMAL
NRBC BLD AUTO-RTO: 0 /100 WBC (ref 0–0.2)
NT-PROBNP SERPL-MCNC: <36 PG/ML (ref 0–900)
OXYHGB MFR BLDV: 57.6 %
PAW @ PEAK INSP FLOW SETTING VENT: 0 CMH2O
PCO2 BLDV: 64.7 MM HG (ref 41–51)
PH BLDV: 7.33 PH UNITS (ref 7.31–7.41)
PLATELET # BLD AUTO: 242 10*3/MM3 (ref 140–450)
PMV BLD AUTO: 9.1 FL (ref 6–12)
PO2 BLDV: 34.2 MM HG (ref 27–53)
POTASSIUM SERPL-SCNC: 4.3 MMOL/L (ref 3.5–5.2)
PROT SERPL-MCNC: 6.9 G/DL (ref 6–8.5)
QT INTERVAL: 374 MS
QTC INTERVAL: 469 MS
RBC # BLD AUTO: 5.04 10*6/MM3 (ref 3.77–5.28)
SODIUM SERPL-SCNC: 143 MMOL/L (ref 136–145)
TOTAL RATE: 0 BREATHS/MINUTE
TROPONIN T SERPL HS-MCNC: 7 NG/L
WBC NRBC COR # BLD: 8.66 10*3/MM3 (ref 3.4–10.8)

## 2023-11-05 PROCEDURE — 82948 REAGENT STRIP/BLOOD GLUCOSE: CPT

## 2023-11-05 PROCEDURE — 80053 COMPREHEN METABOLIC PANEL: CPT | Performed by: EMERGENCY MEDICINE

## 2023-11-05 PROCEDURE — 93005 ELECTROCARDIOGRAM TRACING: CPT | Performed by: EMERGENCY MEDICINE

## 2023-11-05 PROCEDURE — 36415 COLL VENOUS BLD VENIPUNCTURE: CPT

## 2023-11-05 PROCEDURE — 71045 X-RAY EXAM CHEST 1 VIEW: CPT

## 2023-11-05 PROCEDURE — 82805 BLOOD GASES W/O2 SATURATION: CPT

## 2023-11-05 PROCEDURE — 83880 ASSAY OF NATRIURETIC PEPTIDE: CPT | Performed by: EMERGENCY MEDICINE

## 2023-11-05 PROCEDURE — 85025 COMPLETE CBC W/AUTO DIFF WBC: CPT | Performed by: EMERGENCY MEDICINE

## 2023-11-05 PROCEDURE — 82010 KETONE BODYS QUAN: CPT | Performed by: EMERGENCY MEDICINE

## 2023-11-05 PROCEDURE — 84484 ASSAY OF TROPONIN QUANT: CPT | Performed by: EMERGENCY MEDICINE

## 2023-11-05 PROCEDURE — 99284 EMERGENCY DEPT VISIT MOD MDM: CPT

## 2023-11-05 RX ORDER — TETRACAINE HYDROCHLORIDE 5 MG/ML
2 SOLUTION OPHTHALMIC ONCE
Status: COMPLETED | OUTPATIENT
Start: 2023-11-05 | End: 2023-11-05

## 2023-11-05 RX ORDER — ERYTHROMYCIN 5 MG/G
OINTMENT OPHTHALMIC
Qty: 3.5 G | Refills: 0 | Status: SHIPPED | OUTPATIENT
Start: 2023-11-05 | End: 2023-11-10

## 2023-11-05 RX ORDER — ERYTHROMYCIN 5 MG/G
OINTMENT OPHTHALMIC
Status: COMPLETED
Start: 2023-11-05 | End: 2023-11-05

## 2023-11-05 RX ADMIN — ERYTHROMYCIN: 5 OINTMENT OPHTHALMIC at 02:55

## 2023-11-05 RX ADMIN — TETRACAINE HYDROCHLORIDE 2 DROP: 5 SOLUTION OPHTHALMIC at 02:15
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
## 2023-11-05 NOTE — DISCHARGE INSTRUCTIONS
Apply topical antibiotic ointment to the your left eye to help prevent infection while your abrasion heals.  Call to schedule a follow-up appointment with an ophthalmologist as needed if you are not having improved symptoms in the next 3 to 4 days.

## 2023-11-06 DIAGNOSIS — Z79.4 TYPE 2 DIABETES MELLITUS WITH HYPERGLYCEMIA, WITH LONG-TERM CURRENT USE OF INSULIN: Primary | ICD-10-CM

## 2023-11-06 DIAGNOSIS — E11.65 TYPE 2 DIABETES MELLITUS WITH HYPERGLYCEMIA, WITH LONG-TERM CURRENT USE OF INSULIN: Primary | ICD-10-CM

## 2023-11-06 RX ORDER — ACYCLOVIR 400 MG/1
1 TABLET ORAL DAILY
Qty: 1 EACH | Refills: 0 | Status: SHIPPED | OUTPATIENT
Start: 2023-11-06

## 2023-11-06 RX ORDER — ACYCLOVIR 400 MG/1
1 TABLET ORAL
Qty: 3 EACH | Refills: 11 | Status: SHIPPED | OUTPATIENT
Start: 2023-11-06

## 2023-11-09 ENCOUNTER — TELEPHONE (OUTPATIENT)
Dept: FAMILY MEDICINE CLINIC | Facility: CLINIC | Age: 63
End: 2023-11-09
Payer: MEDICARE

## 2023-11-09 NOTE — TELEPHONE ENCOUNTER
Caller: NickiSaadia    Relationship: Self    Best call back number: [phone]    What medication are you requesting: NEW GLUCOSE MONITOR    What are your current symptoms: NONE    How long have you been experiencing symptoms: NONE    Have you had these symptoms before:    [] Yes  [x] No    Have you been treated for these symptoms before:   [] Yes  [x] No    If a prescription is needed, what is your preferred pharmacy and phone number: [id number] [place], [place] - [facility] [address] AT [facility] [address] - [phone]  - [phone] FX     Additional notes:    PT HAS LOST HER GLUCOSE MONITOR AND NEEDS A NEW ONE CALLED IN. SHE IS ASKING ABOUT ONE THAT SHE JUST PUTS ON HER AND IT CHECKS HER SUGAR ON ITS OWN.

## 2023-11-10 ENCOUNTER — TELEPHONE (OUTPATIENT)
Dept: FAMILY MEDICINE CLINIC | Facility: CLINIC | Age: 63
End: 2023-11-10
Payer: MEDICARE
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
## 2023-11-13 NOTE — TELEPHONE ENCOUNTER
Name: Saadia Caceres    Relationship: Self    Best Callback Number: [phone]    HUB PROVIDED THE RELAY MESSAGE FROM THE OFFICE     PATIENT VOICED UNDERSTANDING AND HAS NO FURTHER QUESTIONS AT THIS TIME    ADDITIONAL INFORMATION: PATIENT REQUESTED REFERRAL

## 2023-11-13 NOTE — TELEPHONE ENCOUNTER
Caller: Saadia Caceres    Relationship: Self    Best call back number: [phone]    What is the medical concern/diagnosis: LEFT EYE PAIN, FEELS LIKE GLASS IS IN IT    What specialty or service is being requested: EYE DOCTOR    What is the provider, practice or medical service name:     What is the office location:     What is the office phone number:     Any additional details:        No

## 2023-11-13 NOTE — TELEPHONE ENCOUNTER
Left message for Saadia Caceres  to return my call.    Hub may relay message and document    Chacorta Del Rio DO [facility] [address] Clinical Pool16 minutes ago (2:36 PM)       She needs to see an eye doc for further evaluation, if referral needed just let me know.

## 2023-11-15 DIAGNOSIS — S05.90XA EYE TRAUMA: Primary | ICD-10-CM

## 2023-11-28 ENCOUNTER — TELEPHONE (OUTPATIENT)
Dept: FAMILY MEDICINE CLINIC | Facility: CLINIC | Age: 63
End: 2023-11-28
Payer: MEDICARE
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
## 2023-11-28 NOTE — TELEPHONE ENCOUNTER
Patient called stating that gastroenterology scheduling had called about scheduling a colonoscopy & was asking questions about her heart & was concerned if her heart was ok to have this procedure.   She also stated she saw someone for her incontinence & was given pills to help & would like to see that  Again for it, as the pills helped.  Patient would like a call back.

## 2023-11-29 DIAGNOSIS — R06.02 SHORTNESS OF BREATH: Primary | ICD-10-CM

## 2023-11-30 ENCOUNTER — TELEPHONE (OUTPATIENT)
Dept: FAMILY MEDICINE CLINIC | Facility: CLINIC | Age: 63
End: 2023-11-30
Payer: MEDICARE

## 2023-11-30 NOTE — TELEPHONE ENCOUNTER
ssful     Caller: [facility]    Relationship to patient: Other    Best call back number:    PROVIDER LINE: [phone]    FAX: [phone]     Patient is needing: [facility] STATED IN ORDER FOR PATIENT TO CONTINUE CARE WITH HER PROVIDER THAT A PRIOR AUTHORIZATION/SINGLE CASE AGREEMENT WILL NEED TO BE SUBMITTED.

## 2023-11-30 NOTE — TELEPHONE ENCOUNTER
I attempted to call the number back to get more information regarding this message about patient continuing care with the office.   When I go through the directory the prior auth line is for medications.   We are unsure of what they are needing at this time.   Were they sending a form for Dr. Del Rio to fill out/sign?   Hub may relay message

## 2023-12-11 DIAGNOSIS — M54.50 CHRONIC BILATERAL LOW BACK PAIN: ICD-10-CM

## 2023-12-11 DIAGNOSIS — N32.81 OVERACTIVE BLADDER: ICD-10-CM

## 2023-12-11 DIAGNOSIS — G89.29 CHRONIC BILATERAL LOW BACK PAIN: ICD-10-CM

## 2023-12-11 RX ORDER — CYCLOBENZAPRINE HCL 10 MG
TABLET ORAL
Qty: 90 TABLET | Refills: 0 | Status: SHIPPED | OUTPATIENT
Start: 2023-12-11

## 2023-12-11 NOTE — TELEPHONE ENCOUNTER
Rx Refill Note  Requested Prescriptions     Pending Prescriptions Disp Refills    Gemtesa 75 MG tablet [Pharmacy Med Name: GEMTESA 75MG TABLETS] 30 tablet 3     Sig: TAKE 1 TABLET BY MOUTH DAILY      Last office visit with prescribing clinician: 4/14/2023   Next office visit with prescribing clinician: Visit date not found       Parth Fong MA  12/11/23, 13:59 EST

## 2023-12-11 NOTE — TELEPHONE ENCOUNTER
Tried to call the patient to see if she had stopped the bethnechol before getting the Gemtesa.  Left her a vm to return my call.

## 2023-12-11 NOTE — TELEPHONE ENCOUNTER
Rx Refill Note  Requested Prescriptions     Pending Prescriptions Disp Refills    cyclobenzaprine (FLEXERIL) 10 MG tablet [Pharmacy Med Name: CYCLOBENZAPRINE 10MG TABLETS] 90 tablet 0     Sig: TAKE 1 TABLET BY MOUTH THREE TIMES DAILY AS NEEDED FOR MUSCLE SPASMS      Last office visit with prescribing clinician: 10/2/2023     Next office visit with prescribing clinician: 1/5/2024       Emilee Burden MA  12/11/23, 15:00 EST

## 2023-12-14 ENCOUNTER — TELEPHONE (OUTPATIENT)
Dept: UROLOGY | Facility: CLINIC | Age: 63
End: 2023-12-14
Payer: MEDICARE

## 2023-12-14 DIAGNOSIS — R30.0 DYSURIA: ICD-10-CM

## 2023-12-14 RX ORDER — PHENAZOPYRIDINE HYDROCHLORIDE 200 MG/1
200 TABLET, FILM COATED ORAL 3 TIMES DAILY PRN
Qty: 30 TABLET | Refills: 0 | Status: SHIPPED | OUTPATIENT
Start: 2023-12-14

## 2023-12-14 RX ORDER — VIBEGRON 75 MG/1
1 TABLET, FILM COATED ORAL DAILY
Qty: 30 TABLET | Refills: 3 | Status: SHIPPED | OUTPATIENT
Start: 2023-12-14

## 2023-12-14 NOTE — TELEPHONE ENCOUNTER
Patient called and was wondering if Augusta would be able to send her a refill in of Pyridium just until she is able to find a new provider since we do not accept her insurance anymore. I let patient know that azo over the counter could help her also but I will send Augusta a message and let her know.

## 2024-03-08 RX ORDER — PEN NEEDLE, DIABETIC 31 GX5/16"
NEEDLE, DISPOSABLE MISCELLANEOUS
Qty: 100 EACH | Refills: 1 | Status: SHIPPED | OUTPATIENT
Start: 2024-03-08

## 2024-07-26 ENCOUNTER — HOSPITAL ENCOUNTER (INPATIENT)
Facility: HOSPITAL | Age: 64
LOS: 9 days | Discharge: HOME HEALTH | DRG: 872 | End: 2024-08-05
Attending: EMERGENCY MEDICINE | Admitting: INTERNAL MEDICINE
Payer: MEDICARE

## 2024-07-26 ENCOUNTER — APPOINTMENT (OUTPATIENT)
Dept: CT IMAGING | Facility: HOSPITAL | Age: 64
DRG: 872 | End: 2024-07-26
Payer: MEDICARE

## 2024-07-26 ENCOUNTER — APPOINTMENT (OUTPATIENT)
Dept: GENERAL RADIOLOGY | Facility: HOSPITAL | Age: 64
DRG: 872 | End: 2024-07-26
Payer: MEDICARE

## 2024-07-26 DIAGNOSIS — E11.51 TYPE 2 DIABETES MELLITUS WITH DIABETIC PERIPHERAL ANGIOPATHY WITHOUT GANGRENE, WITH LONG-TERM CURRENT USE OF INSULIN: Chronic | ICD-10-CM

## 2024-07-26 DIAGNOSIS — N12 PYELONEPHRITIS: ICD-10-CM

## 2024-07-26 DIAGNOSIS — Z79.4 TYPE 2 DIABETES MELLITUS WITH DIABETIC PERIPHERAL ANGIOPATHY WITHOUT GANGRENE, WITH LONG-TERM CURRENT USE OF INSULIN: Chronic | ICD-10-CM

## 2024-07-26 DIAGNOSIS — K21.9 CHRONIC GERD: ICD-10-CM

## 2024-07-26 DIAGNOSIS — N95.1 MENOPAUSAL HOT FLUSHES: ICD-10-CM

## 2024-07-26 DIAGNOSIS — G43.019 INTRACTABLE MIGRAINE WITHOUT AURA AND WITHOUT STATUS MIGRAINOSUS: ICD-10-CM

## 2024-07-26 DIAGNOSIS — E87.20 LACTIC ACIDOSIS: ICD-10-CM

## 2024-07-26 DIAGNOSIS — N17.9 ACUTE KIDNEY INJURY: ICD-10-CM

## 2024-07-26 DIAGNOSIS — A41.9 ACUTE SEPSIS: Primary | ICD-10-CM

## 2024-07-26 DIAGNOSIS — N95.1 HOT FLASHES DUE TO MENOPAUSE: ICD-10-CM

## 2024-07-26 DIAGNOSIS — I48.91 ATRIAL FIBRILLATION WITH RAPID VENTRICULAR RESPONSE: ICD-10-CM

## 2024-07-26 LAB
ALBUMIN SERPL-MCNC: 2.7 G/DL (ref 3.5–5.2)
ALBUMIN/GLOB SERPL: 0.7 G/DL
ALP SERPL-CCNC: 201 U/L (ref 39–117)
ALT SERPL W P-5'-P-CCNC: 23 U/L (ref 1–33)
AMMONIA BLD-SCNC: 21 UMOL/L (ref 11–51)
ANION GAP SERPL CALCULATED.3IONS-SCNC: 21 MMOL/L (ref 5–15)
ARTERIAL PATENCY WRIST A: POSITIVE
AST SERPL-CCNC: 66 U/L (ref 1–32)
ATMOSPHERIC PRESS: ABNORMAL MM[HG]
BACTERIA UR QL AUTO: ABNORMAL /HPF
BASE EXCESS BLDA CALC-SCNC: -5.7 MMOL/L (ref 0–2)
BASOPHILS # BLD MANUAL: 0 10*3/MM3 (ref 0–0.2)
BASOPHILS NFR BLD MANUAL: 0 % (ref 0–1.5)
BDY SITE: ABNORMAL
BILIRUB SERPL-MCNC: 0.5 MG/DL (ref 0–1.2)
BILIRUB UR QL STRIP: NEGATIVE
BODY TEMPERATURE: 37
BUN SERPL-MCNC: 69 MG/DL (ref 8–23)
BUN/CREAT SERPL: 27.4 (ref 7–25)
CALCIUM SPEC-SCNC: 8.9 MG/DL (ref 8.6–10.5)
CHLORIDE SERPL-SCNC: 90 MMOL/L (ref 98–107)
CLARITY UR: ABNORMAL
CO2 BLDA-SCNC: 20.1 MMOL/L (ref 22–33)
CO2 SERPL-SCNC: 19 MMOL/L (ref 22–29)
COHGB MFR BLD: 1.4 % (ref 0–2)
COLOR UR: YELLOW
CREAT SERPL-MCNC: 2.52 MG/DL (ref 0.57–1)
CRP SERPL-MCNC: 13.7 MG/DL (ref 0–0.5)
D-LACTATE SERPL-SCNC: 4.6 MMOL/L (ref 0.5–2)
DEPRECATED RDW RBC AUTO: 52 FL (ref 37–54)
EGFRCR SERPLBLD CKD-EPI 2021: 21.2 ML/MIN/1.73
EOSINOPHIL # BLD MANUAL: 0 10*3/MM3 (ref 0–0.4)
EOSINOPHIL NFR BLD MANUAL: 0 % (ref 0.3–6.2)
EPAP: 0
ERYTHROCYTE [DISTWIDTH] IN BLOOD BY AUTOMATED COUNT: 15.9 % (ref 12.3–15.4)
ERYTHROCYTE [SEDIMENTATION RATE] IN BLOOD: >130 MM/HR (ref 0–30)
GLOBULIN UR ELPH-MCNC: 3.8 GM/DL
GLUCOSE BLDC GLUCOMTR-MCNC: 384 MG/DL (ref 70–130)
GLUCOSE SERPL-MCNC: 364 MG/DL (ref 65–99)
GLUCOSE UR STRIP-MCNC: ABNORMAL MG/DL
HCO3 BLDA-SCNC: 19.1 MMOL/L (ref 20–26)
HCT VFR BLD AUTO: 34.1 % (ref 34–46.6)
HCT VFR BLD CALC: 31.2 % (ref 38–51)
HGB BLD-MCNC: 11.3 G/DL (ref 12–15.9)
HGB BLDA-MCNC: 10.2 G/DL (ref 14–18)
HGB UR QL STRIP.AUTO: ABNORMAL
HOLD SPECIMEN: NORMAL
HYALINE CASTS UR QL AUTO: ABNORMAL /LPF
INHALED O2 CONCENTRATION: 32 %
IPAP: 0
KETONES UR QL STRIP: ABNORMAL
LEUKOCYTE ESTERASE UR QL STRIP.AUTO: ABNORMAL
LIPASE SERPL-CCNC: 71 U/L (ref 13–60)
LYMPHOCYTES # BLD MANUAL: 1.3 10*3/MM3 (ref 0.7–3.1)
LYMPHOCYTES NFR BLD MANUAL: 10 % (ref 5–12)
MAGNESIUM SERPL-MCNC: 2.5 MG/DL (ref 1.6–2.4)
MCH RBC QN AUTO: 29.4 PG (ref 26.6–33)
MCHC RBC AUTO-ENTMCNC: 33.1 G/DL (ref 31.5–35.7)
MCV RBC AUTO: 88.8 FL (ref 79–97)
METAMYELOCYTES NFR BLD MANUAL: 1 % (ref 0–0)
METHGB BLD QL: 0.7 % (ref 0–1.5)
MODALITY: ABNORMAL
MONOCYTES # BLD: 1.44 10*3/MM3 (ref 0.1–0.9)
NEUTROPHILS # BLD AUTO: 11.51 10*3/MM3 (ref 1.7–7)
NEUTROPHILS NFR BLD MANUAL: 71 % (ref 42.7–76)
NEUTS BAND NFR BLD MANUAL: 9 % (ref 0–5)
NEUTS VAC BLD QL SMEAR: ABNORMAL
NITRITE UR QL STRIP: NEGATIVE
OXYHGB MFR BLDV: 94.7 % (ref 94–99)
PAW @ PEAK INSP FLOW SETTING VENT: 0 CMH2O
PCO2 BLDA: 34 MM HG (ref 35–45)
PCO2 TEMP ADJ BLD: 34 MM HG (ref 35–45)
PH BLDA: 7.36 PH UNITS (ref 7.35–7.45)
PH UR STRIP.AUTO: 5.5 [PH] (ref 5–8)
PH, TEMP CORRECTED: 7.36 PH UNITS
PHOSPHATE SERPL-MCNC: 4.3 MG/DL (ref 2.5–4.5)
PLAT MORPH BLD: NORMAL
PLATELET # BLD AUTO: 143 10*3/MM3 (ref 140–450)
PMV BLD AUTO: 11 FL (ref 6–12)
PO2 BLDA: 92.6 MM HG (ref 83–108)
PO2 TEMP ADJ BLD: 92.6 MM HG (ref 83–108)
POTASSIUM SERPL-SCNC: 4.7 MMOL/L (ref 3.5–5.2)
PROCALCITONIN SERPL-MCNC: 28.22 NG/ML (ref 0–0.25)
PROT SERPL-MCNC: 6.5 G/DL (ref 6–8.5)
PROT UR QL STRIP: ABNORMAL
QT INTERVAL: 348 MS
QTC INTERVAL: 479 MS
RBC # BLD AUTO: 3.84 10*6/MM3 (ref 3.77–5.28)
RBC # UR STRIP: ABNORMAL /HPF
RBC MORPH BLD: NORMAL
REF LAB TEST METHOD: ABNORMAL
SCAN SLIDE: NORMAL
SODIUM SERPL-SCNC: 130 MMOL/L (ref 136–145)
SP GR UR STRIP: 1.02 (ref 1–1.03)
SQUAMOUS #/AREA URNS HPF: ABNORMAL /HPF
TOTAL RATE: 0 BREATHS/MINUTE
TOXIC GRANULATION: ABNORMAL
TROPONIN T SERPL HS-MCNC: 45 NG/L
TSH SERPL DL<=0.05 MIU/L-ACNC: 1.75 UIU/ML (ref 0.27–4.2)
UROBILINOGEN UR QL STRIP: ABNORMAL
VALPROATE SERPL-MCNC: 45 MCG/ML (ref 50–125)
VARIANT LYMPHS NFR BLD MANUAL: 9 % (ref 19.6–45.3)
WBC # UR STRIP: ABNORMAL /HPF
WBC NRBC COR # BLD AUTO: 14.39 10*3/MM3 (ref 3.4–10.8)
WHOLE BLOOD HOLD COAG: NORMAL
WHOLE BLOOD HOLD SPECIMEN: NORMAL

## 2024-07-26 PROCEDURE — 85007 BL SMEAR W/DIFF WBC COUNT: CPT | Performed by: EMERGENCY MEDICINE

## 2024-07-26 PROCEDURE — 70450 CT HEAD/BRAIN W/O DYE: CPT

## 2024-07-26 PROCEDURE — 71250 CT THORAX DX C-: CPT

## 2024-07-26 PROCEDURE — 87040 BLOOD CULTURE FOR BACTERIA: CPT | Performed by: EMERGENCY MEDICINE

## 2024-07-26 PROCEDURE — 87086 URINE CULTURE/COLONY COUNT: CPT | Performed by: INTERNAL MEDICINE

## 2024-07-26 PROCEDURE — 83050 HGB METHEMOGLOBIN QUAN: CPT

## 2024-07-26 PROCEDURE — 82948 REAGENT STRIP/BLOOD GLUCOSE: CPT

## 2024-07-26 PROCEDURE — 83605 ASSAY OF LACTIC ACID: CPT | Performed by: EMERGENCY MEDICINE

## 2024-07-26 PROCEDURE — 82140 ASSAY OF AMMONIA: CPT | Performed by: EMERGENCY MEDICINE

## 2024-07-26 PROCEDURE — 36600 WITHDRAWAL OF ARTERIAL BLOOD: CPT

## 2024-07-26 PROCEDURE — 82805 BLOOD GASES W/O2 SATURATION: CPT

## 2024-07-26 PROCEDURE — 87088 URINE BACTERIA CULTURE: CPT | Performed by: EMERGENCY MEDICINE

## 2024-07-26 PROCEDURE — 74176 CT ABD & PELVIS W/O CONTRAST: CPT

## 2024-07-26 PROCEDURE — 85652 RBC SED RATE AUTOMATED: CPT | Performed by: EMERGENCY MEDICINE

## 2024-07-26 PROCEDURE — 87186 SC STD MICRODIL/AGAR DIL: CPT | Performed by: INTERNAL MEDICINE

## 2024-07-26 PROCEDURE — 87154 CUL TYP ID BLD PTHGN 6+ TRGT: CPT | Performed by: EMERGENCY MEDICINE

## 2024-07-26 PROCEDURE — 87077 CULTURE AEROBIC IDENTIFY: CPT | Performed by: INTERNAL MEDICINE

## 2024-07-26 PROCEDURE — 25010000002 CEFTRIAXONE PER 250 MG: Performed by: EMERGENCY MEDICINE

## 2024-07-26 PROCEDURE — 81001 URINALYSIS AUTO W/SCOPE: CPT | Performed by: EMERGENCY MEDICINE

## 2024-07-26 PROCEDURE — 86140 C-REACTIVE PROTEIN: CPT | Performed by: EMERGENCY MEDICINE

## 2024-07-26 PROCEDURE — 84100 ASSAY OF PHOSPHORUS: CPT | Performed by: EMERGENCY MEDICINE

## 2024-07-26 PROCEDURE — 83690 ASSAY OF LIPASE: CPT | Performed by: EMERGENCY MEDICINE

## 2024-07-26 PROCEDURE — 84484 ASSAY OF TROPONIN QUANT: CPT | Performed by: EMERGENCY MEDICINE

## 2024-07-26 PROCEDURE — 99291 CRITICAL CARE FIRST HOUR: CPT

## 2024-07-26 PROCEDURE — 87186 SC STD MICRODIL/AGAR DIL: CPT | Performed by: EMERGENCY MEDICINE

## 2024-07-26 PROCEDURE — 80053 COMPREHEN METABOLIC PANEL: CPT | Performed by: EMERGENCY MEDICINE

## 2024-07-26 PROCEDURE — 72125 CT NECK SPINE W/O DYE: CPT

## 2024-07-26 PROCEDURE — 83735 ASSAY OF MAGNESIUM: CPT | Performed by: EMERGENCY MEDICINE

## 2024-07-26 PROCEDURE — 25810000003 SODIUM CHLORIDE 0.9 % SOLUTION: Performed by: EMERGENCY MEDICINE

## 2024-07-26 PROCEDURE — 82375 ASSAY CARBOXYHB QUANT: CPT

## 2024-07-26 PROCEDURE — 36415 COLL VENOUS BLD VENIPUNCTURE: CPT

## 2024-07-26 PROCEDURE — 80164 ASSAY DIPROPYLACETIC ACD TOT: CPT | Performed by: EMERGENCY MEDICINE

## 2024-07-26 PROCEDURE — 71045 X-RAY EXAM CHEST 1 VIEW: CPT

## 2024-07-26 PROCEDURE — 84443 ASSAY THYROID STIM HORMONE: CPT | Performed by: EMERGENCY MEDICINE

## 2024-07-26 PROCEDURE — 84145 PROCALCITONIN (PCT): CPT | Performed by: EMERGENCY MEDICINE

## 2024-07-26 PROCEDURE — 85025 COMPLETE CBC W/AUTO DIFF WBC: CPT | Performed by: EMERGENCY MEDICINE

## 2024-07-26 PROCEDURE — 93005 ELECTROCARDIOGRAM TRACING: CPT | Performed by: EMERGENCY MEDICINE

## 2024-07-26 RX ORDER — SODIUM CHLORIDE 0.9 % (FLUSH) 0.9 %
10 SYRINGE (ML) INJECTION AS NEEDED
Status: DISCONTINUED | OUTPATIENT
Start: 2024-07-26 | End: 2024-08-05 | Stop reason: HOSPADM

## 2024-07-26 RX ADMIN — SODIUM CHLORIDE 3120 ML: 9 INJECTION, SOLUTION INTRAVENOUS at 22:46

## 2024-07-26 RX ADMIN — SODIUM CHLORIDE 2000 MG: 900 INJECTION INTRAVENOUS at 22:54

## 2024-07-26 NOTE — Clinical Note
Level of Care: Critical Care [6]   Admitting Physician: SHAHRIAR SIMON [[id number]]   Attending Physician: SHAHRIAR SIOMN [[id number]]

## 2024-07-27 ENCOUNTER — APPOINTMENT (OUTPATIENT)
Dept: GENERAL RADIOLOGY | Facility: HOSPITAL | Age: 64
DRG: 872 | End: 2024-07-27
Payer: MEDICARE

## 2024-07-27 PROBLEM — N17.9 AKI (ACUTE KIDNEY INJURY): Status: ACTIVE | Noted: 2024-07-27

## 2024-07-27 PROBLEM — I48.91 ATRIAL FIBRILLATION WITH RVR: Status: ACTIVE | Noted: 2024-07-27

## 2024-07-27 PROBLEM — N10 ACUTE PYELONEPHRITIS: Status: ACTIVE | Noted: 2024-07-27

## 2024-07-27 PROBLEM — R65.20 SEVERE SEPSIS: Status: ACTIVE | Noted: 2024-07-27

## 2024-07-27 PROBLEM — A41.9 SEVERE SEPSIS: Status: ACTIVE | Noted: 2024-07-27

## 2024-07-27 LAB
ALBUMIN SERPL-MCNC: 2.5 G/DL (ref 3.5–5.2)
ALBUMIN/GLOB SERPL: 0.6 G/DL
ALP SERPL-CCNC: 202 U/L (ref 39–117)
ALT SERPL W P-5'-P-CCNC: 22 U/L (ref 1–33)
ANION GAP SERPL CALCULATED.3IONS-SCNC: 11 MMOL/L (ref 5–15)
ANION GAP SERPL CALCULATED.3IONS-SCNC: 14 MMOL/L (ref 5–15)
ANION GAP SERPL CALCULATED.3IONS-SCNC: 16 MMOL/L (ref 5–15)
APTT PPP: 27.3 SECONDS (ref 60–90)
AST SERPL-CCNC: 53 U/L (ref 1–32)
B-OH-BUTYR SERPL-SCNC: 3.26 MMOL/L (ref 0.02–0.27)
BACTERIA BLD CULT: ABNORMAL
BACTERIA UR QL AUTO: ABNORMAL /HPF
BILIRUB SERPL-MCNC: 0.3 MG/DL (ref 0–1.2)
BILIRUB UR QL STRIP: NEGATIVE
BOTTLE TYPE: ABNORMAL
BUN SERPL-MCNC: 69 MG/DL (ref 8–23)
BUN SERPL-MCNC: 74 MG/DL (ref 8–23)
BUN SERPL-MCNC: 75 MG/DL (ref 8–23)
BUN/CREAT SERPL: 25.8 (ref 7–25)
BUN/CREAT SERPL: 27.1 (ref 7–25)
BUN/CREAT SERPL: 27.2 (ref 7–25)
CALCIUM SPEC-SCNC: 7.9 MG/DL (ref 8.6–10.5)
CALCIUM SPEC-SCNC: 7.9 MG/DL (ref 8.6–10.5)
CALCIUM SPEC-SCNC: 8 MG/DL (ref 8.6–10.5)
CHLORIDE SERPL-SCNC: 100 MMOL/L (ref 98–107)
CHLORIDE SERPL-SCNC: 95 MMOL/L (ref 98–107)
CHLORIDE SERPL-SCNC: 96 MMOL/L (ref 98–107)
CLARITY UR: ABNORMAL
CO2 SERPL-SCNC: 20 MMOL/L (ref 22–29)
CO2 SERPL-SCNC: 21 MMOL/L (ref 22–29)
CO2 SERPL-SCNC: 25 MMOL/L (ref 22–29)
COLOR UR: ABNORMAL
CREAT SERPL-MCNC: 2.54 MG/DL (ref 0.57–1)
CREAT SERPL-MCNC: 2.73 MG/DL (ref 0.57–1)
CREAT SERPL-MCNC: 2.91 MG/DL (ref 0.57–1)
D-LACTATE SERPL-SCNC: 1.2 MMOL/L (ref 0.5–2)
EGFRCR SERPLBLD CKD-EPI 2021: 17.8 ML/MIN/1.73
EGFRCR SERPLBLD CKD-EPI 2021: 19.2 ML/MIN/1.73
EGFRCR SERPLBLD CKD-EPI 2021: 21 ML/MIN/1.73
GEN 5 2HR TROPONIN T REFLEX: 37 NG/L
GLOBULIN UR ELPH-MCNC: 3.9 GM/DL
GLUCOSE BLDC GLUCOMTR-MCNC: 113 MG/DL (ref 70–130)
GLUCOSE BLDC GLUCOMTR-MCNC: 114 MG/DL (ref 70–130)
GLUCOSE BLDC GLUCOMTR-MCNC: 116 MG/DL (ref 70–130)
GLUCOSE BLDC GLUCOMTR-MCNC: 128 MG/DL (ref 70–130)
GLUCOSE BLDC GLUCOMTR-MCNC: 129 MG/DL (ref 70–130)
GLUCOSE BLDC GLUCOMTR-MCNC: 131 MG/DL (ref 70–130)
GLUCOSE BLDC GLUCOMTR-MCNC: 131 MG/DL (ref 70–130)
GLUCOSE BLDC GLUCOMTR-MCNC: 132 MG/DL (ref 70–130)
GLUCOSE BLDC GLUCOMTR-MCNC: 135 MG/DL (ref 70–130)
GLUCOSE BLDC GLUCOMTR-MCNC: 156 MG/DL (ref 70–130)
GLUCOSE BLDC GLUCOMTR-MCNC: 171 MG/DL (ref 70–130)
GLUCOSE BLDC GLUCOMTR-MCNC: 201 MG/DL (ref 70–130)
GLUCOSE BLDC GLUCOMTR-MCNC: 207 MG/DL (ref 70–130)
GLUCOSE BLDC GLUCOMTR-MCNC: 210 MG/DL (ref 70–130)
GLUCOSE BLDC GLUCOMTR-MCNC: 249 MG/DL (ref 70–130)
GLUCOSE BLDC GLUCOMTR-MCNC: 289 MG/DL (ref 70–130)
GLUCOSE BLDC GLUCOMTR-MCNC: 335 MG/DL (ref 70–130)
GLUCOSE SERPL-MCNC: 121 MG/DL (ref 65–99)
GLUCOSE SERPL-MCNC: 121 MG/DL (ref 65–99)
GLUCOSE SERPL-MCNC: 352 MG/DL (ref 65–99)
GLUCOSE UR STRIP-MCNC: ABNORMAL MG/DL
HGB UR QL STRIP.AUTO: ABNORMAL
HYALINE CASTS UR QL AUTO: ABNORMAL /LPF
INR PPP: 1.15 (ref 0.89–1.12)
KETONES UR QL STRIP: ABNORMAL
LEUKOCYTE ESTERASE UR QL STRIP.AUTO: ABNORMAL
MAGNESIUM SERPL-MCNC: 2.6 MG/DL (ref 1.6–2.4)
NITRITE UR QL STRIP: NEGATIVE
PH UR STRIP.AUTO: 5.5 [PH] (ref 5–8)
PHOSPHATE SERPL-MCNC: 5.3 MG/DL (ref 2.5–4.5)
POTASSIUM SERPL-SCNC: 4 MMOL/L (ref 3.5–5.2)
POTASSIUM SERPL-SCNC: 4.3 MMOL/L (ref 3.5–5.2)
POTASSIUM SERPL-SCNC: 4.7 MMOL/L (ref 3.5–5.2)
PROT SERPL-MCNC: 6.4 G/DL (ref 6–8.5)
PROT UR QL STRIP: ABNORMAL
PROTHROMBIN TIME: 14.8 SECONDS (ref 12.2–14.5)
RBC # UR STRIP: ABNORMAL /HPF
REF LAB TEST METHOD: ABNORMAL
SODIUM SERPL-SCNC: 130 MMOL/L (ref 136–145)
SODIUM SERPL-SCNC: 132 MMOL/L (ref 136–145)
SODIUM SERPL-SCNC: 136 MMOL/L (ref 136–145)
SP GR UR STRIP: 1.02 (ref 1–1.03)
SQUAMOUS #/AREA URNS HPF: ABNORMAL /HPF
TROPONIN T DELTA: 1 NG/L
TROPONIN T SERPL HS-MCNC: 36 NG/L
UFH PPP CHRO-ACNC: 0.1 IU/ML (ref 0.3–0.7)
UFH PPP CHRO-ACNC: 0.1 IU/ML (ref 0.3–0.7)
UROBILINOGEN UR QL STRIP: ABNORMAL
WBC # UR STRIP: ABNORMAL /HPF

## 2024-07-27 PROCEDURE — 85520 HEPARIN ASSAY: CPT

## 2024-07-27 PROCEDURE — 93005 ELECTROCARDIOGRAM TRACING: CPT | Performed by: INTERNAL MEDICINE

## 2024-07-27 PROCEDURE — 82948 REAGENT STRIP/BLOOD GLUCOSE: CPT

## 2024-07-27 PROCEDURE — 83605 ASSAY OF LACTIC ACID: CPT | Performed by: EMERGENCY MEDICINE

## 2024-07-27 PROCEDURE — 25810000003 SODIUM CHLORIDE 0.9 % SOLUTION: Performed by: INTERNAL MEDICINE

## 2024-07-27 PROCEDURE — 25010000002 HEPARIN (PORCINE) 25000-0.45 UT/250ML-% SOLUTION: Performed by: INTERNAL MEDICINE

## 2024-07-27 PROCEDURE — 84100 ASSAY OF PHOSPHORUS: CPT | Performed by: NURSE PRACTITIONER

## 2024-07-27 PROCEDURE — 99291 CRITICAL CARE FIRST HOUR: CPT | Performed by: INTERNAL MEDICINE

## 2024-07-27 PROCEDURE — 73030 X-RAY EXAM OF SHOULDER: CPT

## 2024-07-27 PROCEDURE — 82010 KETONE BODYS QUAN: CPT

## 2024-07-27 PROCEDURE — 25010000002 PIPERACILLIN SOD-TAZOBACTAM PER 1 G: Performed by: INTERNAL MEDICINE

## 2024-07-27 PROCEDURE — 84484 ASSAY OF TROPONIN QUANT: CPT | Performed by: INTERNAL MEDICINE

## 2024-07-27 PROCEDURE — 25010000002 HEPARIN (PORCINE) PER 1000 UNITS: Performed by: INTERNAL MEDICINE

## 2024-07-27 PROCEDURE — 81001 URINALYSIS AUTO W/SCOPE: CPT | Performed by: INTERNAL MEDICINE

## 2024-07-27 PROCEDURE — 93010 ELECTROCARDIOGRAM REPORT: CPT | Performed by: INTERNAL MEDICINE

## 2024-07-27 PROCEDURE — 80053 COMPREHEN METABOLIC PANEL: CPT | Performed by: INTERNAL MEDICINE

## 2024-07-27 PROCEDURE — 85610 PROTHROMBIN TIME: CPT | Performed by: INTERNAL MEDICINE

## 2024-07-27 PROCEDURE — 83735 ASSAY OF MAGNESIUM: CPT | Performed by: NURSE PRACTITIONER

## 2024-07-27 PROCEDURE — 93005 ELECTROCARDIOGRAM TRACING: CPT | Performed by: EMERGENCY MEDICINE

## 2024-07-27 PROCEDURE — 85730 THROMBOPLASTIN TIME PARTIAL: CPT | Performed by: INTERNAL MEDICINE

## 2024-07-27 PROCEDURE — 87086 URINE CULTURE/COLONY COUNT: CPT | Performed by: INTERNAL MEDICINE

## 2024-07-27 PROCEDURE — 85520 HEPARIN ASSAY: CPT | Performed by: INTERNAL MEDICINE

## 2024-07-27 RX ORDER — HEPARIN SODIUM 1000 [USP'U]/ML
4000 INJECTION, SOLUTION INTRAVENOUS; SUBCUTANEOUS ONCE
Status: COMPLETED | OUTPATIENT
Start: 2024-07-27 | End: 2024-07-27

## 2024-07-27 RX ORDER — METOPROLOL TARTRATE 1 MG/ML
5 INJECTION, SOLUTION INTRAVENOUS EVERY 6 HOURS PRN
Status: DISCONTINUED | OUTPATIENT
Start: 2024-07-27 | End: 2024-08-05 | Stop reason: HOSPADM

## 2024-07-27 RX ORDER — ACETAMINOPHEN 325 MG/1
650 TABLET ORAL EVERY 6 HOURS PRN
Status: DISCONTINUED | OUTPATIENT
Start: 2024-07-27 | End: 2024-08-05 | Stop reason: HOSPADM

## 2024-07-27 RX ORDER — PANTOPRAZOLE SODIUM 40 MG/1
40 TABLET, DELAYED RELEASE ORAL
Status: DISCONTINUED | OUTPATIENT
Start: 2024-07-27 | End: 2024-08-05 | Stop reason: HOSPADM

## 2024-07-27 RX ORDER — ASPIRIN 81 MG/1
81 TABLET ORAL DAILY
Status: DISCONTINUED | OUTPATIENT
Start: 2024-07-27 | End: 2024-08-05 | Stop reason: HOSPADM

## 2024-07-27 RX ORDER — SODIUM CHLORIDE AND POTASSIUM CHLORIDE 150; 900 MG/100ML; MG/100ML
250 INJECTION, SOLUTION INTRAVENOUS CONTINUOUS PRN
Status: DISCONTINUED | OUTPATIENT
Start: 2024-07-27 | End: 2024-07-27

## 2024-07-27 RX ORDER — DEXTROSE MONOHYDRATE 25 G/50ML
10-50 INJECTION, SOLUTION INTRAVENOUS
Status: DISCONTINUED | OUTPATIENT
Start: 2024-07-27 | End: 2024-07-28

## 2024-07-27 RX ORDER — DIVALPROEX SODIUM 250 MG/1
500 TABLET, DELAYED RELEASE ORAL EVERY MORNING
Status: DISCONTINUED | OUTPATIENT
Start: 2024-07-27 | End: 2024-08-05 | Stop reason: HOSPADM

## 2024-07-27 RX ORDER — SODIUM CHLORIDE 450 MG/100ML
250 INJECTION, SOLUTION INTRAVENOUS CONTINUOUS PRN
Status: DISCONTINUED | OUTPATIENT
Start: 2024-07-27 | End: 2024-07-27

## 2024-07-27 RX ORDER — DEXTROSE MONOHYDRATE, SODIUM CHLORIDE, AND POTASSIUM CHLORIDE 50; 1.49; 4.5 G/1000ML; G/1000ML; G/1000ML
150 INJECTION, SOLUTION INTRAVENOUS CONTINUOUS PRN
Status: DISCONTINUED | OUTPATIENT
Start: 2024-07-27 | End: 2024-07-27

## 2024-07-27 RX ORDER — LEVOTHYROXINE SODIUM 88 UG/1
88 TABLET ORAL DAILY
Status: DISCONTINUED | OUTPATIENT
Start: 2024-07-27 | End: 2024-08-05 | Stop reason: HOSPADM

## 2024-07-27 RX ORDER — DEXTROSE MONOHYDRATE AND SODIUM CHLORIDE 5; .45 G/100ML; G/100ML
150 INJECTION, SOLUTION INTRAVENOUS CONTINUOUS PRN
Status: DISCONTINUED | OUTPATIENT
Start: 2024-07-27 | End: 2024-07-27

## 2024-07-27 RX ORDER — DILTIAZEM HCL/D5W 125 MG/125
5-15 PLASTIC BAG, INJECTION (ML) INTRAVENOUS
Status: DISCONTINUED | OUTPATIENT
Start: 2024-07-27 | End: 2024-07-29

## 2024-07-27 RX ORDER — SODIUM CHLORIDE 9 MG/ML
40 INJECTION, SOLUTION INTRAVENOUS AS NEEDED
Status: DISCONTINUED | OUTPATIENT
Start: 2024-07-27 | End: 2024-07-27

## 2024-07-27 RX ORDER — ESTRADIOL 0.5 MG/1
0.5 TABLET ORAL DAILY
Status: DISCONTINUED | OUTPATIENT
Start: 2024-07-27 | End: 2024-08-05 | Stop reason: HOSPADM

## 2024-07-27 RX ORDER — HEPARIN SODIUM 1000 [USP'U]/ML
2000 INJECTION, SOLUTION INTRAVENOUS; SUBCUTANEOUS AS NEEDED
Status: DISCONTINUED | OUTPATIENT
Start: 2024-07-27 | End: 2024-07-27 | Stop reason: SDUPTHER

## 2024-07-27 RX ORDER — IBUPROFEN 600 MG/1
1 TABLET ORAL
Status: DISCONTINUED | OUTPATIENT
Start: 2024-07-27 | End: 2024-07-28

## 2024-07-27 RX ORDER — DEXTROSE MONOHYDRATE, SODIUM CHLORIDE, AND POTASSIUM CHLORIDE 50; 2.98; 4.5 G/1000ML; G/1000ML; G/1000ML
150 INJECTION, SOLUTION INTRAVENOUS CONTINUOUS PRN
Status: DISCONTINUED | OUTPATIENT
Start: 2024-07-27 | End: 2024-07-27

## 2024-07-27 RX ORDER — SODIUM CHLORIDE 0.9 % (FLUSH) 0.9 %
10 SYRINGE (ML) INJECTION EVERY 12 HOURS SCHEDULED
Status: DISCONTINUED | OUTPATIENT
Start: 2024-07-27 | End: 2024-07-27

## 2024-07-27 RX ORDER — IPRATROPIUM BROMIDE AND ALBUTEROL SULFATE 2.5; .5 MG/3ML; MG/3ML
3 SOLUTION RESPIRATORY (INHALATION) EVERY 6 HOURS PRN
Status: DISCONTINUED | OUTPATIENT
Start: 2024-07-27 | End: 2024-08-05 | Stop reason: HOSPADM

## 2024-07-27 RX ORDER — DEXTROSE MONOHYDRATE AND SODIUM CHLORIDE 5; .9 G/100ML; G/100ML
150 INJECTION, SOLUTION INTRAVENOUS CONTINUOUS PRN
Status: DISCONTINUED | OUTPATIENT
Start: 2024-07-27 | End: 2024-07-27

## 2024-07-27 RX ORDER — HEPARIN SODIUM 5000 [USP'U]/ML
5000 INJECTION, SOLUTION INTRAVENOUS; SUBCUTANEOUS EVERY 8 HOURS SCHEDULED
Status: DISCONTINUED | OUTPATIENT
Start: 2024-07-27 | End: 2024-08-05 | Stop reason: HOSPADM

## 2024-07-27 RX ORDER — DEXTROSE MONOHYDRATE, SODIUM CHLORIDE, AND POTASSIUM CHLORIDE 50; 1.49; 9 G/1000ML; G/1000ML; G/1000ML
150 INJECTION, SOLUTION INTRAVENOUS CONTINUOUS PRN
Status: DISCONTINUED | OUTPATIENT
Start: 2024-07-27 | End: 2024-07-27

## 2024-07-27 RX ORDER — BETHANECHOL CHLORIDE 25 MG/1
25 TABLET ORAL 3 TIMES DAILY
Status: DISCONTINUED | OUTPATIENT
Start: 2024-07-27 | End: 2024-08-05 | Stop reason: HOSPADM

## 2024-07-27 RX ORDER — NICOTINE POLACRILEX 4 MG
15 LOZENGE BUCCAL
Status: DISCONTINUED | OUTPATIENT
Start: 2024-07-27 | End: 2024-07-27

## 2024-07-27 RX ORDER — SODIUM CHLORIDE AND POTASSIUM CHLORIDE 150; 450 MG/100ML; MG/100ML
250 INJECTION, SOLUTION INTRAVENOUS CONTINUOUS PRN
Status: DISCONTINUED | OUTPATIENT
Start: 2024-07-27 | End: 2024-07-27

## 2024-07-27 RX ORDER — HEPARIN SODIUM 1000 [USP'U]/ML
4000 INJECTION, SOLUTION INTRAVENOUS; SUBCUTANEOUS AS NEEDED
Status: DISCONTINUED | OUTPATIENT
Start: 2024-07-27 | End: 2024-07-27 | Stop reason: SDUPTHER

## 2024-07-27 RX ORDER — SODIUM CHLORIDE 9 MG/ML
100 INJECTION, SOLUTION INTRAVENOUS CONTINUOUS
Status: DISCONTINUED | OUTPATIENT
Start: 2024-07-27 | End: 2024-07-29

## 2024-07-27 RX ORDER — SODIUM CHLORIDE 9 MG/ML
250 INJECTION, SOLUTION INTRAVENOUS CONTINUOUS PRN
Status: DISCONTINUED | OUTPATIENT
Start: 2024-07-27 | End: 2024-07-27

## 2024-07-27 RX ORDER — DIVALPROEX SODIUM 250 MG/1
1500 TABLET, DELAYED RELEASE ORAL NIGHTLY
Status: DISCONTINUED | OUTPATIENT
Start: 2024-07-27 | End: 2024-08-05 | Stop reason: HOSPADM

## 2024-07-27 RX ORDER — SODIUM CHLORIDE 0.9 % (FLUSH) 0.9 %
10 SYRINGE (ML) INJECTION AS NEEDED
Status: DISCONTINUED | OUTPATIENT
Start: 2024-07-27 | End: 2024-07-27

## 2024-07-27 RX ORDER — DEXTROSE MONOHYDRATE 25 G/50ML
10-50 INJECTION, SOLUTION INTRAVENOUS
Status: DISCONTINUED | OUTPATIENT
Start: 2024-07-27 | End: 2024-07-27

## 2024-07-27 RX ORDER — SODIUM CHLORIDE AND POTASSIUM CHLORIDE 300; 900 MG/100ML; MG/100ML
250 INJECTION, SOLUTION INTRAVENOUS CONTINUOUS PRN
Status: DISCONTINUED | OUTPATIENT
Start: 2024-07-27 | End: 2024-07-27

## 2024-07-27 RX ORDER — IBUPROFEN 600 MG/1
1 TABLET ORAL
Status: DISCONTINUED | OUTPATIENT
Start: 2024-07-27 | End: 2024-07-27

## 2024-07-27 RX ORDER — OXYCODONE HYDROCHLORIDE 5 MG/1
5 TABLET ORAL EVERY 4 HOURS PRN
Status: COMPLETED | OUTPATIENT
Start: 2024-07-27 | End: 2024-07-28

## 2024-07-27 RX ORDER — HEPARIN SODIUM 10000 [USP'U]/100ML
9.5 INJECTION, SOLUTION INTRAVENOUS
Status: DISCONTINUED | OUTPATIENT
Start: 2024-07-27 | End: 2024-07-27

## 2024-07-27 RX ADMIN — BETHANECHOL CHLORIDE 25 MG: 25 TABLET ORAL at 18:11

## 2024-07-27 RX ADMIN — PIPERACILLIN AND TAZOBACTAM 3.38 G: 3; .375 INJECTION, POWDER, FOR SOLUTION INTRAVENOUS at 18:19

## 2024-07-27 RX ADMIN — HEPARIN SODIUM 5000 UNITS: 5000 INJECTION INTRAVENOUS; SUBCUTANEOUS at 15:10

## 2024-07-27 RX ADMIN — DIVALPROEX SODIUM 500 MG: 250 TABLET, DELAYED RELEASE ORAL at 10:22

## 2024-07-27 RX ADMIN — PIPERACILLIN AND TAZOBACTAM 3.38 G: 3; .375 INJECTION, POWDER, FOR SOLUTION INTRAVENOUS at 03:27

## 2024-07-27 RX ADMIN — ESTRADIOL 0.5 MG: 0.5 TABLET ORAL at 10:22

## 2024-07-27 RX ADMIN — LEVOTHYROXINE SODIUM 88 MCG: 0.09 TABLET ORAL at 06:04

## 2024-07-27 RX ADMIN — PANTOPRAZOLE SODIUM 40 MG: 40 TABLET, DELAYED RELEASE ORAL at 06:04

## 2024-07-27 RX ADMIN — PIPERACILLIN AND TAZOBACTAM 3.38 G: 3; .375 INJECTION, POWDER, FOR SOLUTION INTRAVENOUS at 10:21

## 2024-07-27 RX ADMIN — ACETAMINOPHEN 650 MG: 325 TABLET ORAL at 15:10

## 2024-07-27 RX ADMIN — INSULIN HUMAN 5.5 UNITS/HR: 1 INJECTION, SOLUTION INTRAVENOUS at 03:13

## 2024-07-27 RX ADMIN — HEPARIN SODIUM 5000 UNITS: 5000 INJECTION INTRAVENOUS; SUBCUTANEOUS at 20:15

## 2024-07-27 RX ADMIN — HEPARIN SODIUM 4000 UNITS: 1000 INJECTION INTRAVENOUS; SUBCUTANEOUS at 03:30

## 2024-07-27 RX ADMIN — HEPARIN SODIUM 9.5 UNITS/KG/HR: 10000 INJECTION, SOLUTION INTRAVENOUS at 03:33

## 2024-07-27 RX ADMIN — OXYCODONE HYDROCHLORIDE 5 MG: 5 TABLET ORAL at 20:18

## 2024-07-27 RX ADMIN — SODIUM CHLORIDE 100 ML/HR: 9 INJECTION, SOLUTION INTRAVENOUS at 03:20

## 2024-07-27 RX ADMIN — ASPIRIN 81 MG: 81 TABLET, COATED ORAL at 10:23

## 2024-07-27 RX ADMIN — BETHANECHOL CHLORIDE 25 MG: 25 TABLET ORAL at 12:27

## 2024-07-27 RX ADMIN — DIVALPROEX SODIUM 1500 MG: 250 TABLET, DELAYED RELEASE ORAL at 03:54

## 2024-07-27 RX ADMIN — Medication 5 MG/HR: at 01:07
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
## 2024-07-27 NOTE — H&P
"  CRITICAL CARE ADMISSION NOTE    Chief Complaint     Acute pyelonephritis    History of Present Illness     63-year-old female admitted to the intensive care unit from the emergency room on 7/27/2024    She is followed by Dr. King, PCP, at [facility].  Has a history of bipolar affective disorder and/or schizophrenia.  Also history of tobacco abuse, chronic obstructive pulmonary disease.  She reportedly has \"mild cognitive impairment\" and lives with her daughter who is her POA    She is apparently recently had several falls and was seen by Dr. Hutchinson several days ago and started on Macrobid for symptoms of urinary tract infection.  I do not see any cultures in the system.  She has been in continent of urine.  She is brought to the emergency room after a fall; apparently got lightheaded and fell.  In the emergency room she was noted to have atrial fibrillation with a rapid ventricular response and started on a Cardizem drip.  CT scan of the abdomen pelvis was consistent with bilateral pyelonephritis and labs were consistent for acute kidney insufficiency, hyperglycemia, metabolic acidosis with elevated lactate level.    We have been asked to admit her to the intensive care unit    Problem List, Surgical History, Family, Social History, and ROS     Acute pyelonephritis    Severe sepsis    Atrial fibrillation with RVR    ANDRE (acute kidney injury)    Bipolar I disorder, single manic episode    COPD (chronic obstructive pulmonary disease)    Obstructive sleep apnea syndrome    Tobacco abuse     Past Surgical History:   Procedure Laterality Date    COLONOSCOPY      last a few years ago    COLONOSCOPY N/A 9/19/2019    Procedure: COLONOSCOPY;  Surgeon: Mayank Craven MD;  Location: [facility] ENDOSCOPY;  Service: Gastroenterology    ENDOSCOPY      last a few years ago    ENDOSCOPY N/A 9/19/2019    Procedure: Esophagogastroduodenoscopy;  Surgeon: Mayank Craven MD;  Location:  [facility] ENDOSCOPY;  Service: " Gastroenterology    HYSTERECTOMY      2003 m[place]anne, total    MASTOIDECTOMY      TONSILLECTOMY         Allergies   Allergen Reactions    Lipitor [Atorvastatin] Myalgia    Lortab [Hydrocodone-Acetaminophen]     Rosuvastatin     Hydrocodone Nausea Only    Ibuprofen Nausea Only    Ondansetron Itching     No current facility-administered medications on file prior to encounter.     Current Outpatient Medications on File Prior to Encounter   Medication Sig    albuterol sulfate  (90 Base) MCG/ACT inhaler Inhale 2 puffs Every 6 (Six) Hours As Needed for Wheezing.    amLODIPine (NORVASC) 10 MG tablet Take 1 tablet by mouth Every Morning.    Anoro Ellipta 62.5-25 MCG/ACT aerosol powder  inhaler INHALE 1 PUFF BY MOUTH DAILY    aspirin 81 MG EC tablet Take 1 tablet by mouth Daily.    B-D ULTRAFINE III SHORT PEN 31G X 8 MM misc USE AS DIRECTED THREE TIMES DAILY    bethanechol (URECHOLINE) 25 MG tablet Take 1 tablet by mouth 3 (Three) Times a Day.    ciprofloxacin (CIPRO) 500 MG tablet TAKE 1 TABLET BY MOUTH TWICE DAILY FOR 7 DAYS    clonazePAM (KlonoPIN) 2 MG tablet Take 1 tablet by mouth 2 (Two) Times a Day.    Continuous Blood Gluc  (Dexcom G7 ) device Use 1 each Daily.    Continuous Blood Gluc Sensor (Dexcom G7 Sensor) misc Use 1 each Every 10 (Ten) Days.    cyclobenzaprine (FLEXERIL) 10 MG tablet TAKE 1 TABLET BY MOUTH THREE TIMES DAILY AS NEEDED FOR MUSCLE SPASMS    dicyclomine (BENTYL) 10 MG capsule TAKE 1 CAPSULE BY MOUTH FOUR TIMES DAILY BEFORE MEALS AND AT BEDTIME    divalproex (DEPAKOTE) 500 MG DR tablet TAKE 1 TABLET EVERY MORNING AND 3 TABLETS EVERY NIGHT AT BEDTIME    empagliflozin (Jardiance) 10 MG tablet tablet Take 1 tablet by mouth Daily.    estradiol (ESTRACE) 0.5 MG tablet TAKE 1 TABLET BY MOUTH DAILY    furosemide (LASIX) 20 MG tablet Take 1 tablet by mouth Daily.    gabapentin (NEURONTIN) 400 MG capsule Take 1 capsule by mouth every night at bedtime.    Gemtesa 75 MG tablet TAKE 1 TABLET BY  "MOUTH DAILY    glucose blood test strip 1 each by Other route 3 (Three) Times a Day Before Meals.    glucose monitor monitoring kit Inject  under the skin into the appropriate area as directed 3 (Three) Times a Day Before Meals.    hydrOXYzine pamoate (VISTARIL) 50 MG capsule Take 1 capsule by mouth 4 (Four) Times a Day As Needed.    icosapent ethyl (Vascepa) 1 g capsule capsule Take 2 g by mouth 2 (Two) Times a Day With Meals.    Incontinence Supply Disposable (DISPOSABLE UNDERPADS 30\"X36\") misc 1 each Every Night.    Incontinence Supply Disposable (INCONTINENCE BRIEF LARGE) misc 1 each 2 (Two) Times a Day. (Patient taking differently: Use 1 each As Needed.)    insulin aspart (NovoLOG FlexPen) 100 UNIT/ML solution pen-injector sc pen Inject 15 Units under the skin into the appropriate area as directed 3 (Three) Times a Day With Meals.    Insulin Glargine (LANTUS SOLOSTAR) 100 UNIT/ML injection pen Inject 35 Units under the skin into the appropriate area as directed Every Night.    Insulin Pen Needle (B-D ULTRAFINE III SHORT PEN) 31G X 8 MM misc Inject 1 applicator under the skin 2 (Two) Times a Day.    Lancet Device misc Use to check blood sugars three times daily before meals.    levothyroxine (SYNTHROID, LEVOTHROID) 88 MCG tablet TAKE 1 TABLET BY MOUTH DAILY    lidocaine (LIDODERM) 5 % Place 1 patch on the skin as directed by provider Daily. Remove & Discard patch within 12 hours or as directed by MD    Misc. Devices (Adjust Bath/Shower Seat) misc 1 each Daily.    Misc. Devices (Adjust Bath/Shower Seat/Back) misc 1 each Daily.    Misc. Devices (Roller Walker) misc 1 Device Daily. Rollator walker    mupirocin (BACTROBAN) 2 % cream Apply  topically to the appropriate area as directed See Admin Instructions. Apply topically to the affected areas three times daily    neomycin-polymyxin-hydrocortisone (CORTISPORIN) 3.5-53727-2 otic solution Administer 3 drops to the right ear 4 (Four) Times a Day.    nystatin " "(MYCOSTATIN) 677985 UNIT/GM ointment Apply  topically to the appropriate area as directed 2 (Two) Times a Day.    omeprazole (priLOSEC) 40 MG capsule TAKE 1 CAPSULE BY MOUTH EVERY DAY    phenazopyridine (Pyridium) 200 MG tablet Take 1 tablet by mouth 3 (Three) Times a Day As Needed (dysuria).    prazosin (MINIPRESS) 2 MG capsule TK 1 C PO QHS    promethazine (PHENERGAN) 25 MG tablet Take 1 tablet by mouth Every 12 (Twelve) Hours.    simvastatin (ZOCOR) 10 MG tablet Take 1 tablet by mouth Every Evening.    SUMAtriptan (IMITREX) 50 MG tablet Take 1 tablet by mouth Every 2 (Two) Hours As Needed for Migraine. Take one tablet at onset of headache. May repeat dose one time in 2 hours if headache not relieved.    traZODone (DESYREL) 100 MG tablet TK 1 T PO AT SUPPER AND 3 TS QPM    vitamin D (ERGOCALCIFEROL) 1.25 MG (64558 UT) capsule capsule Take 1 capsule by mouth Every 7 (Seven) Days.    Wheat Dextrin (BENEFIBER DRINK MIX PO) Take 1 dose by mouth 2 (Two) Times a Day As Needed.     MEDICATION LIST AND ALLERGIES REVIEWED.    Family History   Problem Relation Age of Onset    Diabetes Mother     Stroke Mother         Stroke Syndrome    Cancer Father     Diabetes Sister     Cancer Brother     Diabetes Brother     Diabetes Maternal Grandmother     Pancreatitis Neg Hx     Colon cancer Neg Hx     Colon polyps Neg Hx     Esophageal cancer Neg Hx      Social History     Tobacco Use    Smoking status: Every Day     Current packs/day: 2.00     Average packs/day: 2.0 packs/day for 15.0 years (30.0 ttl pk-yrs)     Types: Cigarettes    Smokeless tobacco: Never   Vaping Use    Vaping status: Never Used   Substance Use Topics    Alcohol use: Yes     Comment: hx social use decades ago, occ heavier use, denies any hx abuse    Drug use: Not Currently     Types: \"Crack\" cocaine     Comment: hx street drugs self medication of pain/bipolar, heavy 20-30 years ago, denies any hx ever IV use     Social History     Social History Narrative    " "Nicki is a 57 year old white  female. She has 3 children. Her daughter Heidi is her POA, she was managing herself up until 2017 when her daughter had to increase management. She lives alone in [facility], but her daughter lives across the street ~50 yards and checks on her daily.    Caffeine: 3 cups of coffee daily      FAMILY AND SOCIAL HISTORY REVIEWED.    Review of Systems  AS ABOVE OR ALL OTHER SYSTEMS REVIEWED AND ARE NEGATIVE.    Physical Exam and Clinical Information   /56   Pulse (!) 147   Temp 99.1 °F (37.3 °C) (Oral)   Resp 20   Ht 167.6 cm (66\")   Wt 104 kg (230 lb)   SpO2 91%   BMI 37.12 kg/m²   Physical Exam:   GENERAL: Awake, no overt distress   HEENT: No evidence of external trauma.  Pupils equal.  Sclera nonicteric   LUNGS: No wheezes or rhonchi   HEART: Tachycardic.  Distant heart sounds   GI: Obese, soft, nontender   EXTREMITIES: Palpable pulses.  No clubbing or cyanosis   NEURO/PSYCH: Awake.  Alert.  Oriented to person and hospital.  Follows simple commands    Results from last 7 days   Lab Units 07/26/24  2228   WBC 10*3/mm3 14.39*   HEMOGLOBIN g/dL 11.3*   PLATELETS 10*3/mm3 143     Results from last 7 days   Lab Units 07/26/24  2222   SODIUM mmol/L 130*   POTASSIUM mmol/L 4.7   CO2 mmol/L 19.0*   BUN mg/dL 69*   CREATININE mg/dL 2.52*   MAGNESIUM mg/dL 2.5*   PHOSPHORUS mg/dL 4.3   GLUCOSE mg/dL 364*     Estimated Creatinine Clearance: 28.6 mL/min (A) (by C-G formula based on SCr of 2.52 mg/dL (H)).      Results from last 7 days   Lab Units 07/26/24  2305   PH, ARTERIAL pH units 7.357   PCO2, ARTERIAL mm Hg 34.0*   PO2 ART mm Hg 92.6     Lab Results   Component Value Date    LACTATE 4.6 (C) 07/26/2024        IMAGES: Chest x-ray reveals cardiomegaly and low lung volumes    CT scan of the abdomen pelvis reveals bilateral renal enlargement and stranding suggestive of bilateral pyelonephritis    CT scan of the head revealed no acute intracranial abnormalities and no " "evidence of trauma    I reviewed the patient's results and images.     Assesment     Active Hospital Problems    Diagnosis     **Acute pyelonephritis     Severe sepsis     Atrial fibrillation with RVR     ANDRE (acute kidney injury)     Tobacco abuse     Bipolar I disorder, single manic episode     COPD (chronic obstructive pulmonary disease)     Obstructive sleep apnea syndrome      Plan/Recommendations     Admit to the intensive care unit  Cardizem drip for rate control  Beta-blockers as needed for rate control as well  Heparin drip  Echocardiogram  Serial EKG and troponin  Broad-spectrum antibiotics  Follow-up urine cultures  IV fluids  Follow-up renal function  Insulin drip  Ulcer prophylaxis  Review and continue some home medications  Inhaled bronchodilators    I talked with the patient's daughter on the phone, Crystal Sandifer, who is her POA.  I updated her to the multiple medical issues.  Apparently there is no history of atrial fibrillation in the past.    We discussed resuscitation status and she indicates that this holiday is a \"DNR\" and they do not want extensive resuscitative measures in the event of a cardiopulmonary arrest.    Critical Care time spent in direct patient care: 45 minutes (excluding procedure time, if applicable) including high complexity decision making to assess, manipulate, and support vital organ system failure in this individual who has impairment of one or more vital organ systems such that there is a high probability of imminent or life threatening deterioration in the patient’s condition.    ANEL Deshpande MD  Pulmonary and Critical Care Medicine     CC: Chacorta Del Rio, DO  "
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
## 2024-07-27 NOTE — PLAN OF CARE
Plan of care reviewed with patient/family    Neuro  A&O, occasional periods of garbled rambling  REDMAN  Unable to ambulate    Resp  4 LPM  Sats > 88%  LS clear  RR 16-20, higher with ambulation    CV  SR 83-96  SBP /  + pul  No a fib  Heparin drip stopped, SQ started    GI  Clear liquid diet  + BS  Loose BM today x 1       plus 2 small voids that were unmeasureable  Urine very dark and concentrated  Creat conts in increase, now 2.91  Renal artery duplex ordered for 7/28    Skin  Left shoulder echymotic and painful  2 view XR to left shoulder today    Misc  Na decreased from 136 to 130  Tylenol for pain x 1 today  Insulin drip conts, DKA protocol stopped.

## 2024-07-28 ENCOUNTER — APPOINTMENT (OUTPATIENT)
Dept: CARDIOLOGY | Facility: HOSPITAL | Age: 64
DRG: 872 | End: 2024-07-28
Payer: MEDICARE

## 2024-07-28 LAB
ALBUMIN SERPL-MCNC: 2.3 G/DL (ref 3.5–5.2)
ALBUMIN/GLOB SERPL: 0.6 G/DL
ALP SERPL-CCNC: 376 U/L (ref 39–117)
ALT SERPL W P-5'-P-CCNC: 19 U/L (ref 1–33)
ANION GAP SERPL CALCULATED.3IONS-SCNC: 15 MMOL/L (ref 5–15)
ANISOCYTOSIS BLD QL: ABNORMAL
AST SERPL-CCNC: 66 U/L (ref 1–32)
BASOPHILS # BLD MANUAL: 0 10*3/MM3 (ref 0–0.2)
BASOPHILS NFR BLD MANUAL: 0 % (ref 0–1.5)
BH CV ECHO MEAS - DIST REN A EDV LEFT: 9 CM/S
BH CV ECHO MEAS - DIST REN A PSV LEFT: 86.9 CM/S
BH CV ECHO MEAS - MID REN A EDV LEFT: 9.9 CM/S
BH CV ECHO MEAS - MID REN A PSV LEFT: 80.4 CM/S
BH CV ECHO MEAS - PROX REN A EDV LEFT: 11.7 CM/S
BH CV ECHO MEAS - PROX REN A PSV LEFT: 105 CM/S
BH CV VAS BP LEFT ARM: NORMAL MMHG
BH CV VAS RENAL AORTIC MID PSV: 90.3 CM/S
BH CV VAS RENAL HILUM LEFT EDV: 9.9 CM/S
BH CV VAS RENAL HILUM LEFT PSV: 81.7 CM/S
BH CV VAS RENAL HILUM RIGHT EDV: 9.2 CM/S
BH CV VAS RENAL HILUM RIGHT PSV: 68.8 CM/S
BH CV XLRA MEAS - KID L LEFT: 14 CM
BH CV XLRA MEAS DIST REN A EDV RIGHT: 11.7 CM/S
BH CV XLRA MEAS DIST REN A PSV RIGHT: 84.8 CM/S
BH CV XLRA MEAS KID L RIGHT: 14 CM
BH CV XLRA MEAS KID W RIGHT: 6 CM
BH CV XLRA MEAS MID REN A EDV RIGHT: 11.1 CM/S
BH CV XLRA MEAS MID REN A PSV RIGHT: 97.7 CM/S
BH CV XLRA MEAS PROX REN A EDV RIGHT: 12.3 CM/S
BH CV XLRA MEAS PROX REN A PSV RIGHT: 123 CM/S
BH CV XLRA MEAS RAR LEFT: 1.17
BH CV XLRA MEAS RAR RIGHT: 1.4
BH CV XLRA MEAS RENAL A ORG EDV LEFT: 12 CM/S
BH CV XLRA MEAS RENAL A ORG EDV RIGHT: 12.3 CM/S
BH CV XLRA MEAS RENAL A ORG PSV LEFT: 106 CM/S
BH CV XLRA MEAS RENAL A ORG PSV RIGHT: 103 CM/S
BILIRUB SERPL-MCNC: 0.3 MG/DL (ref 0–1.2)
BUN SERPL-MCNC: 74 MG/DL (ref 8–23)
BUN/CREAT SERPL: 25.6 (ref 7–25)
CA-I SERPL ISE-MCNC: 1.1 MMOL/L (ref 1.12–1.32)
CALCIUM SPEC-SCNC: 7.7 MG/DL (ref 8.6–10.5)
CHLORIDE SERPL-SCNC: 98 MMOL/L (ref 98–107)
CK SERPL-CCNC: 251 U/L (ref 20–180)
CO2 SERPL-SCNC: 20 MMOL/L (ref 22–29)
CREAT SERPL-MCNC: 2.89 MG/DL (ref 0.57–1)
DEPRECATED RDW RBC AUTO: 54.2 FL (ref 37–54)
EGFRCR SERPLBLD CKD-EPI 2021: 18 ML/MIN/1.73
EOSINOPHIL # BLD MANUAL: 0 10*3/MM3 (ref 0–0.4)
EOSINOPHIL NFR BLD MANUAL: 0 % (ref 0.3–6.2)
ERYTHROCYTE [DISTWIDTH] IN BLOOD BY AUTOMATED COUNT: 16.7 % (ref 12.3–15.4)
GLOBULIN UR ELPH-MCNC: 3.9 GM/DL
GLUCOSE BLDC GLUCOMTR-MCNC: 111 MG/DL (ref 70–130)
GLUCOSE BLDC GLUCOMTR-MCNC: 115 MG/DL (ref 70–130)
GLUCOSE BLDC GLUCOMTR-MCNC: 122 MG/DL (ref 70–130)
GLUCOSE BLDC GLUCOMTR-MCNC: 131 MG/DL (ref 70–130)
GLUCOSE BLDC GLUCOMTR-MCNC: 131 MG/DL (ref 70–130)
GLUCOSE BLDC GLUCOMTR-MCNC: 137 MG/DL (ref 70–130)
GLUCOSE BLDC GLUCOMTR-MCNC: 138 MG/DL (ref 70–130)
GLUCOSE BLDC GLUCOMTR-MCNC: 141 MG/DL (ref 70–130)
GLUCOSE BLDC GLUCOMTR-MCNC: 144 MG/DL (ref 70–130)
GLUCOSE BLDC GLUCOMTR-MCNC: 144 MG/DL (ref 70–130)
GLUCOSE BLDC GLUCOMTR-MCNC: 172 MG/DL (ref 70–130)
GLUCOSE BLDC GLUCOMTR-MCNC: 235 MG/DL (ref 70–130)
GLUCOSE BLDC GLUCOMTR-MCNC: 255 MG/DL (ref 70–130)
GLUCOSE SERPL-MCNC: 139 MG/DL (ref 65–99)
HCT VFR BLD AUTO: 32 % (ref 34–46.6)
HGB BLD-MCNC: 10.5 G/DL (ref 12–15.9)
LEFT KIDNEY WIDTH: 7 CM
LEFT RENAL UPPER PARENCHYMA MAX: 26.5 CM/S
LEFT RENAL UPPER PARENCHYMA MIN: 7.9 CM/S
LEFT RENAL UPPER PARENCHYMA RI: 0.7
LYMPHOCYTES # BLD MANUAL: 1.89 10*3/MM3 (ref 0.7–3.1)
LYMPHOCYTES NFR BLD MANUAL: 2 % (ref 5–12)
MAGNESIUM SERPL-MCNC: 2.6 MG/DL (ref 1.6–2.4)
MCH RBC QN AUTO: 28.8 PG (ref 26.6–33)
MCHC RBC AUTO-ENTMCNC: 32.8 G/DL (ref 31.5–35.7)
MCV RBC AUTO: 87.9 FL (ref 79–97)
MONOCYTES # BLD: 0.29 10*3/MM3 (ref 0.1–0.9)
NEUTROPHILS # BLD AUTO: 12.35 10*3/MM3 (ref 1.7–7)
NEUTROPHILS NFR BLD MANUAL: 65 % (ref 42.7–76)
NEUTS BAND NFR BLD MANUAL: 20 % (ref 0–5)
NRBC SPEC MANUAL: 0 /100 WBC (ref 0–0.2)
PLAT MORPH BLD: NORMAL
PLATELET # BLD AUTO: 160 10*3/MM3 (ref 140–450)
PMV BLD AUTO: 10.2 FL (ref 6–12)
POTASSIUM SERPL-SCNC: 4.3 MMOL/L (ref 3.5–5.2)
PROT SERPL-MCNC: 6.2 G/DL (ref 6–8.5)
QT INTERVAL: 284 MS
QT INTERVAL: 364 MS
QTC INTERVAL: 474 MS
QTC INTERVAL: 474 MS
RBC # BLD AUTO: 3.64 10*6/MM3 (ref 3.77–5.28)
RIGHT RENAL UPPER PARENCHYMA MAX: 29.2 CM/S
RIGHT RENAL UPPER PARENCHYMA MIN: 6.8 CM/S
RIGHT RENAL UPPER PARENCHYMA RI: 0.77
SODIUM SERPL-SCNC: 133 MMOL/L (ref 136–145)
VARIANT LYMPHS NFR BLD MANUAL: 13 % (ref 19.6–45.3)
WBC MORPH BLD: NORMAL
WBC NRBC COR # BLD AUTO: 14.53 10*3/MM3 (ref 3.4–10.8)

## 2024-07-28 PROCEDURE — 83735 ASSAY OF MAGNESIUM: CPT | Performed by: INTERNAL MEDICINE

## 2024-07-28 PROCEDURE — 94799 UNLISTED PULMONARY SVC/PX: CPT

## 2024-07-28 PROCEDURE — 25010000002 HEPARIN (PORCINE) PER 1000 UNITS: Performed by: INTERNAL MEDICINE

## 2024-07-28 PROCEDURE — 94761 N-INVAS EAR/PLS OXIMETRY MLT: CPT

## 2024-07-28 PROCEDURE — 99233 SBSQ HOSP IP/OBS HIGH 50: CPT | Performed by: INTERNAL MEDICINE

## 2024-07-28 PROCEDURE — 85025 COMPLETE CBC W/AUTO DIFF WBC: CPT | Performed by: INTERNAL MEDICINE

## 2024-07-28 PROCEDURE — 94640 AIRWAY INHALATION TREATMENT: CPT

## 2024-07-28 PROCEDURE — 25010000002 PIPERACILLIN SOD-TAZOBACTAM PER 1 G: Performed by: INTERNAL MEDICINE

## 2024-07-28 PROCEDURE — 82948 REAGENT STRIP/BLOOD GLUCOSE: CPT

## 2024-07-28 PROCEDURE — 82550 ASSAY OF CK (CPK): CPT | Performed by: INTERNAL MEDICINE

## 2024-07-28 PROCEDURE — 82330 ASSAY OF CALCIUM: CPT | Performed by: INTERNAL MEDICINE

## 2024-07-28 PROCEDURE — 80053 COMPREHEN METABOLIC PANEL: CPT | Performed by: INTERNAL MEDICINE

## 2024-07-28 PROCEDURE — 93975 VASCULAR STUDY: CPT

## 2024-07-28 PROCEDURE — 93975 VASCULAR STUDY: CPT | Performed by: INTERNAL MEDICINE

## 2024-07-28 PROCEDURE — 85007 BL SMEAR W/DIFF WBC COUNT: CPT | Performed by: INTERNAL MEDICINE

## 2024-07-28 PROCEDURE — 63710000001 INSULIN LISPRO (HUMAN) PER 5 UNITS: Performed by: INTERNAL MEDICINE

## 2024-07-28 RX ORDER — INSULIN LISPRO 100 [IU]/ML
2-9 INJECTION, SOLUTION INTRAVENOUS; SUBCUTANEOUS
Status: DISCONTINUED | OUTPATIENT
Start: 2024-07-28 | End: 2024-07-29

## 2024-07-28 RX ORDER — IBUPROFEN 600 MG/1
1 TABLET ORAL
Status: DISCONTINUED | OUTPATIENT
Start: 2024-07-28 | End: 2024-07-29

## 2024-07-28 RX ORDER — NICOTINE POLACRILEX 4 MG
15 LOZENGE BUCCAL
Status: DISCONTINUED | OUTPATIENT
Start: 2024-07-28 | End: 2024-07-29

## 2024-07-28 RX ORDER — DEXTROSE MONOHYDRATE 25 G/50ML
25 INJECTION, SOLUTION INTRAVENOUS
Status: DISCONTINUED | OUTPATIENT
Start: 2024-07-28 | End: 2024-07-29

## 2024-07-28 RX ADMIN — BETHANECHOL CHLORIDE 25 MG: 25 TABLET ORAL at 08:07

## 2024-07-28 RX ADMIN — IPRATROPIUM BROMIDE AND ALBUTEROL SULFATE 3 ML: 2.5; .5 SOLUTION RESPIRATORY (INHALATION) at 16:30

## 2024-07-28 RX ADMIN — ASPIRIN 81 MG: 81 TABLET, COATED ORAL at 08:07

## 2024-07-28 RX ADMIN — HEPARIN SODIUM 5000 UNITS: 5000 INJECTION INTRAVENOUS; SUBCUTANEOUS at 05:12

## 2024-07-28 RX ADMIN — HEPARIN SODIUM 5000 UNITS: 5000 INJECTION INTRAVENOUS; SUBCUTANEOUS at 14:14

## 2024-07-28 RX ADMIN — INSULIN LISPRO 6 UNITS: 100 INJECTION, SOLUTION INTRAVENOUS; SUBCUTANEOUS at 17:18

## 2024-07-28 RX ADMIN — OXYCODONE HYDROCHLORIDE 5 MG: 5 TABLET ORAL at 04:47

## 2024-07-28 RX ADMIN — PIPERACILLIN AND TAZOBACTAM 3.38 G: 3; .375 INJECTION, POWDER, FOR SOLUTION INTRAVENOUS at 03:16

## 2024-07-28 RX ADMIN — PIPERACILLIN AND TAZOBACTAM 3.38 G: 3; .375 INJECTION, POWDER, FOR SOLUTION INTRAVENOUS at 17:18

## 2024-07-28 RX ADMIN — ESTRADIOL 0.5 MG: 0.5 TABLET ORAL at 08:07

## 2024-07-28 RX ADMIN — DIVALPROEX SODIUM 500 MG: 250 TABLET, DELAYED RELEASE ORAL at 08:07

## 2024-07-28 RX ADMIN — HEPARIN SODIUM 5000 UNITS: 5000 INJECTION INTRAVENOUS; SUBCUTANEOUS at 21:03

## 2024-07-28 RX ADMIN — ACETAMINOPHEN 650 MG: 325 TABLET ORAL at 11:53

## 2024-07-28 RX ADMIN — DIVALPROEX SODIUM 1500 MG: 250 TABLET, DELAYED RELEASE ORAL at 21:04

## 2024-07-28 RX ADMIN — INSULIN LISPRO 4 UNITS: 100 INJECTION, SOLUTION INTRAVENOUS; SUBCUTANEOUS at 21:03

## 2024-07-28 RX ADMIN — LEVOTHYROXINE SODIUM 88 MCG: 0.09 TABLET ORAL at 05:12

## 2024-07-28 RX ADMIN — PANTOPRAZOLE SODIUM 40 MG: 40 TABLET, DELAYED RELEASE ORAL at 05:12

## 2024-07-28 RX ADMIN — ACETAMINOPHEN 650 MG: 325 TABLET ORAL at 21:02

## 2024-07-28 RX ADMIN — BETHANECHOL CHLORIDE 25 MG: 25 TABLET ORAL at 17:18

## 2024-07-28 RX ADMIN — BETHANECHOL CHLORIDE 25 MG: 25 TABLET ORAL at 11:53

## 2024-07-28 RX ADMIN — PIPERACILLIN AND TAZOBACTAM 3.38 G: 3; .375 INJECTION, POWDER, FOR SOLUTION INTRAVENOUS at 09:12

## 2024-07-28 NOTE — PROGRESS NOTES
"INTENSIVIST   PROGRESS NOTE     Hospital:  LOS: 1 day     Chief Complaint: Malaise     Subjective   S     Interval History: No acute issues over the last 24 hours. Patient has significantly improved since admission. She is afebrile and hemodynamically stable this morning. Off all drips and now tolerating PO feeds without nausea, vomiting.    The patient's relevant past medical, surgical and social history were reviewed and updated in Epic as appropriate.      Objective   O     Intake/Ouptut 24 hrs (7:00AM - 6:59 AM)  Intake & Output (last 3 days)         07/25 0701 07/26 0700 07/26 0701 07/27 0700 07/27 0701 07/28 0700 07/28 0701 07/29 0700    P.O.   960     I.V. (mL/kg)   2386.7 (22.9)     IV Piggyback  3220 300     Total Intake(mL/kg)  3220 (31) 3646.7 (35.1)     Urine (mL/kg/hr)   600 (0.2) 200 (0.2)    Stool   0     Total Output   600 200    Net  +3220 +3046.7 -200            Urine Unmeasured Occurrence   2 x 2 x    Stool Unmeasured Occurrence   1 x      Medications (drips):  dilTIAZem, Last Rate: Stopped (07/27/24 0345)  Pharmacy Consult  sodium chloride, Last Rate: 100 mL/hr (07/27/24 0320)    Respiratory Support: NC    Physical Examination:  Vital Signs: Blood pressure 128/71, pulse 93, temperature 97.6 °F (36.4 °C), temperature source Oral, resp. rate 16, height 167.6 cm (66\"), weight 104 kg (230 lb), SpO2 92%.    General: The patient appears in no acute distress. Significantly more alert today on exam. Sitting upright in bedside chair at time of assessment.   Chest: Clear to auscultation bilaterally, No wheezing, rhonchi, or rales. Normal work of breathing. Equal chest rise.  Cardiac: Rate controlled. S1S2 auscultated. No murmurs, rubs or gallops.    Abdomen: Soft, non-tender, non-distended, mild CVA tenderness bilaterally  Skin: No rashes, open wounds.  Bruising on left shoulder (posterior aspect)   Neuro: Motor power grossly intact bilaterally. Sensation intact. Speech fluid and fluent. Thought " process coherent.   Psych: Alert and oriented x3. Mood stable.    Lines, Drains & Airways       Active LDAs       Name Placement date Placement time Site Days    Peripheral IV 07/26/24 2230 Left Antecubital 07/26/24 2230  Antecubital  1    Peripheral IV 07/26/24 2253 Left Antecubital 07/26/24 2253  Antecubital  1    Peripheral IV 07/27/24 2200 Anterior;Right;Upper Arm 07/27/24 2200  Arm  less than 1    External Urinary Catheter 07/27/24 2200  --  less than 1             Results from last 7 days   Lab Units 07/28/24 0327 07/26/24  2228   WBC 10*3/mm3 14.53* 14.39*   HEMOGLOBIN g/dL 10.5* 11.3*   MCV fL 87.9 88.8   PLATELETS 10*3/mm3 160 143     Results from last 7 days   Lab Units 07/28/24  0327 07/27/24  1634 07/27/24  1030 07/27/24 0311 07/26/24  2222   SODIUM mmol/L 133* 130* 136   < > 130*   POTASSIUM mmol/L 4.3 4.3 4.0   < > 4.7   CO2 mmol/L 20.0* 21.0* 25.0   < > 19.0*   CREATININE mg/dL 2.89* 2.91* 2.73*   < > 2.52*   GLUCOSE mg/dL 139* 121* 121*   < > 364*   MAGNESIUM mg/dL 2.6*  --  2.6*  --  2.5*   PHOSPHORUS mg/dL  --   --  5.3*  --  4.3    < > = values in this interval not displayed.     Estimated Creatinine Clearance: 24.9 mL/min (A) (by C-G formula based on SCr of 2.89 mg/dL (H)).  Results from last 7 days   Lab Units 07/28/24 0327 07/27/24 0311 07/26/24  2222   ALK PHOS U/L 376* 202* 201*   BILIRUBIN mg/dL 0.3 0.3 0.5   ALT (SGPT) U/L 19 22 23   AST (SGOT) U/L 66* 53* 66*     Results from last 7 days   Lab Units 07/26/24  2305   PH, ARTERIAL pH units 7.357   PCO2, ARTERIAL mm Hg 34.0*   PO2 ART mm Hg 92.6   FIO2 % 32     Images:  XR Shoulder 2+ View Left    Result Date: 7/27/2024  Impression: Calcification adjacent to the greater tuberosity likely reflects rotator cuff calcific tendinosis. There are some enthesopathic changes of the greater tuberosity and correlate for any rotator cuff pathology. No high-grade narrowing of the subacromial space. Mild degenerative changes. No acute osseous  findings. Electronically Signed: José Smyth MD  7/27/2024 5:04 PM EDT  Workstation ID: [id number]    CT Abdomen Pelvis Without Contrast    Result Date: 7/27/2024  Impression: 1.There is prominent bilateral perinephric stranding and periureteric stranding. The kidneys measure significantly larger than on the prior study. Pyelonephritis is the primary concern. 2.No acute findings in the chest. 3.Mild cardiomegaly and mild coronary artery disease. 4.Mild emphysema. 5.Small hiatal hernia. 6.Chronic bilateral L5 pars interarticularis defects. Electronically Signed: Con Solomon MD  7/27/2024 12:03 AM EDT  Workstation ID: [id number]    CT Chest Without Contrast Diagnostic    Result Date: 7/27/2024  Impression: 1.There is prominent bilateral perinephric stranding and periureteric stranding. The kidneys measure significantly larger than on the prior study. Pyelonephritis is the primary concern. 2.No acute findings in the chest. 3.Mild cardiomegaly and mild coronary artery disease. 4.Mild emphysema. 5.Small hiatal hernia. 6.Chronic bilateral L5 pars interarticularis defects. Electronically Signed: Con Solomon MD  7/27/2024 12:03 AM EDT  Workstation ID: [id number]    CT Cervical Spine Without Contrast    Result Date: 7/26/2024  Impression: 1.No acute osseous abnormality. 2.Mild multilevel disc degeneration. 3.Minimal paraseptal emphysema. Electronically Signed: Con Solomon MD  7/26/2024 11:57 PM EDT  Workstation ID: [id number]    CT Head Without Contrast    Result Date: 7/26/2024  Impression: 1.No acute intracranial abnormality. 2.Mild paranasal sinus mucosal disease. Electronically Signed: Con Solomon MD  7/26/2024 11:53 PM EDT  Workstation ID: [id number]    XR Chest 1 View    Result Date: 7/26/2024  Impression: Stable left basilar volume loss. No acute findings. Electronically Signed: Con Solomon MD  7/26/2024 11:23 PM EDT  Workstation ID: [id number]     Results: Reviewed.  - I reviewed the patient's new laboratory  "and imaging results.  - I independently reviewed the patient's new images.    Medications: Reviewed.    Assessment & Plan    A / P     Saadia Caceres is a 63-year-old female with past medical history of tobacco abuse, COPD, recurrent UTIs and bipolar affective disorder/schizophrenia. She was admitted on 7/26/2024 with urinary incontinence, altered mentation, fever and malaise. Ultimately brought to the emergency room that day after a fall where she fell on her shoulder.  Patient is septic on arrival in atrial fibrillation with RVR.  She was started on a Cardizem drip, antibiotics and fluids. CT abdomen/pelvis consistent with bilateral pyelonephritis without obstructing stone or evidence of hydronephrosis. Further laboratory evaluation consistent with ANDRE, hyperglycemia, metabolic/lactic acidosis.     Active Hospital Problems:  Active Hospital Problems    Diagnosis  POA    **Acute pyelonephritis [N10]  -CT abdomen on admission with, \"prominent bilateral perinephric stranding and periureteric stranding. The kidneys measure significantly larger than on the prior study. Pyelonephritis is the primary concern... Renal parenchyma appears homogeneous. No urolithiasis or hydronephrosis \"   -UA with 4+ bacteria, too numerous to count WBC, moderate leukocyte esterase. Urine culture now growing gram-negative bacilli  -Continue Zosyn    Yes    Severe sepsis [A41.9, R65.20]  -Trend lactate to normalization  -See pyelonephritis treatment above    Yes    Atrial fibrillation with RVR [I48.91]  -Initiated diltiazem on admission which was discontinued on 7/27 // Sinus on EKG     Yes    ANDRE (acute kidney injury) [N17.9]  -Presumably normal baseline creatinine  -CK pending   -Will monitor creatinine, urine output and electrolytes while in the ICU.  Electrolyte replacement per unit protocol  -Renal US with duplex pending     Yes    Tobacco abuse [Z72.0]    Yes    Bipolar I disorder, single manic episode [F30.9]  -Continue home " medications when clinically appropriate    Yes    COPD (chronic obstructive pulmonary disease) [J44.9]  Yes    Obstructive sleep apnea syndrome [G47.33]  Yes      Resolved Hospital Problems   No resolved problems to display.     F-PO  A-NA  S-NA  T-Heparin SQ  H-Head of bed greater than 30 degrees  U-PPI  G-FSBS per unit protocol, correction dose insulin  S-NA  B-Will monitor daily and provide regimen if indicated  I-PIV  D-NA    Advance Directives:   Code Status and Medical Interventions: No CPR (Do Not Attempt to Resuscitate); Limited Support; No cardioversion; Daughter (POA)   Ordered at: 07/27/24 0219     Medical Intervention Limits:    No cardioversion     Code Status (Patient has no pulse and is not breathing):    No CPR (Do Not Attempt to Resuscitate)     Medical Interventions (Patient has pulse or is breathing):    Limited Support     Comments:    Daughter (POA)     High level of risk due to severe exacerbation of chronic illness and illness with threat to life or bodily function.    I conducted multidisciplinary rounds in the plan of care was discussed with the multidisciplinary team at that time. In attendance at multidisciplinary rounds was clinical pharmacist, dietitian, nursing staff and case management.    I discussed the patient's findings and my recommendations with patient and nursing staff.     -- Barrington Briceño MD  Pulmonary/Critical Care

## 2024-07-29 LAB
ALBUMIN SERPL-MCNC: 2.5 G/DL (ref 3.5–5.2)
ALBUMIN/GLOB SERPL: 0.6 G/DL
ALP SERPL-CCNC: 273 U/L (ref 39–117)
ALT SERPL W P-5'-P-CCNC: 25 U/L (ref 1–33)
ANION GAP SERPL CALCULATED.3IONS-SCNC: 14 MMOL/L (ref 5–15)
AST SERPL-CCNC: 55 U/L (ref 1–32)
BACTERIA SPEC AEROBE CULT: ABNORMAL
BACTERIA SPEC AEROBE CULT: NO GROWTH
BILIRUB SERPL-MCNC: 0.3 MG/DL (ref 0–1.2)
BUN SERPL-MCNC: 75 MG/DL (ref 8–23)
BUN/CREAT SERPL: 24.4 (ref 7–25)
CALCIUM SPEC-SCNC: 8.5 MG/DL (ref 8.6–10.5)
CHLORIDE SERPL-SCNC: 99 MMOL/L (ref 98–107)
CO2 SERPL-SCNC: 19 MMOL/L (ref 22–29)
CREAT SERPL-MCNC: 3.08 MG/DL (ref 0.57–1)
DEPRECATED RDW RBC AUTO: 60.6 FL (ref 37–54)
EGFRCR SERPLBLD CKD-EPI 2021: 16.7 ML/MIN/1.73
ERYTHROCYTE [DISTWIDTH] IN BLOOD BY AUTOMATED COUNT: 17.4 % (ref 12.3–15.4)
GLOBULIN UR ELPH-MCNC: 4 GM/DL
GLUCOSE BLDC GLUCOMTR-MCNC: 174 MG/DL (ref 70–130)
GLUCOSE BLDC GLUCOMTR-MCNC: 198 MG/DL (ref 70–130)
GLUCOSE BLDC GLUCOMTR-MCNC: 211 MG/DL (ref 70–130)
GLUCOSE BLDC GLUCOMTR-MCNC: 308 MG/DL (ref 70–130)
GLUCOSE SERPL-MCNC: 198 MG/DL (ref 65–99)
HCT VFR BLD AUTO: 34.9 % (ref 34–46.6)
HGB BLD-MCNC: 11 G/DL (ref 12–15.9)
MAGNESIUM SERPL-MCNC: 2.7 MG/DL (ref 1.6–2.4)
MCH RBC QN AUTO: 29.3 PG (ref 26.6–33)
MCHC RBC AUTO-ENTMCNC: 31.5 G/DL (ref 31.5–35.7)
MCV RBC AUTO: 93.1 FL (ref 79–97)
PLATELET # BLD AUTO: 176 10*3/MM3 (ref 140–450)
PMV BLD AUTO: 10.2 FL (ref 6–12)
POTASSIUM SERPL-SCNC: 4.2 MMOL/L (ref 3.5–5.2)
PROCALCITONIN SERPL-MCNC: 11.54 NG/ML (ref 0–0.25)
PROT SERPL-MCNC: 6.5 G/DL (ref 6–8.5)
RBC # BLD AUTO: 3.75 10*6/MM3 (ref 3.77–5.28)
SODIUM SERPL-SCNC: 132 MMOL/L (ref 136–145)
T4 FREE SERPL-MCNC: 0.72 NG/DL (ref 0.92–1.68)
TSH SERPL DL<=0.05 MIU/L-ACNC: 4.59 UIU/ML (ref 0.27–4.2)
WBC NRBC COR # BLD AUTO: 15.37 10*3/MM3 (ref 3.4–10.8)

## 2024-07-29 PROCEDURE — 84443 ASSAY THYROID STIM HORMONE: CPT | Performed by: INTERNAL MEDICINE

## 2024-07-29 PROCEDURE — 84145 PROCALCITONIN (PCT): CPT | Performed by: INTERNAL MEDICINE

## 2024-07-29 PROCEDURE — 63710000001 INSULIN LISPRO (HUMAN) PER 5 UNITS: Performed by: INTERNAL MEDICINE

## 2024-07-29 PROCEDURE — 83735 ASSAY OF MAGNESIUM: CPT | Performed by: INTERNAL MEDICINE

## 2024-07-29 PROCEDURE — 82948 REAGENT STRIP/BLOOD GLUCOSE: CPT

## 2024-07-29 PROCEDURE — 85027 COMPLETE CBC AUTOMATED: CPT | Performed by: INTERNAL MEDICINE

## 2024-07-29 PROCEDURE — 25010000002 CEFTRIAXONE PER 250 MG: Performed by: INTERNAL MEDICINE

## 2024-07-29 PROCEDURE — 25010000002 PIPERACILLIN SOD-TAZOBACTAM PER 1 G: Performed by: INTERNAL MEDICINE

## 2024-07-29 PROCEDURE — 84439 ASSAY OF FREE THYROXINE: CPT | Performed by: INTERNAL MEDICINE

## 2024-07-29 PROCEDURE — 63710000001 INSULIN GLARGINE PER 5 UNITS: Performed by: INTERNAL MEDICINE

## 2024-07-29 PROCEDURE — 99232 SBSQ HOSP IP/OBS MODERATE 35: CPT | Performed by: INTERNAL MEDICINE

## 2024-07-29 PROCEDURE — 25010000002 HEPARIN (PORCINE) PER 1000 UNITS: Performed by: INTERNAL MEDICINE

## 2024-07-29 PROCEDURE — 80053 COMPREHEN METABOLIC PANEL: CPT | Performed by: INTERNAL MEDICINE

## 2024-07-29 RX ORDER — NICOTINE POLACRILEX 4 MG
15 LOZENGE BUCCAL
Status: DISCONTINUED | OUTPATIENT
Start: 2024-07-29 | End: 2024-08-05 | Stop reason: HOSPADM

## 2024-07-29 RX ORDER — DEXTROSE MONOHYDRATE 25 G/50ML
10-50 INJECTION, SOLUTION INTRAVENOUS
Status: DISCONTINUED | OUTPATIENT
Start: 2024-07-29 | End: 2024-08-05 | Stop reason: HOSPADM

## 2024-07-29 RX ORDER — IBUPROFEN 600 MG/1
1 TABLET ORAL
Status: DISCONTINUED | OUTPATIENT
Start: 2024-07-29 | End: 2024-08-05 | Stop reason: HOSPADM

## 2024-07-29 RX ORDER — GABAPENTIN 600 MG/1
600 TABLET ORAL 4 TIMES DAILY
COMMUNITY

## 2024-07-29 RX ORDER — INSULIN LISPRO 100 [IU]/ML
1-200 INJECTION, SOLUTION INTRAVENOUS; SUBCUTANEOUS
Status: DISCONTINUED | OUTPATIENT
Start: 2024-07-29 | End: 2024-07-31

## 2024-07-29 RX ORDER — INSULIN LISPRO 100 [IU]/ML
1-200 INJECTION, SOLUTION INTRAVENOUS; SUBCUTANEOUS AS NEEDED
Status: DISCONTINUED | OUTPATIENT
Start: 2024-07-29 | End: 2024-07-31

## 2024-07-29 RX ADMIN — ACETAMINOPHEN 650 MG: 325 TABLET ORAL at 03:53

## 2024-07-29 RX ADMIN — HEPARIN SODIUM 5000 UNITS: 5000 INJECTION INTRAVENOUS; SUBCUTANEOUS at 05:51

## 2024-07-29 RX ADMIN — ASPIRIN 81 MG: 81 TABLET, COATED ORAL at 08:30

## 2024-07-29 RX ADMIN — DIVALPROEX SODIUM 1500 MG: 250 TABLET, DELAYED RELEASE ORAL at 22:00

## 2024-07-29 RX ADMIN — DIVALPROEX SODIUM 500 MG: 250 TABLET, DELAYED RELEASE ORAL at 08:30

## 2024-07-29 RX ADMIN — BETHANECHOL CHLORIDE 25 MG: 25 TABLET ORAL at 19:13

## 2024-07-29 RX ADMIN — SODIUM CHLORIDE 2000 MG: 900 INJECTION INTRAVENOUS at 17:56

## 2024-07-29 RX ADMIN — PIPERACILLIN AND TAZOBACTAM 3.38 G: 3; .375 INJECTION, POWDER, FOR SOLUTION INTRAVENOUS at 02:58

## 2024-07-29 RX ADMIN — HEPARIN SODIUM 5000 UNITS: 5000 INJECTION INTRAVENOUS; SUBCUTANEOUS at 14:19

## 2024-07-29 RX ADMIN — INSULIN LISPRO 2 UNITS: 100 INJECTION, SOLUTION INTRAVENOUS; SUBCUTANEOUS at 17:56

## 2024-07-29 RX ADMIN — INSULIN LISPRO 4 UNITS: 100 INJECTION, SOLUTION INTRAVENOUS; SUBCUTANEOUS at 08:29

## 2024-07-29 RX ADMIN — HEPARIN SODIUM 5000 UNITS: 5000 INJECTION INTRAVENOUS; SUBCUTANEOUS at 22:00

## 2024-07-29 RX ADMIN — ESTRADIOL 0.5 MG: 0.5 TABLET ORAL at 08:31

## 2024-07-29 RX ADMIN — INSULIN GLARGINE 16 UNITS: 100 INJECTION, SOLUTION SUBCUTANEOUS at 14:33

## 2024-07-29 RX ADMIN — PANTOPRAZOLE SODIUM 40 MG: 40 TABLET, DELAYED RELEASE ORAL at 05:51

## 2024-07-29 RX ADMIN — BETHANECHOL CHLORIDE 25 MG: 25 TABLET ORAL at 11:10

## 2024-07-29 RX ADMIN — BETHANECHOL CHLORIDE 25 MG: 25 TABLET ORAL at 08:30

## 2024-07-29 RX ADMIN — LEVOTHYROXINE SODIUM 88 MCG: 0.09 TABLET ORAL at 05:51

## 2024-07-29 RX ADMIN — INSULIN LISPRO 7 UNITS: 100 INJECTION, SOLUTION INTRAVENOUS; SUBCUTANEOUS at 12:41

## 2024-07-29 RX ADMIN — ACETAMINOPHEN 650 MG: 325 TABLET ORAL at 17:56

## 2024-07-29 RX ADMIN — PIPERACILLIN AND TAZOBACTAM 3.38 G: 3; .375 INJECTION, POWDER, FOR SOLUTION INTRAVENOUS at 09:24

## 2024-07-29 NOTE — PAYOR COMM NOTE
"Gay[facility] ZU[facility]IGA [facility]   phone: [phone]  fax: [phone]      Saadia Gomez (63 y.o. Female)       Date of Birth   1960    Social Security Number       Address   [address] [place] [address] [address] [place], [place] [address]    Home Phone   [phone]    MRN   [id number]       Rastafarian   Zoroastrian    Marital Status                               Admission Date   7/26/24    Admission Type   Emergency    Admitting Provider   Clint Deshpande MD    Attending Provider   Nam Valverde MD    [facility], Room/Bed   [facility]       Discharge Date       Discharge Disposition       Discharge Destination                                 Attending Provider: Nam Valverde MD    Allergies: Lipitor [Atorvastatin], Lortab [Hydrocodone-acetaminophen], Rosuvastatin, Hydrocodone, Ibuprofen, Ondansetron    Isolation: None   Infection: None   Code Status: No CPR    Ht: 167.6 cm (66\")   Wt: 104 kg (230 lb)    Admission Cmt: None   Principal Problem: Acute pyelonephritis [N10]                   Active Insurance as of 7/26/2024       Primary Coverage       Payor Plan Insurance Group Employer/Plan Group    WELLCARE [facility] MEDICARE REPLACEMENT WELLCARE MED ADV SNP HMO        Payor Plan Address Payor Plan Phone Number Payor Plan Fax Number Effective Dates    [address] [address]   11/1/2023 - None Entered    [facility] [id number]         Subscriber Name Subscriber Birth Date Member ID       SAADIA GOMEZ 1960 [id number]               Secondary Coverage       Payor Plan Insurance Group Employer/Plan Group    [place]        Payor Plan Address Payor Plan Phone Number Payor Plan Fax Number Effective Dates    [address] [phone]  3/29/2018 - None Entered    [place] [address]         Subscriber Name Subscriber Birth Date Member ID       SAAIDA GOMEZ 1960 [id number]                     Emergency Contacts        (Rel.) Home Phone " "Work Phone Mobile Phone    Day Tripp (Sister) [phone] -- [phone]    Sandifer,Crystal (Daughter) -- -- [phone]    Lulú Mack (Daughter) [phone] -- --                 History & Physical        DeshpandeClint MD at 07/27/24 0208            CRITICAL CARE ADMISSION NOTE    Chief Complaint     Acute pyelonephritis    History of Present Illness     63-year-old female admitted to the intensive care unit from the emergency room on 7/27/2024    She is followed by Dr. King, PCP, at [facility].  Has a history of bipolar affective disorder and/or schizophrenia.  Also history of tobacco abuse, chronic obstructive pulmonary disease.  She reportedly has \"mild cognitive impairment\" and lives with her daughter who is her POA    She is apparently recently had several falls and was seen by Dr. Hutchinson several days ago and started on Macrobid for symptoms of urinary tract infection.  I do not see any cultures in the system.  She has been in continent of urine.  She is brought to the emergency room after a fall; apparently got lightheaded and fell.  In the emergency room she was noted to have atrial fibrillation with a rapid ventricular response and started on a Cardizem drip.  CT scan of the abdomen pelvis was consistent with bilateral pyelonephritis and labs were consistent for acute kidney insufficiency, hyperglycemia, metabolic acidosis with elevated lactate level.    We have been asked to admit her to the intensive care unit    Problem List, Surgical History, Family, Social History, and ROS     Acute pyelonephritis    Severe sepsis    Atrial fibrillation with RVR    ANDRE (acute kidney injury)    Bipolar I disorder, single manic episode    COPD (chronic obstructive pulmonary disease)    Obstructive sleep apnea syndrome    Tobacco abuse     Past Surgical History:   Procedure Laterality Date    COLONOSCOPY      last a few years ago    COLONOSCOPY N/A 9/19/2019    Procedure: COLONOSCOPY;  " Surgeon: Mayank Craven MD;  Location:  [facility];  Service: Gastroenterology    ENDOSCOPY      last a few years ago    ENDOSCOPY N/A 9/19/2019    Procedure: Esophagogastroduodenoscopy;  Surgeon: Mayank Craven MD;  Location:  [facility];  Service: Gastroenterology    HYSTERECTOMY      2003 maryanne, total    MASTOIDECTOMY      TONSILLECTOMY         Allergies   Allergen Reactions    Lipitor [Atorvastatin] Myalgia    Lortab [Hydrocodone-Acetaminophen]     Rosuvastatin     Hydrocodone Nausea Only    Ibuprofen Nausea Only    Ondansetron Itching     No current facility-administered medications on file prior to encounter.     Current Outpatient Medications on File Prior to Encounter   Medication Sig    albuterol sulfate  (90 Base) MCG/ACT inhaler Inhale 2 puffs Every 6 (Six) Hours As Needed for Wheezing.    amLODIPine (NORVASC) 10 MG tablet Take 1 tablet by mouth Every Morning.    Anoro Ellipta 62.5-25 MCG/ACT aerosol powder  inhaler INHALE 1 PUFF BY MOUTH DAILY    aspirin 81 MG EC tablet Take 1 tablet by mouth Daily.    B-D ULTRAFINE III SHORT PEN 31G X 8 MM misc USE AS DIRECTED THREE TIMES DAILY    bethanechol (URECHOLINE) 25 MG tablet Take 1 tablet by mouth 3 (Three) Times a Day.    ciprofloxacin (CIPRO) 500 MG tablet TAKE 1 TABLET BY MOUTH TWICE DAILY FOR 7 DAYS    clonazePAM (KlonoPIN) 2 MG tablet Take 1 tablet by mouth 2 (Two) Times a Day.    Continuous Blood Gluc  (Dexcom G7 ) device Use 1 each Daily.    Continuous Blood Gluc Sensor (Dexcom G7 Sensor) misc Use 1 each Every 10 (Ten) Days.    cyclobenzaprine (FLEXERIL) 10 MG tablet TAKE 1 TABLET BY MOUTH THREE TIMES DAILY AS NEEDED FOR MUSCLE SPASMS    dicyclomine (BENTYL) 10 MG capsule TAKE 1 CAPSULE BY MOUTH FOUR TIMES DAILY BEFORE MEALS AND AT BEDTIME    divalproex (DEPAKOTE) 500 MG DR tablet TAKE 1 TABLET EVERY MORNING AND 3 TABLETS EVERY NIGHT AT BEDTIME    empagliflozin (Jardiance) 10 MG tablet tablet Take 1 tablet by  "mouth Daily.    estradiol (ESTRACE) 0.5 MG tablet TAKE 1 TABLET BY MOUTH DAILY    furosemide (LASIX) 20 MG tablet Take 1 tablet by mouth Daily.    gabapentin (NEURONTIN) 400 MG capsule Take 1 capsule by mouth every night at bedtime.    Gemtesa 75 MG tablet TAKE 1 TABLET BY MOUTH DAILY    glucose blood test strip 1 each by Other route 3 (Three) Times a Day Before Meals.    glucose monitor monitoring kit Inject  under the skin into the appropriate area as directed 3 (Three) Times a Day Before Meals.    hydrOXYzine pamoate (VISTARIL) 50 MG capsule Take 1 capsule by mouth 4 (Four) Times a Day As Needed.    icosapent ethyl (Vascepa) 1 g capsule capsule Take 2 g by mouth 2 (Two) Times a Day With Meals.    Incontinence Supply Disposable (DISPOSABLE UNDERPADS 30\"X36\") misc 1 each Every Night.    Incontinence Supply Disposable (INCONTINENCE BRIEF LARGE) misc 1 each 2 (Two) Times a Day. (Patient taking differently: Use 1 each As Needed.)    insulin aspart (NovoLOG FlexPen) 100 UNIT/ML solution pen-injector sc pen Inject 15 Units under the skin into the appropriate area as directed 3 (Three) Times a Day With Meals.    Insulin Glargine (LANTUS SOLOSTAR) 100 UNIT/ML injection pen Inject 35 Units under the skin into the appropriate area as directed Every Night.    Insulin Pen Needle (B-D ULTRAFINE III SHORT PEN) 31G X 8 MM misc Inject 1 applicator under the skin 2 (Two) Times a Day.    Lancet Device misc Use to check blood sugars three times daily before meals.    levothyroxine (SYNTHROID, LEVOTHROID) 88 MCG tablet TAKE 1 TABLET BY MOUTH DAILY    lidocaine (LIDODERM) 5 % Place 1 patch on the skin as directed by provider Daily. Remove & Discard patch within 12 hours or as directed by MD    Misc. Devices (Adjust Bath/Shower Seat) misc 1 each Daily.    Misc. Devices (Adjust Bath/Shower Seat/Back) misc 1 each Daily.    Misc. Devices (Roller Walker) misc 1 Device Daily. Rollator walker    mupirocin (BACTROBAN) 2 % cream Apply  " topically to the appropriate area as directed See Admin Instructions. Apply topically to the affected areas three times daily    neomycin-polymyxin-hydrocortisone (CORTISPORIN) 3.5-74989-4 otic solution Administer 3 drops to the right ear 4 (Four) Times a Day.    nystatin (MYCOSTATIN) 734025 UNIT/GM ointment Apply  topically to the appropriate area as directed 2 (Two) Times a Day.    omeprazole (priLOSEC) 40 MG capsule TAKE 1 CAPSULE BY MOUTH EVERY DAY    phenazopyridine (Pyridium) 200 MG tablet Take 1 tablet by mouth 3 (Three) Times a Day As Needed (dysuria).    prazosin (MINIPRESS) 2 MG capsule TK 1 C PO QHS    promethazine (PHENERGAN) 25 MG tablet Take 1 tablet by mouth Every 12 (Twelve) Hours.    simvastatin (ZOCOR) 10 MG tablet Take 1 tablet by mouth Every Evening.    SUMAtriptan (IMITREX) 50 MG tablet Take 1 tablet by mouth Every 2 (Two) Hours As Needed for Migraine. Take one tablet at onset of headache. May repeat dose one time in 2 hours if headache not relieved.    traZODone (DESYREL) 100 MG tablet TK 1 T PO AT SUPPER AND 3 TS QPM    vitamin D (ERGOCALCIFEROL) 1.25 MG (14262 UT) capsule capsule Take 1 capsule by mouth Every 7 (Seven) Days.    Wheat Dextrin (BENEFIBER DRINK MIX PO) Take 1 dose by mouth 2 (Two) Times a Day As Needed.     MEDICATION LIST AND ALLERGIES REVIEWED.    Family History   Problem Relation Age of Onset    Diabetes Mother     Stroke Mother         Stroke Syndrome    Cancer Father     Diabetes Sister     Cancer Brother     Diabetes Brother     Diabetes Maternal Grandmother     Pancreatitis Neg Hx     Colon cancer Neg Hx     Colon polyps Neg Hx     Esophageal cancer Neg Hx      Social History     Tobacco Use    Smoking status: Every Day     Current packs/day: 2.00     Average packs/day: 2.0 packs/day for 15.0 years (30.0 ttl pk-yrs)     Types: Cigarettes    Smokeless tobacco: Never   Vaping Use    Vaping status: Never Used   Substance Use Topics    Alcohol use: Yes     Comment: hx social  "use decades ago, occ heavier use, denies any hx abuse    Drug use: Not Currently     Types: \"Crack\" cocaine     Comment: hx street drugs self medication of pain/bipolar, heavy 20-30 years ago, denies any hx ever IV use     Social History     Social History Narrative    Mr. Caceres is a 57 year old white  female. She has 3 children. Her daughter Heidi is her POA, she was managing herself up until 2017 when her daughter had to increase management. She lives alone in [facility], but her daughter lives across the street ~50 yards and checks on her daily.    Caffeine: 3 cups of coffee daily      FAMILY AND SOCIAL HISTORY REVIEWED.    Review of Systems  AS ABOVE OR ALL OTHER SYSTEMS REVIEWED AND ARE NEGATIVE.    Physical Exam and Clinical Information   /56   Pulse (!) 147   Temp 99.1 °F (37.3 °C) (Oral)   Resp 20   Ht 167.6 cm (66\")   Wt 104 kg (230 lb)   SpO2 91%   BMI 37.12 kg/m²   Physical Exam:   GENERAL: Awake, no overt distress   HEENT: No evidence of external trauma.  Pupils equal.  Sclera nonicteric   LUNGS: No wheezes or rhonchi   HEART: Tachycardic.  Distant heart sounds   GI: Obese, soft, nontender   EXTREMITIES: Palpable pulses.  No clubbing or cyanosis   NEURO/PSYCH: Awake.  Alert.  Oriented to person and hospital.  Follows simple commands    Results from last 7 days   Lab Units 07/26/24  2228   WBC 10*3/mm3 14.39*   HEMOGLOBIN g/dL 11.3*   PLATELETS 10*3/mm3 143     Results from last 7 days   Lab Units 07/26/24  2222   SODIUM mmol/L 130*   POTASSIUM mmol/L 4.7   CO2 mmol/L 19.0*   BUN mg/dL 69*   CREATININE mg/dL 2.52*   MAGNESIUM mg/dL 2.5*   PHOSPHORUS mg/dL 4.3   GLUCOSE mg/dL 364*     Estimated Creatinine Clearance: 28.6 mL/min (A) (by C-G formula based on SCr of 2.52 mg/dL (H)).      Results from last 7 days   Lab Units 07/26/24  2305   PH, ARTERIAL pH units 7.357   PCO2, ARTERIAL mm Hg 34.0*   PO2 ART mm Hg 92.6     Lab Results   Component Value Date    LACTATE 4.6 (C) " "07/26/2024        IMAGES: Chest x-ray reveals cardiomegaly and low lung volumes    CT scan of the abdomen pelvis reveals bilateral renal enlargement and stranding suggestive of bilateral pyelonephritis    CT scan of the head revealed no acute intracranial abnormalities and no evidence of trauma    I reviewed the patient's results and images.     Assesment     Active Hospital Problems    Diagnosis     **Acute pyelonephritis     Severe sepsis     Atrial fibrillation with RVR     ANDRE (acute kidney injury)     Tobacco abuse     Bipolar I disorder, single manic episode     COPD (chronic obstructive pulmonary disease)     Obstructive sleep apnea syndrome      Plan/Recommendations     Admit to the intensive care unit  Cardizem drip for rate control  Beta-blockers as needed for rate control as well  Heparin drip  Echocardiogram  Serial EKG and troponin  Broad-spectrum antibiotics  Follow-up urine cultures  IV fluids  Follow-up renal function  Insulin drip  Ulcer prophylaxis  Review and continue some home medications  Inhaled bronchodilators    I talked with the patient's daughter on the phone, Crystal Sandifer, who is her POA.  I updated her to the multiple medical issues.  Apparently there is no history of atrial fibrillation in the past.    We discussed resuscitation status and she indicates that this holiday is a \"DNR\" and they do not want extensive resuscitative measures in the event of a cardiopulmonary arrest.    Critical Care time spent in direct patient care: 45 minutes (excluding procedure time, if applicable) including high complexity decision making to assess, manipulate, and support vital organ system failure in this individual who has impairment of one or more vital organ systems such that there is a high probability of imminent or life threatening deterioration in the patient’s condition.    ANEL Deshpande MD  Pulmonary and Critical Care Medicine     CC: Chacorta Del Rio DO    Electronically signed " by Clint Deshpande MD at 07/27/24 0220       Lab Results (all)       Procedure Component Value Units Date/Time    Urine Culture - Urine, Urine, Clean Catch [[id number]]  (Normal) Collected: 07/27/24 1244    Specimen: Urine, Clean Catch Updated: 07/29/24 0939     Urine Culture No growth    Urine Culture - Urine, Urine, Clean Catch [[id number]]  (Abnormal)  (Susceptibility) Collected: 07/26/24 2229    Specimen: Urine, Clean Catch Updated: 07/29/24 0927     Urine Culture >100,000 CFU/mL Escherichia coli    Narrative:      Colonization of the urinary tract without infection is common. Treatment is discouraged unless the patient is symptomatic, pregnant, or undergoing an invasive urologic procedure.    Susceptibility        Escherichia coli      LOREE      Amoxicillin + Clavulanate Susceptible      Ampicillin Resistant      Ampicillin + Sulbactam Intermediate      Cefazolin Susceptible      Cefepime Susceptible      Ceftazidime Susceptible      Ceftriaxone Susceptible      Gentamicin Susceptible      Levofloxacin Susceptible      Nitrofurantoin Susceptible      Piperacillin + Tazobactam Susceptible      Trimethoprim + Sulfamethoxazole Susceptible                           Procalcitonin [[id number]]  (Abnormal) Collected: 07/29/24 0558    Specimen: Blood from Arm, Right Updated: 07/29/24 0911     Procalcitonin 11.54 ng/mL     Narrative:      As a Marker for Sepsis (Non-Neonates):    1. <0.5 ng/mL represents a low risk of severe sepsis and/or septic shock.  2. >2 ng/mL represents a high risk of severe sepsis and/or septic shock.    As a Marker for Lower Respiratory Tract Infections that require antibiotic therapy:    PCT on Admission    Antibiotic Therapy       6-12 Hrs later    >0.5                Strongly Recommended  >0.25 - <0.5        Recommended   0.1 - 0.25          Discouraged              Remeasure/reassess PCT  <0.1                Strongly Discouraged     Remeasure/reassess PCT    As 28 day mortality risk  "marker: \"Change in Procalcitonin Result\" (>80% or <=80%) if Day 0 (or Day 1) and Day 4 values are available. Refer to http://[email or web address]-pct-calculator.com    Change in PCT <=80%  A decrease of PCT levels below or equal to 80% defines a positive change in PCT test result representing a higher risk for 28-day all-cause mortality of patients diagnosed with severe sepsis for septic shock.    Change in PCT >80%  A decrease of PCT levels of more than 80% defines a negative change in PCT result representing a lower risk for 28-day all-cause mortality of patients diagnosed with severe sepsis or septic shock.       POC Glucose Once [[id number]]  (Abnormal) Collected: 07/29/24 0721    Specimen: Blood Updated: 07/29/24 0731     Glucose 211 mg/dL     Blood Culture - Blood, Arm, Left [[id number]]  (Abnormal) Collected: 07/26/24 2228    Specimen: Blood from Arm, Left Updated: 07/29/24 0638     Blood Culture Escherichia coli     Isolated from Aerobic and Anaerobic Bottles     Gram Stain Aerobic Bottle Gram negative bacilli      Anaerobic Bottle Gram negative bacilli    Narrative:      Less than seven (7) mL's of blood was collected.  Insufficient quantity may yield false negative results.    Blood Culture - Blood, Arm, Right [[id number]]  (Abnormal) Collected: 07/26/24 2222    Specimen: Blood from Arm, Right Updated: 07/29/24 0638     Blood Culture Escherichia coli     Isolated from Aerobic and Anaerobic Bottles     Gram Stain Aerobic Bottle Gram negative bacilli      Anaerobic Bottle Gram negative bacilli    Comprehensive Metabolic Panel [[id number]]  (Abnormal) Collected: 07/29/24 0558    Specimen: Blood from Arm, Right Updated: 07/29/24 0632     Glucose 198 mg/dL      BUN 75 mg/dL      Creatinine 3.08 mg/dL      Sodium 132 mmol/L      Potassium 4.2 mmol/L      Comment: Slight hemolysis detected by analyzer. Result may be falsely elevated.        Chloride 99 mmol/L      CO2 19.0 mmol/L      Calcium 8.5 mg/dL      Total Protein 6.5 " g/dL      Albumin 2.5 g/dL      ALT (SGPT) 25 U/L      AST (SGOT) 55 U/L      Alkaline Phosphatase 273 U/L      Total Bilirubin 0.3 mg/dL      Globulin 4.0 gm/dL      Comment: Calculated Result        A/G Ratio 0.6 g/dL      BUN/Creatinine Ratio 24.4     Anion Gap 14.0 mmol/L      eGFR 16.7 mL/min/1.73     Narrative:      GFR Normal >60  Chronic Kidney Disease <60  Kidney Failure <15      Magnesium [[id number]]  (Abnormal) Collected: 07/29/24 0558    Specimen: Blood from Arm, Right Updated: 07/29/24 0632     Magnesium 2.7 mg/dL     CBC (No Diff) [[id number]]  (Abnormal) Collected: 07/29/24 0558    Specimen: Blood from Arm, Right Updated: 07/29/24 0620     WBC 15.37 10*3/mm3      RBC 3.75 10*6/mm3      Hemoglobin 11.0 g/dL      Hematocrit 34.9 %      MCV 93.1 fL      MCH 29.3 pg      MCHC 31.5 g/dL      RDW 17.4 %      RDW-SD 60.6 fl      MPV 10.2 fL      Platelets 176 10*3/mm3     POC Glucose Once [[id number]]  (Abnormal) Collected: 07/28/24 2005    Specimen: Blood Updated: 07/28/24 2006     Glucose 235 mg/dL     POC Glucose Once [[id number]]  (Abnormal) Collected: 07/28/24 1653    Specimen: Blood Updated: 07/28/24 1703     Glucose 255 mg/dL     CK [[id number]]  (Abnormal) Collected: 07/28/24 0327    Specimen: Blood Updated: 07/28/24 1611     Creatine Kinase 251 U/L     POC Glucose Once [[id number]]  (Abnormal) Collected: 07/28/24 1258    Specimen: Blood Updated: 07/28/24 1314     Glucose 172 mg/dL     POC Glucose Once [[id number]]  (Abnormal) Collected: 07/28/24 1157    Specimen: Blood Updated: 07/28/24 1200     Glucose 144 mg/dL     POC Glucose Once [[id number]]  (Normal) Collected: 07/28/24 1103    Specimen: Blood Updated: 07/28/24 1113     Glucose 122 mg/dL     POC Glucose Once [[id number]]  (Abnormal) Collected: 07/28/24 0955    Specimen: Blood Updated: 07/28/24 1015     Glucose 131 mg/dL     POC Glucose Once [[id number]]  (Abnormal) Collected: 07/28/24 0910    Specimen: Blood Updated: 07/28/24 0911     Glucose 131 mg/dL      POC Glucose Once [[id number]]  (Abnormal) Collected: 07/28/24 0800    Specimen: Blood Updated: 07/28/24 0820     Glucose 137 mg/dL     POC Glucose Once [[id number]]  (Abnormal) Collected: 07/28/24 0637    Specimen: Blood Updated: 07/28/24 0638     Glucose 141 mg/dL     POC Glucose Once [[id number]]  (Abnormal) Collected: 07/28/24 0526    Specimen: Blood Updated: 07/28/24 0536     Glucose 144 mg/dL     CBC & Differential [[id number]]  (Abnormal) Collected: 07/28/24 0327    Specimen: Blood Updated: 07/28/24 0439    Narrative:      The following orders were created for panel order CBC & Differential.  Procedure                               Abnormality         Status                     ---------                               -----------         ------                     CBC Auto Differential[[id number]]        Abnormal            Final result               Scan Slide[[id number]]                                                                    Please view results for these tests on the individual orders.    CBC Auto Differential [[id number]]  (Abnormal) Collected: 07/28/24 0327    Specimen: Blood Updated: 07/28/24 0439     WBC 14.53 10*3/mm3      RBC 3.64 10*6/mm3      Hemoglobin 10.5 g/dL      Hematocrit 32.0 %      MCV 87.9 fL      MCH 28.8 pg      MCHC 32.8 g/dL      RDW 16.7 %      RDW-SD 54.2 fl      MPV 10.2 fL      Platelets 160 10*3/mm3     Narrative:      The previously reported component NRBC is no longer being reported. Previous result was 0.0 /100 WBC (Reference Range: 0.0-0.2 /100 WBC) on 7/28/2024 at 0409 EDT.    Manual Differential [[id number]]  (Abnormal) Collected: 07/28/24 0327    Specimen: Blood Updated: 07/28/24 0439     Neutrophil % 65.0 %      Lymphocyte % 13.0 %      Monocyte % 2.0 %      Eosinophil % 0.0 %      Basophil % 0.0 %      Bands %  20.0 %      Neutrophils Absolute 12.35 10*3/mm3      Lymphocytes Absolute 1.89 10*3/mm3      Monocytes Absolute 0.29 10*3/mm3      Eosinophils Absolute 0.00  10*3/mm3      Basophils Absolute 0.00 10*3/mm3      nRBC 0.0 /100 WBC      Anisocytosis Slight/1+     WBC Morphology Normal     Platelet Morphology Normal    Comprehensive Metabolic Panel [[id number]]  (Abnormal) Collected: 07/28/24 0327    Specimen: Blood Updated: 07/28/24 0437     Glucose 139 mg/dL      BUN 74 mg/dL      Creatinine 2.89 mg/dL      Sodium 133 mmol/L      Potassium 4.3 mmol/L      Comment: Slight hemolysis detected by analyzer. Result may be falsely elevated.        Chloride 98 mmol/L      CO2 20.0 mmol/L      Calcium 7.7 mg/dL      Total Protein 6.2 g/dL      Albumin 2.3 g/dL      ALT (SGPT) 19 U/L      AST (SGOT) 66 U/L      Alkaline Phosphatase 376 U/L      Total Bilirubin 0.3 mg/dL      Globulin 3.9 gm/dL      Comment: Calculated Result        A/G Ratio 0.6 g/dL      BUN/Creatinine Ratio 25.6     Anion Gap 15.0 mmol/L      eGFR 18.0 mL/min/1.73     Narrative:      GFR Normal >60  Chronic Kidney Disease <60  Kidney Failure <15      Magnesium [[id number]]  (Abnormal) Collected: 07/28/24 0327    Specimen: Blood Updated: 07/28/24 0437     Magnesium 2.6 mg/dL     Calcium, Ionized [[id number]]  (Abnormal) Collected: 07/28/24 0327    Specimen: Blood Updated: 07/28/24 0408     Ionized Calcium 1.10 mmol/L     POC Glucose Once [[id number]]  (Abnormal) Collected: 07/28/24 0331    Specimen: Blood Updated: 07/28/24 0332     Glucose 138 mg/dL     POC Glucose Once [[id number]]  (Normal) Collected: 07/28/24 0159    Specimen: Blood Updated: 07/28/24 0201     Glucose 115 mg/dL     POC Glucose Once [[id number]]  (Normal) Collected: 07/28/24 0054    Specimen: Blood Updated: 07/28/24 0100     Glucose 111 mg/dL     POC Glucose Once [[id number]]  (Normal) Collected: 07/27/24 2330    Specimen: Blood Updated: 07/27/24 2333     Glucose 129 mg/dL     POC Glucose Once [[id number]]  (Normal) Collected: 07/27/24 2212    Specimen: Blood Updated: 07/27/24 2222     Glucose 114 mg/dL     POC Glucose Once [[id number]]  (Normal) Collected:  07/27/24 2112    Specimen: Blood Updated: 07/27/24 2123     Glucose 116 mg/dL     POC Glucose Once [[id number]]  (Normal) Collected: 07/27/24 2042    Specimen: Blood Updated: 07/27/24 2044     Glucose 128 mg/dL     Blood Culture ID, PCR - Blood, Arm, Left [[id number]]  (Abnormal) Collected: 07/26/24 2228    Specimen: Blood from Arm, Left Updated: 07/27/24 2024     BCID, PCR Escherichia coli. Identification by BCID2 PCR.     BOTTLE TYPE Aerobic Bottle    Narrative:      No resistance genes detected.    POC Glucose Once [[id number]]  (Abnormal) Collected: 07/27/24 1858    Specimen: Blood Updated: 07/27/24 1859     Glucose 207 mg/dL     POC Glucose Once [[id number]]  (Abnormal) Collected: 07/27/24 1754    Specimen: Blood Updated: 07/27/24 1805     Glucose 210 mg/dL     Basic Metabolic Panel [[id number]]  (Abnormal) Collected: 07/27/24 1634    Specimen: Blood Updated: 07/27/24 1718     Glucose 121 mg/dL      BUN 75 mg/dL      Creatinine 2.91 mg/dL      Sodium 130 mmol/L      Potassium 4.3 mmol/L      Comment: Slight hemolysis detected by analyzer. Result may be falsely elevated.        Chloride 95 mmol/L      CO2 21.0 mmol/L      Calcium 7.9 mg/dL      BUN/Creatinine Ratio 25.8     Anion Gap 14.0 mmol/L      eGFR 17.8 mL/min/1.73     Narrative:      GFR Normal >60  Chronic Kidney Disease <60  Kidney Failure <15      POC Glucose Once [[id number]]  (Abnormal) Collected: 07/27/24 1651    Specimen: Blood Updated: 07/27/24 1656     Glucose 131 mg/dL     POC Glucose Once [[id number]]  (Abnormal) Collected: 07/27/24 1514    Specimen: Blood Updated: 07/27/24 1524     Glucose 135 mg/dL     POC Glucose Once [[id number]]  (Abnormal) Collected: 07/27/24 1334    Specimen: Blood Updated: 07/27/24 1345     Glucose 156 mg/dL     Urinalysis, Microscopic Only - Urine, Clean Catch [[id number]]  (Abnormal) Collected: 07/27/24 1244    Specimen: Urine, Clean Catch Updated: 07/27/24 1317     RBC, UA 3-5 /HPF      WBC, UA Too Numerous to Count /HPF       Bacteria, UA Trace /HPF      Squamous Epithelial Cells, UA 7-12 /HPF      Hyaline Casts, UA 0-6 /LPF      Methodology Manual Light Microscopy    Narrative:      Microscopic performed on unspun specimen.      Urinalysis With Culture If Indicated - Urine, Clean Catch [[id number]]  (Abnormal) Collected: 07/27/24 1244    Specimen: Urine, Clean Catch Updated: 07/27/24 1314     Color, UA Dark Yellow     Appearance, UA Turbid     pH, UA 5.5     Specific Gravity, UA 1.020     Glucose, UA >=1000 mg/dL (3+)     Ketones, UA 15 mg/dL (1+)     Bilirubin, UA Negative     Blood, UA Large (3+)     Protein,  mg/dL (2+)     Leuk Esterase, UA Moderate (2+)     Nitrite, UA Negative     Urobilinogen, UA 1.0 E.U./dL    Narrative:      In absence of clinical symptoms, the presence of pyuria, bacteria, and/or nitrites on the urinalysis result does not correlate with infection.    POC Glucose Once [[id number]]  (Abnormal) Collected: 07/27/24 1153    Specimen: Blood Updated: 07/27/24 1154     Glucose 131 mg/dL     Heparin Anti-Xa [[id number]]  (Abnormal) Collected: 07/27/24 1030    Specimen: Blood Updated: 07/27/24 1101     Heparin Anti-Xa (UFH) 0.10 IU/ml     Basic Metabolic Panel [[id number]]  (Abnormal) Collected: 07/27/24 1030    Specimen: Blood Updated: 07/27/24 1055     Glucose 121 mg/dL      BUN 74 mg/dL      Creatinine 2.73 mg/dL      Sodium 136 mmol/L      Potassium 4.0 mmol/L      Chloride 100 mmol/L      CO2 25.0 mmol/L      Calcium 7.9 mg/dL      BUN/Creatinine Ratio 27.1     Anion Gap 11.0 mmol/L      eGFR 19.2 mL/min/1.73     Narrative:      GFR Normal >60  Chronic Kidney Disease <60  Kidney Failure <15      Magnesium [[id number]]  (Abnormal) Collected: 07/27/24 1030    Specimen: Blood Updated: 07/27/24 1055     Magnesium 2.6 mg/dL     Phosphorus [[id number]]  (Abnormal) Collected: 07/27/24 1030    Specimen: Blood Updated: 07/27/24 1055     Phosphorus 5.3 mg/dL     POC Glucose Once [[id number]]  (Normal) Collected: 07/27/24  1042    Specimen: Blood Updated: 07/27/24 1049     Glucose 113 mg/dL     POC Glucose Once [[id number]]  (Abnormal) Collected: 07/27/24 0901    Specimen: Blood Updated: 07/27/24 0912     Glucose 132 mg/dL     POC Glucose Once [[id number]]  (Abnormal) Collected: 07/27/24 0755    Specimen: Blood Updated: 07/27/24 0808     Glucose 171 mg/dL     POC Glucose Once [[id number]]  (Abnormal) Collected: 07/27/24 0700    Specimen: Blood Updated: 07/27/24 0723     Glucose 201 mg/dL     Beta Hydroxybutyrate Quantitative [[id number]]  (Abnormal) Collected: 07/27/24 0311    Specimen: Blood Updated: 07/27/24 0656     Beta-Hydroxybutyrate Quant 3.263 mmol/L     Narrative:      In the assessment of possible diabetic ketoacidosis, the test should be interpreted along with other clinical and laboratory findings.  A level greater than 1 mmol/L should require further evaluation and levels of more than 3 mmol/L require immediate medical review.    High Sensitivity Troponin T 2Hr [[id number]]  (Abnormal) Collected: 07/27/24 0621    Specimen: Blood Updated: 07/27/24 0649     HS Troponin T 37 ng/L      Troponin T Delta 1 ng/L     Narrative:      High Sensitive Troponin T Reference Range:  <14.0 ng/L- Negative Female for AMI  <22.0 ng/L- Negative Male for AMI  >=14 - Abnormal Female indicating possible myocardial injury.  >=22 - Abnormal Male indicating possible myocardial injury.   Clinicians would have to utilize clinical acumen, EKG, Troponin, and serial changes to determine if it is an Acute Myocardial Infarction or myocardial injury due to an underlying chronic condition.         Protime-INR [[id number]]  (Abnormal) Collected: 07/27/24 0311    Specimen: Blood Updated: 07/27/24 0609     Protime 14.8 Seconds      INR 1.15    POC Glucose Once [[id number]]  (Abnormal) Collected: 07/27/24 0604    Specimen: Blood Updated: 07/27/24 0605     Glucose 249 mg/dL     POC Glucose Once [[id number]]  (Abnormal) Collected: 07/27/24 0436    Specimen: Blood  Updated: 07/27/24 0438     Glucose 289 mg/dL     Comprehensive Metabolic Panel [[id number]]  (Abnormal) Collected: 07/27/24 0311    Specimen: Blood Updated: 07/27/24 0348     Glucose 352 mg/dL      BUN 69 mg/dL      Creatinine 2.54 mg/dL      Sodium 132 mmol/L      Potassium 4.7 mmol/L      Chloride 96 mmol/L      CO2 20.0 mmol/L      Calcium 8.0 mg/dL      Total Protein 6.4 g/dL      Albumin 2.5 g/dL      ALT (SGPT) 22 U/L      AST (SGOT) 53 U/L      Alkaline Phosphatase 202 U/L      Total Bilirubin 0.3 mg/dL      Globulin 3.9 gm/dL      Comment: Calculated Result        A/G Ratio 0.6 g/dL      BUN/Creatinine Ratio 27.2     Anion Gap 16.0 mmol/L      eGFR 21.0 mL/min/1.73     Narrative:      GFR Normal >60  Chronic Kidney Disease <60  Kidney Failure <15      High Sensitivity Troponin T [[id number]]  (Abnormal) Collected: 07/27/24 0311    Specimen: Blood Updated: 07/27/24 0346     HS Troponin T 36 ng/L     Narrative:      High Sensitive Troponin T Reference Range:  <14.0 ng/L- Negative Female for AMI  <22.0 ng/L- Negative Male for AMI  >=14 - Abnormal Female indicating possible myocardial injury.  >=22 - Abnormal Male indicating possible myocardial injury.   Clinicians would have to utilize clinical acumen, EKG, Troponin, and serial changes to determine if it is an Acute Myocardial Infarction or myocardial injury due to an underlying chronic condition.         Heparin Anti-Xa [[id number]]  (Abnormal) Collected: 07/27/24 0311    Specimen: Blood Updated: 07/27/24 0339     Heparin Anti-Xa (UFH) 0.10 IU/ml     aPTT [[id number]]  (Abnormal) Collected: 07/27/24 0311    Specimen: Blood Updated: 07/27/24 0338     PTT 27.3 seconds     Narrative:      PTT = The equivalent PTT values for the therapeutic range of heparin levels at 0.3 to 0.5 U/ml are 60 to 70 seconds.    POC Glucose Once [[id number]]  (Abnormal) Collected: 07/27/24 0308    Specimen: Blood Updated: 07/27/24 0309     Glucose 335 mg/dL     STAT Lactic Acid, Reflex  "[[id number]]  (Normal) Collected: 07/27/24 0151    Specimen: Blood Updated: 07/27/24 0228     Lactate 1.2 mmol/L      Comment: Falsely depressed results may occur on samples drawn from patients receiving N-Acetylcysteine (NAC) or Metamizole.       Lactic Acid, Plasma [[id number]]  (Abnormal) Collected: 07/26/24 2222    Specimen: Blood Updated: 07/26/24 2348     Lactate 4.6 mmol/L      Comment: Falsely depressed results may occur on samples drawn from patients receiving N-Acetylcysteine (NAC) or Metamizole.       Procalcitonin [[id number]]  (Abnormal) Collected: 07/26/24 2222    Specimen: Blood Updated: 07/26/24 2342     Procalcitonin 28.22 ng/mL     Narrative:      As a Marker for Sepsis (Non-Neonates):    1. <0.5 ng/mL represents a low risk of severe sepsis and/or septic shock.  2. >2 ng/mL represents a high risk of severe sepsis and/or septic shock.    As a Marker for Lower Respiratory Tract Infections that require antibiotic therapy:    PCT on Admission    Antibiotic Therapy       6-12 Hrs later    >0.5                Strongly Recommended  >0.25 - <0.5        Recommended   0.1 - 0.25          Discouraged              Remeasure/reassess PCT  <0.1                Strongly Discouraged     Remeasure/reassess PCT    As 28 day mortality risk marker: \"Change in Procalcitonin Result\" (>80% or <=80%) if Day 0 (or Day 1) and Day 4 values are available. Refer to http://[email or web address]-[email or web address]    Change in PCT <=80%  A decrease of PCT levels below or equal to 80% defines a positive change in PCT test result representing a higher risk for 28-day all-cause mortality of patients diagnosed with severe sepsis for septic shock.    Change in PCT >80%  A decrease of PCT levels of more than 80% defines a negative change in PCT result representing a lower risk for 28-day all-cause mortality of patients diagnosed with severe sepsis or septic shock.       TSH [[id number]]  (Normal) Collected: 07/26/24 2222    Specimen: Blood Updated: " 07/26/24 2342     TSH 1.750 uIU/mL     Lipase [[id number]]  (Abnormal) Collected: 07/26/24 2222    Specimen: Blood Updated: 07/26/24 2336     Lipase 71 U/L     C-reactive Protein [[id number]]  (Abnormal) Collected: 07/26/24 2222    Specimen: Blood Updated: 07/26/24 2336     C-Reactive Protein 13.70 mg/dL     Valproic Acid Level, Total [[id number]]  (Abnormal) Collected: 07/26/24 2222    Specimen: Blood Updated: 07/26/24 2336     Valproic Acid 45.0 mcg/mL     Narrative:      Therapeutic Ranges for Valproic Acid    Epilepsy:       mcg/ml  Bipolar/[place]  up to 125 mcg/ml      Phosphorus [[id number]]  (Normal) Collected: 07/26/24 2222    Specimen: Blood Updated: 07/26/24 2336     Phosphorus 4.3 mg/dL     CBC & Differential [[id number]]  (Abnormal) Collected: 07/26/24 2228    Specimen: Blood Updated: 07/26/24 2334    Narrative:      The following orders were created for panel order CBC & Differential.  Procedure                               Abnormality         Status                     ---------                               -----------         ------                     CBC Auto Differential[[id number]]        Abnormal            Final result               Scan Slide[[id number]]                                       Final result                 Please view results for these tests on the individual orders.    CBC Auto Differential [[id number]]  (Abnormal) Collected: 07/26/24 2228    Specimen: Blood Updated: 07/26/24 2334     WBC 14.39 10*3/mm3      RBC 3.84 10*6/mm3      Hemoglobin 11.3 g/dL      Hematocrit 34.1 %      MCV 88.8 fL      MCH 29.4 pg      MCHC 33.1 g/dL      RDW 15.9 %      RDW-SD 52.0 fl      MPV 11.0 fL      Platelets 143 10*3/mm3     Narrative:      The previously reported component NRBC is no longer being reported. Previous result was 0.1 /100 WBC (Reference Range: 0.0-0.2 /100 WBC) on 7/26/2024 at 2258 EDT.    Scan Slide [[id number]] Collected: 07/26/24 2228    Specimen: Blood Updated: 07/26/24 2334      Scan Slide --     Comment: See Manual Differential Results       Manual Differential [[id number]]  (Abnormal) Collected: 07/26/24 2228    Specimen: Blood Updated: 07/26/24 2334     Neutrophil % 71.0 %      Lymphocyte % 9.0 %      Monocyte % 10.0 %      Eosinophil % 0.0 %      Basophil % 0.0 %      Bands %  9.0 %      Metamyelocyte % 1.0 %      Neutrophils Absolute 11.51 10*3/mm3      Lymphocytes Absolute 1.30 10*3/mm3      Monocytes Absolute 1.44 10*3/mm3      Eosinophils Absolute 0.00 10*3/mm3      Basophils Absolute 0.00 10*3/mm3      RBC Morphology Normal     Toxic Granulation Slight/1+     Vacuolated Neutrophils Slight/1+     Platelet Morphology Normal    Sedimentation Rate [[id number]]  (Abnormal) Collected: 07/26/24 2230    Specimen: Blood Updated: 07/26/24 2325     Sed Rate >130 mm/hr     Urinalysis With Microscopic If Indicated (No Culture) - Urine, Clean Catch [[id number]]  (Abnormal) Collected: 07/26/24 2229    Specimen: Urine, Clean Catch Updated: 07/26/24 2320     Color, UA Yellow     Appearance, UA Turbid     pH, UA 5.5     Specific Gravity, UA 1.019     Glucose, UA >=1000 mg/dL (3+)     Ketones, UA 15 mg/dL (1+)     Bilirubin, UA Negative     Blood, UA Large (3+)     Protein,  mg/dL (2+)     Leuk Esterase, UA Moderate (2+)     Nitrite, UA Negative     Urobilinogen, UA 1.0 E.U./dL    Urinalysis, Microscopic Only - Urine, Clean Catch [[id number]]  (Abnormal) Collected: 07/26/24 2229    Specimen: Urine, Clean Catch Updated: 07/26/24 2320     RBC, UA Too Numerous to Count /HPF      WBC, UA Too Numerous to Count /HPF      Bacteria, UA 4+ /HPF      Squamous Epithelial Cells, UA 3-6 /HPF      Hyaline Casts, UA 0-6 /LPF      Methodology Manual Light Microscopy    Comprehensive Metabolic Panel [[id number]]  (Abnormal) Collected: 07/26/24 2222    Specimen: Blood Updated: 07/26/24 2317     Glucose 364 mg/dL      BUN 69 mg/dL      Creatinine 2.52 mg/dL      Sodium 130 mmol/L      Potassium 4.7 mmol/L       Comment: Slight hemolysis detected by analyzer. Result may be falsely elevated.        Chloride 90 mmol/L      CO2 19.0 mmol/L      Calcium 8.9 mg/dL      Total Protein 6.5 g/dL      Albumin 2.7 g/dL      ALT (SGPT) 23 U/L      AST (SGOT) 66 U/L      Alkaline Phosphatase 201 U/L      Total Bilirubin 0.5 mg/dL      Globulin 3.8 gm/dL      Comment: Calculated Result        A/G Ratio 0.7 g/dL      BUN/Creatinine Ratio 27.4     Anion Gap 21.0 mmol/L      eGFR 21.2 mL/min/1.73     Narrative:      GFR Normal >60  Chronic Kidney Disease <60  Kidney Failure <15      Magnesium [[id number]]  (Abnormal) Collected: 07/26/24 2222    Specimen: Blood Updated: 07/26/24 2317     Magnesium 2.5 mg/dL     Single High Sensitivity Troponin T [[id number]]  (Abnormal) Collected: 07/26/24 2222    Specimen: Blood Updated: 07/26/24 2310     HS Troponin T 45 ng/L     Narrative:      High Sensitive Troponin T Reference Range:  <14.0 ng/L- Negative Female for AMI  <22.0 ng/L- Negative Male for AMI  >=14 - Abnormal Female indicating possible myocardial injury.  >=22 - Abnormal Male indicating possible myocardial injury.   Clinicians would have to utilize clinical acumen, EKG, Troponin, and serial changes to determine if it is an Acute Myocardial Infarction or myocardial injury due to an underlying chronic condition.         Blood Gas, Arterial With Co-Ox [[id number]]  (Abnormal) Collected: 07/26/24 2305    Specimen: Arterial Blood Updated: 07/26/24 2306     Site Left Radial     Byron's Test Positive     pH, Arterial 7.357 pH units      pCO2, Arterial 34.0 mm Hg      Comment: 84 Value below reference range        pO2, Arterial 92.6 mm Hg      HCO3, Arterial 19.1 mmol/L      Base Excess, Arterial -5.7 mmol/L      Hemoglobin, Blood Gas 10.2 g/dL      Comment: 84 Value below reference range        Hematocrit, Blood Gas 31.2 %      Oxyhemoglobin 94.7 %      Methemoglobin 0.70 %      Carboxyhemoglobin 1.4 %      CO2 Content 20.1 mmol/L      Temperature  37.0     Barometric Pressure for Blood Gas --     Comment: N/A        Modality Nasal Cannula     FIO2 32 %      Rate 0 Breaths/minute      PIP 0 cmH2O      Comment: Meter: [id number]     :  [id number]        IPAP 0     EPAP 0     pH, Temp Corrected 7.357 pH Units      pCO2, Temperature Corrected 34.0 mm Hg      pO2, Temperature Corrected 92.6 mm Hg     [place] Draw [[id number]] Collected: 07/26/24 2222    Specimen: Blood Updated: 07/26/24 2300    Narrative:      The following orders were created for panel order [place] Draw.  Procedure                               Abnormality         Status                     ---------                               -----------         ------                     Green Top (Gel)[[id number]]                                  Final result               Lavender Top[[id number]]                                     Final result               Gold Top - SST[[id number]]                                   Final result               Wolfe Top[[id number]]                                         Final result               Light Blue Top[[id number]]                                   Final result                 Please view results for these tests on the individual orders.    Lavender Top [[id number]] Collected: 07/26/24 2228    Specimen: Blood Updated: 07/26/24 2300     Extra Tube hold for add-on     Comment: Auto resulted       Light Blue Top [[id number]] Collected: 07/26/24 2222    Specimen: Blood Updated: 07/26/24 2300     Extra Tube Hold for add-ons.     Comment: Auto resulted       Ammonia [[id number]]  (Normal) Collected: 07/26/24 2230    Specimen: Blood Updated: 07/26/24 2255     Ammonia 21 umol/L     POC Glucose Once [[id number]]  (Abnormal) Collected: 07/26/24 2246    Specimen: Blood Updated: 07/26/24 2247     Glucose 384 mg/dL     Green Top (Gel) [[id number]] Collected: 07/26/24 2222    Specimen: Blood Updated: 07/26/24 2246     Extra Tube Hold for add-ons.     Comment: Auto resulted.        Gold Top - SST [[id number]] Collected: 07/26/24 2222    Specimen: Blood Updated: 07/26/24 2246     Extra Tube Hold for add-ons.     Comment: Auto resulted.       Aiden Mcgee [[id number]] Collected: 07/26/24 2222    Specimen: Blood Updated: 07/26/24 2246     Extra Tube Hold for add-ons.     Comment: Auto resulted.             Imaging Results (All)       Procedure Component Value Units Date/Time    XR Shoulder 2+ View Left [[id number]] Collected: 07/27/24 1702     Updated: 07/27/24 1707    Narrative:      XR SHOULDER 2+ VW LEFT    Date of Exam: 7/27/2024 3:48 PM EDT    Indication: shoulder pain    Comparison: Left shoulder radiographs 2/7/2017    Findings:  There is likely calcification adjacent to the greater tuberosity compatible with rotator cuff calcific tendinosis. There are some enthesopathic changes of the greater tuberosity. There is mild degenerative change of the glenohumeral and acromioclavicular   joints. No high-grade narrowing of the subacromial space. No acute fracture or traumatic malalignment. Unremarkable appearance of the left chest. There is degenerative disc disease and facet arthropathy in the partially imaged cervical spine.      Impression:      Impression:  Calcification adjacent to the greater tuberosity likely reflects rotator cuff calcific tendinosis.    There are some enthesopathic changes of the greater tuberosity and correlate for any rotator cuff pathology. No high-grade narrowing of the subacromial space.    Mild degenerative changes. No acute osseous findings.      Electronically Signed: José Smyth MD    7/27/2024 5:04 PM EDT    Workstation ID: [id number]    CT Abdomen Pelvis Without Contrast [[id number]] Collected: 07/26/24 2357     Updated: 07/27/24 0006    Narrative:      CT ABDOMEN PELVIS WO CONTRAST  CT CHEST WO CONTRAST DIAGNOSTIC    Date of Exam: 7/26/2024 11:43 PM EDT    Indication: sepsis, fall.    Comparison: 8/18/2023.    Technique: Axial CT images were obtained of the  chest, abdomen and pelvis without the administration of contrast. Reconstructed coronal and sagittal images were also obtained. Automated exposure control and iterative construction methods were used.      Findings:  Chest: The thyroid, trachea and esophagus appear within normal limits. There is a small hiatal hernia. Heart is mildly enlarged. There are mild coronary artery and aortic calcifications. No pericardial effusion or mediastinal lymphadenopathy. There are a   few calcified mediastinal granulomas.    No acute findings in the superficial soft tissues. There is no acute osseous abnormality or destructive bone lesion. Minimal thoracic degenerative changes are present.    There is mild paraseptal apical emphysema. No pneumothorax, pleural effusion or focal airspace consolidation. Mild dependent bibasilar atelectasis.    Abdomen and pelvis: There is mild diffuse muscular atrophy. No acute findings in the superficial soft tissues. No acute osseous abnormality or destructive bone lesion. There are chronic bilateral L5 pars interarticularis defects. There are mild   thoracolumbar degenerative changes. Mild DJD at the hips.    The liver, gallbladder, bile ducts, pancreas, stomach and spleen appear within normal limits. The adrenal glands are normal. There is prominent bilateral perinephric stranding. Renal parenchyma appears homogeneous. No urolithiasis or hydronephrosis. The   kidneys measure significantly larger than on the prior study. There is mild periureteric stranding. Urinary bladder is nondistended. Patient is status post hysterectomy. The ovaries are not seen. The appendix is normal. Small bowel is nondistended. Colon   is unremarkable. There is mild aortoiliac atherosclerotic disease. No ascites, pneumoperitoneum or lymphadenopathy.      Impression:      Impression:  1.There is prominent bilateral perinephric stranding and periureteric stranding. The kidneys measure significantly larger than on the prior  study. Pyelonephritis is the primary concern.  2.No acute findings in the chest.  3.Mild cardiomegaly and mild coronary artery disease.  4.Mild emphysema.  5.Small hiatal hernia.  6.Chronic bilateral L5 pars interarticularis defects.        Electronically Signed: Con Solomon MD    7/27/2024 12:03 AM EDT    Workstation ID: [id number]    CT Chest Without Contrast Diagnostic [[id number]] Collected: 07/26/24 2357     Updated: 07/27/24 0006    Narrative:      CT ABDOMEN PELVIS WO CONTRAST  CT CHEST WO CONTRAST DIAGNOSTIC    Date of Exam: 7/26/2024 11:43 PM EDT    Indication: sepsis, fall.    Comparison: 8/18/2023.    Technique: Axial CT images were obtained of the chest, abdomen and pelvis without the administration of contrast. Reconstructed coronal and sagittal images were also obtained. Automated exposure control and iterative construction methods were used.      Findings:  Chest: The thyroid, trachea and esophagus appear within normal limits. There is a small hiatal hernia. Heart is mildly enlarged. There are mild coronary artery and aortic calcifications. No pericardial effusion or mediastinal lymphadenopathy. There are a   few calcified mediastinal granulomas.    No acute findings in the superficial soft tissues. There is no acute osseous abnormality or destructive bone lesion. Minimal thoracic degenerative changes are present.    There is mild paraseptal apical emphysema. No pneumothorax, pleural effusion or focal airspace consolidation. Mild dependent bibasilar atelectasis.    Abdomen and pelvis: There is mild diffuse muscular atrophy. No acute findings in the superficial soft tissues. No acute osseous abnormality or destructive bone lesion. There are chronic bilateral L5 pars interarticularis defects. There are mild   thoracolumbar degenerative changes. Mild DJD at the hips.    The liver, gallbladder, bile ducts, pancreas, stomach and spleen appear within normal limits. The adrenal glands are normal. There is  prominent bilateral perinephric stranding. Renal parenchyma appears homogeneous. No urolithiasis or hydronephrosis. The   kidneys measure significantly larger than on the prior study. There is mild periureteric stranding. Urinary bladder is nondistended. Patient is status post hysterectomy. The ovaries are not seen. The appendix is normal. Small bowel is nondistended. Colon   is unremarkable. There is mild aortoiliac atherosclerotic disease. No ascites, pneumoperitoneum or lymphadenopathy.      Impression:      Impression:  1.There is prominent bilateral perinephric stranding and periureteric stranding. The kidneys measure significantly larger than on the prior study. Pyelonephritis is the primary concern.  2.No acute findings in the chest.  3.Mild cardiomegaly and mild coronary artery disease.  4.Mild emphysema.  5.Small hiatal hernia.  6.Chronic bilateral L5 pars interarticularis defects.        Electronically Signed: Con Solomon MD    7/27/2024 12:03 AM EDT    Workstation ID: [id number]    CT Cervical Spine Without Contrast [[id number]] Collected: 07/26/24 2355     Updated: 07/27/24 0000    Narrative:      CT CERVICAL SPINE WO CONTRAST    Date of Exam: 7/26/2024 11:43 PM EDT    Indication: fall.    Comparison: 8/21/2017.    Technique: Axial CT images were obtained of the cervical spine without contrast administration.  Reconstructed coronal and sagittal images were also obtained. Automated exposure control and iterative construction methods were used.      Findings:  7 cervical vertebrae are identified. There is normal alignment. No acute fracture or subluxation. The odontoid process, C1 ring and craniocervical junction appear intact. There is mild multilevel marginal osteophyte formation. No high-grade   neuroforaminal or spinal canal osseous stenosis. Limited view of the posterior fossa contents is unremarkable. No neck mass or adenopathy. Thyroid is homogeneous. Minimal paraseptal emphysema in the lung  apices.      Impression:      Impression:  1.No acute osseous abnormality.  2.Mild multilevel disc degeneration.  3.Minimal paraseptal emphysema.        Electronically Signed: Con Solomon MD    7/26/2024 11:57 PM EDT    Workstation ID: [id number]    CT Head Without Contrast [[id number]] Collected: 07/26/24 2351     Updated: 07/26/24 2356    Narrative:      CT HEAD WO CONTRAST    Date of Exam: 7/26/2024 11:43 PM EDT    Indication: fall.    Comparison: 6/29/2023.    Technique: Axial CT images were obtained of the head without contrast administration.  Automated exposure control and iterative construction methods were used.      Findings:  Superficial soft tissues appear within normal limits. The calvarium is intact. There is mild multifocal paranasal sinus mucosal disease. The mastoids are underpneumatized with small effusions. Orbits are unremarkable.  There is no acute intracranial   hemorrhage.  No mass effect or midline shift.  No abnormal extra-axial collections.  Gray-white differentiation is within normal limits. There is stable minimal patchy white matter hypoattenuation. Ventricular size and configuration is normal for age.      Impression:      Impression:  1.No acute intracranial abnormality.  2.Mild paranasal sinus mucosal disease.        Electronically Signed: Con Solomon MD    7/26/2024 11:53 PM EDT    Workstation ID: [id number]    XR Chest 1 View [[id number]] Collected: 07/26/24 2322     Updated: 07/26/24 2326    Narrative:      XR CHEST 1 VW    Date of Exam: 7/26/2024 10:35 PM EDT    Indication: Weak/Dizzy/AMS triage protocol    Comparison: 11/5/2023.    Findings:  There is stable volume loss in the left lung base without definite new opacity. Right lung remains clear. Heart size is stable. No pneumothorax or pleural effusion. Mild aortic arch calcifications. No acute osseous abnormality.      Impression:      Impression:  Stable left basilar volume loss. No acute findings.        Electronically  "Signed: Con Solomon MD    7/26/2024 11:23 PM EDT    Workstation ID: [id number]             Physician Progress Notes (all)        Dung Briceño MD at 07/28/24 1502            INTENSIVIST   PROGRESS NOTE     Hospital:  LOS: 1 day     Chief Complaint: Malaise     Subjective   S     Interval History: No acute issues over the last 24 hours. Patient has significantly improved since admission. She is afebrile and hemodynamically stable this morning. Off all drips and now tolerating PO feeds without nausea, vomiting.    The patient's relevant past medical, surgical and social history were reviewed and updated in Epic as appropriate.      Objective   O     Intake/Ouptut 24 hrs (7:00AM - 6:59 AM)  Intake & Output (last 3 days)         07/25 0701 07/26 0700 07/26 0701 07/27 0700 07/27 0701 07/28 0700 07/28 0701 07/29 0700    P.O.   960     I.V. (mL/kg)   2386.7 (22.9)     IV Piggyback  3220 300     Total Intake(mL/kg)  3220 (31) 3646.7 (35.1)     Urine (mL/kg/hr)   600 (0.2) 200 (0.2)    Stool   0     Total Output   600 200    Net  +3220 +3046.7 -200            Urine Unmeasured Occurrence   2 x 2 x    Stool Unmeasured Occurrence   1 x      Medications (drips):  dilTIAZem, Last Rate: Stopped (07/27/24 0345)  Pharmacy Consult  sodium chloride, Last Rate: 100 mL/hr (07/27/24 0320)    Respiratory Support: NC    Physical Examination:  Vital Signs: Blood pressure 128/71, pulse 93, temperature 97.6 °F (36.4 °C), temperature source Oral, resp. rate 16, height 167.6 cm (66\"), weight 104 kg (230 lb), SpO2 92%.    General: The patient appears in no acute distress. Significantly more alert today on exam. Sitting upright in bedside chair at time of assessment.   Chest: Clear to auscultation bilaterally, No wheezing, rhonchi, or rales. Normal work of breathing. Equal chest rise.  Cardiac: Rate controlled. S1S2 auscultated. No murmurs, rubs or gallops.    Abdomen: Soft, non-tender, non-distended, mild CVA tenderness bilaterally  Skin: " No rashes, open wounds.  Bruising on left shoulder (posterior aspect)   Neuro: Motor power grossly intact bilaterally. Sensation intact. Speech fluid and fluent. Thought process coherent.   Psych: Alert and oriented x3. Mood stable.    Lines, Drains & Airways       Active LDAs       Name Placement date Placement time Site Days    Peripheral IV 07/26/24 2230 Left Antecubital 07/26/24 2230  Antecubital  1    Peripheral IV 07/26/24 2253 Left Antecubital 07/26/24 2253  Antecubital  1    Peripheral IV 07/27/24 2200 Anterior;Right;Upper Arm 07/27/24 2200  Arm  less than 1    External Urinary Catheter 07/27/24 2200  --  less than 1             Results from last 7 days   Lab Units 07/28/24 0327 07/26/24 2228   WBC 10*3/mm3 14.53* 14.39*   HEMOGLOBIN g/dL 10.5* 11.3*   MCV fL 87.9 88.8   PLATELETS 10*3/mm3 160 143     Results from last 7 days   Lab Units 07/28/24 0327 07/27/24  1634 07/27/24  1030 07/27/24  0311 07/26/24  2222   SODIUM mmol/L 133* 130* 136   < > 130*   POTASSIUM mmol/L 4.3 4.3 4.0   < > 4.7   CO2 mmol/L 20.0* 21.0* 25.0   < > 19.0*   CREATININE mg/dL 2.89* 2.91* 2.73*   < > 2.52*   GLUCOSE mg/dL 139* 121* 121*   < > 364*   MAGNESIUM mg/dL 2.6*  --  2.6*  --  2.5*   PHOSPHORUS mg/dL  --   --  5.3*  --  4.3    < > = values in this interval not displayed.     Estimated Creatinine Clearance: 24.9 mL/min (A) (by C-G formula based on SCr of 2.89 mg/dL (H)).  Results from last 7 days   Lab Units 07/28/24 0327 07/27/24  0311 07/26/24  2222   ALK PHOS U/L 376* 202* 201*   BILIRUBIN mg/dL 0.3 0.3 0.5   ALT (SGPT) U/L 19 22 23   AST (SGOT) U/L 66* 53* 66*     Results from last 7 days   Lab Units 07/26/24  2305   PH, ARTERIAL pH units 7.357   PCO2, ARTERIAL mm Hg 34.0*   PO2 ART mm Hg 92.6   FIO2 % 32     Images:  XR Shoulder 2+ View Left    Result Date: 7/27/2024  Impression: Calcification adjacent to the greater tuberosity likely reflects rotator cuff calcific tendinosis. There are some enthesopathic changes  of the greater tuberosity and correlate for any rotator cuff pathology. No high-grade narrowing of the subacromial space. Mild degenerative changes. No acute osseous findings. Electronically Signed: José Smyth MD  7/27/2024 5:04 PM EDT  Workstation ID: [id number]    CT Abdomen Pelvis Without Contrast    Result Date: 7/27/2024  Impression: 1.There is prominent bilateral perinephric stranding and periureteric stranding. The kidneys measure significantly larger than on the prior study. Pyelonephritis is the primary concern. 2.No acute findings in the chest. 3.Mild cardiomegaly and mild coronary artery disease. 4.Mild emphysema. 5.Small hiatal hernia. 6.Chronic bilateral L5 pars interarticularis defects. Electronically Signed: Con Solomon MD  7/27/2024 12:03 AM EDT  Workstation ID: [id number]    CT Chest Without Contrast Diagnostic    Result Date: 7/27/2024  Impression: 1.There is prominent bilateral perinephric stranding and periureteric stranding. The kidneys measure significantly larger than on the prior study. Pyelonephritis is the primary concern. 2.No acute findings in the chest. 3.Mild cardiomegaly and mild coronary artery disease. 4.Mild emphysema. 5.Small hiatal hernia. 6.Chronic bilateral L5 pars interarticularis defects. Electronically Signed: Con Solomon MD  7/27/2024 12:03 AM EDT  Workstation ID: [id number]    CT Cervical Spine Without Contrast    Result Date: 7/26/2024  Impression: 1.No acute osseous abnormality. 2.Mild multilevel disc degeneration. 3.Minimal paraseptal emphysema. Electronically Signed: Con Solomon MD  7/26/2024 11:57 PM EDT  Workstation ID: [id number]    CT Head Without Contrast    Result Date: 7/26/2024  Impression: 1.No acute intracranial abnormality. 2.Mild paranasal sinus mucosal disease. Electronically Signed: Con Solomon MD  7/26/2024 11:53 PM EDT  Workstation ID: [id number]    XR Chest 1 View    Result Date: 7/26/2024  Impression: Stable left basilar volume loss. No acute  "findings. Electronically Signed: Con Solomon MD  7/26/2024 11:23 PM EDT  Workstation ID: [id number]     Results: Reviewed.  - I reviewed the patient's new laboratory and imaging results.  - I independently reviewed the patient's new images.    Medications: Reviewed.    Assessment & Plan    A / P     Saadia Caceres is a 63-year-old female with past medical history of tobacco abuse, COPD, recurrent UTIs and bipolar affective disorder/schizophrenia. She was admitted on 7/26/2024 with urinary incontinence, altered mentation, fever and malaise. Ultimately brought to the emergency room that day after a fall where she fell on her shoulder.  Patient is septic on arrival in atrial fibrillation with RVR.  She was started on a Cardizem drip, antibiotics and fluids. CT abdomen/pelvis consistent with bilateral pyelonephritis without obstructing stone or evidence of hydronephrosis. Further laboratory evaluation consistent with ANDRE, hyperglycemia, metabolic/lactic acidosis.     Active Hospital Problems:  Active Hospital Problems    Diagnosis  POA    **Acute pyelonephritis [N10]  -CT abdomen on admission with, \"prominent bilateral perinephric stranding and periureteric stranding. The kidneys measure significantly larger than on the prior study. Pyelonephritis is the primary concern... Renal parenchyma appears homogeneous. No urolithiasis or hydronephrosis \"   -UA with 4+ bacteria, too numerous to count WBC, moderate leukocyte esterase. Urine culture now growing gram-negative bacilli  -Continue Zosyn    Yes    Severe sepsis [A41.9, R65.20]  -Trend lactate to normalization  -See pyelonephritis treatment above    Yes    Atrial fibrillation with RVR [I48.91]  -Initiated diltiazem on admission which was discontinued on 7/27 // Sinus on EKG     Yes    ANDRE (acute kidney injury) [N17.9]  -Presumably normal baseline creatinine  -CK pending   -Will monitor creatinine, urine output and electrolytes while in the ICU.  Electrolyte " replacement per unit protocol  -Renal US with duplex pending     Yes    Tobacco abuse [Z72.0]    Yes    Bipolar I disorder, single manic episode [F30.9]  -Continue home medications when clinically appropriate    Yes    COPD (chronic obstructive pulmonary disease) [J44.9]  Yes    Obstructive sleep apnea syndrome [G47.33]  Yes      Resolved Hospital Problems   No resolved problems to display.     F-PO  A-NA  S-NA  T-Heparin SQ  H-Head of bed greater than 30 degrees  U-PPI  G-FSBS per unit protocol, correction dose insulin  S-NA  B-Will monitor daily and provide regimen if indicated  I-PIV  D-NA    Advance Directives:   Code Status and Medical Interventions: No CPR (Do Not Attempt to Resuscitate); Limited Support; No cardioversion; Daughter (POA)   Ordered at: 07/27/24 0219     Medical Intervention Limits:    No cardioversion     Code Status (Patient has no pulse and is not breathing):    No CPR (Do Not Attempt to Resuscitate)     Medical Interventions (Patient has pulse or is breathing):    Limited Support     Comments:    Daughter (POA)     High level of risk due to severe exacerbation of chronic illness and illness with threat to life or bodily function.    I conducted multidisciplinary rounds in the plan of care was discussed with the multidisciplinary team at that time. In attendance at multidisciplinary rounds was clinical pharmacist, dietitian, nursing staff and case management.    I discussed the patient's findings and my recommendations with patient and nursing staff.     -- Barrington Briceño MD  Pulmonary/Critical Care      Electronically signed by Dung Briceño MD at 07/28/24 1458       Consult Notes (all)    No notes of this type exist for this encounter.

## 2024-07-29 NOTE — PROGRESS NOTES
INTENSIVIST   PROGRESS NOTE        SUBJECTIVE     [place] 63 y.o. female is followed for: Fall       Acute pyelonephritis    Bipolar I disorder, single manic episode    COPD (chronic obstructive pulmonary disease)    Obstructive sleep apnea syndrome    Tobacco abuse    Severe sepsis    Atrial fibrillation with RVR    ANDRE (acute kidney injury)    As an Intensivist, we provide an integrated approach to the ICU patient and family, medical management of comorbid conditions, including but not limited to electrolytes, glycemic control, organ dysfunction, lead interdisciplinary rounds and coordinate the care with all other services, including those from other specialists.     Interval History:    No acute events overnight.    Sleeping a lot. But awakes up easily. Normal conversation.    Temp  Min: 97.6 °F (36.4 °C)  Max: 98.3 °F (36.8 °C)       History     Last Reviewed by Nam Valverde MD on 2024 at  8:41 AM    Sections Reviewed    Medical, Surgical, Family, Tobacco, Alcohol, Drug Use, Sexual Activity,   Social Documentation    Problem list reviewed by Nam Valverde MD on 2024 at  8:41 AM  Medicines reviewed by Nam Valverde MD on 2024 at  8:41 AM  Allergies reviewed by Nam Valverde MD on 2024 at  8:41 AM       The patient's relevant past medical, surgical and social history were reviewed and updated in Epic as appropriate.        OBJECTIVE     Vitals:  Temp: 98.3 °F (36.8 °C) (24 1200) Temp  Min: 97.6 °F (36.4 °C)  Max: 98.3 °F (36.8 °C)   Temp core:      BP: 145/68 (24 1105) BP  Min: 87/69  Max: 150/63   MAP (non-invasive) Noninvasive MAP (mmHg): 104 (24 2000) Noninvasive MAP (mmHg)  Av.7  Min: 54  Max: 114   Pulse: 92 (24 1105) Pulse  Min: 84  Max: 104   Resp: 20 (24 1200) Resp  Min: 16  Max: 20   SpO2: 91 % (24 1105) SpO2  Min: 68 %  Max: 96 %   Device: room air (24 1200)    Flow Rate: 1 (24 0200) Flow (L/min)  Min: 1  Max: 1          07/26/24  2232 07/28/24  0954   Weight: 104 kg (230 lb) 104 kg (230 lb)        Intake/Ouptut 24 hrs (7:00AM - 6:59 AM)  Intake & Output (last 2 days)         07/27 0701 07/28 0700 07/28 0701 07/29 0700 07/29 0701 07/30 0700    P.O. 960      I.V. (mL/kg) 2386.7 (22.9)      IV Piggyback 300 100     Total Intake(mL/kg) 3646.7 (35.1) 100 (1)     Urine (mL/kg/hr) 600 (0.2) 750 (0.3)     Stool 0 0     Total Output 600 750     Net +3046.7 -650            Urine Unmeasured Occurrence 2 x 4 x     Stool Unmeasured Occurrence 1 x 2 x             Medications (drips):  Pharmacy Consult        Physical Examination  Telemetry:  Rhythm: normal sinus rhythm (07/29/24 0600)      Constitutional:  No acute distress.   Cardiovascular: RRR.    Respiratory: Normal breath sounds  No adventitious sounds   Abdominal:  Soft with no tenderness.   Extremities: Trace Edema   Neurological:   Alert, Oriented, Cooperative.    Best Eye Response: 4-->(E4) spontaneous (07/28/24 2000)  Best Motor Response: 6-->(M6) obeys commands (07/28/24 2000)  Best Verbal Response: 5-->(V5) oriented (07/28/24 2000)  Jorge Coma Scale Score: 15 (07/28/24 2000)               Results Reviewed:  Laboratory  Microbiology  Radiology  Pathology    Hematology:  Results from last 7 days   Lab Units 07/29/24  0558 07/28/24  0327 07/26/24 2228   WBC 10*3/mm3 15.37* 14.53* 14.39*   BANDS % %  --  20.0* 9.0*   HEMOGLOBIN g/dL 11.0* 10.5* 11.3*   MCV fL 93.1 87.9 88.8   PLATELETS 10*3/mm3 176 160 143     Results from last 7 days   Lab Units 07/28/24  0327 07/26/24  2228   NEUTROS ABS 10*3/mm3 12.35* 11.51*   EOS ABS 10*3/mm3 0.00 0.00     Chemistry:  Estimated Creatinine Clearance: 23.4 mL/min (A) (by C-G formula based on SCr of 3.08 mg/dL (H)).    Results from last 7 days   Lab Units 07/29/24  0558 07/28/24  0327   SODIUM mmol/L 132* 133*   POTASSIUM mmol/L 4.2 4.3   CHLORIDE mmol/L 99 98   CO2 mmol/L 19.0* 20.0*   BUN mg/dL 75* 74*   CREATININE mg/dL 3.08* 2.89*   GLUCOSE  mg/dL 198* 139*     Results from last 7 days   Lab Units 07/29/24  0558 07/28/24  0327 07/27/24  1634 07/27/24  1030 07/27/24  0311 07/26/24  2222   IONIZED CALCIUM mmol/L  --  1.10*  --   --   --   --    CALCIUM mg/dL 8.5* 7.7*   < > 7.9*   < > 8.9   MAGNESIUM mg/dL 2.7* 2.6*  --  2.6*  --  2.5*   PHOSPHORUS mg/dL  --   --   --  5.3*  --  4.3    < > = values in this interval not displayed.     Hepatic Panel:  Results from last 7 days   Lab Units 07/29/24  0558 07/28/24  0327 07/27/24  0311   ALBUMIN g/dL 2.5* 2.3* 2.5*   TOTAL PROTEIN g/dL 6.5 6.2 6.4   BILIRUBIN mg/dL 0.3 0.3 0.3   AST (SGOT) U/L 55* 66* 53*   ALT (SGPT) U/L 25 19 22   ALK PHOS U/L 273* 376* 202*     Biomarkers:  Results from last 7 days   Lab Units 07/29/24  0558 07/27/24  0151 07/26/24  2222   CRP mg/dL  --   --  13.70*   LACTATE mmol/L  --  1.2 4.6*   PROCALCITONIN ng/mL 11.54*  --  28.22*     Images:  XR Shoulder 2+ View Left    Result Date: 7/27/2024  Impression: Calcification adjacent to the greater tuberosity likely reflects rotator cuff calcific tendinosis. There are some enthesopathic changes of the greater tuberosity and correlate for any rotator cuff pathology. No high-grade narrowing of the subacromial space. Mild degenerative changes. No acute osseous findings. Electronically Signed: José Smyth MD  7/27/2024 5:04 PM EDT  Workstation ID: [id number]     Echo:      Results: Reviewed.  I reviewed the patient's new laboratory and imaging results.  I independently reviewed the patient's new images.    Medications: Reviewed.    Assessment   A/P     Hospital:  LOS: 2 days   ICU: 2d 10h     Active Hospital Problems    Diagnosis  POA    **Acute pyelonephritis [N10]  Yes    Severe sepsis [A41.9, R65.20]  Yes    Atrial fibrillation with RVR [I48.91]  Yes    ANDRE (acute kidney injury) [N17.9]  Yes    Tobacco abuse [Z72.0]  Yes    Bipolar I disorder, single manic episode [F30.9]  Yes    COPD (chronic obstructive pulmonary disease) [J44.9]  Yes     Obstructive sleep apnea syndrome [G47.33]  Yes     Trilogy Machine         [place]ia is a 63 y.o. female who has recurrent UTIs and was admitted on 7/26/2024 with Pyelonephritis.    On 7/26/2024 she had urinary incontinence, altered mentation, fever and malaise.   On arrival, she was in atrial fibrillation with RVR.   CT abdomen/pelvis consistent with bilateral pyelonephritis without obstructing stone or evidence of hydronephrosis.     Assessment/Management/Treatment Plan:    Pyelonephritis and Sepsis.  Blood and Urine Cx: E coli (R) Ampicillin  Antibiotics: Piperacillin-Tazobactam ? Ceftriaxone  Pulmonary   COPD per history  ADRIANNE - Trilogy  Tobacco use  Cardiovascular  A Fib  Psych  Bipolar disorder  Renal  ANDRE, non-oliguric.    Lab Results   Component Value Date    CREATININE 3.08 (H) 07/29/2024    CREATININE 2.89 (H) 07/28/2024    CREATININE 2.91 (H) 07/27/2024    CREATININE 2.73 (H) 07/27/2024      Endocrine   Body mass index is 37.12 kg/m². Obese Class II: 35-39.9kg/m2  Hypothyroidism. ? Treatment with Levothyroxine   Type 2 diabetes.    Lab Results   Lab Value Date/Time    HGBA1C 11.1 10/02/2023 1403    HGBA1C 12.7 06/22/2023 1154    HGBA1C 10.4 (H) 03/11/2022 0720    HGBA1C 12.30 (H) 09/01/2021 1343    HGBA1C 11.70 (H) 07/09/2018 0914     Results from last 7 days   Lab Units 07/29/24  1130 07/29/24  0721 07/28/24 2005 07/28/24  1653 07/28/24  1258 07/28/24  1157 07/28/24  1103 07/28/24  0955   GLUCOSE mg/dL 308* 211* 235* 255* 172* 144* 122 131*       Diet: Diet: Regular/House, Diabetic, Renal; Consistent Carbohydrate; Low Sodium (2-3g), Low Potassium, Low Phosphorus; Fluid Consistency: Thin (IDDSI 0)   Advance Directives: Code Status and Medical Interventions: No CPR (Do Not Attempt to Resuscitate); Limited Support; No cardioversion; Daughter (POA)   Ordered at: 07/27/24 0219     Medical Intervention Limits:    No cardioversion     Code Status (Patient has no pulse and is not breathing):    No CPR (Do Not  Attempt to Resuscitate)     Medical Interventions (Patient has pulse or is breathing):    Limited Support     Comments:    Daughter (POA)        VTE Prophylaxis:  Pharmacologic VTE prophylaxis orders are present.         In brief:  Goal: Glucose < 180 mg/dL.   Start Glucommander™ SubQ (eGMS): Basal, prandial and correction insulin.  De-escalate antibiotics - PCT improving  Monitor renal function.  Check TSH  Check ECHO  Disposition: Transfer to Telemetry Unit    Plan of care and goals reviewed during interdisciplinary rounds.  I discussed the patient's findings and my recommendations with patient and nursing staff    MDM:    Problem(s) High due to: Acute or Chronic illness or injury that may poses a threat to life or bodily function  Data: Moderate due to: Review of prior external records from each unique source, Review or results of each unique test, and Ordering of each unique test    Moderate    [x] Primary Attending Intensive Care Medicine - Nutrition Support   [] Consultant    Copied text in this note has been reviewed and is accurate as of 07/29/24

## 2024-07-30 ENCOUNTER — APPOINTMENT (OUTPATIENT)
Dept: CARDIOLOGY | Facility: HOSPITAL | Age: 64
DRG: 872 | End: 2024-07-30
Payer: MEDICARE

## 2024-07-30 LAB
ALBUMIN SERPL-MCNC: 2.4 G/DL (ref 3.5–5.2)
ALBUMIN/GLOB SERPL: 0.8 G/DL
ALP SERPL-CCNC: 275 U/L (ref 39–117)
ALT SERPL W P-5'-P-CCNC: 21 U/L (ref 1–33)
ANION GAP SERPL CALCULATED.3IONS-SCNC: 12 MMOL/L (ref 5–15)
ANISOCYTOSIS BLD QL: ABNORMAL
ASCENDING AORTA: 2.9 CM
AST SERPL-CCNC: 33 U/L (ref 1–32)
BACTERIA SPEC AEROBE CULT: ABNORMAL
BACTERIA SPEC AEROBE CULT: ABNORMAL
BASOPHILS # BLD MANUAL: 0 10*3/MM3 (ref 0–0.2)
BASOPHILS NFR BLD MANUAL: 0 % (ref 0–1.5)
BH CV ECHO MEAS - AO MAX PG: 4.2 MMHG
BH CV ECHO MEAS - AO MEAN PG: 2 MMHG
BH CV ECHO MEAS - AO ROOT DIAM: 3.5 CM
BH CV ECHO MEAS - AO V2 MAX: 102 CM/SEC
BH CV ECHO MEAS - AO V2 VTI: 18.8 CM
BH CV ECHO MEAS - AVA(I,D): 2.8 CM2
BH CV ECHO MEAS - EDV(CUBED): 140.6 ML
BH CV ECHO MEAS - EDV(MOD-SP2): 163 ML
BH CV ECHO MEAS - EDV(MOD-SP4): 180 ML
BH CV ECHO MEAS - EF(MOD-BP): 51 %
BH CV ECHO MEAS - EF(MOD-SP2): 55.7 %
BH CV ECHO MEAS - EF(MOD-SP4): 50.7 %
BH CV ECHO MEAS - ESV(CUBED): 32.8 ML
BH CV ECHO MEAS - ESV(MOD-SP2): 72.2 ML
BH CV ECHO MEAS - ESV(MOD-SP4): 88.7 ML
BH CV ECHO MEAS - FS: 38.5 %
BH CV ECHO MEAS - IVS/LVPW: 1 CM
BH CV ECHO MEAS - IVSD: 1.1 CM
BH CV ECHO MEAS - LA DIMENSION: 3.7 CM
BH CV ECHO MEAS - LAT PEAK E' VEL: 9.4 CM/SEC
BH CV ECHO MEAS - LV DIASTOLIC VOL/BSA (35-75): 82.7 CM2
BH CV ECHO MEAS - LV MASS(C)D: 220.8 GRAMS
BH CV ECHO MEAS - LV MAX PG: 2.47 MMHG
BH CV ECHO MEAS - LV MEAN PG: 1 MMHG
BH CV ECHO MEAS - LV SYSTOLIC VOL/BSA (12-30): 40.8 CM2
BH CV ECHO MEAS - LV V1 MAX: 78.6 CM/SEC
BH CV ECHO MEAS - LV V1 VTI: 16.6 CM
BH CV ECHO MEAS - LVIDD: 5.2 CM
BH CV ECHO MEAS - LVIDS: 3.2 CM
BH CV ECHO MEAS - LVOT AREA: 3.1 CM2
BH CV ECHO MEAS - LVOT DIAM: 2 CM
BH CV ECHO MEAS - LVPWD: 1.1 CM
BH CV ECHO MEAS - MED PEAK E' VEL: 9.4 CM/SEC
BH CV ECHO MEAS - MV A MAX VEL: 77 CM/SEC
BH CV ECHO MEAS - MV DEC SLOPE: 675 CM/SEC2
BH CV ECHO MEAS - MV DEC TIME: 0.15 SEC
BH CV ECHO MEAS - MV E MAX VEL: 90.7 CM/SEC
BH CV ECHO MEAS - MV E/A: 1.18
BH CV ECHO MEAS - MV MAX PG: 4.1 MMHG
BH CV ECHO MEAS - MV MEAN PG: 2 MMHG
BH CV ECHO MEAS - MV P1/2T: 45.1 MSEC
BH CV ECHO MEAS - MV V2 VTI: 23.7 CM
BH CV ECHO MEAS - MVA(P1/2T): 4.9 CM2
BH CV ECHO MEAS - MVA(VTI): 2.2 CM2
BH CV ECHO MEAS - PA ACC TIME: 0.11 SEC
BH CV ECHO MEAS - SV(LVOT): 52.2 ML
BH CV ECHO MEAS - SV(MOD-SP2): 90.8 ML
BH CV ECHO MEAS - SV(MOD-SP4): 91.3 ML
BH CV ECHO MEAS - SVI(LVOT): 24 ML/M2
BH CV ECHO MEAS - SVI(MOD-SP2): 41.7 ML/M2
BH CV ECHO MEAS - SVI(MOD-SP4): 41.9 ML/M2
BH CV ECHO MEAS - TAPSE (>1.6): 2.6 CM
BH CV ECHO MEASUREMENTS AVERAGE E/E' RATIO: 9.65
BH CV VAS BP LEFT ARM: NORMAL MMHG
BH CV XLRA - RV BASE: 2.9 CM
BH CV XLRA - RV LENGTH: 7 CM
BH CV XLRA - RV MID: 3 CM
BH CV XLRA - TDI S': 21.1 CM/SEC
BILIRUB SERPL-MCNC: 0.2 MG/DL (ref 0–1.2)
BUN SERPL-MCNC: 56 MG/DL (ref 8–23)
BUN/CREAT SERPL: 27.5 (ref 7–25)
CALCIUM SPEC-SCNC: 7.9 MG/DL (ref 8.6–10.5)
CHLORIDE SERPL-SCNC: 101 MMOL/L (ref 98–107)
CO2 SERPL-SCNC: 20 MMOL/L (ref 22–29)
CREAT SERPL-MCNC: 2.04 MG/DL (ref 0.57–1)
DEPRECATED RDW RBC AUTO: 54 FL (ref 37–54)
EGFRCR SERPLBLD CKD-EPI 2021: 27.3 ML/MIN/1.73
EOSINOPHIL # BLD MANUAL: 0.18 10*3/MM3 (ref 0–0.4)
EOSINOPHIL NFR BLD MANUAL: 1 % (ref 0.3–6.2)
ERYTHROCYTE [DISTWIDTH] IN BLOOD BY AUTOMATED COUNT: 16.9 % (ref 12.3–15.4)
GLOBULIN UR ELPH-MCNC: 2.9 GM/DL
GLUCOSE BLDC GLUCOMTR-MCNC: 181 MG/DL (ref 70–130)
GLUCOSE BLDC GLUCOMTR-MCNC: 197 MG/DL (ref 70–130)
GLUCOSE BLDC GLUCOMTR-MCNC: 198 MG/DL (ref 70–130)
GLUCOSE BLDC GLUCOMTR-MCNC: 326 MG/DL (ref 70–130)
GLUCOSE SERPL-MCNC: 322 MG/DL (ref 65–99)
GRAM STN SPEC: ABNORMAL
HBA1C MFR BLD: 8.7 % (ref 4.8–5.6)
HCT VFR BLD AUTO: 32.5 % (ref 34–46.6)
HGB BLD-MCNC: 11 G/DL (ref 12–15.9)
ISOLATED FROM: ABNORMAL
ISOLATED FROM: ABNORMAL
LV EF 2D ECHO EST: 55 %
LYMPHOCYTES # BLD MANUAL: 1.97 10*3/MM3 (ref 0.7–3.1)
LYMPHOCYTES NFR BLD MANUAL: 4 % (ref 5–12)
MAGNESIUM SERPL-MCNC: 2.5 MG/DL (ref 1.6–2.4)
MCH RBC QN AUTO: 29.6 PG (ref 26.6–33)
MCHC RBC AUTO-ENTMCNC: 33.8 G/DL (ref 31.5–35.7)
MCV RBC AUTO: 87.6 FL (ref 79–97)
METAMYELOCYTES NFR BLD MANUAL: 4 % (ref 0–0)
MONOCYTES # BLD: 0.72 10*3/MM3 (ref 0.1–0.9)
MYELOCYTES NFR BLD MANUAL: 1 % (ref 0–0)
NEUTROPHILS # BLD AUTO: 14.13 10*3/MM3 (ref 1.7–7)
NEUTROPHILS NFR BLD MANUAL: 59 % (ref 42.7–76)
NEUTS BAND NFR BLD MANUAL: 20 % (ref 0–5)
NEUTS VAC BLD QL SMEAR: ABNORMAL
PLATELET # BLD AUTO: 231 10*3/MM3 (ref 140–450)
PMV BLD AUTO: 10.6 FL (ref 6–12)
POTASSIUM SERPL-SCNC: 4.1 MMOL/L (ref 3.5–5.2)
PROCALCITONIN SERPL-MCNC: 4.07 NG/ML (ref 0–0.25)
PROT SERPL-MCNC: 5.3 G/DL (ref 6–8.5)
RBC # BLD AUTO: 3.71 10*6/MM3 (ref 3.77–5.28)
SMALL PLATELETS BLD QL SMEAR: ADEQUATE
SODIUM SERPL-SCNC: 133 MMOL/L (ref 136–145)
TOXIC GRANULATION: ABNORMAL
VARIANT LYMPHS NFR BLD MANUAL: 1 % (ref 0–5)
VARIANT LYMPHS NFR BLD MANUAL: 10 % (ref 19.6–45.3)
WBC NRBC COR # BLD AUTO: 17.89 10*3/MM3 (ref 3.4–10.8)

## 2024-07-30 PROCEDURE — 82948 REAGENT STRIP/BLOOD GLUCOSE: CPT

## 2024-07-30 PROCEDURE — 84145 PROCALCITONIN (PCT): CPT | Performed by: INTERNAL MEDICINE

## 2024-07-30 PROCEDURE — 85007 BL SMEAR W/DIFF WBC COUNT: CPT | Performed by: INTERNAL MEDICINE

## 2024-07-30 PROCEDURE — 25010000002 HEPARIN (PORCINE) PER 1000 UNITS: Performed by: INTERNAL MEDICINE

## 2024-07-30 PROCEDURE — 25810000003 LACTATED RINGERS PER 1000 ML: Performed by: STUDENT IN AN ORGANIZED HEALTH CARE EDUCATION/TRAINING PROGRAM

## 2024-07-30 PROCEDURE — 85025 COMPLETE CBC W/AUTO DIFF WBC: CPT | Performed by: INTERNAL MEDICINE

## 2024-07-30 PROCEDURE — 63710000001 INSULIN LISPRO (HUMAN) PER 5 UNITS: Performed by: INTERNAL MEDICINE

## 2024-07-30 PROCEDURE — 80053 COMPREHEN METABOLIC PANEL: CPT | Performed by: STUDENT IN AN ORGANIZED HEALTH CARE EDUCATION/TRAINING PROGRAM

## 2024-07-30 PROCEDURE — 83036 HEMOGLOBIN GLYCOSYLATED A1C: CPT | Performed by: STUDENT IN AN ORGANIZED HEALTH CARE EDUCATION/TRAINING PROGRAM

## 2024-07-30 PROCEDURE — 93306 TTE W/DOPPLER COMPLETE: CPT

## 2024-07-30 PROCEDURE — 97535 SELF CARE MNGMENT TRAINING: CPT

## 2024-07-30 PROCEDURE — 99232 SBSQ HOSP IP/OBS MODERATE 35: CPT | Performed by: STUDENT IN AN ORGANIZED HEALTH CARE EDUCATION/TRAINING PROGRAM

## 2024-07-30 PROCEDURE — 83735 ASSAY OF MAGNESIUM: CPT | Performed by: INTERNAL MEDICINE

## 2024-07-30 PROCEDURE — 25010000002 CEFTRIAXONE PER 250 MG: Performed by: INTERNAL MEDICINE

## 2024-07-30 PROCEDURE — 97165 OT EVAL LOW COMPLEX 30 MIN: CPT

## 2024-07-30 PROCEDURE — 63710000001 INSULIN GLARGINE PER 5 UNITS: Performed by: INTERNAL MEDICINE

## 2024-07-30 PROCEDURE — 97530 THERAPEUTIC ACTIVITIES: CPT

## 2024-07-30 PROCEDURE — 93306 TTE W/DOPPLER COMPLETE: CPT | Performed by: INTERNAL MEDICINE

## 2024-07-30 PROCEDURE — 97161 PT EVAL LOW COMPLEX 20 MIN: CPT

## 2024-07-30 RX ORDER — DICYCLOMINE HYDROCHLORIDE 10 MG/1
10 CAPSULE ORAL 4 TIMES DAILY PRN
Status: DISCONTINUED | OUTPATIENT
Start: 2024-07-30 | End: 2024-08-05 | Stop reason: HOSPADM

## 2024-07-30 RX ORDER — CLONAZEPAM 1 MG/1
2 TABLET ORAL 2 TIMES DAILY
Status: COMPLETED | OUTPATIENT
Start: 2024-07-30 | End: 2024-08-03

## 2024-07-30 RX ORDER — PRAZOSIN HYDROCHLORIDE 1 MG/1
2 CAPSULE ORAL NIGHTLY
Status: DISCONTINUED | OUTPATIENT
Start: 2024-07-30 | End: 2024-08-05 | Stop reason: HOSPADM

## 2024-07-30 RX ORDER — SODIUM CHLORIDE, SODIUM LACTATE, POTASSIUM CHLORIDE, CALCIUM CHLORIDE 600; 310; 30; 20 MG/100ML; MG/100ML; MG/100ML; MG/100ML
75 INJECTION, SOLUTION INTRAVENOUS CONTINUOUS
Status: ACTIVE | OUTPATIENT
Start: 2024-07-30 | End: 2024-08-01

## 2024-07-30 RX ADMIN — HEPARIN SODIUM 5000 UNITS: 5000 INJECTION INTRAVENOUS; SUBCUTANEOUS at 21:01

## 2024-07-30 RX ADMIN — LEVOTHYROXINE SODIUM 88 MCG: 0.09 TABLET ORAL at 07:15

## 2024-07-30 RX ADMIN — INSULIN LISPRO 2 UNITS: 100 INJECTION, SOLUTION INTRAVENOUS; SUBCUTANEOUS at 10:00

## 2024-07-30 RX ADMIN — CLONAZEPAM 2 MG: 1 TABLET ORAL at 11:02

## 2024-07-30 RX ADMIN — PRAZOSIN HYDROCHLORIDE 2 MG: 1 CAPSULE ORAL at 20:53

## 2024-07-30 RX ADMIN — ASPIRIN 81 MG: 81 TABLET, COATED ORAL at 09:59

## 2024-07-30 RX ADMIN — SODIUM CHLORIDE 2000 MG: 900 INJECTION INTRAVENOUS at 17:17

## 2024-07-30 RX ADMIN — DIVALPROEX SODIUM 500 MG: 250 TABLET, DELAYED RELEASE ORAL at 11:02

## 2024-07-30 RX ADMIN — BETHANECHOL CHLORIDE 25 MG: 25 TABLET ORAL at 17:17

## 2024-07-30 RX ADMIN — ESTRADIOL 0.5 MG: 0.5 TABLET ORAL at 10:00

## 2024-07-30 RX ADMIN — CLONAZEPAM 2 MG: 1 TABLET ORAL at 20:53

## 2024-07-30 RX ADMIN — BETHANECHOL CHLORIDE 25 MG: 25 TABLET ORAL at 07:15

## 2024-07-30 RX ADMIN — HEPARIN SODIUM 5000 UNITS: 5000 INJECTION INTRAVENOUS; SUBCUTANEOUS at 07:14

## 2024-07-30 RX ADMIN — INSULIN GLARGINE 16 UNITS: 100 INJECTION, SOLUTION SUBCUTANEOUS at 10:00

## 2024-07-30 RX ADMIN — PANTOPRAZOLE SODIUM 40 MG: 40 TABLET, DELAYED RELEASE ORAL at 07:18

## 2024-07-30 RX ADMIN — SODIUM CHLORIDE, POTASSIUM CHLORIDE, SODIUM LACTATE AND CALCIUM CHLORIDE 75 ML/HR: 600; 310; 30; 20 INJECTION, SOLUTION INTRAVENOUS at 17:48

## 2024-07-30 RX ADMIN — INSULIN LISPRO 5 UNITS: 100 INJECTION, SOLUTION INTRAVENOUS; SUBCUTANEOUS at 13:00

## 2024-07-30 RX ADMIN — METOPROLOL TARTRATE 25 MG: 25 TABLET, FILM COATED ORAL at 17:48

## 2024-07-30 RX ADMIN — INSULIN LISPRO 1 UNITS: 100 INJECTION, SOLUTION INTRAVENOUS; SUBCUTANEOUS at 17:17

## 2024-07-30 RX ADMIN — BETHANECHOL CHLORIDE 25 MG: 25 TABLET ORAL at 12:58

## 2024-07-30 RX ADMIN — HEPARIN SODIUM 5000 UNITS: 5000 INJECTION INTRAVENOUS; SUBCUTANEOUS at 17:17

## 2024-07-30 RX ADMIN — INSULIN LISPRO 1 UNITS: 100 INJECTION, SOLUTION INTRAVENOUS; SUBCUTANEOUS at 20:53

## 2024-07-30 RX ADMIN — DIVALPROEX SODIUM 1500 MG: 250 TABLET, DELAYED RELEASE ORAL at 21:17

## 2024-07-30 NOTE — PLAN OF CARE
Goal Outcome Evaluation:  Plan of Care Reviewed With: patient        Progress: no change  Outcome Evaluation: Pt presents to OT evaluation w/ deficits in self-care, functional activity tolerance and safety awareness. Pt would benefit from IPOT services to address deficits in order to progress towards PLOF. d/c rec is home w/ 24/7 assist.      Anticipated Discharge Disposition (OT): home with 24/7 care

## 2024-07-30 NOTE — PLAN OF CARE
"  Problem: Adult Inpatient Plan of Care  Goal: Plan of Care Review  Outcome: Ongoing, Progressing   Goal Outcome Evaluation:                                            Patient confused and saying random comments, refused labs at first but finally agreed, patient has been incontinent and I just was rounding on the patient and found her digging in her stool and then said, \"baby help me please\".  She is currently sitting up in the chair and home meds restarted in rounds this am.  "
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
## 2024-07-30 NOTE — PLAN OF CARE
Goal Outcome Evaluation:  Plan of Care Reviewed With: patient           Outcome Evaluation: Physical therapy evaluation complete. The patient requires verbal cues to increased safety awareness during mobility. Patient presents below baseline for mobility. Patient would continue to benefit from skilled PT to address strength, balance and activity tolerance deficits to assist with return to PLOF.      Anticipated Discharge Disposition (PT): home with 24/7 care

## 2024-07-30 NOTE — CONSULTS
Visited patient at bedside for diabetes education. She states she has had diabetes for many years. Her last noted A1c in EPIC was 11.1% in Oct. 2023. Per PTA medication list she takes lantus and jardiance for diabetes. She is mostly unable to stay focused on conversation and questions at this time. Will attempt education at another time. Spoke with her nurse, Augusta, that will attempt to educate at an appropriate time. Discussed that she could use a current A1c. Augusta states she has refused blood draws today, but will ask provider to add an A1c to morning labs tomorrow, if patient allows. Diabetes ed to follow.
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
## 2024-07-30 NOTE — PROGRESS NOTES
[facility] Medicine Services  PROGRESS NOTE    Patient Name: Saadia Caceres  : 1960  MRN: [id number]    Date of Admission: 2024  Primary Care Physician: Chacorta Del Rio DO    Subjective   Subjective     CC:  Lightheadedness with fall    HPI:  Patient without complaints today. Only asking for water.      Objective   Objective     Vital Signs:   Temp:  [97.8 °F (36.6 °C)-98.8 °F (37.1 °C)] 97.8 °F (36.6 °C)  Heart Rate:  [79-91] 91  Resp:  [16-20] 16  BP: (158-187)/(61-97) 187/74     Physical Exam:  Constitutional: Dozing off, resting comfortably, chronically ill-appearing  HENT: NCAT, mucous membranes moist  Respiratory: Clear to auscultation bilaterally, respiratory effort normal   Cardiovascular: RRR, no murmurs, rubs, or gallops  Gastrointestinal: soft, nontender, nondistended  Musculoskeletal: Nonpitting edema of bilateral lower extremities  Psychiatric: Appropriate affect, cooperative  Neurologic: Alert and oriented x 3, no focal deficits, speech clear  Skin: No rashes      Results Reviewed:  LAB RESULTS:      Lab 24  1202 24  0319 24  0558 24  0327 24  1030 24  0311 24  0151 24  2230 24  2228 24  2222   WBC  --  17.89* 15.37* 14.53*  --   --   --   --  14.39*  --    HEMOGLOBIN  --  11.0* 11.0* 10.5*  --   --   --   --  11.3*  --    HEMATOCRIT  --  32.5* 34.9 32.0*  --   --   --   --  34.1  --    PLATELETS  --  231 176 160  --   --   --   --  143  --    NEUTROS ABS  --  14.13*  --  12.35*  --   --   --   --  11.51*  --    EOS ABS  --  0.18  --  0.00  --   --   --   --  0.00  --    MCV  --  87.6 93.1 87.9  --   --   --   --  88.8  --    SED RATE  --   --   --   --   --   --   --  >130*  --   --    CRP  --   --   --   --   --   --   --   --   --  13.70*   PROCALCITONIN 4.07*  --  11.54*  --   --   --   --   --   --  28.22*   LACTATE  --   --   --   --   --   --  1.2  --   --  4.6*   PROTIME  --   --    --   --   --  14.8*  --   --   --   --    APTT  --   --   --   --   --  27.3*  --   --   --   --    HEPARIN ANTI-XA  --   --   --   --  0.10* 0.10*  --   --   --   --          Lab 07/30/24  1202 07/29/24  0558 07/28/24 0327 07/27/24  1634 07/27/24  1030 07/27/24 0311 07/26/24  2222   SODIUM 133* 132* 133* 130* 136   < > 130*   POTASSIUM 4.1 4.2 4.3 4.3 4.0   < > 4.7   CHLORIDE 101 99 98 95* 100   < > 90*   CO2 20.0* 19.0* 20.0* 21.0* 25.0   < > 19.0*   ANION GAP 12.0 14.0 15.0 14.0 11.0   < > 21.0*   BUN 56* 75* 74* 75* 74*   < > 69*   CREATININE 2.04* 3.08* 2.89* 2.91* 2.73*   < > 2.52*   EGFR 27.3* 16.7* 18.0* 17.8* 19.2*   < > 21.2*   GLUCOSE 322* 198* 139* 121* 121*   < > 364*   CALCIUM 7.9* 8.5* 7.7* 7.9* 7.9*   < > 8.9   IONIZED CALCIUM  --   --  1.10*  --   --   --   --    MAGNESIUM 2.5* 2.7* 2.6*  --  2.6*  --  2.5*   PHOSPHORUS  --   --   --   --  5.3*  --  4.3   HEMOGLOBIN A1C 8.70*  --   --   --   --   --   --    TSH  --  4.590*  --   --   --   --  1.750    < > = values in this interval not displayed.         Lab 07/30/24  1202 07/29/24 0558 07/28/24 0327 07/27/24 0311 07/26/24  2222   TOTAL PROTEIN 5.3* 6.5 6.2 6.4 6.5   ALBUMIN 2.4* 2.5* 2.3* 2.5* 2.7*   GLOBULIN 2.9 4.0 3.9 3.9 3.8   ALT (SGPT) 21 25 19 22 23   AST (SGOT) 33* 55* 66* 53* 66*   BILIRUBIN 0.2 0.3 0.3 0.3 0.5   ALK PHOS 275* 273* 376* 202* 201*   LIPASE  --   --   --   --  71*         Lab 07/27/24  0621 07/27/24  0311 07/26/24  2222   HSTROP T 37* 36* 45*   PROTIME  --  14.8*  --    INR  --  1.15*  --                  Lab 07/26/24  2305   PH, ARTERIAL 7.357   PCO2, ARTERIAL 34.0*   PO2 ART 92.6   FIO2 32   HCO3 ART 19.1*   BASE EXCESS ART -5.7*   CARBOXYHEMOGLOBIN 1.4     Brief Urine Lab Results  (Last result in the past 365 days)        Color   Clarity   Blood   Leuk Est   Nitrite   Protein   CREAT   Urine HCG        07/27/24 1244 Dark Yellow   Turbid   Large (3+)   Moderate (2+)   Negative   100 mg/dL (2+)                    Microbiology Results Abnormal       Procedure Component Value - Date/Time    Urine Culture - Urine, Urine, Clean Catch [[id number]]  (Normal) Collected: 07/27/24 1244    Lab Status: Final result Specimen: Urine, Clean Catch Updated: 07/29/24 0939     Urine Culture No growth            Adult Transthoracic Echo Complete W/ Cont if Necessary Per Protocol    Result Date: 7/30/2024    Left ventricular systolic function is normal. Estimated left ventricular EF = 55%   Left ventricular wall thickness is consistent with mild concentric hypertrophy.   No hemodynamically significant valvular heart disease      Results for orders placed during the hospital encounter of 07/26/24    Adult Transthoracic Echo Complete W/ Cont if Necessary Per Protocol    Interpretation Summary    Left ventricular systolic function is normal. Estimated left ventricular EF = 55%    Left ventricular wall thickness is consistent with mild concentric hypertrophy.    No hemodynamically significant valvular heart disease      Current medications:  Scheduled Meds:aspirin, 81 mg, Oral, Daily  bethanechol, 25 mg, Oral, TID  cefTRIAXone, 2,000 mg, Intravenous, Q24H  clonazePAM, 2 mg, Oral, BID  divalproex, 1,500 mg, Oral, Nightly  divalproex, 500 mg, Oral, QAM  estradiol, 0.5 mg, Oral, Daily  heparin (porcine), 5,000 Units, Subcutaneous, Q8H  insulin glargine, 1-200 Units, Subcutaneous, Daily - Glucommander  insulin lispro, 1-200 Units, Subcutaneous, 4x Daily With Meals & Nightly  levothyroxine, 88 mcg, Oral, Daily  metoprolol tartrate, 25 mg, Oral, Q12H  pantoprazole, 40 mg, Oral, Q AM  prazosin, 2 mg, Oral, Nightly      Continuous Infusions:lactated ringers, 75 mL/hr      PRN Meds:.  acetaminophen    dextrose    dextrose    dicyclomine    glucagon (human recombinant)    insulin lispro    ipratropium-albuterol    metoprolol tartrate    sodium chloride    Assessment & Plan   Assessment & Plan     Active Hospital Problems    Diagnosis  POA    **Acute  pyelonephritis [N10]  Yes    Severe sepsis [A41.9, R65.20]  Yes    Atrial fibrillation with RVR [I48.91]  Yes    ANDRE (acute kidney injury) [N17.9]  Yes    Tobacco abuse [Z72.0]  Yes    Bipolar I disorder, single manic episode [F30.9]  Yes    COPD (chronic obstructive pulmonary disease) [J44.9]  Yes    Obstructive sleep apnea syndrome [G47.33]  Yes      Resolved Hospital Problems   No resolved problems to display.        Brief Hospital Course to date:  Saadia Caceres is a 63 y.o. female with PMHx of bipolar affective disorder and/or schizophrenia, tobacco abuse, COPD, mild cognitive impairment, recent urinary symptoms and was started on Macrobid by PCP. She was admitted on 7/26/2024 with urinary incontinence, altered mentation, fever and malaise. Ultimately brought to the emergency room that day after a fall where she fell on her shoulder. Was found to be in A-fib with RVR in the ED and started on Cardizem infusion. CT abdomen/pelvis was consistent with bilateral pyelonephritis. Labs consistent with ANDRE, hyperglycemia, metabolic acidosis and lactic acidosis. Patient was admitted to the MICU for further evaluation. Hospitalist assumed care on 7/30/2024.    Acute pyelonephritis  E. coli bacteremia  Leukocytosis   -Urine and blood cultures x 2 growing E. Coli  -Continue IV ceftriaxone for 5 to 7 days. Daily CBC with diff.   -PT/OT recommending home with 24/7 care.    Atrial fibrillation with RVR  -Required Cardizem infusion on admission, spontaneously converted.  -Echo with EF 55%, no significant valvular heart disease.  -Adding on BB. Continue to monitor on telemetry.    Acute kidney injury  Metabolic acidosis  -secondary to sepsis   -Cr up to 3.08, baseline Cr 0.7-0.9  -s/p IV fluids in MICU  -Continue gentle hydration with IV fluids.    Diabetes mellitus type II with hyperglycemia  -A1c 8.7% this admission  -Required insulin drip and fluids in the MICU  -Continue insulin per Glucomander protocol.    Lactic  acidosis  -resolved with IV fluids.     Bipolar affective disorder  -Continue home Klonopin, Depakote, prazosin.    Hypothyroidism  -Continue levothyroxine.    GERD  -Continue PPI.      Expected Discharge Location and Transportation: D  Expected Discharge   Expected Discharge Date: 8/6/2024; Expected Discharge Time:      VTE Prophylaxis:  Pharmacologic VTE prophylaxis orders are present.         AM-PAC 6 Clicks Score (PT): 18 (07/30/24 1313)    CODE STATUS:   Code Status and Medical Interventions: No CPR (Do Not Attempt to Resuscitate); Limited Support; No cardioversion; Daughter (POA)   Ordered at: 07/27/24 0219     Medical Intervention Limits:    No cardioversion     Code Status (Patient has no pulse and is not breathing):    No CPR (Do Not Attempt to Resuscitate)     Medical Interventions (Patient has pulse or is breathing):    Limited Support     Comments:    Daughter (POA)       Danni Moralesminal, DO  07/30/24

## 2024-07-31 LAB
ALBUMIN SERPL-MCNC: 2.2 G/DL (ref 3.5–5.2)
ALBUMIN/GLOB SERPL: 0.5 G/DL
ALP SERPL-CCNC: 218 U/L (ref 39–117)
ALT SERPL W P-5'-P-CCNC: 17 U/L (ref 1–33)
ANION GAP SERPL CALCULATED.3IONS-SCNC: 14 MMOL/L (ref 5–15)
ANISOCYTOSIS BLD QL: ABNORMAL
AST SERPL-CCNC: 22 U/L (ref 1–32)
BASOPHILS # BLD MANUAL: 0 10*3/MM3 (ref 0–0.2)
BASOPHILS NFR BLD MANUAL: 0 % (ref 0–1.5)
BILIRUB SERPL-MCNC: 0.2 MG/DL (ref 0–1.2)
BUN SERPL-MCNC: 50 MG/DL (ref 8–23)
BUN/CREAT SERPL: 27.9 (ref 7–25)
CALCIUM SPEC-SCNC: 8.5 MG/DL (ref 8.6–10.5)
CHLORIDE SERPL-SCNC: 102 MMOL/L (ref 98–107)
CO2 SERPL-SCNC: 22 MMOL/L (ref 22–29)
CREAT SERPL-MCNC: 1.79 MG/DL (ref 0.57–1)
DEPRECATED RDW RBC AUTO: 53.9 FL (ref 37–54)
EGFRCR SERPLBLD CKD-EPI 2021: 31.9 ML/MIN/1.73
EOSINOPHIL # BLD MANUAL: 0 10*3/MM3 (ref 0–0.4)
EOSINOPHIL NFR BLD MANUAL: 0 % (ref 0.3–6.2)
ERYTHROCYTE [DISTWIDTH] IN BLOOD BY AUTOMATED COUNT: 16.8 % (ref 12.3–15.4)
GLOBULIN UR ELPH-MCNC: 4.1 GM/DL
GLUCOSE BLDC GLUCOMTR-MCNC: 171 MG/DL (ref 70–130)
GLUCOSE BLDC GLUCOMTR-MCNC: 173 MG/DL (ref 70–130)
GLUCOSE BLDC GLUCOMTR-MCNC: 200 MG/DL (ref 70–130)
GLUCOSE BLDC GLUCOMTR-MCNC: 241 MG/DL (ref 70–130)
GLUCOSE SERPL-MCNC: 153 MG/DL (ref 65–99)
HCT VFR BLD AUTO: 35.4 % (ref 34–46.6)
HGB BLD-MCNC: 11.7 G/DL (ref 12–15.9)
LYMPHOCYTES # BLD MANUAL: 2.24 10*3/MM3 (ref 0.7–3.1)
LYMPHOCYTES NFR BLD MANUAL: 4 % (ref 5–12)
MCH RBC QN AUTO: 28.9 PG (ref 26.6–33)
MCHC RBC AUTO-ENTMCNC: 33.1 G/DL (ref 31.5–35.7)
MCV RBC AUTO: 87.4 FL (ref 79–97)
MONOCYTES # BLD: 0.69 10*3/MM3 (ref 0.1–0.9)
MYELOCYTES NFR BLD MANUAL: 5 % (ref 0–0)
NEUTROPHILS # BLD AUTO: 13.27 10*3/MM3 (ref 1.7–7)
NEUTROPHILS NFR BLD MANUAL: 68 % (ref 42.7–76)
NEUTS BAND NFR BLD MANUAL: 9 % (ref 0–5)
NEUTS VAC BLD QL SMEAR: ABNORMAL
PLAT MORPH BLD: NORMAL
PLATELET # BLD AUTO: 257 10*3/MM3 (ref 140–450)
PMV BLD AUTO: 9.7 FL (ref 6–12)
POTASSIUM SERPL-SCNC: 3.8 MMOL/L (ref 3.5–5.2)
PROLYMPHOCYTES NFR BLD MANUAL: 1 % (ref 0–0)
PROT SERPL-MCNC: 6.3 G/DL (ref 6–8.5)
RBC # BLD AUTO: 4.05 10*6/MM3 (ref 3.77–5.28)
SODIUM SERPL-SCNC: 138 MMOL/L (ref 136–145)
TOXIC GRANULATION: ABNORMAL
VARIANT LYMPHS NFR BLD MANUAL: 11 % (ref 19.6–45.3)
VARIANT LYMPHS NFR BLD MANUAL: 2 % (ref 0–5)
WBC NRBC COR # BLD AUTO: 17.24 10*3/MM3 (ref 3.4–10.8)

## 2024-07-31 PROCEDURE — 99232 SBSQ HOSP IP/OBS MODERATE 35: CPT | Performed by: STUDENT IN AN ORGANIZED HEALTH CARE EDUCATION/TRAINING PROGRAM

## 2024-07-31 PROCEDURE — 63710000001 INSULIN LISPRO (HUMAN) PER 5 UNITS: Performed by: INTERNAL MEDICINE

## 2024-07-31 PROCEDURE — 82948 REAGENT STRIP/BLOOD GLUCOSE: CPT

## 2024-07-31 PROCEDURE — 63710000001 INSULIN LISPRO (HUMAN) PER 5 UNITS: Performed by: STUDENT IN AN ORGANIZED HEALTH CARE EDUCATION/TRAINING PROGRAM

## 2024-07-31 PROCEDURE — 85025 COMPLETE CBC W/AUTO DIFF WBC: CPT | Performed by: STUDENT IN AN ORGANIZED HEALTH CARE EDUCATION/TRAINING PROGRAM

## 2024-07-31 PROCEDURE — 25010000002 CEFTRIAXONE PER 250 MG: Performed by: INTERNAL MEDICINE

## 2024-07-31 PROCEDURE — 63710000001 INSULIN GLARGINE PER 5 UNITS: Performed by: INTERNAL MEDICINE

## 2024-07-31 PROCEDURE — 85007 BL SMEAR W/DIFF WBC COUNT: CPT | Performed by: STUDENT IN AN ORGANIZED HEALTH CARE EDUCATION/TRAINING PROGRAM

## 2024-07-31 PROCEDURE — 25010000002 HEPARIN (PORCINE) PER 1000 UNITS: Performed by: INTERNAL MEDICINE

## 2024-07-31 PROCEDURE — 80053 COMPREHEN METABOLIC PANEL: CPT | Performed by: STUDENT IN AN ORGANIZED HEALTH CARE EDUCATION/TRAINING PROGRAM

## 2024-07-31 RX ORDER — INSULIN LISPRO 100 [IU]/ML
3-14 INJECTION, SOLUTION INTRAVENOUS; SUBCUTANEOUS
Status: DISCONTINUED | OUTPATIENT
Start: 2024-07-31 | End: 2024-08-05 | Stop reason: HOSPADM

## 2024-07-31 RX ADMIN — ACETAMINOPHEN 650 MG: 325 TABLET ORAL at 22:04

## 2024-07-31 RX ADMIN — ESTRADIOL 0.5 MG: 0.5 TABLET ORAL at 09:17

## 2024-07-31 RX ADMIN — ACETAMINOPHEN 650 MG: 325 TABLET ORAL at 00:15

## 2024-07-31 RX ADMIN — HEPARIN SODIUM 5000 UNITS: 5000 INJECTION INTRAVENOUS; SUBCUTANEOUS at 06:06

## 2024-07-31 RX ADMIN — ASPIRIN 81 MG: 81 TABLET, COATED ORAL at 09:11

## 2024-07-31 RX ADMIN — CLONAZEPAM 2 MG: 1 TABLET ORAL at 09:10

## 2024-07-31 RX ADMIN — SODIUM CHLORIDE 2000 MG: 900 INJECTION INTRAVENOUS at 17:35

## 2024-07-31 RX ADMIN — HEPARIN SODIUM 5000 UNITS: 5000 INJECTION INTRAVENOUS; SUBCUTANEOUS at 22:01

## 2024-07-31 RX ADMIN — DIVALPROEX SODIUM 1500 MG: 250 TABLET, DELAYED RELEASE ORAL at 22:02

## 2024-07-31 RX ADMIN — BETHANECHOL CHLORIDE 25 MG: 25 TABLET ORAL at 16:58

## 2024-07-31 RX ADMIN — INSULIN GLARGINE 16 UNITS: 100 INJECTION, SOLUTION SUBCUTANEOUS at 09:11

## 2024-07-31 RX ADMIN — DIVALPROEX SODIUM 500 MG: 250 TABLET, DELAYED RELEASE ORAL at 09:11

## 2024-07-31 RX ADMIN — HEPARIN SODIUM 5000 UNITS: 5000 INJECTION INTRAVENOUS; SUBCUTANEOUS at 13:33

## 2024-07-31 RX ADMIN — INSULIN LISPRO 2 UNITS: 100 INJECTION, SOLUTION INTRAVENOUS; SUBCUTANEOUS at 09:11

## 2024-07-31 RX ADMIN — PRAZOSIN HYDROCHLORIDE 2 MG: 1 CAPSULE ORAL at 22:03

## 2024-07-31 RX ADMIN — INSULIN LISPRO 6 UNITS: 100 INJECTION, SOLUTION INTRAVENOUS; SUBCUTANEOUS at 13:33

## 2024-07-31 RX ADMIN — METOPROLOL TARTRATE 25 MG: 25 TABLET, FILM COATED ORAL at 22:02

## 2024-07-31 RX ADMIN — Medication 10 ML: at 22:03

## 2024-07-31 RX ADMIN — BETHANECHOL CHLORIDE 25 MG: 25 TABLET ORAL at 12:59

## 2024-07-31 RX ADMIN — INSULIN LISPRO 5 UNITS: 100 INJECTION, SOLUTION INTRAVENOUS; SUBCUTANEOUS at 22:01

## 2024-07-31 RX ADMIN — DICYCLOMINE HYDROCHLORIDE 10 MG: 10 CAPSULE ORAL at 00:15

## 2024-07-31 RX ADMIN — CLONAZEPAM 2 MG: 1 TABLET ORAL at 22:02

## 2024-07-31 RX ADMIN — METOPROLOL TARTRATE 25 MG: 25 TABLET, FILM COATED ORAL at 09:10

## 2024-07-31 RX ADMIN — PANTOPRAZOLE SODIUM 40 MG: 40 TABLET, DELAYED RELEASE ORAL at 06:06

## 2024-07-31 RX ADMIN — ACETAMINOPHEN 650 MG: 325 TABLET ORAL at 16:55

## 2024-07-31 RX ADMIN — INSULIN LISPRO 3 UNITS: 100 INJECTION, SOLUTION INTRAVENOUS; SUBCUTANEOUS at 17:46

## 2024-07-31 RX ADMIN — LEVOTHYROXINE SODIUM 88 MCG: 0.09 TABLET ORAL at 06:06

## 2024-07-31 NOTE — PLAN OF CARE
Goal Outcome Evaluation:           Progress: no change  Outcome Evaluation: Patient vital signs stable overnight, SBP elevated at start of shift with drastic improvement after PM dose of blood pressure medication, non-tele, radial pulse regular, on room air with sats greater than 90%, patient restless throughout night and did not sleep well but did not attempt to exit bed, incontinent of bowel and bladder, changed q2, BMs x3- loose and liquid, urine output adequate, IV fluids maintained per order, PRN medication given for back pain per patient report with relief, abdominal cramps with relief from PRN medication, oriented to self only during shift.
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
## 2024-07-31 NOTE — PLAN OF CARE
Goal Outcome Evaluation:      Patient is on Room Air, Aox1, Non-Tele, still very confused - took her meds whole, continued IV antibiotics- VSS - will continue POC.
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
## 2024-07-31 NOTE — CONSULTS
"Attempted to visit patient at bedside for diabetes education, for second day. She is very drowsy today and unable to stay awake. Discussed with her nurse, Sedrick, that I would contact her daughter.  Called and spoke with Crystal Sandifer, patient's daughter with whom she lives with. Heidi states she is a home health nurse. Instructed that Ms. Caceres's  current A1c is 8.7%. Her previous one last Oct. Was 11.1%. Heidi reports she did have a 6.9% since her Oct. One. Heidi states she takes Lantus 60 units AM and Jardiance for glycemic control. Prior to this hospitalization, patient was able to self administer insulin and monitor glucose daily in the morning. Heidi states that Ms. Caceres's mental illness is an issue with her diabetes control.  Heidi reports that patient thinks when she takes her insulin then she can eat 3 donuts and it won't hurt her. Pt. Keeps snacks in her room and will eat throughout the day.   Suggested she keep snacks put away from patient. To keep healthy, low carb snacks available for her. Also to not only monitor glucose fasting but also 2 hours pp to show patient how the foods she eats is affecting her glucose. To keep in close contact with her PCP to discuss glucose levels. Discussed with Heidi that I left handouts in patient's room for her to take home: [employer] \"What is Diabetes\" handout, \"Blood Glucose Goals\" handout, and \"What is A1c\" handout.   Thank you for this referral.  "

## 2024-08-01 LAB
ALBUMIN SERPL-MCNC: 2.4 G/DL (ref 3.5–5.2)
ALBUMIN/GLOB SERPL: 0.8 G/DL
ALP SERPL-CCNC: 168 U/L (ref 39–117)
ALT SERPL W P-5'-P-CCNC: 11 U/L (ref 1–33)
ANION GAP SERPL CALCULATED.3IONS-SCNC: 11 MMOL/L (ref 5–15)
ANISOCYTOSIS BLD QL: ABNORMAL
AST SERPL-CCNC: 15 U/L (ref 1–32)
BASOPHILS # BLD MANUAL: 0 10*3/MM3 (ref 0–0.2)
BASOPHILS NFR BLD MANUAL: 0 % (ref 0–1.5)
BILIRUB SERPL-MCNC: 0.2 MG/DL (ref 0–1.2)
BUN SERPL-MCNC: 38 MG/DL (ref 8–23)
BUN/CREAT SERPL: 25.2 (ref 7–25)
CALCIUM SPEC-SCNC: 8.1 MG/DL (ref 8.6–10.5)
CHLORIDE SERPL-SCNC: 103 MMOL/L (ref 98–107)
CLUMPED PLATELETS: PRESENT
CO2 SERPL-SCNC: 24 MMOL/L (ref 22–29)
CREAT SERPL-MCNC: 1.51 MG/DL (ref 0.57–1)
DEPRECATED RDW RBC AUTO: 55 FL (ref 37–54)
EGFRCR SERPLBLD CKD-EPI 2021: 39.2 ML/MIN/1.73
EOSINOPHIL # BLD MANUAL: 0 10*3/MM3 (ref 0–0.4)
EOSINOPHIL NFR BLD MANUAL: 0 % (ref 0.3–6.2)
ERYTHROCYTE [DISTWIDTH] IN BLOOD BY AUTOMATED COUNT: 17 % (ref 12.3–15.4)
GLOBULIN UR ELPH-MCNC: 3 GM/DL
GLUCOSE BLDC GLUCOMTR-MCNC: 162 MG/DL (ref 70–130)
GLUCOSE BLDC GLUCOMTR-MCNC: 180 MG/DL (ref 70–130)
GLUCOSE BLDC GLUCOMTR-MCNC: 190 MG/DL (ref 70–130)
GLUCOSE BLDC GLUCOMTR-MCNC: 213 MG/DL (ref 70–130)
GLUCOSE SERPL-MCNC: 237 MG/DL (ref 65–99)
HCT VFR BLD AUTO: 31.1 % (ref 34–46.6)
HGB BLD-MCNC: 10.2 G/DL (ref 12–15.9)
LYMPHOCYTES # BLD MANUAL: 1.37 10*3/MM3 (ref 0.7–3.1)
LYMPHOCYTES NFR BLD MANUAL: 6 % (ref 5–12)
MCH RBC QN AUTO: 29.1 PG (ref 26.6–33)
MCHC RBC AUTO-ENTMCNC: 32.8 G/DL (ref 31.5–35.7)
MCV RBC AUTO: 88.6 FL (ref 79–97)
MONOCYTES # BLD: 0.92 10*3/MM3 (ref 0.1–0.9)
MYELOCYTES NFR BLD MANUAL: 1 % (ref 0–0)
NEUTROPHILS # BLD AUTO: 12.83 10*3/MM3 (ref 1.7–7)
NEUTROPHILS NFR BLD MANUAL: 79 % (ref 42.7–76)
NEUTS BAND NFR BLD MANUAL: 5 % (ref 0–5)
NEUTS VAC BLD QL SMEAR: ABNORMAL
PLATELET # BLD AUTO: 217 10*3/MM3 (ref 140–450)
PMV BLD AUTO: 10 FL (ref 6–12)
POTASSIUM SERPL-SCNC: 4 MMOL/L (ref 3.5–5.2)
PROT SERPL-MCNC: 5.4 G/DL (ref 6–8.5)
RBC # BLD AUTO: 3.51 10*6/MM3 (ref 3.77–5.28)
SMALL PLATELETS BLD QL SMEAR: ADEQUATE
SODIUM SERPL-SCNC: 138 MMOL/L (ref 136–145)
TOXIC GRANULATION: ABNORMAL
VARIANT LYMPHS NFR BLD MANUAL: 9 % (ref 19.6–45.3)
WBC NRBC COR # BLD AUTO: 15.27 10*3/MM3 (ref 3.4–10.8)

## 2024-08-01 PROCEDURE — 63710000001 INSULIN LISPRO (HUMAN) PER 5 UNITS: Performed by: STUDENT IN AN ORGANIZED HEALTH CARE EDUCATION/TRAINING PROGRAM

## 2024-08-01 PROCEDURE — 25010000002 HEPARIN (PORCINE) PER 1000 UNITS: Performed by: INTERNAL MEDICINE

## 2024-08-01 PROCEDURE — 63710000001 INSULIN GLARGINE PER 5 UNITS: Performed by: STUDENT IN AN ORGANIZED HEALTH CARE EDUCATION/TRAINING PROGRAM

## 2024-08-01 PROCEDURE — 85025 COMPLETE CBC W/AUTO DIFF WBC: CPT | Performed by: STUDENT IN AN ORGANIZED HEALTH CARE EDUCATION/TRAINING PROGRAM

## 2024-08-01 PROCEDURE — 99232 SBSQ HOSP IP/OBS MODERATE 35: CPT | Performed by: INTERNAL MEDICINE

## 2024-08-01 PROCEDURE — 85007 BL SMEAR W/DIFF WBC COUNT: CPT | Performed by: STUDENT IN AN ORGANIZED HEALTH CARE EDUCATION/TRAINING PROGRAM

## 2024-08-01 PROCEDURE — 82948 REAGENT STRIP/BLOOD GLUCOSE: CPT

## 2024-08-01 PROCEDURE — 80053 COMPREHEN METABOLIC PANEL: CPT | Performed by: STUDENT IN AN ORGANIZED HEALTH CARE EDUCATION/TRAINING PROGRAM

## 2024-08-01 PROCEDURE — 25010000002 CEFTRIAXONE PER 250 MG: Performed by: INTERNAL MEDICINE

## 2024-08-01 RX ADMIN — ESTRADIOL 0.5 MG: 0.5 TABLET ORAL at 10:55

## 2024-08-01 RX ADMIN — DIVALPROEX SODIUM 500 MG: 250 TABLET, DELAYED RELEASE ORAL at 08:44

## 2024-08-01 RX ADMIN — SODIUM CHLORIDE 2000 MG: 900 INJECTION INTRAVENOUS at 17:40

## 2024-08-01 RX ADMIN — CLONAZEPAM 2 MG: 1 TABLET ORAL at 20:51

## 2024-08-01 RX ADMIN — BETHANECHOL CHLORIDE 25 MG: 25 TABLET ORAL at 17:40

## 2024-08-01 RX ADMIN — PANTOPRAZOLE SODIUM 40 MG: 40 TABLET, DELAYED RELEASE ORAL at 06:44

## 2024-08-01 RX ADMIN — ASPIRIN 81 MG: 81 TABLET, COATED ORAL at 08:44

## 2024-08-01 RX ADMIN — METOPROLOL TARTRATE 25 MG: 25 TABLET, FILM COATED ORAL at 20:51

## 2024-08-01 RX ADMIN — INSULIN LISPRO 3 UNITS: 100 INJECTION, SOLUTION INTRAVENOUS; SUBCUTANEOUS at 20:50

## 2024-08-01 RX ADMIN — CLONAZEPAM 2 MG: 1 TABLET ORAL at 08:45

## 2024-08-01 RX ADMIN — Medication 10 ML: at 20:51

## 2024-08-01 RX ADMIN — HEPARIN SODIUM 5000 UNITS: 5000 INJECTION INTRAVENOUS; SUBCUTANEOUS at 20:50

## 2024-08-01 RX ADMIN — INSULIN LISPRO 3 UNITS: 100 INJECTION, SOLUTION INTRAVENOUS; SUBCUTANEOUS at 17:40

## 2024-08-01 RX ADMIN — BETHANECHOL CHLORIDE 25 MG: 25 TABLET ORAL at 10:55

## 2024-08-01 RX ADMIN — BETHANECHOL CHLORIDE 25 MG: 25 TABLET ORAL at 06:44

## 2024-08-01 RX ADMIN — INSULIN GLARGINE 16 UNITS: 100 INJECTION, SOLUTION SUBCUTANEOUS at 08:45

## 2024-08-01 RX ADMIN — LEVOTHYROXINE SODIUM 88 MCG: 0.09 TABLET ORAL at 06:44

## 2024-08-01 RX ADMIN — INSULIN LISPRO 5 UNITS: 100 INJECTION, SOLUTION INTRAVENOUS; SUBCUTANEOUS at 12:38

## 2024-08-01 RX ADMIN — PRAZOSIN HYDROCHLORIDE 2 MG: 1 CAPSULE ORAL at 20:51

## 2024-08-01 RX ADMIN — DIVALPROEX SODIUM 1500 MG: 250 TABLET, DELAYED RELEASE ORAL at 20:50

## 2024-08-01 RX ADMIN — METOPROLOL TARTRATE 25 MG: 25 TABLET, FILM COATED ORAL at 08:45

## 2024-08-01 RX ADMIN — HEPARIN SODIUM 5000 UNITS: 5000 INJECTION INTRAVENOUS; SUBCUTANEOUS at 06:44

## 2024-08-01 RX ADMIN — INSULIN LISPRO 3 UNITS: 100 INJECTION, SOLUTION INTRAVENOUS; SUBCUTANEOUS at 08:45
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
## 2024-08-01 NOTE — PLAN OF CARE
Goal Outcome Evaluation:      Patient is on Room Air, Non-Tele, Aox2 - but conversationally very confused to time and situation. Diet good, multiple Bm's and Urinary Voids, continued IV antibiotics - VSS - will continue POC.

## 2024-08-01 NOTE — PLAN OF CARE
Goal Outcome Evaluation:  Plan of Care Reviewed With: patient        Progress: no change  Outcome Evaluation: Patient resting on room air. Non tele, VSS. No complaints of pain

## 2024-08-02 LAB
ALBUMIN SERPL-MCNC: 2.4 G/DL (ref 3.5–5.2)
ALBUMIN/GLOB SERPL: 0.7 G/DL
ALP SERPL-CCNC: 161 U/L (ref 39–117)
ALT SERPL W P-5'-P-CCNC: 10 U/L (ref 1–33)
ANION GAP SERPL CALCULATED.3IONS-SCNC: 10 MMOL/L (ref 5–15)
AST SERPL-CCNC: 18 U/L (ref 1–32)
BASOPHILS # BLD MANUAL: 0.16 10*3/MM3 (ref 0–0.2)
BASOPHILS NFR BLD MANUAL: 1 % (ref 0–1.5)
BILIRUB SERPL-MCNC: <0.2 MG/DL (ref 0–1.2)
BUN SERPL-MCNC: 31 MG/DL (ref 8–23)
BUN/CREAT SERPL: 20.9 (ref 7–25)
CALCIUM SPEC-SCNC: 8 MG/DL (ref 8.6–10.5)
CHLORIDE SERPL-SCNC: 104 MMOL/L (ref 98–107)
CO2 SERPL-SCNC: 24 MMOL/L (ref 22–29)
CREAT SERPL-MCNC: 1.48 MG/DL (ref 0.57–1)
CRP SERPL-MCNC: 2.69 MG/DL (ref 0–0.5)
DEPRECATED RDW RBC AUTO: 55.6 FL (ref 37–54)
EGFRCR SERPLBLD CKD-EPI 2021: 40.1 ML/MIN/1.73
EOSINOPHIL # BLD MANUAL: 0 10*3/MM3 (ref 0–0.4)
EOSINOPHIL NFR BLD MANUAL: 0 % (ref 0.3–6.2)
ERYTHROCYTE [DISTWIDTH] IN BLOOD BY AUTOMATED COUNT: 17.2 % (ref 12.3–15.4)
GLOBULIN UR ELPH-MCNC: 3.4 GM/DL
GLUCOSE BLDC GLUCOMTR-MCNC: 154 MG/DL (ref 70–130)
GLUCOSE BLDC GLUCOMTR-MCNC: 192 MG/DL (ref 70–130)
GLUCOSE BLDC GLUCOMTR-MCNC: 214 MG/DL (ref 70–130)
GLUCOSE BLDC GLUCOMTR-MCNC: 239 MG/DL (ref 70–130)
GLUCOSE SERPL-MCNC: 159 MG/DL (ref 65–99)
HCT VFR BLD AUTO: 30.4 % (ref 34–46.6)
HGB BLD-MCNC: 10 G/DL (ref 12–15.9)
LYMPHOCYTES # BLD MANUAL: 2.99 10*3/MM3 (ref 0.7–3.1)
LYMPHOCYTES NFR BLD MANUAL: 5 % (ref 5–12)
MCH RBC QN AUTO: 29.3 PG (ref 26.6–33)
MCHC RBC AUTO-ENTMCNC: 32.9 G/DL (ref 31.5–35.7)
MCV RBC AUTO: 89.1 FL (ref 79–97)
METAMYELOCYTES NFR BLD MANUAL: 1 % (ref 0–0)
MONOCYTES # BLD: 0.79 10*3/MM3 (ref 0.1–0.9)
MYELOCYTES NFR BLD MANUAL: 1 % (ref 0–0)
NEUTROPHILS # BLD AUTO: 11.49 10*3/MM3 (ref 1.7–7)
NEUTROPHILS NFR BLD MANUAL: 63 % (ref 42.7–76)
NEUTS BAND NFR BLD MANUAL: 10 % (ref 0–5)
NEUTS VAC BLD QL SMEAR: ABNORMAL
PLAT MORPH BLD: NORMAL
PLATELET # BLD AUTO: 231 10*3/MM3 (ref 140–450)
PMV BLD AUTO: 10.2 FL (ref 6–12)
POTASSIUM SERPL-SCNC: 4.5 MMOL/L (ref 3.5–5.2)
PROCALCITONIN SERPL-MCNC: 0.91 NG/ML (ref 0–0.25)
PROT SERPL-MCNC: 5.8 G/DL (ref 6–8.5)
RBC # BLD AUTO: 3.41 10*6/MM3 (ref 3.77–5.28)
ROULEAUX BLD QL SMEAR: ABNORMAL
SODIUM SERPL-SCNC: 138 MMOL/L (ref 136–145)
TOXIC GRANULATION: ABNORMAL
VALPROATE SERPL-MCNC: 53.3 MCG/ML (ref 50–125)
VARIANT LYMPHS NFR BLD MANUAL: 17 % (ref 19.6–45.3)
VARIANT LYMPHS NFR BLD MANUAL: 2 % (ref 0–5)
WBC NRBC COR # BLD AUTO: 15.74 10*3/MM3 (ref 3.4–10.8)

## 2024-08-02 PROCEDURE — 84145 PROCALCITONIN (PCT): CPT | Performed by: INTERNAL MEDICINE

## 2024-08-02 PROCEDURE — 25010000002 HEPARIN (PORCINE) PER 1000 UNITS: Performed by: INTERNAL MEDICINE

## 2024-08-02 PROCEDURE — 94761 N-INVAS EAR/PLS OXIMETRY MLT: CPT

## 2024-08-02 PROCEDURE — 99232 SBSQ HOSP IP/OBS MODERATE 35: CPT | Performed by: INTERNAL MEDICINE

## 2024-08-02 PROCEDURE — 85007 BL SMEAR W/DIFF WBC COUNT: CPT | Performed by: STUDENT IN AN ORGANIZED HEALTH CARE EDUCATION/TRAINING PROGRAM

## 2024-08-02 PROCEDURE — 85025 COMPLETE CBC W/AUTO DIFF WBC: CPT | Performed by: STUDENT IN AN ORGANIZED HEALTH CARE EDUCATION/TRAINING PROGRAM

## 2024-08-02 PROCEDURE — 63710000001 INSULIN LISPRO (HUMAN) PER 5 UNITS: Performed by: STUDENT IN AN ORGANIZED HEALTH CARE EDUCATION/TRAINING PROGRAM

## 2024-08-02 PROCEDURE — 80164 ASSAY DIPROPYLACETIC ACD TOT: CPT | Performed by: INTERNAL MEDICINE

## 2024-08-02 PROCEDURE — 94799 UNLISTED PULMONARY SVC/PX: CPT

## 2024-08-02 PROCEDURE — 63710000001 INSULIN GLARGINE PER 5 UNITS: Performed by: STUDENT IN AN ORGANIZED HEALTH CARE EDUCATION/TRAINING PROGRAM

## 2024-08-02 PROCEDURE — 86140 C-REACTIVE PROTEIN: CPT | Performed by: INTERNAL MEDICINE

## 2024-08-02 PROCEDURE — 80053 COMPREHEN METABOLIC PANEL: CPT | Performed by: STUDENT IN AN ORGANIZED HEALTH CARE EDUCATION/TRAINING PROGRAM

## 2024-08-02 PROCEDURE — 82948 REAGENT STRIP/BLOOD GLUCOSE: CPT

## 2024-08-02 PROCEDURE — 25010000002 CEFTRIAXONE PER 250 MG: Performed by: INTERNAL MEDICINE

## 2024-08-02 RX ORDER — TRAMADOL HYDROCHLORIDE 50 MG/1
25 TABLET ORAL EVERY 6 HOURS PRN
Status: DISCONTINUED | OUTPATIENT
Start: 2024-08-02 | End: 2024-08-05 | Stop reason: HOSPADM

## 2024-08-02 RX ORDER — LIDOCAINE 4 G/G
1 PATCH TOPICAL
Status: DISCONTINUED | OUTPATIENT
Start: 2024-08-02 | End: 2024-08-05 | Stop reason: HOSPADM

## 2024-08-02 RX ADMIN — DIVALPROEX SODIUM 1500 MG: 250 TABLET, DELAYED RELEASE ORAL at 20:40

## 2024-08-02 RX ADMIN — SODIUM CHLORIDE 2000 MG: 900 INJECTION INTRAVENOUS at 17:16

## 2024-08-02 RX ADMIN — ESTRADIOL 0.5 MG: 0.5 TABLET ORAL at 09:26

## 2024-08-02 RX ADMIN — LEVOTHYROXINE SODIUM 88 MCG: 0.09 TABLET ORAL at 06:28

## 2024-08-02 RX ADMIN — ACETAMINOPHEN 650 MG: 325 TABLET ORAL at 20:40

## 2024-08-02 RX ADMIN — METOPROLOL TARTRATE 25 MG: 25 TABLET, FILM COATED ORAL at 20:41

## 2024-08-02 RX ADMIN — BETHANECHOL CHLORIDE 25 MG: 25 TABLET ORAL at 06:30

## 2024-08-02 RX ADMIN — INSULIN LISPRO 3 UNITS: 100 INJECTION, SOLUTION INTRAVENOUS; SUBCUTANEOUS at 09:25

## 2024-08-02 RX ADMIN — BETHANECHOL CHLORIDE 25 MG: 25 TABLET ORAL at 17:16

## 2024-08-02 RX ADMIN — INSULIN GLARGINE 16 UNITS: 100 INJECTION, SOLUTION SUBCUTANEOUS at 09:25

## 2024-08-02 RX ADMIN — CLONAZEPAM 2 MG: 1 TABLET ORAL at 09:25

## 2024-08-02 RX ADMIN — Medication 10 ML: at 09:26

## 2024-08-02 RX ADMIN — CLONAZEPAM 2 MG: 1 TABLET ORAL at 20:41

## 2024-08-02 RX ADMIN — HEPARIN SODIUM 5000 UNITS: 5000 INJECTION INTRAVENOUS; SUBCUTANEOUS at 06:28

## 2024-08-02 RX ADMIN — METOPROLOL TARTRATE 25 MG: 25 TABLET, FILM COATED ORAL at 09:25

## 2024-08-02 RX ADMIN — INSULIN LISPRO 3 UNITS: 100 INJECTION, SOLUTION INTRAVENOUS; SUBCUTANEOUS at 12:09

## 2024-08-02 RX ADMIN — ASPIRIN 81 MG: 81 TABLET, COATED ORAL at 09:25

## 2024-08-02 RX ADMIN — PANTOPRAZOLE SODIUM 40 MG: 40 TABLET, DELAYED RELEASE ORAL at 06:28

## 2024-08-02 RX ADMIN — PRAZOSIN HYDROCHLORIDE 2 MG: 1 CAPSULE ORAL at 20:40

## 2024-08-02 RX ADMIN — BETHANECHOL CHLORIDE 25 MG: 25 TABLET ORAL at 12:09

## 2024-08-02 RX ADMIN — INSULIN LISPRO 5 UNITS: 100 INJECTION, SOLUTION INTRAVENOUS; SUBCUTANEOUS at 17:16

## 2024-08-02 RX ADMIN — Medication 10 ML: at 20:41

## 2024-08-02 RX ADMIN — HEPARIN SODIUM 5000 UNITS: 5000 INJECTION INTRAVENOUS; SUBCUTANEOUS at 14:47

## 2024-08-02 RX ADMIN — DIVALPROEX SODIUM 500 MG: 250 TABLET, DELAYED RELEASE ORAL at 09:25
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
## 2024-08-02 NOTE — PLAN OF CARE
Goal Outcome Evaluation:  Plan of Care Reviewed With: patient        Progress: no change  Outcome Evaluation: Patient oriented x3. Confused to situation this shift. Patient alert to person, year and location. Frequently confused during conversation. Vitals stable. Patient rested this shift. Remains impulsive
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
## 2024-08-02 NOTE — CASE MANAGEMENT/SOCIAL WORK
Continued Stay Note   [place]     Patient Name: Saadia Caceres  MRN: [id number]  Today's Date: 8/2/2024    Admit Date: 7/26/2024    Plan: Home   Discharge Plan       Row Name 08/02/24 1337       Plan    Plan Home    Plan Comments Patient discharge plan is home. Patient lives with Crystal Sandifer, daughter. CM attempted to call Crystal Sandifer. CM left voicemail, awaiting call back. CM will follow.    Final Discharge Disposition Code 01 - home or self-care                                           Discharge Codes    No documentation.                 Expected Discharge Date and Time       Expected Discharge Date Expected Discharge Time    Aug 6, 2024               Mellisa Guerrero RN
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
## 2024-08-02 NOTE — PLAN OF CARE
Problem: Adult Inpatient Plan of Care  Goal: Plan of Care Review  Outcome: Ongoing, Progressing  Flowsheets (Taken 8/2/2024 1935)  Progress: no change  Outcome Evaluation: Patient A/Ox4 but with some intermittent confusion. Patient anxious to get ahold of daughter Crystal. VSS, remains on RA, Non-tele. Patient able to rest some this shift. IV abx continues. Continue with POC.   Goal Outcome Evaluation:           Progress: no change  Outcome Evaluation: Patient A/Ox4 but with some intermittent confusion. Patient anxious to get ahold of daughter Crystal. VSS, remains on RA, Non-tele. Patient able to rest some this shift. IV abx continues. Continue with POC.

## 2024-08-03 LAB
ALBUMIN SERPL-MCNC: 2.6 G/DL (ref 3.5–5.2)
ALBUMIN/GLOB SERPL: 0.8 G/DL
ALP SERPL-CCNC: 146 U/L (ref 39–117)
ALT SERPL W P-5'-P-CCNC: 9 U/L (ref 1–33)
ANION GAP SERPL CALCULATED.3IONS-SCNC: 10 MMOL/L (ref 5–15)
ANISOCYTOSIS BLD QL: ABNORMAL
AST SERPL-CCNC: 14 U/L (ref 1–32)
BASOPHILS # BLD MANUAL: 0 10*3/MM3 (ref 0–0.2)
BASOPHILS NFR BLD MANUAL: 0 % (ref 0–1.5)
BILIRUB SERPL-MCNC: <0.2 MG/DL (ref 0–1.2)
BUN SERPL-MCNC: 28 MG/DL (ref 8–23)
BUN/CREAT SERPL: 20.7 (ref 7–25)
CALCIUM SPEC-SCNC: 7.9 MG/DL (ref 8.6–10.5)
CHLORIDE SERPL-SCNC: 105 MMOL/L (ref 98–107)
CO2 SERPL-SCNC: 25 MMOL/L (ref 22–29)
CREAT SERPL-MCNC: 1.35 MG/DL (ref 0.57–1)
DEPRECATED RDW RBC AUTO: 56.6 FL (ref 37–54)
EGFRCR SERPLBLD CKD-EPI 2021: 44.8 ML/MIN/1.73
EOSINOPHIL # BLD MANUAL: 0 10*3/MM3 (ref 0–0.4)
EOSINOPHIL NFR BLD MANUAL: 0 % (ref 0.3–6.2)
ERYTHROCYTE [DISTWIDTH] IN BLOOD BY AUTOMATED COUNT: 17.3 % (ref 12.3–15.4)
GLOBULIN UR ELPH-MCNC: 3.2 GM/DL
GLUCOSE BLDC GLUCOMTR-MCNC: 148 MG/DL (ref 70–130)
GLUCOSE BLDC GLUCOMTR-MCNC: 151 MG/DL (ref 70–130)
GLUCOSE BLDC GLUCOMTR-MCNC: 191 MG/DL (ref 70–130)
GLUCOSE BLDC GLUCOMTR-MCNC: 205 MG/DL (ref 70–130)
GLUCOSE SERPL-MCNC: 131 MG/DL (ref 65–99)
HCT VFR BLD AUTO: 30.2 % (ref 34–46.6)
HGB BLD-MCNC: 9.7 G/DL (ref 12–15.9)
LYMPHOCYTES # BLD MANUAL: 3.14 10*3/MM3 (ref 0.7–3.1)
LYMPHOCYTES NFR BLD MANUAL: 6 % (ref 5–12)
MCH RBC QN AUTO: 28.8 PG (ref 26.6–33)
MCHC RBC AUTO-ENTMCNC: 32.1 G/DL (ref 31.5–35.7)
MCV RBC AUTO: 89.6 FL (ref 79–97)
METAMYELOCYTES NFR BLD MANUAL: 2 % (ref 0–0)
MONOCYTES # BLD: 0.9 10*3/MM3 (ref 0.1–0.9)
MYELOCYTES NFR BLD MANUAL: 2 % (ref 0–0)
NEUTROPHILS # BLD AUTO: 10.33 10*3/MM3 (ref 1.7–7)
NEUTROPHILS NFR BLD MANUAL: 62 % (ref 42.7–76)
NEUTS BAND NFR BLD MANUAL: 7 % (ref 0–5)
NRBC SPEC MANUAL: 0 /100 WBC (ref 0–0.2)
PLAT MORPH BLD: NORMAL
PLATELET # BLD AUTO: 271 10*3/MM3 (ref 140–450)
PMV BLD AUTO: 9.9 FL (ref 6–12)
POTASSIUM SERPL-SCNC: 4.6 MMOL/L (ref 3.5–5.2)
PROT SERPL-MCNC: 5.8 G/DL (ref 6–8.5)
RBC # BLD AUTO: 3.37 10*6/MM3 (ref 3.77–5.28)
SODIUM SERPL-SCNC: 140 MMOL/L (ref 136–145)
VARIANT LYMPHS NFR BLD MANUAL: 21 % (ref 19.6–45.3)
WBC MORPH BLD: NORMAL
WBC NRBC COR # BLD AUTO: 14.97 10*3/MM3 (ref 3.4–10.8)

## 2024-08-03 PROCEDURE — 25010000002 HEPARIN (PORCINE) PER 1000 UNITS: Performed by: INTERNAL MEDICINE

## 2024-08-03 PROCEDURE — 99232 SBSQ HOSP IP/OBS MODERATE 35: CPT | Performed by: INTERNAL MEDICINE

## 2024-08-03 PROCEDURE — 63710000001 INSULIN LISPRO (HUMAN) PER 5 UNITS: Performed by: STUDENT IN AN ORGANIZED HEALTH CARE EDUCATION/TRAINING PROGRAM

## 2024-08-03 PROCEDURE — 25810000003 SODIUM CHLORIDE 0.9 % SOLUTION 250 ML FLEX CONT: Performed by: INTERNAL MEDICINE

## 2024-08-03 PROCEDURE — 25010000002 CEFTRIAXONE PER 250 MG: Performed by: INTERNAL MEDICINE

## 2024-08-03 PROCEDURE — 85007 BL SMEAR W/DIFF WBC COUNT: CPT | Performed by: STUDENT IN AN ORGANIZED HEALTH CARE EDUCATION/TRAINING PROGRAM

## 2024-08-03 PROCEDURE — 85025 COMPLETE CBC W/AUTO DIFF WBC: CPT | Performed by: STUDENT IN AN ORGANIZED HEALTH CARE EDUCATION/TRAINING PROGRAM

## 2024-08-03 PROCEDURE — 63710000001 INSULIN GLARGINE PER 5 UNITS: Performed by: STUDENT IN AN ORGANIZED HEALTH CARE EDUCATION/TRAINING PROGRAM

## 2024-08-03 PROCEDURE — 80053 COMPREHEN METABOLIC PANEL: CPT | Performed by: STUDENT IN AN ORGANIZED HEALTH CARE EDUCATION/TRAINING PROGRAM

## 2024-08-03 PROCEDURE — 82948 REAGENT STRIP/BLOOD GLUCOSE: CPT

## 2024-08-03 RX ADMIN — CLONAZEPAM 2 MG: 1 TABLET ORAL at 09:05

## 2024-08-03 RX ADMIN — HEPARIN SODIUM 5000 UNITS: 5000 INJECTION INTRAVENOUS; SUBCUTANEOUS at 14:10

## 2024-08-03 RX ADMIN — INSULIN LISPRO 3 UNITS: 100 INJECTION, SOLUTION INTRAVENOUS; SUBCUTANEOUS at 17:13

## 2024-08-03 RX ADMIN — INSULIN LISPRO 3 UNITS: 100 INJECTION, SOLUTION INTRAVENOUS; SUBCUTANEOUS at 09:06

## 2024-08-03 RX ADMIN — METOPROLOL TARTRATE 25 MG: 25 TABLET, FILM COATED ORAL at 09:05

## 2024-08-03 RX ADMIN — PRAZOSIN HYDROCHLORIDE 2 MG: 1 CAPSULE ORAL at 21:24

## 2024-08-03 RX ADMIN — CLONAZEPAM 2 MG: 1 TABLET ORAL at 21:23

## 2024-08-03 RX ADMIN — BETHANECHOL CHLORIDE 25 MG: 25 TABLET ORAL at 12:25

## 2024-08-03 RX ADMIN — TRAMADOL HYDROCHLORIDE 25 MG: 50 TABLET ORAL at 21:23

## 2024-08-03 RX ADMIN — METOPROLOL TARTRATE 25 MG: 25 TABLET, FILM COATED ORAL at 21:23

## 2024-08-03 RX ADMIN — LEVOTHYROXINE SODIUM 88 MCG: 0.09 TABLET ORAL at 05:10

## 2024-08-03 RX ADMIN — INSULIN GLARGINE 16 UNITS: 100 INJECTION, SOLUTION SUBCUTANEOUS at 09:06

## 2024-08-03 RX ADMIN — SODIUM CHLORIDE 2000 MG: 900 INJECTION INTRAVENOUS at 17:14

## 2024-08-03 RX ADMIN — BETHANECHOL CHLORIDE 25 MG: 25 TABLET ORAL at 17:13

## 2024-08-03 RX ADMIN — DIVALPROEX SODIUM 1500 MG: 250 TABLET, DELAYED RELEASE ORAL at 21:23

## 2024-08-03 RX ADMIN — BETHANECHOL CHLORIDE 25 MG: 25 TABLET ORAL at 09:05

## 2024-08-03 RX ADMIN — LIDOCAINE 1 PATCH: 4 PATCH TOPICAL at 09:06

## 2024-08-03 RX ADMIN — HEPARIN SODIUM 5000 UNITS: 5000 INJECTION INTRAVENOUS; SUBCUTANEOUS at 05:10

## 2024-08-03 RX ADMIN — PANTOPRAZOLE SODIUM 40 MG: 40 TABLET, DELAYED RELEASE ORAL at 05:10

## 2024-08-03 RX ADMIN — HEPARIN SODIUM 5000 UNITS: 5000 INJECTION INTRAVENOUS; SUBCUTANEOUS at 21:26

## 2024-08-03 RX ADMIN — ASPIRIN 81 MG: 81 TABLET, COATED ORAL at 09:05

## 2024-08-03 RX ADMIN — ESTRADIOL 0.5 MG: 0.5 TABLET ORAL at 09:05

## 2024-08-03 RX ADMIN — INSULIN LISPRO 5 UNITS: 100 INJECTION, SOLUTION INTRAVENOUS; SUBCUTANEOUS at 21:24

## 2024-08-03 NOTE — PLAN OF CARE
Goal Outcome Evaluation:  Plan of Care Reviewed With: patient        Progress: no change  Outcome Evaluation: Patient oriented x 3 with continued moments of confusion. Patient fearful at times asking staff to with her. Reassurance provided. Vitals stable. Patient refused heparin and insulin early in shift. Frequently asking to go home. Patient frequenly getting out of bed/chair unassisted. Redirected repeatedly.
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
## 2024-08-03 NOTE — PLAN OF CARE
Goal Outcome Evaluation:         Patient is on Room Air, Non-Tele, Aox3 to questions, but conversationally not oriented -very confused to situation, continued IV Antibiotics, she had good appetite today, 2 BM's and good UOP - VSS - will continue POC.

## 2024-08-04 LAB
ALBUMIN SERPL-MCNC: 2.5 G/DL (ref 3.5–5.2)
ALBUMIN/GLOB SERPL: 0.8 G/DL
ALP SERPL-CCNC: 125 U/L (ref 39–117)
ALT SERPL W P-5'-P-CCNC: 8 U/L (ref 1–33)
ANION GAP SERPL CALCULATED.3IONS-SCNC: 9 MMOL/L (ref 5–15)
AST SERPL-CCNC: 14 U/L (ref 1–32)
BASOPHILS # BLD MANUAL: 0 10*3/MM3 (ref 0–0.2)
BASOPHILS NFR BLD MANUAL: 0 % (ref 0–1.5)
BILIRUB SERPL-MCNC: <0.2 MG/DL (ref 0–1.2)
BUN SERPL-MCNC: 26 MG/DL (ref 8–23)
BUN/CREAT SERPL: 20 (ref 7–25)
CALCIUM SPEC-SCNC: 7.9 MG/DL (ref 8.6–10.5)
CHLORIDE SERPL-SCNC: 107 MMOL/L (ref 98–107)
CO2 SERPL-SCNC: 26 MMOL/L (ref 22–29)
CREAT SERPL-MCNC: 1.3 MG/DL (ref 0.57–1)
DEPRECATED RDW RBC AUTO: 58.7 FL (ref 37–54)
EGFRCR SERPLBLD CKD-EPI 2021: 46.9 ML/MIN/1.73
EOSINOPHIL # BLD MANUAL: 0.12 10*3/MM3 (ref 0–0.4)
EOSINOPHIL NFR BLD MANUAL: 1 % (ref 0.3–6.2)
ERYTHROCYTE [DISTWIDTH] IN BLOOD BY AUTOMATED COUNT: 17.6 % (ref 12.3–15.4)
GLOBULIN UR ELPH-MCNC: 3.1 GM/DL
GLUCOSE BLDC GLUCOMTR-MCNC: 135 MG/DL (ref 70–130)
GLUCOSE BLDC GLUCOMTR-MCNC: 159 MG/DL (ref 70–130)
GLUCOSE BLDC GLUCOMTR-MCNC: 162 MG/DL (ref 70–130)
GLUCOSE BLDC GLUCOMTR-MCNC: 183 MG/DL (ref 70–130)
GLUCOSE SERPL-MCNC: 114 MG/DL (ref 65–99)
HCT VFR BLD AUTO: 29.2 % (ref 34–46.6)
HGB BLD-MCNC: 9.4 G/DL (ref 12–15.9)
LYMPHOCYTES # BLD MANUAL: 2.31 10*3/MM3 (ref 0.7–3.1)
LYMPHOCYTES NFR BLD MANUAL: 5 % (ref 5–12)
MCH RBC QN AUTO: 29.4 PG (ref 26.6–33)
MCHC RBC AUTO-ENTMCNC: 32.2 G/DL (ref 31.5–35.7)
MCV RBC AUTO: 91.3 FL (ref 79–97)
METAMYELOCYTES NFR BLD MANUAL: 3 % (ref 0–0)
MONOCYTES # BLD: 0.61 10*3/MM3 (ref 0.1–0.9)
NEUTROPHILS # BLD AUTO: 8.75 10*3/MM3 (ref 1.7–7)
NEUTROPHILS NFR BLD MANUAL: 70 % (ref 42.7–76)
NEUTS BAND NFR BLD MANUAL: 2 % (ref 0–5)
NRBC SPEC MANUAL: 0 /100 WBC (ref 0–0.2)
PLAT MORPH BLD: NORMAL
PLATELET # BLD AUTO: 273 10*3/MM3 (ref 140–450)
PMV BLD AUTO: 10 FL (ref 6–12)
POTASSIUM SERPL-SCNC: 4.5 MMOL/L (ref 3.5–5.2)
PROT SERPL-MCNC: 5.6 G/DL (ref 6–8.5)
RBC # BLD AUTO: 3.2 10*6/MM3 (ref 3.77–5.28)
RBC MORPH BLD: NORMAL
SODIUM SERPL-SCNC: 142 MMOL/L (ref 136–145)
VARIANT LYMPHS NFR BLD MANUAL: 19 % (ref 19.6–45.3)
WBC MORPH BLD: NORMAL
WBC NRBC COR # BLD AUTO: 12.15 10*3/MM3 (ref 3.4–10.8)

## 2024-08-04 PROCEDURE — 80053 COMPREHEN METABOLIC PANEL: CPT | Performed by: STUDENT IN AN ORGANIZED HEALTH CARE EDUCATION/TRAINING PROGRAM

## 2024-08-04 PROCEDURE — 85025 COMPLETE CBC W/AUTO DIFF WBC: CPT | Performed by: STUDENT IN AN ORGANIZED HEALTH CARE EDUCATION/TRAINING PROGRAM

## 2024-08-04 PROCEDURE — 25010000002 CEFTRIAXONE PER 250 MG: Performed by: INTERNAL MEDICINE

## 2024-08-04 PROCEDURE — 63710000001 INSULIN GLARGINE PER 5 UNITS: Performed by: STUDENT IN AN ORGANIZED HEALTH CARE EDUCATION/TRAINING PROGRAM

## 2024-08-04 PROCEDURE — 85007 BL SMEAR W/DIFF WBC COUNT: CPT | Performed by: STUDENT IN AN ORGANIZED HEALTH CARE EDUCATION/TRAINING PROGRAM

## 2024-08-04 PROCEDURE — 25010000002 HEPARIN (PORCINE) PER 1000 UNITS: Performed by: INTERNAL MEDICINE

## 2024-08-04 PROCEDURE — 63710000001 INSULIN LISPRO (HUMAN) PER 5 UNITS: Performed by: STUDENT IN AN ORGANIZED HEALTH CARE EDUCATION/TRAINING PROGRAM

## 2024-08-04 PROCEDURE — 99232 SBSQ HOSP IP/OBS MODERATE 35: CPT | Performed by: INTERNAL MEDICINE

## 2024-08-04 PROCEDURE — 82948 REAGENT STRIP/BLOOD GLUCOSE: CPT

## 2024-08-04 RX ORDER — ADENOSINE 3 MG/ML
INJECTION, SOLUTION INTRAVENOUS
Status: DISCONTINUED
Start: 2024-08-04 | End: 2024-08-04 | Stop reason: WASHOUT

## 2024-08-04 RX ADMIN — INSULIN LISPRO 3 UNITS: 100 INJECTION, SOLUTION INTRAVENOUS; SUBCUTANEOUS at 16:47

## 2024-08-04 RX ADMIN — HEPARIN SODIUM 5000 UNITS: 5000 INJECTION INTRAVENOUS; SUBCUTANEOUS at 22:06

## 2024-08-04 RX ADMIN — INSULIN LISPRO 3 UNITS: 100 INJECTION, SOLUTION INTRAVENOUS; SUBCUTANEOUS at 12:30

## 2024-08-04 RX ADMIN — PANTOPRAZOLE SODIUM 40 MG: 40 TABLET, DELAYED RELEASE ORAL at 05:13

## 2024-08-04 RX ADMIN — HEPARIN SODIUM 5000 UNITS: 5000 INJECTION INTRAVENOUS; SUBCUTANEOUS at 05:13

## 2024-08-04 RX ADMIN — BETHANECHOL CHLORIDE 25 MG: 25 TABLET ORAL at 16:47

## 2024-08-04 RX ADMIN — DIVALPROEX SODIUM 1500 MG: 250 TABLET, DELAYED RELEASE ORAL at 22:06

## 2024-08-04 RX ADMIN — HEPARIN SODIUM 5000 UNITS: 5000 INJECTION INTRAVENOUS; SUBCUTANEOUS at 12:34

## 2024-08-04 RX ADMIN — INSULIN LISPRO 3 UNITS: 100 INJECTION, SOLUTION INTRAVENOUS; SUBCUTANEOUS at 22:08

## 2024-08-04 RX ADMIN — PRAZOSIN HYDROCHLORIDE 2 MG: 1 CAPSULE ORAL at 22:09

## 2024-08-04 RX ADMIN — ASPIRIN 81 MG: 81 TABLET, COATED ORAL at 08:54

## 2024-08-04 RX ADMIN — TRAMADOL HYDROCHLORIDE 25 MG: 50 TABLET ORAL at 22:08

## 2024-08-04 RX ADMIN — ESTRADIOL 0.5 MG: 0.5 TABLET ORAL at 08:55

## 2024-08-04 RX ADMIN — INSULIN GLARGINE 16 UNITS: 100 INJECTION, SOLUTION SUBCUTANEOUS at 08:55

## 2024-08-04 RX ADMIN — SODIUM CHLORIDE 2000 MG: 900 INJECTION INTRAVENOUS at 16:47

## 2024-08-04 RX ADMIN — METOPROLOL TARTRATE 25 MG: 25 TABLET, FILM COATED ORAL at 22:08

## 2024-08-04 RX ADMIN — LIDOCAINE 1 PATCH: 4 PATCH TOPICAL at 08:53

## 2024-08-04 RX ADMIN — LEVOTHYROXINE SODIUM 88 MCG: 0.09 TABLET ORAL at 05:13

## 2024-08-04 RX ADMIN — BETHANECHOL CHLORIDE 25 MG: 25 TABLET ORAL at 12:30

## 2024-08-04 RX ADMIN — METOPROLOL TARTRATE 25 MG: 25 TABLET, FILM COATED ORAL at 08:53

## 2024-08-04 RX ADMIN — DIVALPROEX SODIUM 500 MG: 250 TABLET, DELAYED RELEASE ORAL at 08:53

## 2024-08-04 RX ADMIN — BETHANECHOL CHLORIDE 25 MG: 25 TABLET ORAL at 08:54

## 2024-08-04 RX ADMIN — ACETAMINOPHEN 650 MG: 325 TABLET ORAL at 05:15

## 2024-08-04 NOTE — PLAN OF CARE
Goal Outcome Evaluation:  Plan of Care Reviewed With: patient        Progress: no change       Pt had little sleep overnight, oriented x3, disoriented to situation/illogical conversation. Adequate UOP, BM x1. C/o pain, PRNs given. VSS- hypertensive, pt remains non-tele.        Problem: Adult Inpatient Plan of Care  Goal: Plan of Care Review  Outcome: Ongoing, Progressing  Goal: Patient-Specific Goal (Individualized)  Outcome: Ongoing, Progressing  Goal: Absence of Hospital-Acquired Illness or Injury  Outcome: Ongoing, Progressing  Intervention: Prevent Skin Injury  Goal: Optimal Comfort and Wellbeing  Outcome: Ongoing, Progressing  Goal: Readiness for Transition of Care  Outcome: Ongoing, Progressing

## 2024-08-04 NOTE — PLAN OF CARE
Goal Outcome Evaluation:  Plan of Care Reviewed With: patient        Progress: no change          VSS, no acute changes. Pt. Remains pleasantly confused and impulsive. MAEE. On room air. Good UOP. +BM. No c/o pain.
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
## 2024-08-05 ENCOUNTER — READMISSION MANAGEMENT (OUTPATIENT)
Dept: CALL CENTER | Facility: HOSPITAL | Age: 64
End: 2024-08-05
Payer: MEDICARE

## 2024-08-05 VITALS
HEIGHT: 66 IN | OXYGEN SATURATION: 95 % | HEART RATE: 74 BPM | RESPIRATION RATE: 18 BRPM | BODY MASS INDEX: 40.31 KG/M2 | WEIGHT: 250.8 LBS | DIASTOLIC BLOOD PRESSURE: 69 MMHG | SYSTOLIC BLOOD PRESSURE: 175 MMHG | TEMPERATURE: 98.4 F

## 2024-08-05 LAB
ALBUMIN SERPL-MCNC: 2.5 G/DL (ref 3.5–5.2)
ALBUMIN/GLOB SERPL: 0.7 G/DL
ALP SERPL-CCNC: 134 U/L (ref 39–117)
ALT SERPL W P-5'-P-CCNC: 8 U/L (ref 1–33)
ANION GAP SERPL CALCULATED.3IONS-SCNC: 11 MMOL/L (ref 5–15)
AST SERPL-CCNC: 17 U/L (ref 1–32)
BASOPHILS # BLD AUTO: 0.11 10*3/MM3 (ref 0–0.2)
BASOPHILS NFR BLD AUTO: 0.9 % (ref 0–1.5)
BILIRUB SERPL-MCNC: 0.2 MG/DL (ref 0–1.2)
BUN SERPL-MCNC: 18 MG/DL (ref 8–23)
BUN/CREAT SERPL: 16.1 (ref 7–25)
CALCIUM SPEC-SCNC: 7.9 MG/DL (ref 8.6–10.5)
CHLORIDE SERPL-SCNC: 104 MMOL/L (ref 98–107)
CO2 SERPL-SCNC: 24 MMOL/L (ref 22–29)
CREAT SERPL-MCNC: 1.12 MG/DL (ref 0.57–1)
DEPRECATED RDW RBC AUTO: 60.8 FL (ref 37–54)
EGFRCR SERPLBLD CKD-EPI 2021: 56.1 ML/MIN/1.73
EOSINOPHIL # BLD AUTO: 0.05 10*3/MM3 (ref 0–0.4)
EOSINOPHIL NFR BLD AUTO: 0.4 % (ref 0.3–6.2)
ERYTHROCYTE [DISTWIDTH] IN BLOOD BY AUTOMATED COUNT: 17.4 % (ref 12.3–15.4)
GLOBULIN UR ELPH-MCNC: 3.7 GM/DL
GLUCOSE BLDC GLUCOMTR-MCNC: 141 MG/DL (ref 70–130)
GLUCOSE BLDC GLUCOMTR-MCNC: 172 MG/DL (ref 70–130)
GLUCOSE SERPL-MCNC: 127 MG/DL (ref 65–99)
HCT VFR BLD AUTO: 30.5 % (ref 34–46.6)
HGB BLD-MCNC: 9.3 G/DL (ref 12–15.9)
IMM GRANULOCYTES # BLD AUTO: 0.37 10*3/MM3 (ref 0–0.05)
IMM GRANULOCYTES NFR BLD AUTO: 3.1 % (ref 0–0.5)
LYMPHOCYTES # BLD AUTO: 2.06 10*3/MM3 (ref 0.7–3.1)
LYMPHOCYTES NFR BLD AUTO: 17.4 % (ref 19.6–45.3)
MCH RBC QN AUTO: 29.1 PG (ref 26.6–33)
MCHC RBC AUTO-ENTMCNC: 30.5 G/DL (ref 31.5–35.7)
MCV RBC AUTO: 95.3 FL (ref 79–97)
MONOCYTES # BLD AUTO: 1.03 10*3/MM3 (ref 0.1–0.9)
MONOCYTES NFR BLD AUTO: 8.7 % (ref 5–12)
NEUTROPHILS NFR BLD AUTO: 69.5 % (ref 42.7–76)
NEUTROPHILS NFR BLD AUTO: 8.19 10*3/MM3 (ref 1.7–7)
NRBC BLD AUTO-RTO: 0 /100 WBC (ref 0–0.2)
PLATELET # BLD AUTO: 246 10*3/MM3 (ref 140–450)
PMV BLD AUTO: 9.9 FL (ref 6–12)
POTASSIUM SERPL-SCNC: 4.1 MMOL/L (ref 3.5–5.2)
PROT SERPL-MCNC: 6.2 G/DL (ref 6–8.5)
RBC # BLD AUTO: 3.2 10*6/MM3 (ref 3.77–5.28)
SODIUM SERPL-SCNC: 139 MMOL/L (ref 136–145)
WBC NRBC COR # BLD AUTO: 11.81 10*3/MM3 (ref 3.4–10.8)

## 2024-08-05 PROCEDURE — 80053 COMPREHEN METABOLIC PANEL: CPT | Performed by: STUDENT IN AN ORGANIZED HEALTH CARE EDUCATION/TRAINING PROGRAM

## 2024-08-05 PROCEDURE — 63710000001 INSULIN GLARGINE PER 5 UNITS: Performed by: STUDENT IN AN ORGANIZED HEALTH CARE EDUCATION/TRAINING PROGRAM

## 2024-08-05 PROCEDURE — 25010000002 CEFTRIAXONE PER 250 MG: Performed by: INTERNAL MEDICINE

## 2024-08-05 PROCEDURE — 82948 REAGENT STRIP/BLOOD GLUCOSE: CPT

## 2024-08-05 PROCEDURE — 85025 COMPLETE CBC W/AUTO DIFF WBC: CPT | Performed by: STUDENT IN AN ORGANIZED HEALTH CARE EDUCATION/TRAINING PROGRAM

## 2024-08-05 PROCEDURE — 99239 HOSP IP/OBS DSCHRG MGMT >30: CPT | Performed by: INTERNAL MEDICINE

## 2024-08-05 PROCEDURE — 63710000001 INSULIN LISPRO (HUMAN) PER 5 UNITS: Performed by: STUDENT IN AN ORGANIZED HEALTH CARE EDUCATION/TRAINING PROGRAM

## 2024-08-05 PROCEDURE — 25010000002 HEPARIN (PORCINE) PER 1000 UNITS: Performed by: INTERNAL MEDICINE

## 2024-08-05 RX ORDER — BETHANECHOL CHLORIDE 25 MG/1
25 TABLET ORAL 3 TIMES DAILY
Start: 2024-08-05

## 2024-08-05 RX ORDER — OMEPRAZOLE 40 MG/1
40 CAPSULE, DELAYED RELEASE ORAL DAILY
Start: 2024-08-05

## 2024-08-05 RX ORDER — CEFUROXIME AXETIL 500 MG/1
500 TABLET ORAL 2 TIMES DAILY
Qty: 28 TABLET | Refills: 0 | Status: SHIPPED | OUTPATIENT
Start: 2024-08-06

## 2024-08-05 RX ORDER — ESTRADIOL 0.5 MG/1
0.5 TABLET ORAL DAILY
Start: 2024-08-05

## 2024-08-05 RX ORDER — CEFUROXIME AXETIL 500 MG/1
500 TABLET ORAL 2 TIMES DAILY
Qty: 28 TABLET | Refills: 0 | OUTPATIENT
Start: 2024-08-06

## 2024-08-05 RX ORDER — ESTRADIOL 0.5 MG/1
0.5 TABLET ORAL DAILY
Qty: 90 TABLET | Refills: 3 | OUTPATIENT
Start: 2024-08-05

## 2024-08-05 RX ADMIN — ASPIRIN 81 MG: 81 TABLET, COATED ORAL at 08:40

## 2024-08-05 RX ADMIN — SODIUM CHLORIDE 2000 MG: 900 INJECTION INTRAVENOUS at 11:02

## 2024-08-05 RX ADMIN — METOPROLOL TARTRATE 25 MG: 25 TABLET, FILM COATED ORAL at 08:36

## 2024-08-05 RX ADMIN — DIVALPROEX SODIUM 500 MG: 250 TABLET, DELAYED RELEASE ORAL at 08:36

## 2024-08-05 RX ADMIN — INSULIN GLARGINE 16 UNITS: 100 INJECTION, SOLUTION SUBCUTANEOUS at 08:36

## 2024-08-05 RX ADMIN — TRAMADOL HYDROCHLORIDE 25 MG: 50 TABLET ORAL at 05:16

## 2024-08-05 RX ADMIN — LEVOTHYROXINE SODIUM 88 MCG: 0.09 TABLET ORAL at 05:16

## 2024-08-05 RX ADMIN — LIDOCAINE 1 PATCH: 4 PATCH TOPICAL at 08:36

## 2024-08-05 RX ADMIN — PANTOPRAZOLE SODIUM 40 MG: 40 TABLET, DELAYED RELEASE ORAL at 05:16

## 2024-08-05 RX ADMIN — ESTRADIOL 0.5 MG: 0.5 TABLET ORAL at 08:37

## 2024-08-05 RX ADMIN — INSULIN LISPRO 3 UNITS: 100 INJECTION, SOLUTION INTRAVENOUS; SUBCUTANEOUS at 08:36

## 2024-08-05 RX ADMIN — HEPARIN SODIUM 5000 UNITS: 5000 INJECTION INTRAVENOUS; SUBCUTANEOUS at 05:16

## 2024-08-05 RX ADMIN — BETHANECHOL CHLORIDE 25 MG: 25 TABLET ORAL at 08:36

## 2024-08-05 NOTE — DISCHARGE INSTR - APPOINTMENTS
Chacorta Del Rio,  PCP - General Internal Medicine [phone]  [address] [place] [id number]   ***Please call Dr Del Rio's office to schedule appointment. They have a question about insurance.

## 2024-08-05 NOTE — PLAN OF CARE
Goal Outcome Evaluation:         Restless.  Multiple cups of water.  Great UOP.  Tramadol given for pain.  Non Tele.                                        Patient would like it to go to [facility] in [place], and will be using goodrx.    Meds on med tab pending your approval.   Set to send to pharmacy via e-prescribe if approved.

## 2024-08-05 NOTE — DISCHARGE SUMMARY
[facility] Medicine Services  DISCHARGE SUMMARY    Patient Name: Saadia Caceres  : 1960  MRN: [id number]    Date of Admission: 2024 10:13 PM  Date of Discharge:  24  Primary Care Physician: Chacorta Del Rio DO    Consults       Date and Time Order Name Status Description    2024  8:04 AM Inpatient Infectious Diseases Consult Completed             Hospital Course     Presenting Problem: Bacteremia    Active Hospital Problems    Diagnosis  POA    **Acute pyelonephritis [N10]  Yes    Severe sepsis [A41.9, R65.20]  Yes    Atrial fibrillation with RVR [I48.91]  Yes    ANDRE (acute kidney injury) [N17.9]  Yes    Tobacco abuse [Z72.0]  Yes    Bipolar I disorder, single manic episode [F30.9]  Yes    COPD (chronic obstructive pulmonary disease) [J44.9]  Yes    Obstructive sleep apnea syndrome [G47.33]  Yes      Resolved Hospital Problems   No resolved problems to display.          Hospital Course:  Saadia Caceres is a 63 y.o. female with PMHx of bipolar affective disorder and/or schizophrenia, tobacco abuse, COPD, mild cognitive impairment, recent urinary symptoms and was started on Macrobid by PCP. She was admitted on 2024 with urinary incontinence, altered mentation, fever and malaise. Ultimately brought to the emergency room that day after a fall Was found to be in A-fib with RVR in the ED and started on Cardizem infusion. CT abdomen/pelvis was consistent with bilateral pyelonephritis. Labs consistent with ANDRE, hyperglycemia, metabolic acidosis and lactic acidosis.      Acute pyelonephritis  E. coli bacteremia  - patient seen by infectious disease Dr Maximiliano Johns  - Both blood and urine cultures positive for E coli  - IV rocephin provided while hospitalized, on  patient will transition to PO Cefuroxime 500 mg BID x 2 weeks  - follow up with PCP in one week     Atrial fibrillation with RVR  -Required Cardizem infusion on admission, spontaneously  converted.  -Echo with EF 55%, no significant valvular heart disease.  -patient NSR at discharge  -refer to Cardiology or H&V clinic after discharge, may need holter monitor to assess burden of arrhythmia.  If anticoagulation considered, will need to balance this against possible fall risk.      Acute kidney injury  Metabolic acidosis  -secondary to sepsis   -Cr up to 3.08, baseline Cr 0.7-0.9  -avoid NSAIDs and nephrotoxins.   - follow up with PCP in one week with BMP     Diabetes mellitus type II with hyperglycemia  -A1c 8.7% this admission  -resume home insulin at discharge, but will discontinue jardiance due to pyelonephritis      Bipolar affective disorder vs schizophrenia  AMS  - Continue home medictions  - depakote level in therapeutic range, 53     Hypothyroidism  -Continue levothyroxine.     GERD  -Continue PPI        Prior to discharge, I spoke with patient's daughter Heidi who states she is a home health nurse.  Discussed hospital course with anticipated discharge home Monday with cefuroxime with plans to complete two week course of oral antibiotics as an outpatient.  Heidi says Ms Caceres lives with her she manages the patient's medications, although compliance can at times be an issue.  She also states that Ms Caceres's mentation does fluctuate, but the daughter feels the patient is at her baseline currently.         Discharge Follow Up Recommendations for outpatient labs/diagnostics:   BMP at PCP followup    Day of Discharge     HPI:   Feels better today. No fevers.  Wants to go home    Review of Systems  Gen: no fevers  : no dysuria or flank pain    Vital Signs:   Temp:  [98 °F (36.7 °C)-98.7 °F (37.1 °C)] 98.4 °F (36.9 °C)  Heart Rate:  [66-79] 74  Resp:  [16-18] 18  BP: (151-178)/(57-77) 175/69      Physical Exam:  NAD, in bed  MM moist  RRR  CTAB  Abd soft, NT  Normal affect today (has been labile on previous days)  Awake, speech clear      Pertinent  and/or Most Recent Results     LAB  RESULTS:      Lab 08/05/24  0703 08/04/24  0446 08/03/24  0347 08/02/24  0505 08/01/24  1010 07/31/24  0551 07/30/24  1202   WBC 11.81* 12.15* 14.97* 15.74* 15.27*   < >  --    HEMOGLOBIN 9.3* 9.4* 9.7* 10.0* 10.2*   < >  --    HEMATOCRIT 30.5* 29.2* 30.2* 30.4* 31.1*   < >  --    PLATELETS 246 273 271 231 217   < >  --    NEUTROS ABS 8.19* 8.75* 10.33* 11.49* 12.83*   < >  --    IMMATURE GRANS (ABS) 0.37*  --   --   --   --   --   --    LYMPHS ABS 2.06  --   --   --   --   --   --    MONOS ABS 1.03*  --   --   --   --   --   --    EOS ABS 0.05 0.12 0.00 0.00 0.00   < >  --    MCV 95.3 91.3 89.6 89.1 88.6   < >  --    CRP  --   --   --  2.69*  --   --   --    PROCALCITONIN  --   --   --  0.91*  --   --  4.07*    < > = values in this interval not displayed.         Lab 08/05/24  0703 08/04/24  0446 08/03/24  0347 08/02/24  0505 08/01/24  1010 07/31/24  0551 07/30/24  1202   SODIUM 139 142 140 138 138   < > 133*   POTASSIUM 4.1 4.5 4.6 4.5 4.0   < > 4.1   CHLORIDE 104 107 105 104 103   < > 101   CO2 24.0 26.0 25.0 24.0 24.0   < > 20.0*   ANION GAP 11.0 9.0 10.0 10.0 11.0   < > 12.0   BUN 18 26* 28* 31* 38*   < > 56*   CREATININE 1.12* 1.30* 1.35* 1.48* 1.51*   < > 2.04*   EGFR 56.1* 46.9* 44.8* 40.1* 39.2*   < > 27.3*   GLUCOSE 127* 114* 131* 159* 237*   < > 322*   CALCIUM 7.9* 7.9* 7.9* 8.0* 8.1*   < > 7.9*   MAGNESIUM  --   --   --   --   --   --  2.5*   HEMOGLOBIN A1C  --   --   --   --   --   --  8.70*    < > = values in this interval not displayed.         Lab 08/05/24  0703 08/04/24  0446 08/03/24  0347 08/02/24  0505 08/01/24  1010   TOTAL PROTEIN 6.2 5.6* 5.8* 5.8* 5.4*   ALBUMIN 2.5* 2.5* 2.6* 2.4* 2.4*   GLOBULIN 3.7 3.1 3.2 3.4 3.0   ALT (SGPT) 8 8 9 10 11   AST (SGOT) 17 14 14 18 15   BILIRUBIN 0.2 <0.2 <0.2 <0.2 0.2   ALK PHOS 134* 125* 146* 161* 168*                     Brief Urine Lab Results  (Last result in the past 365 days)        Color   Clarity   Blood   Leuk Est   Nitrite   Protein   CREAT    Urine HCG        07/27/24 1244 Dark Yellow   Turbid   Large (3+)   Moderate (2+)   Negative   100 mg/dL (2+)                 Microbiology Results (last 10 days)       Procedure Component Value - Date/Time    Urine Culture - Urine, Urine, Clean Catch [[id number]]  (Normal) Collected: 07/27/24 1244    Lab Status: Final result Specimen: Urine, Clean Catch Updated: 07/29/24 0939     Urine Culture No growth    Urine Culture - Urine, Urine, Clean Catch [[id number]]  (Abnormal)  (Susceptibility) Collected: 07/26/24 2229    Lab Status: Final result Specimen: Urine, Clean Catch Updated: 07/29/24 0927     Urine Culture >100,000 CFU/mL Escherichia coli    Narrative:      Colonization of the urinary tract without infection is common. Treatment is discouraged unless the patient is symptomatic, pregnant, or undergoing an invasive urologic procedure.    Susceptibility        Escherichia coli      LOREE      Amoxicillin + Clavulanate Susceptible      Ampicillin Resistant      Ampicillin + Sulbactam Intermediate      Cefazolin Susceptible      Cefepime Susceptible      Ceftazidime Susceptible      Ceftriaxone Susceptible      Gentamicin Susceptible      Levofloxacin Susceptible      Nitrofurantoin Susceptible      Piperacillin + Tazobactam Susceptible      Trimethoprim + Sulfamethoxazole Susceptible                           Blood Culture - Blood, Arm, Left [[id number]]  (Abnormal)  (Susceptibility) Collected: 07/26/24 2228    Lab Status: Final result Specimen: Blood from Arm, Left Updated: 07/30/24 0629     Blood Culture Escherichia coli     Isolated from Aerobic and Anaerobic Bottles     Gram Stain Aerobic Bottle Gram negative bacilli      Anaerobic Bottle Gram negative bacilli    Narrative:      Less than seven (7) mL's of blood was collected.  Insufficient quantity may yield false negative results.    Susceptibility        Escherichia coli      LOREE      Amoxicillin + Clavulanate Susceptible      Ampicillin Resistant      Ampicillin +  Sulbactam Intermediate      Cefepime Susceptible      Ceftazidime Susceptible      Ceftriaxone Susceptible      Gentamicin Susceptible      Levofloxacin Susceptible      Piperacillin + Tazobactam Susceptible      Trimethoprim + Sulfamethoxazole Susceptible                       Susceptibility Comments       Escherichia coli    Cefazolin sensitivity will not be reported for Enterobacteriaceae in non-urine isolates. If cefazolin is preferred, please call the microbiology lab to request an E-test.  With the exception of urinary-sourced infections, aminoglycosides should not be used as monotherapy.               Blood Culture ID, PCR - Blood, Arm, Left [[id number]]  (Abnormal) Collected: 07/26/24 2228    Lab Status: Final result Specimen: Blood from Arm, Left Updated: 07/27/24 2024     BCID, PCR Escherichia coli. Identification by BCID2 PCR.     BOTTLE TYPE Aerobic Bottle    Narrative:      No resistance genes detected.    Blood Culture - Blood, Arm, Right [[id number]]  (Abnormal) Collected: 07/26/24 2222    Lab Status: Final result Specimen: Blood from Arm, Right Updated: 07/30/24 0629     Blood Culture Escherichia coli     Isolated from Aerobic and Anaerobic Bottles     Gram Stain Aerobic Bottle Gram negative bacilli      Anaerobic Bottle Gram negative bacilli    Narrative:      Refer to previous blood culture collected on 07/26/2024 2228 for MICs              Adult Transthoracic Echo Complete W/ Cont if Necessary Per Protocol    Result Date: 7/30/2024    Left ventricular systolic function is normal. Estimated left ventricular EF = 55%   Left ventricular wall thickness is consistent with mild concentric hypertrophy.   No hemodynamically significant valvular heart disease     Duplex Renal Artery - Bilateral Complete CAR    Result Date: 7/28/2024    Normal right renal artery.   Normal left renal artery.     XR Shoulder 2+ View Left    Result Date: 7/27/2024  XR SHOULDER 2+ VW LEFT Date of Exam: 7/27/2024 3:48 PM EDT  Indication: shoulder pain Comparison: Left shoulder radiographs 2/7/2017 Findings: There is likely calcification adjacent to the greater tuberosity compatible with rotator cuff calcific tendinosis. There are some enthesopathic changes of the greater tuberosity. There is mild degenerative change of the glenohumeral and acromioclavicular  joints. No high-grade narrowing of the subacromial space. No acute fracture or traumatic malalignment. Unremarkable appearance of the left chest. There is degenerative disc disease and facet arthropathy in the partially imaged cervical spine.     Impression: Calcification adjacent to the greater tuberosity likely reflects rotator cuff calcific tendinosis. There are some enthesopathic changes of the greater tuberosity and correlate for any rotator cuff pathology. No high-grade narrowing of the subacromial space. Mild degenerative changes. No acute osseous findings. Electronically Signed: José Smyth MD  7/27/2024 5:04 PM EDT  Workstation ID: [id number]    CT Abdomen Pelvis Without Contrast    Result Date: 7/27/2024  CT ABDOMEN PELVIS WO CONTRAST CT CHEST WO CONTRAST DIAGNOSTIC Date of Exam: 7/26/2024 11:43 PM EDT Indication: sepsis, fall. Comparison: 8/18/2023. Technique: Axial CT images were obtained of the chest, abdomen and pelvis without the administration of contrast. Reconstructed coronal and sagittal images were also obtained. Automated exposure control and iterative construction methods were used. Findings: Chest: The thyroid, trachea and esophagus appear within normal limits. There is a small hiatal hernia. Heart is mildly enlarged. There are mild coronary artery and aortic calcifications. No pericardial effusion or mediastinal lymphadenopathy. There are a  few calcified mediastinal granulomas. No acute findings in the superficial soft tissues. There is no acute osseous abnormality or destructive bone lesion. Minimal thoracic degenerative changes are present. There is mild  paraseptal apical emphysema. No pneumothorax, pleural effusion or focal airspace consolidation. Mild dependent bibasilar atelectasis. Abdomen and pelvis: There is mild diffuse muscular atrophy. No acute findings in the superficial soft tissues. No acute osseous abnormality or destructive bone lesion. There are chronic bilateral L5 pars interarticularis defects. There are mild thoracolumbar degenerative changes. Mild DJD at the hips. The liver, gallbladder, bile ducts, pancreas, stomach and spleen appear within normal limits. The adrenal glands are normal. There is prominent bilateral perinephric stranding. Renal parenchyma appears homogeneous. No urolithiasis or hydronephrosis. The kidneys measure significantly larger than on the prior study. There is mild periureteric stranding. Urinary bladder is nondistended. Patient is status post hysterectomy. The ovaries are not seen. The appendix is normal. Small bowel is nondistended. Colon  is unremarkable. There is mild aortoiliac atherosclerotic disease. No ascites, pneumoperitoneum or lymphadenopathy.     Impression: 1.There is prominent bilateral perinephric stranding and periureteric stranding. The kidneys measure significantly larger than on the prior study. Pyelonephritis is the primary concern. 2.No acute findings in the chest. 3.Mild cardiomegaly and mild coronary artery disease. 4.Mild emphysema. 5.Small hiatal hernia. 6.Chronic bilateral L5 pars interarticularis defects. Electronically Signed: Con Solomon MD  7/27/2024 12:03 AM EDT  Workstation ID: [id number]    CT Chest Without Contrast Diagnostic    Result Date: 7/27/2024  CT ABDOMEN PELVIS WO CONTRAST CT CHEST WO CONTRAST DIAGNOSTIC Date of Exam: 7/26/2024 11:43 PM EDT Indication: sepsis, fall. Comparison: 8/18/2023. Technique: Axial CT images were obtained of the chest, abdomen and pelvis without the administration of contrast. Reconstructed coronal and sagittal images were also obtained. Automated exposure  control and iterative construction methods were used. Findings: Chest: The thyroid, trachea and esophagus appear within normal limits. There is a small hiatal hernia. Heart is mildly enlarged. There are mild coronary artery and aortic calcifications. No pericardial effusion or mediastinal lymphadenopathy. There are a  few calcified mediastinal granulomas. No acute findings in the superficial soft tissues. There is no acute osseous abnormality or destructive bone lesion. Minimal thoracic degenerative changes are present. There is mild paraseptal apical emphysema. No pneumothorax, pleural effusion or focal airspace consolidation. Mild dependent bibasilar atelectasis. Abdomen and pelvis: There is mild diffuse muscular atrophy. No acute findings in the superficial soft tissues. No acute osseous abnormality or destructive bone lesion. There are chronic bilateral L5 pars interarticularis defects. There are mild thoracolumbar degenerative changes. Mild DJD at the hips. The liver, gallbladder, bile ducts, pancreas, stomach and spleen appear within normal limits. The adrenal glands are normal. There is prominent bilateral perinephric stranding. Renal parenchyma appears homogeneous. No urolithiasis or hydronephrosis. The kidneys measure significantly larger than on the prior study. There is mild periureteric stranding. Urinary bladder is nondistended. Patient is status post hysterectomy. The ovaries are not seen. The appendix is normal. Small bowel is nondistended. Colon  is unremarkable. There is mild aortoiliac atherosclerotic disease. No ascites, pneumoperitoneum or lymphadenopathy.     Impression: 1.There is prominent bilateral perinephric stranding and periureteric stranding. The kidneys measure significantly larger than on the prior study. Pyelonephritis is the primary concern. 2.No acute findings in the chest. 3.Mild cardiomegaly and mild coronary artery disease. 4.Mild emphysema. 5.Small hiatal hernia. 6.Chronic  bilateral L5 pars interarticularis defects. Electronically Signed: Con Solomon MD  7/27/2024 12:03 AM EDT  Workstation ID: [id number]    CT Cervical Spine Without Contrast    Result Date: 7/26/2024  CT CERVICAL SPINE WO CONTRAST Date of Exam: 7/26/2024 11:43 PM EDT Indication: fall. Comparison: 8/21/2017. Technique: Axial CT images were obtained of the cervical spine without contrast administration.  Reconstructed coronal and sagittal images were also obtained. Automated exposure control and iterative construction methods were used. Findings: 7 cervical vertebrae are identified. There is normal alignment. No acute fracture or subluxation. The odontoid process, C1 ring and craniocervical junction appear intact. There is mild multilevel marginal osteophyte formation. No high-grade neuroforaminal or spinal canal osseous stenosis. Limited view of the posterior fossa contents is unremarkable. No neck mass or adenopathy. Thyroid is homogeneous. Minimal paraseptal emphysema in the lung apices.     Impression: 1.No acute osseous abnormality. 2.Mild multilevel disc degeneration. 3.Minimal paraseptal emphysema. Electronically Signed: Con Solomon MD  7/26/2024 11:57 PM EDT  Workstation ID: [id number]    CT Head Without Contrast    Result Date: 7/26/2024  CT HEAD WO CONTRAST Date of Exam: 7/26/2024 11:43 PM EDT Indication: fall. Comparison: 6/29/2023. Technique: Axial CT images were obtained of the head without contrast administration.  Automated exposure control and iterative construction methods were used. Findings: Superficial soft tissues appear within normal limits. The calvarium is intact. There is mild multifocal paranasal sinus mucosal disease. The mastoids are underpneumatized with small effusions. Orbits are unremarkable.  There is no acute intracranial hemorrhage.  No mass effect or midline shift.  No abnormal extra-axial collections.  Gray-white differentiation is within normal limits. There is stable minimal  patchy white matter hypoattenuation. Ventricular size and configuration is normal for age.     Impression: 1.No acute intracranial abnormality. 2.Mild paranasal sinus mucosal disease. Electronically Signed: Con Solomon MD  7/26/2024 11:53 PM EDT  Workstation ID: [id number]    XR Chest 1 View    Result Date: 7/26/2024  XR CHEST 1 VW Date of Exam: 7/26/2024 10:35 PM EDT Indication: Weak/Dizzy/AMS triage protocol Comparison: 11/5/2023. Findings: There is stable volume loss in the left lung base without definite new opacity. Right lung remains clear. Heart size is stable. No pneumothorax or pleural effusion. Mild aortic arch calcifications. No acute osseous abnormality.     Impression: Stable left basilar volume loss. No acute findings. Electronically Signed: Con Solomon MD  7/26/2024 11:23 PM EDT  Workstation ID: [id number]     Results for orders placed during the hospital encounter of 07/26/24    Duplex Renal Artery - Bilateral Complete CAR    Interpretation Summary    Normal right renal artery.    Normal left renal artery.      Results for orders placed during the hospital encounter of 07/26/24    Duplex Renal Artery - Bilateral Complete CAR    Interpretation Summary    Normal right renal artery.    Normal left renal artery.      Results for orders placed during the hospital encounter of 07/26/24    Adult Transthoracic Echo Complete W/ Cont if Necessary Per Protocol    Interpretation Summary    Left ventricular systolic function is normal. Estimated left ventricular EF = 55%    Left ventricular wall thickness is consistent with mild concentric hypertrophy.    No hemodynamically significant valvular heart disease      Plan for Follow-up of Pending Labs/Results:     Discharge Details        Discharge Medications        New Medications        Instructions Start Date   cefuroxime 500 MG tablet  Commonly known as: CEFTIN   500 mg, Oral, 2 Times Daily   Start Date: August 6, 2024            Changes to Medications         "Instructions Start Date   dicyclomine 10 MG capsule  Commonly known as: BENTYL  What changed: when to take this   TAKE 1 CAPSULE BY MOUTH FOUR TIMES DAILY BEFORE MEALS AND AT BEDTIME      Incontinence Brief Large misc  What changed:   when to take this  reasons to take this   1 each, Does not apply, 2 Times Daily      Disposable Underpads 30\"x36\" misc  What changed: Another medication with the same name was changed. Make sure you understand how and when to take each.   1 each, Does not apply, Nightly             Continue These Medications        Instructions Start Date   Adjust Bath/Shower Seat misc   1 each, Does not apply, Daily      Adjust Bath/Shower Seat/Back misc   1 each, Does not apply, Daily      Roller Walker misc   1 Device, Does not apply, Daily, Rollator walker      albuterol sulfate  (90 Base) MCG/ACT inhaler  Commonly known as: PROVENTIL HFA;VENTOLIN HFA;PROAIR HFA   2 puffs, Inhalation, Every 6 Hours PRN      aspirin 81 MG EC tablet   81 mg, Oral, Daily      bethanechol 25 MG tablet  Commonly known as: URECHOLINE   25 mg, Oral, 3 Times Daily      clonazePAM 2 MG tablet  Commonly known as: KlonoPIN   2 mg, Oral, 2 Times Daily      cyclobenzaprine 10 MG tablet  Commonly known as: FLEXERIL   TAKE 1 TABLET BY MOUTH THREE TIMES DAILY AS NEEDED FOR MUSCLE SPASMS      Dexcom G7  device   1 each, Does not apply, Daily      Dexcom G7 Sensor misc   1 each, Does not apply, Every 10 Days      divalproex 500 MG DR tablet  Commonly known as: DEPAKOTE   TAKE 1 TABLET EVERY MORNING AND 3 TABLETS EVERY NIGHT AT BEDTIME      estazolam 2 MG tablet  Commonly known as: PROSOM   2 mg, Oral, Nightly      estradiol 0.5 MG tablet  Commonly known as: ESTRACE   0.5 mg, Oral, Daily      furosemide 20 MG tablet  Commonly known as: LASIX   20 mg, Oral, Daily      gabapentin 600 MG tablet  Commonly known as: NEURONTIN   600 mg, Oral, 4 Times Daily      glucose blood test strip   1 each, Other, 3 Times Daily Before " Meals      glucose monitor monitoring kit   Subcutaneous, 3 Times Daily Before Meals      Insulin Glargine 100 UNIT/ML injection pen  Commonly known as: LANTUS SOLOSTAR   35 Units, Subcutaneous, Nightly      Insulin Pen Needle 31G X 8 MM misc  Commonly known as: B-D ULTRAFINE III SHORT PEN   Inject 1 applicator under the skin 2 (Two) Times a Day.      B-D ULTRAFINE III SHORT PEN 31G X 8 MM misc  Generic drug: Insulin Pen Needle   USE AS DIRECTED THREE TIMES DAILY      Lancet Device misc   Use to check blood sugars three times daily before meals.      levothyroxine 88 MCG tablet  Commonly known as: SYNTHROID, LEVOTHROID   88 mcg, Oral, Daily      omeprazole 40 MG capsule  Commonly known as: priLOSEC   40 mg, Oral, Daily      prazosin 2 MG capsule  Commonly known as: MINIPRESS   Take 1 capsule by mouth Every Night.      promethazine 25 MG tablet  Commonly known as: PHENERGAN   1 tablet, Oral, Every 12 Hours PRN             Stop These Medications      empagliflozin 10 MG tablet tablet  Commonly known as: Jardiance              Allergies   Allergen Reactions    Lipitor [Atorvastatin] Myalgia    Lortab [Hydrocodone-Acetaminophen]     Rosuvastatin     Hydrocodone Nausea Only    Ibuprofen Nausea Only    Ondansetron Itching         Discharge Disposition:  Home or Self Care    Diet:  Hospital:  Diet Order   Procedures    Diet: Regular/House, Diabetic, Renal; Consistent Carbohydrate; Low Sodium (2-3g), Low Potassium, Low Phosphorus; Fluid Consistency: Thin (IDDSI 0)            Activity:      Restrictions or Other Recommendations:         CODE STATUS:    Code Status and Medical Interventions: No CPR (Do Not Attempt to Resuscitate); Limited Support; No cardioversion; Daughter (POA)   Ordered at: 07/27/24 0219     Medical Intervention Limits:    No cardioversion     Code Status (Patient has no pulse and is not breathing):    No CPR (Do Not Attempt to Resuscitate)     Medical Interventions (Patient has pulse or is breathing):     Limited Support     Comments:    Daughter (POA)       Future Appointments   Date Time Provider Department Center   8/12/2024 11:00 AM Micki Cottrell APRN [facility]       Additional Instructions for the Follow-ups that You Need to Schedule       Ambulatory Referral to Home Health   As directed      Face to Face Visit Date: 8/5/2024   Follow-up provider for Plan of Care?: I treated the patient in an acute care facility and will not continue treatment after discharge.   Follow-up provider: ROLAN ZARATE [[id number]]   Reason/Clinical Findings: S/ P hospital stay   Describe mobility limitations that make leaving home difficult: Impaired gait, Balance, endurance, and mobility   Nursing/Therapeutic Services Requested: Skilled Nursing Physical Therapy Occupational Therapy   Skilled nursing orders: COPD management   PT orders: Transfer training Strengthening Home safety assessment   Occupational orders: Activities of daily living Energy conservation Strengthening   Frequency: 1 Week 1                      Richard Ocampo MD  08/05/24      Time Spent on Discharge:  I spent  35  minutes on this discharge activity which included: face-to-face encounter with the patient, reviewing the data in the system, coordination of the care with the nursing staff as well as consultants, documentation, and entering orders.

## 2024-08-05 NOTE — OUTREACH NOTE
Prep Survey      Flowsheet Row Responses   [facility] patient discharged from? [place]   Is LACE score < 7 ? No   Eligibility [facility]   Date of Admission 07/26/24   Date of Discharge 08/05/24   Discharge Disposition Home-Health Care [facility]   Discharge diagnosis Severe sepsis   Does the patient have one of the following disease processes/diagnoses(primary or secondary)? Sepsis   Does the patient have Home health ordered? Yes   What is the Home health agency?  [facility]   Prep survey completed? Yes            Mary Jo JIANG - Registered Nurse

## 2024-08-05 NOTE — CASE MANAGEMENT/SOCIAL WORK
Case Management Discharge Note      Final Note: Spoke with patient and CM called Daughter by phone. We discussed patient discharge plan today. Patient is going home to daughter house at [address] in [place]. CM obtained transport through [employer] at 12:30 pm. Patient discharge plan is home with [facility] for PT, OT and SN. IMM letter given at bedside. There is no other discharge needs at this time.         Selected Continued Care - Admitted Since 7/26/2024       Destination    No services have been selected for the patient.                Durable Medical Equipment    No services have been selected for the patient.                Dialysis/Infusion    No services have been selected for the patient.                Home Medical Care Coordination complete.      Service Provider Selected Services Address Phone Fax Patient Preferred    [facility] ,  [facility] [address], [address], [place] [address] [phone] --              Therapy    No services have been selected for the patient.                Community Resources    No services have been selected for the patient.                Community & DME    No services have been selected for the patient.                         Final Discharge Disposition Code: 01 - home or self-care

## 2024-08-05 NOTE — PAYOR COMM NOTE
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     Urine Culture No growth    Urine Culture - Urine, Urine, Clean Catch [[id number]]  (Abnormal)  (Susceptibility) Collected: 07/26/24 2229    Lab Status: Final result Specimen: Urine, Clean Catch Updated: 07/29/24 0927     Urine Culture >100,000 CFU/mL Escherichia coli    Narrative:      Colonization of the urinary tract without infection is common. Treatment is discouraged unless the patient is symptomatic, pregnant, or undergoing an invasive urologic procedure.    Susceptibility        Escherichia coli      LOREE      Amoxicillin + Clavulanate Susceptible      Ampicillin Resistant      Ampicillin + Sulbactam Intermediate      Cefazolin Susceptible      Cefepime Susceptible      Ceftazidime Susceptible      Ceftriaxone Susceptible      Gentamicin Susceptible      Levofloxacin Susceptible      Nitrofurantoin Susceptible       Piperacillin + Tazobactam Susceptible      Trimethoprim + Sulfamethoxazole Susceptible                           Blood Culture - Blood, Arm, Left [[id number]]  (Abnormal)  (Susceptibility) Collected: 07/26/24 2228    Lab Status: Final result Specimen: Blood from Arm, Left Updated: 07/30/24 0629     Blood Culture Escherichia coli     Isolated from Aerobic and Anaerobic Bottles     Gram Stain Aerobic Bottle Gram negative bacilli      Anaerobic Bottle Gram negative bacilli    Narrative:      Less than seven (7) mL's of blood was collected.  Insufficient quantity may yield false negative results.    Susceptibility        Escherichia coli      LOREE      Amoxicillin + Clavulanate Susceptible      Ampicillin Resistant      Ampicillin + Sulbactam Intermediate      Cefepime Susceptible      Ceftazidime Susceptible      Ceftriaxone Susceptible      Gentamicin Susceptible      Levofloxacin Susceptible      Piperacillin + Tazobactam Susceptible      Trimethoprim + Sulfamethoxazole Susceptible                       Susceptibility Comments       Escherichia coli    Cefazolin sensitivity will not be reported for Enterobacteriaceae in non-urine isolates. If cefazolin is preferred, please call the microbiology lab to request an E-test.  With the exception of urinary-sourced infections, aminoglycosides should not be used as monotherapy.               Blood Culture ID, PCR - Blood, Arm, Left [[id number]]  (Abnormal) Collected: 07/26/24 2228    Lab Status: Final result Specimen: Blood from Arm, Left Updated: 07/27/24 2024     BCID, PCR Escherichia coli. Identification by BCID2 PCR.     BOTTLE TYPE Aerobic Bottle    Narrative:      No resistance genes detected.    Blood Culture - Blood, Arm, Right [[id number]]  (Abnormal) Collected: 07/26/24 2222    Lab Status: Final result Specimen: Blood from Arm, Right Updated: 07/30/24 0629     Blood Culture Escherichia coli     Isolated from Aerobic and Anaerobic Bottles     Gram Stain Aerobic Bottle  Gram negative bacilli      Anaerobic Bottle Gram negative bacilli    Narrative:      Refer to previous blood culture collected on 07/26/2024 2228 for MICs              Adult Transthoracic Echo Complete W/ Cont if Necessary Per Protocol    Result Date: 7/30/2024    Left ventricular systolic function is normal. Estimated left ventricular EF = 55%   Left ventricular wall thickness is consistent with mild concentric hypertrophy.   No hemodynamically significant valvular heart disease     Duplex Renal Artery - Bilateral Complete CAR    Result Date: 7/28/2024    Normal right renal artery.   Normal left renal artery.     XR Shoulder 2+ View Left    Result Date: 7/27/2024  XR SHOULDER 2+ VW LEFT Date of Exam: 7/27/2024 3:48 PM EDT Indication: shoulder pain Comparison: Left shoulder radiographs 2/7/2017 Findings: There is likely calcification adjacent to the greater tuberosity compatible with rotator cuff calcific tendinosis. There are some enthesopathic changes of the greater tuberosity. There is mild degenerative change of the glenohumeral and acromioclavicular  joints. No high-grade narrowing of the subacromial space. No acute fracture or traumatic malalignment. Unremarkable appearance of the left chest. There is degenerative disc disease and facet arthropathy in the partially imaged cervical spine.     Impression: Calcification adjacent to the greater tuberosity likely reflects rotator cuff calcific tendinosis. There are some enthesopathic changes of the greater tuberosity and correlate for any rotator cuff pathology. No high-grade narrowing of the subacromial space. Mild degenerative changes. No acute osseous findings. Electronically Signed: José Smyth MD  7/27/2024 5:04 PM EDT  Workstation ID: [id number]    CT Abdomen Pelvis Without Contrast    Result Date: 7/27/2024  CT ABDOMEN PELVIS WO CONTRAST CT CHEST WO CONTRAST DIAGNOSTIC Date of Exam: 7/26/2024 11:43 PM EDT Indication: sepsis, fall. Comparison: 8/18/2023.  Technique: Axial CT images were obtained of the chest, abdomen and pelvis without the administration of contrast. Reconstructed coronal and sagittal images were also obtained. Automated exposure control and iterative construction methods were used. Findings: Chest: The thyroid, trachea and esophagus appear within normal limits. There is a small hiatal hernia. Heart is mildly enlarged. There are mild coronary artery and aortic calcifications. No pericardial effusion or mediastinal lymphadenopathy. There are a  few calcified mediastinal granulomas. No acute findings in the superficial soft tissues. There is no acute osseous abnormality or destructive bone lesion. Minimal thoracic degenerative changes are present. There is mild paraseptal apical emphysema. No pneumothorax, pleural effusion or focal airspace consolidation. Mild dependent bibasilar atelectasis. Abdomen and pelvis: There is mild diffuse muscular atrophy. No acute findings in the superficial soft tissues. No acute osseous abnormality or destructive bone lesion. There are chronic bilateral L5 pars interarticularis defects. There are mild thoracolumbar degenerative changes. Mild DJD at the hips. The liver, gallbladder, bile ducts, pancreas, stomach and spleen appear within normal limits. The adrenal glands are normal. There is prominent bilateral perinephric stranding. Renal parenchyma appears homogeneous. No urolithiasis or hydronephrosis. The kidneys measure significantly larger than on the prior study. There is mild periureteric stranding. Urinary bladder is nondistended. Patient is status post hysterectomy. The ovaries are not seen. The appendix is normal. Small bowel is nondistended. Colon  is unremarkable. There is mild aortoiliac atherosclerotic disease. No ascites, pneumoperitoneum or lymphadenopathy.     Impression: 1.There is prominent bilateral perinephric stranding and periureteric stranding. The kidneys measure significantly larger than on the  prior study. Pyelonephritis is the primary concern. 2.No acute findings in the chest. 3.Mild cardiomegaly and mild coronary artery disease. 4.Mild emphysema. 5.Small hiatal hernia. 6.Chronic bilateral L5 pars interarticularis defects. Electronically Signed: Con Solomon MD  7/27/2024 12:03 AM EDT  Workstation ID: [id number]    CT Chest Without Contrast Diagnostic    Result Date: 7/27/2024  CT ABDOMEN PELVIS WO CONTRAST CT CHEST WO CONTRAST DIAGNOSTIC Date of Exam: 7/26/2024 11:43 PM EDT Indication: sepsis, fall. Comparison: 8/18/2023. Technique: Axial CT images were obtained of the chest, abdomen and pelvis without the administration of contrast. Reconstructed coronal and sagittal images were also obtained. Automated exposure control and iterative construction methods were used. Findings: Chest: The thyroid, trachea and esophagus appear within normal limits. There is a small hiatal hernia. Heart is mildly enlarged. There are mild coronary artery and aortic calcifications. No pericardial effusion or mediastinal lymphadenopathy. There are a  few calcified mediastinal granulomas. No acute findings in the superficial soft tissues. There is no acute osseous abnormality or destructive bone lesion. Minimal thoracic degenerative changes are present. There is mild paraseptal apical emphysema. No pneumothorax, pleural effusion or focal airspace consolidation. Mild dependent bibasilar atelectasis. Abdomen and pelvis: There is mild diffuse muscular atrophy. No acute findings in the superficial soft tissues. No acute osseous abnormality or destructive bone lesion. There are chronic bilateral L5 pars interarticularis defects. There are mild thoracolumbar degenerative changes. Mild DJD at the hips. The liver, gallbladder, bile ducts, pancreas, stomach and spleen appear within normal limits. The adrenal glands are normal. There is prominent bilateral perinephric stranding. Renal parenchyma appears homogeneous. No urolithiasis or  hydronephrosis. The kidneys measure significantly larger than on the prior study. There is mild periureteric stranding. Urinary bladder is nondistended. Patient is status post hysterectomy. The ovaries are not seen. The appendix is normal. Small bowel is nondistended. Colon  is unremarkable. There is mild aortoiliac atherosclerotic disease. No ascites, pneumoperitoneum or lymphadenopathy.     Impression: 1.There is prominent bilateral perinephric stranding and periureteric stranding. The kidneys measure significantly larger than on the prior study. Pyelonephritis is the primary concern. 2.No acute findings in the chest. 3.Mild cardiomegaly and mild coronary artery disease. 4.Mild emphysema. 5.Small hiatal hernia. 6.Chronic bilateral L5 pars interarticularis defects. Electronically Signed: Con Solomon MD  7/27/2024 12:03 AM EDT  Workstation ID: [id number]    CT Cervical Spine Without Contrast    Result Date: 7/26/2024  CT CERVICAL SPINE WO CONTRAST Date of Exam: 7/26/2024 11:43 PM EDT Indication: fall. Comparison: 8/21/2017. Technique: Axial CT images were obtained of the cervical spine without contrast administration.  Reconstructed coronal and sagittal images were also obtained. Automated exposure control and iterative construction methods were used. Findings: 7 cervical vertebrae are identified. There is normal alignment. No acute fracture or subluxation. The odontoid process, C1 ring and craniocervical junction appear intact. There is mild multilevel marginal osteophyte formation. No high-grade neuroforaminal or spinal canal osseous stenosis. Limited view of the posterior fossa contents is unremarkable. No neck mass or adenopathy. Thyroid is homogeneous. Minimal paraseptal emphysema in the lung apices.     Impression: 1.No acute osseous abnormality. 2.Mild multilevel disc degeneration. 3.Minimal paraseptal emphysema. Electronically Signed: Con Solomon MD  7/26/2024 11:57 PM EDT  Workstation ID: [id number]    CT  Head Without Contrast    Result Date: 7/26/2024  CT HEAD WO CONTRAST Date of Exam: 7/26/2024 11:43 PM EDT Indication: fall. Comparison: 6/29/2023. Technique: Axial CT images were obtained of the head without contrast administration.  Automated exposure control and iterative construction methods were used. Findings: Superficial soft tissues appear within normal limits. The calvarium is intact. There is mild multifocal paranasal sinus mucosal disease. The mastoids are underpneumatized with small effusions. Orbits are unremarkable.  There is no acute intracranial hemorrhage.  No mass effect or midline shift.  No abnormal extra-axial collections.  Gray-white differentiation is within normal limits. There is stable minimal patchy white matter hypoattenuation. Ventricular size and configuration is normal for age.     Impression: 1.No acute intracranial abnormality. 2.Mild paranasal sinus mucosal disease. Electronically Signed: Con Solomon MD  7/26/2024 11:53 PM EDT  Workstation ID: [id number]    XR Chest 1 View    Result Date: 7/26/2024  XR CHEST 1 VW Date of Exam: 7/26/2024 10:35 PM EDT Indication: Weak/Dizzy/AMS triage protocol Comparison: 11/5/2023. Findings: There is stable volume loss in the left lung base without definite new opacity. Right lung remains clear. Heart size is stable. No pneumothorax or pleural effusion. Mild aortic arch calcifications. No acute osseous abnormality.     Impression: Stable left basilar volume loss. No acute findings. Electronically Signed: Con Solomon MD  7/26/2024 11:23 PM EDT  Workstation ID: [id number]     Results for orders placed during the hospital encounter of 07/26/24    Duplex Renal Artery - Bilateral Complete CAR    Interpretation Summary    Normal right renal artery.    Normal left renal artery.      Results for orders placed during the hospital encounter of 07/26/24    Duplex Renal Artery - Bilateral Complete CAR    Interpretation Summary    Normal right renal artery.     "Normal left renal artery.      Results for orders placed during the hospital encounter of 07/26/24    Adult Transthoracic Echo Complete W/ Cont if Necessary Per Protocol    Interpretation Summary    Left ventricular systolic function is normal. Estimated left ventricular EF = 55%    Left ventricular wall thickness is consistent with mild concentric hypertrophy.    No hemodynamically significant valvular heart disease      Plan for Follow-up of Pending Labs/Results:     Discharge Details        Discharge Medications        New Medications        Instructions Start Date   cefuroxime 500 MG tablet  Commonly known as: CEFTIN   500 mg, Oral, 2 Times Daily   Start Date: August 6, 2024            Changes to Medications        Instructions Start Date   dicyclomine 10 MG capsule  Commonly known as: BENTYL  What changed: when to take this   TAKE 1 CAPSULE BY MOUTH FOUR TIMES DAILY BEFORE MEALS AND AT BEDTIME      Incontinence Brief Large misc  What changed:   when to take this  reasons to take this   1 each, Does not apply, 2 Times Daily      Disposable Underpads 30\"x36\" misc  What changed: Another medication with the same name was changed. Make sure you understand how and when to take each.   1 each, Does not apply, Nightly             Continue These Medications        Instructions Start Date   Adjust Bath/Shower Seat misc   1 each, Does not apply, Daily      Adjust Bath/Shower Seat/Back misc   1 each, Does not apply, Daily      Roller Walker misc   1 Device, Does not apply, Daily, Rollator walker      albuterol sulfate  (90 Base) MCG/ACT inhaler  Commonly known as: PROVENTIL HFA;VENTOLIN HFA;PROAIR HFA   2 puffs, Inhalation, Every 6 Hours PRN      aspirin 81 MG EC tablet   81 mg, Oral, Daily      bethanechol 25 MG tablet  Commonly known as: URECHOLINE   25 mg, Oral, 3 Times Daily      clonazePAM 2 MG tablet  Commonly known as: KlonoPIN   2 mg, Oral, 2 Times Daily      cyclobenzaprine 10 MG tablet  Commonly known as: " FLEXERIL   TAKE 1 TABLET BY MOUTH THREE TIMES DAILY AS NEEDED FOR MUSCLE SPASMS      Dexcom G7  device   1 each, Does not apply, Daily      Dexcom G7 Sensor misc   1 each, Does not apply, Every 10 Days      divalproex 500 MG DR tablet  Commonly known as: DEPAKOTE   TAKE 1 TABLET EVERY MORNING AND 3 TABLETS EVERY NIGHT AT BEDTIME      estazolam 2 MG tablet  Commonly known as: PROSOM   2 mg, Oral, Nightly      estradiol 0.5 MG tablet  Commonly known as: ESTRACE   0.5 mg, Oral, Daily      furosemide 20 MG tablet  Commonly known as: LASIX   20 mg, Oral, Daily      gabapentin 600 MG tablet  Commonly known as: NEURONTIN   600 mg, Oral, 4 Times Daily      glucose blood test strip   1 each, Other, 3 Times Daily Before Meals      glucose monitor monitoring kit   Subcutaneous, 3 Times Daily Before Meals      Insulin Glargine 100 UNIT/ML injection pen  Commonly known as: LANTUS SOLOSTAR   35 Units, Subcutaneous, Nightly      Insulin Pen Needle 31G X 8 MM misc  Commonly known as: B-D ULTRAFINE III SHORT PEN   Inject 1 applicator under the skin 2 (Two) Times a Day.      B-D ULTRAFINE III SHORT PEN 31G X 8 MM misc  Generic drug: Insulin Pen Needle   USE AS DIRECTED THREE TIMES DAILY      Lancet Device misc   Use to check blood sugars three times daily before meals.      levothyroxine 88 MCG tablet  Commonly known as: SYNTHROID, LEVOTHROID   88 mcg, Oral, Daily      omeprazole 40 MG capsule  Commonly known as: priLOSEC   40 mg, Oral, Daily      prazosin 2 MG capsule  Commonly known as: MINIPRESS   Take 1 capsule by mouth Every Night.      promethazine 25 MG tablet  Commonly known as: PHENERGAN   1 tablet, Oral, Every 12 Hours PRN             Stop These Medications      empagliflozin 10 MG tablet tablet  Commonly known as: Jardiance              Allergies   Allergen Reactions    Lipitor [Atorvastatin] Myalgia    Lortab [Hydrocodone-Acetaminophen]     Rosuvastatin     Hydrocodone Nausea Only    Ibuprofen Nausea Only     Ondansetron Itching         Discharge Disposition:  Home or Self Care    Diet:  Hospital:  Diet Order   Procedures    Diet: Regular/House, Diabetic, Renal; Consistent Carbohydrate; Low Sodium (2-3g), Low Potassium, Low Phosphorus; Fluid Consistency: Thin (IDDSI 0)            Activity:      Restrictions or Other Recommendations:         CODE STATUS:    Code Status and Medical Interventions: No CPR (Do Not Attempt to Resuscitate); Limited Support; No cardioversion; Daughter (POA)   Ordered at: 07/27/24 0219     Medical Intervention Limits:    No cardioversion     Code Status (Patient has no pulse and is not breathing):    No CPR (Do Not Attempt to Resuscitate)     Medical Interventions (Patient has pulse or is breathing):    Limited Support     Comments:    Daughter (POA)       Future Appointments   Date Time Provider Department Center   8/12/2024 11:00 AM Micki Cottrell APRN [facility]       Additional Instructions for the Follow-ups that You Need to Schedule       Ambulatory Referral to Home Health   As directed      Face to Face Visit Date: 8/5/2024   Follow-up provider for Plan of Care?: I treated the patient in an acute care facility and will not continue treatment after discharge.   Follow-up provider: ROLAN ZARATE [[id number]]   Reason/Clinical Findings: S/ P hospital stay   Describe mobility limitations that make leaving home difficult: Impaired gait, Balance, endurance, and mobility   Nursing/Therapeutic Services Requested: Skilled Nursing Physical Therapy Occupational Therapy   Skilled nursing orders: COPD management   PT orders: Transfer training Strengthening Home safety assessment   Occupational orders: Activities of daily living Energy conservation Strengthening   Frequency: 1 Week 1                      Richard Ocampo MD  08/05/24      Time Spent on Discharge:  I spent  35  minutes on this discharge activity which included: face-to-face encounter with the patient, reviewing the data  in the system, coordination of the care with the nursing staff as well as consultants, documentation, and entering orders.            Electronically signed by Richard Ocampo MD at 08/05/24 1076

## 2024-08-06 ENCOUNTER — TRANSITIONAL CARE MANAGEMENT TELEPHONE ENCOUNTER (OUTPATIENT)
Dept: CALL CENTER | Facility: HOSPITAL | Age: 64
End: 2024-08-06
Payer: MEDICARE

## 2024-08-06 NOTE — OUTREACH NOTE
Call Center TCM Note      Flowsheet Row Responses   [facility] patient discharged from? [place]   Does the patient have one of the following disease processes/diagnoses(primary or secondary)? Sepsis   TCM attempt successful? No  [VR listing Crystal]   Unsuccessful attempts Attempt 1   Call Status Left message            Kaylie Roa RN    8/6/2024, 13:25 EDT

## 2024-08-06 NOTE — OUTREACH NOTE
Call Center TCM Note      Flowsheet Row Responses   [facility] [place] patient discharged from? [place]   Does the patient have one of the following disease processes/diagnoses(primary or secondary)? Sepsis   TCM attempt successful? No   Unsuccessful attempts Attempt 2            Kaylie Roa RN    8/6/2024, 16:40 EDT

## 2024-08-07 ENCOUNTER — TRANSITIONAL CARE MANAGEMENT TELEPHONE ENCOUNTER (OUTPATIENT)
Dept: CALL CENTER | Facility: HOSPITAL | Age: 64
End: 2024-08-07
Payer: MEDICARE

## 2024-08-07 NOTE — OUTREACH NOTE
Call Center TCM Note      Flowsheet Row Responses   [facility] patient discharged from? [place]   Does the patient have one of the following disease processes/diagnoses(primary or secondary)? Sepsis   TCM attempt successful? No   Unsuccessful attempts Attempt 3            Mary Jo Paredes RN    8/7/2024, 14:46 EDT

## 2024-09-10 DIAGNOSIS — E11.51 TYPE 2 DIABETES MELLITUS WITH DIABETIC PERIPHERAL ANGIOPATHY WITHOUT GANGRENE, WITH LONG-TERM CURRENT USE OF INSULIN: Chronic | ICD-10-CM

## 2024-09-10 DIAGNOSIS — Z79.4 TYPE 2 DIABETES MELLITUS WITH DIABETIC PERIPHERAL ANGIOPATHY WITHOUT GANGRENE, WITH LONG-TERM CURRENT USE OF INSULIN: Chronic | ICD-10-CM

## 2024-09-10 RX ORDER — BLOOD SUGAR DIAGNOSTIC
STRIP MISCELLANEOUS
Qty: 100 EACH | Refills: 1 | Status: SHIPPED | OUTPATIENT
Start: 2024-09-10

## 2024-09-10 NOTE — TELEPHONE ENCOUNTER
Rx Refill Note  Requested Prescriptions     Pending Prescriptions Disp Refills    OneTouch Ultra test strip [Pharmacy Med Name: ONE TOUCH ULTRA BLUE TESTST(NEW)100]       Sig: USE TO TEST BLOOD SUGAR THREE TIMES DAILY AFTER MEALS      Last office visit with prescribing clinician: 10/2/2023   Last telemedicine visit with prescribing clinician: Visit date not found   Next office visit with prescribing clinician: Visit date not found                         Would you like a call back once the refill request has been completed: [] Yes [] No    If the office needs to give you a call back, can they leave a voicemail: [] Yes [] No    Catherine Patiño MA  09/10/24, 13:09 EDT

## 2024-10-24 NOTE — TELEPHONE ENCOUNTER
Cardiology Daily Note Navjot Mac MD      Patient:  Calvin Morris  [id number]    Patient Active Problem List    Diagnosis Date Noted    Unstable angina (HCC)     Dyspnea on exertion 10/18/2024    New onset of congestive heart failure (HCC) 10/18/2024    New onset a-fib (HCC) 10/18/2024    Gout 10/18/2024    Restless leg syndrome 10/18/2024    Bradycardia, unspecified 10/18/2024    Bilateral carotid artery stenosis 02/28/2019    Skin cancer 02/22/2019    Colonic polyp 07/06/2018    Dysphagia 07/06/2018    GERD (gastroesophageal reflux disease) 07/06/2018    Dyslipidemia 07/03/2018    Irregular heart beat 10/24/2017    Acute blood loss anemia 12/12/2015    Cerebrovascular disease 12/12/2015    Osteoarthritis of right knee 12/12/2015    Coronary artery disease involving native coronary artery 12/11/2015    Essential hypertension 12/11/2015       Admit Date:  10/18/2024    Admission Problem List: Present on Admission:   Dyspnea on exertion   Essential hypertension   Dyslipidemia   New onset of congestive heart failure (HCC)   New onset a-fib (HCC)   Gout   Restless leg syndrome      Cardiac Specific Data:  Specialty Problems          Cardiology Problems    Unstable angina (HCC)        Coronary artery disease involving native coronary artery        Essential hypertension        Cerebrovascular disease        Bilateral carotid artery stenosis        Bradycardia, unspecified        New onset a-fib (HCC)        New onset of congestive heart failure (HCC)           Subjective:  Mr. Morris seen today resting comfortably blood pressure 136/78 heart 73.  Underwent implantation of single-chamber Micra leadless pacemaker yesterday awaiting interrogation by rep this morning if stable could be discharged later today.  Recommend restarting atenolol 25 mg daily and titrate as needed.  Would also recommend anticoagulation with Eliquis 2.5 mg p.o. twice daily as he has what appears to be chronic atrial fibrillation.  No other  rx'd by [facility], please advise on refill.    complaints or issues reported.  Sutures in his right groin need to be removed.    Objective:   /78   Pulse 73   Temp 98.1 °F (36.7 °C)   Resp 16   Ht 1.727 m (5' 8\")   Wt 59.4 kg (131 lb)   SpO2 97%   BMI 19.92 kg/m²       Intake/Output Summary (Last 24 hours) at 10/24/2024 0842  Last data filed at 10/24/2024 0417  Gross per 24 hour   Intake 200 ml   Output 960 ml   Net -760 ml       Prior to Admission medications    Medication Sig Start Date End Date Taking? Authorizing Provider   allopurinol (ZYLOPRIM) 100 MG tablet  6/24/24   Ebenezer Grimes MD   tobramycin (TOBREX) 0.3 % ophthalmic solution  2/12/24   Ebenezer Grimes MD   clopidogrel (PLAVIX) 75 MG tablet TAKE 1 TABLET BY MOUTH EVERY DAY 4/25/22   Vanessa Pickett, APRN - NP   Multiple Vitamins-Minerals (ICAPS AREDS 2 PO) Take 1 tablet by mouth daily     Ebenezer Grimes MD   amLODIPine (NORVASC) 5 MG tablet Take 1 tablet by mouth daily    Ebenezer Grimes MD   atenolol (TENORMIN) 50 MG tablet Take 1 tablet by mouth daily    Ebenezer Grimes MD   carbidopa-levodopa (SINEMET)  MG per tablet Take 1 tablet by mouth nightly    Ebenezer Grimes MD   Cinnamon 500 MG CAPS Take 1 capsule by mouth nightly    Ebenezer Grimes MD        guaiFENesin  600 mg Oral TID    ferric gluconate  125 mg IntraVENous Daily    [Held by provider] atenolol  25 mg Oral Daily    allopurinol  100 mg Oral Daily    amLODIPine  5 mg Oral Daily    carbidopa-levodopa  1 tablet Oral Nightly    clopidogrel  75 mg Oral Daily    sodium chloride flush  5-40 mL IntraVENous 2 times per day    [Held by provider] enoxaparin  1 mg/kg SubCUTAneous BID       TELEMETRY: Atrial fibrillation     Physical Exam:      Physical Exam      General:  Awake, alert, NAD  Skin:  Warm and dry  Neck:  no jvd , no carotid bruits  Chest:  Clear to auscultation, no wheezing or rales  Cardiovascular:  IRRR S1S2 no murmurs, clicks, gallups, or rubs  Abdomen:  Soft nontender,

## 2025-01-27 NOTE — TELEPHONE ENCOUNTER
Patient was no show to her appt with you on 12.16.21. I tried calling patient again to get her scheduled for egd but vm is full. Can we close since we have not been able to get a hold of patient.   
Reyes, Madeleine (Physician Assistant)

## 2025-05-27 ENCOUNTER — DOCUMENTATION (OUTPATIENT)
Dept: FAMILY MEDICINE CLINIC | Facility: CLINIC | Age: 65
End: 2025-05-27
Payer: MEDICARE

## 2025-05-27 RX ORDER — AMLODIPINE BESYLATE 10 MG/1
10 TABLET ORAL DAILY
COMMUNITY

## 2025-05-27 RX ORDER — ROSUVASTATIN CALCIUM 20 MG/1
20 TABLET, COATED ORAL DAILY
COMMUNITY

## 2025-05-27 RX ORDER — ATORVASTATIN CALCIUM 40 MG/1
40 TABLET, FILM COATED ORAL DAILY
COMMUNITY

## 2025-05-27 RX ORDER — NICOTINE 21 MG/24HR
1 PATCH, TRANSDERMAL 24 HOURS TRANSDERMAL EVERY 24 HOURS
COMMUNITY

## 2025-05-27 RX ORDER — INSULIN LISPRO 100 [IU]/ML
INJECTION, SOLUTION INTRAVENOUS; SUBCUTANEOUS
COMMUNITY

## 2025-05-27 RX ORDER — HYDROXYZINE HYDROCHLORIDE 50 MG/1
50 TABLET, FILM COATED ORAL EVERY 6 HOURS PRN
COMMUNITY

## 2025-05-27 RX ORDER — TRAZODONE HYDROCHLORIDE 100 MG/1
200 TABLET ORAL NIGHTLY
COMMUNITY

## 2025-05-27 RX ORDER — ESTRADIOL 0.1 MG/G
2 CREAM VAGINAL DAILY
COMMUNITY

## 2025-05-27 RX ORDER — PHENOL 1.4 %
10 AEROSOL, SPRAY (ML) MUCOUS MEMBRANE NIGHTLY PRN
COMMUNITY

## 2025-05-27 RX ORDER — DOCUSATE SODIUM 100 MG/1
100 CAPSULE, LIQUID FILLED ORAL 2 TIMES DAILY
COMMUNITY

## 2025-05-27 RX ORDER — ACETAMINOPHEN 500 MG
500 TABLET ORAL 3 TIMES DAILY
COMMUNITY

## 2025-05-27 RX ORDER — PRAZOSIN HYDROCHLORIDE 1 MG/1
1 CAPSULE ORAL DAILY
COMMUNITY

## 2025-05-27 RX ORDER — PANTOPRAZOLE SODIUM 40 MG/1
40 TABLET, DELAYED RELEASE ORAL DAILY
COMMUNITY

## 2025-05-27 RX ORDER — OXYCODONE HYDROCHLORIDE 10 MG/1
10 TABLET ORAL EVERY 4 HOURS PRN
COMMUNITY

## 2025-05-27 RX ORDER — LIDOCAINE 4 G/G
1 PATCH TOPICAL 2 TIMES DAILY
COMMUNITY

## 2025-06-06 RX ORDER — LEVOTHYROXINE SODIUM 25 UG/1
25 TABLET ORAL EVERY MORNING
Qty: 90 TABLET | OUTPATIENT
Start: 2025-06-06

## 2025-06-06 RX ORDER — AMLODIPINE BESYLATE 5 MG/1
5 TABLET ORAL DAILY
Qty: 90 TABLET | OUTPATIENT
Start: 2025-06-06

## 2025-06-06 RX ORDER — PANTOPRAZOLE SODIUM 40 MG/1
TABLET, DELAYED RELEASE ORAL
Qty: 90 TABLET | OUTPATIENT
Start: 2025-06-06

## 2025-06-06 RX ORDER — PRAZOSIN HYDROCHLORIDE 1 MG/1
1 CAPSULE ORAL DAILY
Qty: 90 CAPSULE | OUTPATIENT
Start: 2025-06-06

## 2025-06-16 ENCOUNTER — TELEPHONE (OUTPATIENT)
Dept: FAMILY MEDICINE CLINIC | Facility: CLINIC | Age: 65
End: 2025-06-16
Payer: MEDICARE

## 2025-06-16 NOTE — TELEPHONE ENCOUNTER
Caller: [id number] - [place], [place] - [address] AT [facility] - [phone]  - [phone]    Relationship: Pharmacy    Best call back number: [phone]    Requested Prescriptions:   Requested Prescriptions      No prescriptions requested or ordered in this encounter      ONE TOUCH ULTRA 2 METER - THE PATIENT STATES THE ONE THEY HAD IS BROKEN    Pharmacy where request should be sent:    [id number] - [place], [place] - [address] AT [facility] - [phone]  - [phone]       Last office visit with prescribing clinician: 10/2/2023   Last telemedicine visit with prescribing clinician: Visit date not found   Next office visit with prescribing clinician: Visit date not found     Additional details provided by patient: PT NEEDS NEW ONE STATES IT IS BROKEN    Does the patient have less than a 3 day supply:  [x] Yes  [] No    Would you like a call back once the refill request has been completed: [x] Yes [] No    If the office needs to give you a call back, can they leave a voicemail: [x] Yes [] No    Fahad Briggs Rep   06/16/25 13:24 EDT

## 2025-06-26 ENCOUNTER — TRANSCRIBE ORDERS (OUTPATIENT)
Dept: LAB | Facility: HOSPITAL | Age: 65
End: 2025-06-26
Payer: MEDICARE

## 2025-06-26 ENCOUNTER — LAB (OUTPATIENT)
Dept: LAB | Facility: HOSPITAL | Age: 65
End: 2025-06-26
Payer: MEDICARE

## 2025-06-26 DIAGNOSIS — B96.29 NON-SHIGA TOXIN-PRODUCING E. COLI: ICD-10-CM

## 2025-06-26 DIAGNOSIS — N15.1 RENAL AND PERINEPHRIC ABSCESS: ICD-10-CM

## 2025-06-26 DIAGNOSIS — N15.1 RENAL AND PERINEPHRIC ABSCESS: Primary | ICD-10-CM

## 2025-06-26 LAB
ANION GAP SERPL CALCULATED.3IONS-SCNC: 12.1 MMOL/L (ref 5–15)
BASOPHILS # BLD AUTO: 0.04 10*3/MM3 (ref 0–0.2)
BASOPHILS NFR BLD AUTO: 0.6 % (ref 0–1.5)
BUN SERPL-MCNC: 18.3 MG/DL (ref 8–23)
BUN/CREAT SERPL: 18.1 (ref 7–25)
CALCIUM SPEC-SCNC: 9.1 MG/DL (ref 8.6–10.5)
CHLORIDE SERPL-SCNC: 103 MMOL/L (ref 98–107)
CO2 SERPL-SCNC: 21.9 MMOL/L (ref 22–29)
CREAT SERPL-MCNC: 1.01 MG/DL (ref 0.57–1)
CRP SERPL-MCNC: <0.3 MG/DL (ref 0–0.5)
DEPRECATED RDW RBC AUTO: 65.8 FL (ref 37–54)
EGFRCR SERPLBLD CKD-EPI 2021: 63.1 ML/MIN/1.73
EOSINOPHIL # BLD AUTO: 0.17 10*3/MM3 (ref 0–0.4)
EOSINOPHIL NFR BLD AUTO: 2.3 % (ref 0.3–6.2)
ERYTHROCYTE [DISTWIDTH] IN BLOOD BY AUTOMATED COUNT: 19.5 % (ref 12.3–15.4)
GLUCOSE SERPL-MCNC: 140 MG/DL (ref 65–99)
HCT VFR BLD AUTO: 35 % (ref 34–46.6)
HGB BLD-MCNC: 10.8 G/DL (ref 12–15.9)
IMM GRANULOCYTES # BLD AUTO: 0.02 10*3/MM3 (ref 0–0.05)
IMM GRANULOCYTES NFR BLD AUTO: 0.3 % (ref 0–0.5)
LYMPHOCYTES # BLD AUTO: 2.61 10*3/MM3 (ref 0.7–3.1)
LYMPHOCYTES NFR BLD AUTO: 36 % (ref 19.6–45.3)
MCH RBC QN AUTO: 28.6 PG (ref 26.6–33)
MCHC RBC AUTO-ENTMCNC: 30.9 G/DL (ref 31.5–35.7)
MCV RBC AUTO: 92.6 FL (ref 79–97)
MONOCYTES # BLD AUTO: 0.51 10*3/MM3 (ref 0.1–0.9)
MONOCYTES NFR BLD AUTO: 7 % (ref 5–12)
NEUTROPHILS NFR BLD AUTO: 3.89 10*3/MM3 (ref 1.7–7)
NEUTROPHILS NFR BLD AUTO: 53.8 % (ref 42.7–76)
NRBC BLD AUTO-RTO: 0 /100 WBC (ref 0–0.2)
PLATELET # BLD AUTO: 213 10*3/MM3 (ref 140–450)
PMV BLD AUTO: 9.6 FL (ref 6–12)
POTASSIUM SERPL-SCNC: 4.5 MMOL/L (ref 3.5–5.2)
RBC # BLD AUTO: 3.78 10*6/MM3 (ref 3.77–5.28)
RBC MORPH BLD: NORMAL
SMALL PLATELETS BLD QL SMEAR: ADEQUATE
SODIUM SERPL-SCNC: 137 MMOL/L (ref 136–145)
WBC MORPH BLD: NORMAL
WBC NRBC COR # BLD AUTO: 7.24 10*3/MM3 (ref 3.4–10.8)

## 2025-06-26 PROCEDURE — 36415 COLL VENOUS BLD VENIPUNCTURE: CPT

## 2025-06-26 PROCEDURE — 85025 COMPLETE CBC W/AUTO DIFF WBC: CPT

## 2025-06-26 PROCEDURE — 80048 BASIC METABOLIC PNL TOTAL CA: CPT

## 2025-06-26 PROCEDURE — 85007 BL SMEAR W/DIFF WBC COUNT: CPT

## 2025-06-26 PROCEDURE — 86140 C-REACTIVE PROTEIN: CPT

## 2025-07-01 DIAGNOSIS — J44.9 CHRONIC OBSTRUCTIVE PULMONARY DISEASE, UNSPECIFIED COPD TYPE: ICD-10-CM

## 2025-07-01 RX ORDER — ALBUTEROL SULFATE 90 UG/1
INHALANT RESPIRATORY (INHALATION)
Qty: 18 G | OUTPATIENT
Start: 2025-07-01

## 2025-07-01 RX ORDER — PSEUDOEPHED/ACETAMINOPH/DIPHEN 30MG-500MG
1 TABLET ORAL 3 TIMES DAILY
Qty: 90 TABLET | OUTPATIENT
Start: 2025-07-01

## (undated) DEVICE — ENDOGATOR HYBRID TUBING KIT FOR USE WITH ENDOGATOR IRRIGATION PUMP, OLYMPUS PUMP, GI4000 ESU, AND TORRENT IRRIGATION PUMP.: Brand: ENDOGATOR KIT

## (undated) DEVICE — KT BIOGUARD SXN VLV AIR/H20 4PC DISP

## (undated) DEVICE — THE BITE BLOCK MAXI, LATEX FREE STRAP IS USED TO PROTECT THE ENDOSCOPE INSERTION TUBE FROM BEING BITTEN BY THE PATIENT.

## (undated) DEVICE — TUBING, SUCTION, 1/4" X 10', STRAIGHT: Brand: MEDLINE

## (undated) DEVICE — CONTN GRAD MEAS TRIANG 32OZ BLK

## (undated) DEVICE — INTRO ACCSR BLNT TP

## (undated) DEVICE — Device: Brand: DEFENDO AIR/WATER/SUCTION AND BIOPSY VALVE

## (undated) DEVICE — SINGLE-USE BIOPSY FORCEPS: Brand: RADIAL JAW 4

## (undated) DEVICE — SYR LUERLOK 50ML